# Patient Record
Sex: FEMALE | Race: WHITE | NOT HISPANIC OR LATINO | URBAN - METROPOLITAN AREA
[De-identification: names, ages, dates, MRNs, and addresses within clinical notes are randomized per-mention and may not be internally consistent; named-entity substitution may affect disease eponyms.]

---

## 2017-11-17 ENCOUNTER — INPATIENT (INPATIENT)
Facility: HOSPITAL | Age: 72
LOS: 2 days | Discharge: HOME | DRG: 312 | End: 2017-11-20
Attending: INTERNAL MEDICINE | Admitting: INTERNAL MEDICINE
Payer: MEDICARE

## 2017-11-17 VITALS
SYSTOLIC BLOOD PRESSURE: 148 MMHG | DIASTOLIC BLOOD PRESSURE: 77 MMHG | TEMPERATURE: 99 F | RESPIRATION RATE: 19 BRPM | OXYGEN SATURATION: 100 % | HEART RATE: 72 BPM

## 2017-11-17 DIAGNOSIS — J18.1 LOBAR PNEUMONIA, UNSPECIFIED ORGANISM: ICD-10-CM

## 2017-11-17 DIAGNOSIS — R55 SYNCOPE AND COLLAPSE: ICD-10-CM

## 2017-11-17 DIAGNOSIS — Z29.9 ENCOUNTER FOR PROPHYLACTIC MEASURES, UNSPECIFIED: ICD-10-CM

## 2017-11-17 DIAGNOSIS — I10 ESSENTIAL (PRIMARY) HYPERTENSION: ICD-10-CM

## 2017-11-17 LAB
ALBUMIN SERPL ELPH-MCNC: 3.7 G/DL — SIGNIFICANT CHANGE UP (ref 3.5–5)
ALP SERPL-CCNC: 62 U/L — SIGNIFICANT CHANGE UP (ref 40–120)
ALT FLD-CCNC: 23 U/L DA — SIGNIFICANT CHANGE UP (ref 10–60)
ANION GAP SERPL CALC-SCNC: 9 MMOL/L — SIGNIFICANT CHANGE UP (ref 5–17)
APTT BLD: 26.4 SEC — LOW (ref 27.5–37.4)
AST SERPL-CCNC: 18 U/L — SIGNIFICANT CHANGE UP (ref 10–40)
BASOPHILS # BLD AUTO: 0.1 K/UL — SIGNIFICANT CHANGE UP (ref 0–0.2)
BASOPHILS NFR BLD AUTO: 1.2 % — SIGNIFICANT CHANGE UP (ref 0–2)
BILIRUB SERPL-MCNC: 0.3 MG/DL — SIGNIFICANT CHANGE UP (ref 0.2–1.2)
BUN SERPL-MCNC: 14 MG/DL — SIGNIFICANT CHANGE UP (ref 7–18)
CALCIUM SERPL-MCNC: 8.2 MG/DL — LOW (ref 8.4–10.5)
CHLORIDE SERPL-SCNC: 106 MMOL/L — SIGNIFICANT CHANGE UP (ref 96–108)
CK MB BLD-MCNC: 1.4 % — SIGNIFICANT CHANGE UP (ref 0–3.5)
CK MB CFR SERPL CALC: 1.1 NG/ML — SIGNIFICANT CHANGE UP (ref 0–3.6)
CK SERPL-CCNC: 78 U/L — SIGNIFICANT CHANGE UP (ref 21–215)
CO2 SERPL-SCNC: 26 MMOL/L — SIGNIFICANT CHANGE UP (ref 22–31)
CREAT SERPL-MCNC: 0.75 MG/DL — SIGNIFICANT CHANGE UP (ref 0.5–1.3)
EOSINOPHIL # BLD AUTO: 0.1 K/UL — SIGNIFICANT CHANGE UP (ref 0–0.5)
EOSINOPHIL NFR BLD AUTO: 1.2 % — SIGNIFICANT CHANGE UP (ref 0–6)
GLUCOSE SERPL-MCNC: 103 MG/DL — HIGH (ref 70–99)
HCT VFR BLD CALC: 44.7 % — SIGNIFICANT CHANGE UP (ref 34.5–45)
HGB BLD-MCNC: 14.4 G/DL — SIGNIFICANT CHANGE UP (ref 11.5–15.5)
INR BLD: 1.04 RATIO — SIGNIFICANT CHANGE UP (ref 0.88–1.16)
LYMPHOCYTES # BLD AUTO: 3.1 K/UL — SIGNIFICANT CHANGE UP (ref 1–3.3)
LYMPHOCYTES # BLD AUTO: 48.3 % — HIGH (ref 13–44)
MCHC RBC-ENTMCNC: 30.7 PG — SIGNIFICANT CHANGE UP (ref 27–34)
MCHC RBC-ENTMCNC: 32.1 GM/DL — SIGNIFICANT CHANGE UP (ref 32–36)
MCV RBC AUTO: 95.4 FL — SIGNIFICANT CHANGE UP (ref 80–100)
MONOCYTES # BLD AUTO: 0.4 K/UL — SIGNIFICANT CHANGE UP (ref 0–0.9)
MONOCYTES NFR BLD AUTO: 6.9 % — SIGNIFICANT CHANGE UP (ref 2–14)
NEUTROPHILS # BLD AUTO: 2.7 K/UL — SIGNIFICANT CHANGE UP (ref 1.8–7.4)
NEUTROPHILS NFR BLD AUTO: 42.4 % — LOW (ref 43–77)
PLATELET # BLD AUTO: 104 K/UL — LOW (ref 150–400)
POTASSIUM SERPL-MCNC: 3.5 MMOL/L — SIGNIFICANT CHANGE UP (ref 3.5–5.3)
POTASSIUM SERPL-SCNC: 3.5 MMOL/L — SIGNIFICANT CHANGE UP (ref 3.5–5.3)
PROT SERPL-MCNC: 7.3 G/DL — SIGNIFICANT CHANGE UP (ref 6–8.3)
PROTHROM AB SERPL-ACNC: 11.3 SEC — SIGNIFICANT CHANGE UP (ref 9.8–12.7)
RBC # BLD: 4.69 M/UL — SIGNIFICANT CHANGE UP (ref 3.8–5.2)
RBC # FLD: 12.6 % — SIGNIFICANT CHANGE UP (ref 10.3–14.5)
SODIUM SERPL-SCNC: 141 MMOL/L — SIGNIFICANT CHANGE UP (ref 135–145)
TROPONIN I SERPL-MCNC: <0.015 NG/ML — SIGNIFICANT CHANGE UP (ref 0–0.04)
WBC # BLD: 6.3 K/UL — SIGNIFICANT CHANGE UP (ref 3.8–10.5)
WBC # FLD AUTO: 6.3 K/UL — SIGNIFICANT CHANGE UP (ref 3.8–10.5)

## 2017-11-17 PROCEDURE — 70548 MR ANGIOGRAPHY NECK W/DYE: CPT | Mod: 26

## 2017-11-17 PROCEDURE — 70544 MR ANGIOGRAPHY HEAD W/O DYE: CPT | Mod: 26,59

## 2017-11-17 PROCEDURE — 71010: CPT | Mod: 26

## 2017-11-17 PROCEDURE — 70450 CT HEAD/BRAIN W/O DYE: CPT | Mod: 26

## 2017-11-17 PROCEDURE — 70551 MRI BRAIN STEM W/O DYE: CPT | Mod: 26

## 2017-11-17 PROCEDURE — 99285 EMERGENCY DEPT VISIT HI MDM: CPT

## 2017-11-17 PROCEDURE — 93010 ELECTROCARDIOGRAM REPORT: CPT

## 2017-11-17 RX ORDER — AZITHROMYCIN 500 MG/1
TABLET, FILM COATED ORAL
Qty: 0 | Refills: 0 | Status: DISCONTINUED | OUTPATIENT
Start: 2017-11-17 | End: 2017-11-20

## 2017-11-17 RX ORDER — AZITHROMYCIN 500 MG/1
500 TABLET, FILM COATED ORAL EVERY 24 HOURS
Qty: 0 | Refills: 0 | Status: DISCONTINUED | OUTPATIENT
Start: 2017-11-18 | End: 2017-11-20

## 2017-11-17 RX ORDER — CEFTRIAXONE 500 MG/1
1 INJECTION, POWDER, FOR SOLUTION INTRAMUSCULAR; INTRAVENOUS EVERY 24 HOURS
Qty: 0 | Refills: 0 | Status: DISCONTINUED | OUTPATIENT
Start: 2017-11-18 | End: 2017-11-20

## 2017-11-17 RX ORDER — CEFTRIAXONE 500 MG/1
1 INJECTION, POWDER, FOR SOLUTION INTRAMUSCULAR; INTRAVENOUS ONCE
Qty: 0 | Refills: 0 | Status: COMPLETED | OUTPATIENT
Start: 2017-11-17 | End: 2017-11-17

## 2017-11-17 RX ORDER — AZITHROMYCIN 500 MG/1
500 TABLET, FILM COATED ORAL ONCE
Qty: 0 | Refills: 0 | Status: COMPLETED | OUTPATIENT
Start: 2017-11-17 | End: 2017-11-17

## 2017-11-17 RX ORDER — AMLODIPINE BESYLATE 2.5 MG/1
2.5 TABLET ORAL DAILY
Qty: 0 | Refills: 0 | Status: DISCONTINUED | OUTPATIENT
Start: 2017-11-17 | End: 2017-11-19

## 2017-11-17 RX ORDER — INFLUENZA VIRUS VACCINE 15; 15; 15; 15 UG/.5ML; UG/.5ML; UG/.5ML; UG/.5ML
0.5 SUSPENSION INTRAMUSCULAR ONCE
Qty: 0 | Refills: 0 | Status: DISCONTINUED | OUTPATIENT
Start: 2017-11-17 | End: 2017-11-20

## 2017-11-17 RX ORDER — CEFTRIAXONE 500 MG/1
INJECTION, POWDER, FOR SOLUTION INTRAMUSCULAR; INTRAVENOUS
Qty: 0 | Refills: 0 | Status: DISCONTINUED | OUTPATIENT
Start: 2017-11-17 | End: 2017-11-20

## 2017-11-17 RX ORDER — SODIUM CHLORIDE 9 MG/ML
3 INJECTION INTRAMUSCULAR; INTRAVENOUS; SUBCUTANEOUS ONCE
Qty: 0 | Refills: 0 | Status: COMPLETED | OUTPATIENT
Start: 2017-11-17 | End: 2017-11-17

## 2017-11-17 RX ADMIN — CEFTRIAXONE 100 GRAM(S): 500 INJECTION, POWDER, FOR SOLUTION INTRAMUSCULAR; INTRAVENOUS at 21:52

## 2017-11-17 RX ADMIN — AZITHROMYCIN 250 MILLIGRAM(S): 500 TABLET, FILM COATED ORAL at 18:46

## 2017-11-17 RX ADMIN — AZITHROMYCIN 250 MILLIGRAM(S): 500 TABLET, FILM COATED ORAL at 16:56

## 2017-11-17 RX ADMIN — CEFTRIAXONE 100 GRAM(S): 500 INJECTION, POWDER, FOR SOLUTION INTRAMUSCULAR; INTRAVENOUS at 16:56

## 2017-11-17 RX ADMIN — SODIUM CHLORIDE 3 MILLILITER(S): 9 INJECTION INTRAMUSCULAR; INTRAVENOUS; SUBCUTANEOUS at 12:28

## 2017-11-17 NOTE — H&P ADULT - PROBLEM SELECTOR PLAN 2
likely walking PNA  - Will get CT chest to evaluate further  - start ceftriaxone and azithromycin  - f/u blood cx and urine cx

## 2017-11-17 NOTE — H&P ADULT - ASSESSMENT
73yo F with no PMHx came in with c/o nausea, vomiting, palpitations and multiple syncopal episodes since 3 days. Patient states that 3 days ago she had about 3 episodes of vomiting, and after that she had generalized weakness followed by LOC for a few seconds. Patient also states having syncopal episode lasting couple of seconds yesterday at 4pm and today at 10 am. She lives with her  who is old. Son is unsure if the  knows about the syncopal episodes or not. Patient also states having palpitations, overall sweating, and  generalized weakness prior to the syncopal episodes. She denies any association with change in position. Per patient she has been having syncopal episodes lasting couple of seconds for the past 6 months, and would have 1 episode every month. But she never saw a physician for it. She denies any chest pain or SOB. Denies any falls or trauma. Denies tinnitus. Denies seizures. Denies cough, flu like symptoms, or fevers. Denies sick contacts or travel history.

## 2017-11-17 NOTE — H&P ADULT - HISTORY OF PRESENT ILLNESS
71yo F with no PMHx came in with c/o nausea, vomiting, palpitations and multiple syncopal episodes since 3 days. Patient states that 3 days ago she had about 3 episodes of vomiting, and after that she had generalized weakness followed by LOC for a few seconds. Patient also states having syncopal episode lasting couple of seconds yesterday at 4pm and today at 10 am. She lives with her  who is old. Son is unsure if the  knows about the syncopal episodes or not. Patient also states having palpitations, overall sweating, and  generalized weakness prior to the syncopal episodes. She denies any association with change in position. Per patient she has been having syncopal episodes lasting couple of seconds for the past 6 months, and would have 1 episode every month. But she never saw a physician for it. She denies any chest pain or SOB. Denies any falls or trauma. Denies tinnitus. Denies seizures. Denies cough, flu like symptoms, or fevers. Denies sick contacts or travel history.     PSHx: not significant  Social Hx: Denies smoking, alcohol or illicit drug use. Walks independently. Lives at home with her   Family Hx: Brother: prostate cancer  Allergies: NKDA  Code status: Full

## 2017-11-17 NOTE — H&P ADULT - NSHPREVIEWOFSYSTEMS_GEN_ALL_CORE
REVIEW OF SYSTEMS:  CONSTITUTIONAL: No fever, weight loss, or fatigue  EYES: No eye pain, visual disturbances, or discharge  ENMT:  No difficulty hearing, tinnitus, vertigo; No sinus or throat pain  RESPIRATORY: No cough, wheezing, chills or hemoptysis; No shortness of breath  CARDIOVASCULAR: Palpitations, dizziness.  No chest pain, dizziness, or leg swelling  GASTROINTESTINAL: No abdominal or epigastric pain. No nausea, vomiting, or hematemesis; No diarrhea or constipation. No melena or hematochezia.  GENITOURINARY: No dysuria, frequency, hematuria, or incontinence  NEUROLOGICAL: No headaches, memory loss, loss of strength, numbness, or tremors  SKIN: No itching, burning, rashes, or lesions   LYMPH NODES: No enlarged glands  MUSCULOSKELETAL: No joint pain or swelling; No muscle, back, or extremity pain

## 2017-11-17 NOTE — H&P ADULT - PROBLEM SELECTOR PLAN 1
Autonomic dysfunction vs arrythmias (e.g SVT) vs carotid stenosis vs TIA/Stroke  EKG- NSR with TWI in aVR and V1 (No baseline EKG)  T1- negative  Orthostatics- positive  CT head- neg  - Admit to tele  - Trend CE  - MRI head  - MRA neck  - ECHO  - PT eval

## 2017-11-17 NOTE — ED PROVIDER NOTE - OBJECTIVE STATEMENT
73 y/o F pt with no significant PMHx sent to ED for admission by PMD for multiple syncopal episodes x 4 days; pt reports chest pain, HA, and diaphoresis shortly before she synopsizes. Last syncope episode occurred at 10:00 AM today while at PMD. Pt reports that she has been experiencing these symptoms for the past 6 months, but has not followed up with a doctor until today. Pt currently denies shortness of breath, abd pain, nausea, vomiting, diarrhea, focal weakness, or any other complaints. NKDA. 73 y/o F pt with no significant PMHx sent to ED for admission by PMD Thmo Samuels for multiple syncopal episodes x 4 days; pt reports chest pain, HA, and diaphoresis shortly before she synopsizes. Last syncope episode occurred at 10:00 AM today while at PMD. Pt reports that she has been experiencing these symptoms for the past 6 months, but has seen a doctor about it until today. Pt currently denies shortness of breath, abd pain, nausea, vomiting, diarrhea, focal weakness, or any other complaints. NKDA.

## 2017-11-17 NOTE — ED PROVIDER NOTE - CARDIAC, MLM
Normal rate, regular rhythm.  Heart sounds S1, S2.  No murmurs, rubs or gallops. No pedal edema, nml pedal pulses.

## 2017-11-17 NOTE — H&P ADULT - ATTENDING COMMENTS
Above  noted  patient  is  73 yo lady brought to ER with c/o Elevated  blood pressure  associate with cold sweats extreme fatigue and weakness  and  possible syncope episodes  several time    Patient is without significant PMH does not take  medications  Patient  recently had  mammogram with some abnormalities  and became very emotional  Repeated  Mammogram was negative  as  per  patient  Radiology evaluation blood tests and  EKG reports reviewed    Impression    Hypertensive Urgency  Syncope    Right lower lobe  infiltrate Possible  Pneumonia walking Pneumonia agree with antibiotics  at present time  suggest CT chest   Admit to telemetry as per protocol follow  MRA and  ECHO report    Adjust BP meds Cardiology evaluation GI DVT prophylaxis

## 2017-11-17 NOTE — ED PROVIDER NOTE - CARE PLAN
Principal Discharge DX:	Syncope, unspecified syncope type  Secondary Diagnosis:	Pneumonia of right lower lobe due to infectious organism

## 2017-11-17 NOTE — ED ADULT NURSE NOTE - OBJECTIVE STATEMENT
pt stated chest pain started 2 days ago, pt stated that shes unable tolerate any food or liquid due to numerous episode of vomiting and nausea. pt fainted once

## 2017-11-17 NOTE — H&P ADULT - NSHPLABSRESULTS_GEN_ALL_CORE
14.4   6.3   )-----------( 104      ( 17 Nov 2017 11:47 )             44.7   11-17    141  |  106  |  14  ----------------------------<  103<H>  3.5   |  26  |  0.75    Ca    8.2<L>      17 Nov 2017 11:47    TPro  7.3  /  Alb  3.7  /  TBili  0.3  /  DBili  x   /  AST  18  /  ALT  23  /  AlkPhos  62  11-17    < from: CT Head No Cont (11.17.17 @ 13:21) >      EXAM:  CT BRAIN                            PROCEDURE DATE:  11/17/2017          INTERPRETATION:  CT HEAD WITHOUT CONTRAST    HISTORY: syncope, headache.    COMPARISON: None available.    TECHNIQUE: Noncontrast axial CT head was obtained from the skull base to   vertex.    FINDINGS:  There is no evidence of acute intracranial hemorrhage, mass effect or   midline shift. No CT evidence of acute large territory vascular infarct.   The ventricles and cortical sulci are within normal limits for age.   Scattered hypodensities in the periventricular white matter are   nonspecific, but likely sequela of small vessel ischemic disease.   Intracranial atherosclerotic calcifications are present.    There is opacification of a left posterior ethmoid air cell. The mastoid   air cells are well aerated    IMPRESSION:   No acute intracranial hemorrhage, mass effect or midline shift.    < end of copied text >      < from: Xray Chest 1 View AP- PORTABLE-Urgent (11.17.17 @ 11:38) >      EXAM:  CHEST-PORTABLE URGENT                            PROCEDURE DATE:  11/17/2017          INTERPRETATION:  cough    A frontal chest film demonstrates a right lower lobe infiltrate.    The heart size and vascular markings are within normal limitsfor   technique.      No mediastinal or hilar adenopathy is suggested. .     The osseous structures appear intact intact.     IMPRESSION:  Right lower lobe infiltrate.    < end of copied text >

## 2017-11-17 NOTE — H&P ADULT - NSHPPHYSICALEXAM_GEN_ALL_CORE
Vital Signs Last 24 Hrs  T(C): 37 (17 Nov 2017 12:34), Max: 37 (17 Nov 2017 12:34)  T(F): 98.6 (17 Nov 2017 12:34), Max: 98.6 (17 Nov 2017 12:34)  HR: 72 (17 Nov 2017 12:34) (72 - 72)  BP: 148/77 (17 Nov 2017 12:34) (148/77 - 148/77)  BP(mean): --  RR: 16 (17 Nov 2017 15:45) (16 - 19)  SpO2: 100% (17 Nov 2017 15:45) (100% - 100%)    GENERAL: NAD  HEAD:  Atraumatic, Normocephalic  EYES: EOMI, PERRLA, conjunctiva and sclera clear  ENMT: Moist mucous membranes  NECK: Supple, No JVD, Normal thyroid  NERVOUS SYSTEM:  Alert & Oriented X3, strength 5/5 on all extremities, no nystagmus  CHEST/LUNG: Clear to auscultation bilaterally; No rales, rhonchi, wheezing, or rubs  HEART: Regular rate and rhythm; No murmurs, rubs, or gallops  ABDOMEN: Soft, Nontender, Nondistended; Bowel sounds present  EXTREMITIES:  2+ Peripheral Pulses, No  edema  LYMPH: No lymphadenopathy noted  SKIN: No rashes or lesions

## 2017-11-17 NOTE — ED PROVIDER NOTE - MEDICAL DECISION MAKING DETAILS
Pt presents with multiple syncopal episodes; pt will require admission for cardiac evaluation. Pt also has PNA, will treat with IV ABx. PNA is likely not the cause of presenting symptoms due to symptoms ongoing for the last 6 months.

## 2017-11-18 LAB
ANION GAP SERPL CALC-SCNC: 7 MMOL/L — SIGNIFICANT CHANGE UP (ref 5–17)
BUN SERPL-MCNC: 13 MG/DL — SIGNIFICANT CHANGE UP (ref 7–18)
CALCIUM SERPL-MCNC: 8.7 MG/DL — SIGNIFICANT CHANGE UP (ref 8.4–10.5)
CHLORIDE SERPL-SCNC: 106 MMOL/L — SIGNIFICANT CHANGE UP (ref 96–108)
CO2 SERPL-SCNC: 29 MMOL/L — SIGNIFICANT CHANGE UP (ref 22–31)
CREAT SERPL-MCNC: 0.66 MG/DL — SIGNIFICANT CHANGE UP (ref 0.5–1.3)
GLUCOSE BLDC GLUCOMTR-MCNC: 100 MG/DL — HIGH (ref 70–99)
GLUCOSE SERPL-MCNC: 91 MG/DL — SIGNIFICANT CHANGE UP (ref 70–99)
POTASSIUM SERPL-MCNC: 3.8 MMOL/L — SIGNIFICANT CHANGE UP (ref 3.5–5.3)
POTASSIUM SERPL-SCNC: 3.8 MMOL/L — SIGNIFICANT CHANGE UP (ref 3.5–5.3)
SODIUM SERPL-SCNC: 142 MMOL/L — SIGNIFICANT CHANGE UP (ref 135–145)
TROPONIN I SERPL-MCNC: <0.015 NG/ML — SIGNIFICANT CHANGE UP (ref 0–0.04)

## 2017-11-18 PROCEDURE — 71250 CT THORAX DX C-: CPT | Mod: 26

## 2017-11-18 PROCEDURE — 93306 TTE W/DOPPLER COMPLETE: CPT | Mod: 26

## 2017-11-18 RX ORDER — PANTOPRAZOLE SODIUM 20 MG/1
40 TABLET, DELAYED RELEASE ORAL
Qty: 0 | Refills: 0 | Status: DISCONTINUED | OUTPATIENT
Start: 2017-11-18 | End: 2017-11-20

## 2017-11-18 RX ORDER — LORATADINE 10 MG/1
10 TABLET ORAL ONCE
Qty: 0 | Refills: 0 | Status: COMPLETED | OUTPATIENT
Start: 2017-11-18 | End: 2017-11-18

## 2017-11-18 RX ORDER — HEPARIN SODIUM 5000 [USP'U]/ML
5000 INJECTION INTRAVENOUS; SUBCUTANEOUS EVERY 12 HOURS
Qty: 0 | Refills: 0 | Status: DISCONTINUED | OUTPATIENT
Start: 2017-11-18 | End: 2017-11-20

## 2017-11-18 RX ORDER — ACETAMINOPHEN 500 MG
650 TABLET ORAL EVERY 6 HOURS
Qty: 0 | Refills: 0 | Status: DISCONTINUED | OUTPATIENT
Start: 2017-11-18 | End: 2017-11-20

## 2017-11-18 RX ADMIN — HEPARIN SODIUM 5000 UNIT(S): 5000 INJECTION INTRAVENOUS; SUBCUTANEOUS at 17:49

## 2017-11-18 RX ADMIN — CEFTRIAXONE 100 GRAM(S): 500 INJECTION, POWDER, FOR SOLUTION INTRAMUSCULAR; INTRAVENOUS at 18:42

## 2017-11-18 RX ADMIN — AZITHROMYCIN 250 MILLIGRAM(S): 500 TABLET, FILM COATED ORAL at 18:42

## 2017-11-18 RX ADMIN — AMLODIPINE BESYLATE 2.5 MILLIGRAM(S): 2.5 TABLET ORAL at 06:14

## 2017-11-18 RX ADMIN — LORATADINE 10 MILLIGRAM(S): 10 TABLET ORAL at 17:50

## 2017-11-18 NOTE — PROGRESS NOTE ADULT - SUBJECTIVE AND OBJECTIVE BOX
Patient was seen and examined  Patient is a 72y old  Female who presents with a chief complaint of Syncope (17 Nov 2017 15:40)      INTERVAL HPI/OVERNIGHT EVENTS:  T(C): 36.9 (11-18-17 @ 08:29), Max: 37 (11-17-17 @ 12:34)  HR: 73 (11-18-17 @ 08:29) (70 - 82)  BP: 155/71 (11-18-17 @ 08:29) (131/58 - 155/71)  RR: 16 (11-18-17 @ 08:29) (16 - 19)  SpO2: 100% (11-18-17 @ 08:29) (96% - 100%)  Wt(kg): --  I&O's Summary    18 Nov 2017 07:01  -  18 Nov 2017 09:11  --------------------------------------------------------  IN: 200 mL / OUT: 0 mL / NET: 200 mL        LABS:                        14.4   6.3   )-----------( 104      ( 17 Nov 2017 11:47 )             44.7     11-18    142  |  106  |  13  ----------------------------<  91  3.8   |  29  |  0.66    Ca    8.7      18 Nov 2017 06:02    TPro  7.3  /  Alb  3.7  /  TBili  0.3  /  DBili  x   /  AST  18  /  ALT  23  /  AlkPhos  62  11-17    PT/INR - ( 17 Nov 2017 11:47 )   PT: 11.3 sec;   INR: 1.04 ratio         PTT - ( 17 Nov 2017 11:47 )  PTT:26.4 sec    CAPILLARY BLOOD GLUCOSE      POCT Blood Glucose.: 100 mg/dL (18 Nov 2017 07:48)              MEDICATIONS  (STANDING):  amLODIPine   Tablet 2.5 milliGRAM(s) Oral daily  azithromycin  IVPB      azithromycin  IVPB 500 milliGRAM(s) IV Intermittent every 24 hours  cefTRIAXone   IVPB 1 Gram(s) IV Intermittent every 24 hours  cefTRIAXone   IVPB      influenza   Vaccine 0.5 milliLiter(s) IntraMuscular once    MEDICATIONS  (PRN):      RADIOLOGY & ADDITIONAL TESTS:    Imaging Personally Reviewed:  [ ] YES  [ ] NO    REVIEW OF SYSTEMS:  CONSTITUTIONAL: No fever, weight loss, or fatigue  EYES: No eye pain, visual disturbances, or discharge  ENMT:  No difficulty hearing, tinnitus, vertigo; No sinus or throat pain  NECK: No pain or stiffness  BREASTS: No pain, masses, or nipple discharge  RESPIRATORY: No cough, wheezing, chills or hemoptysis; No shortness of breath  CARDIOVASCULAR: No chest pain, palpitations, dizziness, or leg swelling  GASTROINTESTINAL: No abdominal or epigastric pain. No nausea, vomiting, or hematemesis; No diarrhea or constipation. No melena or hematochezia.  GENITOURINARY: No dysuria, frequency, hematuria, or incontinence  NEUROLOGICAL: No headaches, memory loss, loss of strength, numbness, or tremors  SKIN: No itching, burning, rashes, or lesions   LYMPH NODES: No enlarged glands  ENDOCRINE: No heat or cold intolerance; No hair loss  MUSCULOSKELETAL: No joint pain or swelling; No muscle, back, or extremity pain  PSYCHIATRIC: No depression, anxiety, mood swings, or difficulty sleeping  HEME/LYMPH: No easy bruising, or bleeding gums  ALLERY AND IMMUNOLOGIC: No hives or eczema      Consultant(s) Notes Reviewed:  [ x ] YES  [ ] NO    PHYSICAL EXAM:  GENERAL: NAD, well-groomed, well-developed  HEAD:  Atraumatic, Normocephalic  EYES: EOMI, PERRLA, conjunctiva and sclera clear  ENMT: No tonsillar erythema, exudates, or enlargement; Moist mucous membranes, Good dentition, No lesions  NECK: Supple, No JVD, Normal thyroid  NERVOUS SYSTEM:  Alert & Oriented X3, Good concentration; Motor Strength 5/5 B/L upper and lower extremities; DTRs 2+ intact and symmetric  CHEST/LUNG: Clear to percussion bilaterally; No rales, rhonchi, wheezing, or rubs  HEART: Regular rate and rhythm; No murmurs, rubs, or gallops  ABDOMEN: Soft, Nontender, Nondistended; Bowel sounds present  EXTREMITIES:  2+ Peripheral Pulses, No clubbing, cyanosis, or edema  LYMPH: No lymphadenopathy noted  SKIN: No rashes or lesions    Care Discussed with Consultants/Other Providers [ x] YES  [ ] NO

## 2017-11-18 NOTE — CONSULT NOTE ADULT - SUBJECTIVE AND OBJECTIVE BOX
CHIEF COMPLAINT:Patient is a 72y old  Female who presents with a chief complaint of Syncope.      HPI:  73yo F with no PMHx came in with c/o nausea, vomiting, palpitations and multiple syncopal episodes since 3 days. Patient states that 3 days ago she had about 3 episodes of vomiting, and after that she had generalized weakness followed by LOC for a few seconds. Patient also states having syncopal episode lasting couple of seconds yesterday at 4pm and today at 10 am. She lives with her  who is old. Son is unsure if the  knows about the syncopal episodes or not. Patient also states having palpitations, overall sweating, and  generalized weakness prior to the syncopal episodes. She denies any association with change in position. Per patient she has been having syncopal episodes lasting couple of seconds for the past 6 months, and would have 1 episode every month. But she never saw a physician for it. She denies any chest pain or SOB. Denies any falls or trauma. Denies tinnitus. Denies seizures. Denies cough, flu like symptoms, or fevers. Denies sick contacts or travel history.         PAST MEDICAL & SURGICAL HISTORY:  No pertinent past medical history  No significant past surgical history      MEDICATIONS  (STANDING):  amLODIPine   Tablet 2.5 milliGRAM(s) Oral daily  azithromycin  IVPB      azithromycin  IVPB 500 milliGRAM(s) IV Intermittent every 24 hours  cefTRIAXone   IVPB 1 Gram(s) IV Intermittent every 24 hours  cefTRIAXone   IVPB      influenza   Vaccine 0.5 milliLiter(s) IntraMuscular once    MEDICATIONS  (PRN):      FAMILY HISTORY:Brother: prostate cancer      SOCIAL HISTORY:    [X ] Non-smoker    [X ] Alcohol-social    Allergies    No Known Allergies    Intolerances    	    REVIEW OF SYSTEMS:  CONSTITUTIONAL: No fever, weight loss, or fatigue  EYES: No eye pain, visual disturbances, or discharge  ENT:  No difficulty hearing, tinnitus, vertigo; No sinus or throat pain  NECK: No pain or stiffness  RESPIRATORY: No cough, wheezing, chills or hemoptysis; No Shortness of Breath  CARDIOVASCULAR: No chest pain, palpitations, + passing out  GASTROINTESTINAL: No abdominal or epigastric pain. N+nausea, vomiting; No diarrhea or constipation. No melena or hematochezia.  GENITOURINARY: No dysuria, frequency, hematuria, or incontinence  NEUROLOGICAL: No headaches, memory loss, loss of strength, numbness, or tremors  SKIN: No itching, burning, rashes, or lesions   LYMPH Nodes: No enlarged glands  ENDOCRINE: No heat or cold intolerance; No hair loss  MUSCULOSKELETAL: No joint pain or swelling; No muscle, back, or extremity pain  PSYCHIATRIC: No depression, anxiety, mood swings, or difficulty sleeping  HEME/LYMPH: No easy bruising, or bleeding gums  ALLERGY AND IMMUNOLOGIC: No hives or eczema	      PHYSICAL EXAM:  T(C): 36.9 (11-18-17 @ 08:29), Max: 37 (11-17-17 @ 12:34)  HR: 73 (11-18-17 @ 08:29) (70 - 82)  BP: 155/71 (11-18-17 @ 08:29) (131/58 - 155/71)  RR: 16 (11-18-17 @ 08:29) (16 - 19)  SpO2: 100% (11-18-17 @ 08:29) (96% - 100%)  Wt(kg): --  I&O's Summary    18 Nov 2017 07:01  -  18 Nov 2017 09:28  --------------------------------------------------------  IN: 200 mL / OUT: 0 mL / NET: 200 mL        Appearance: Normal	  HEENT:   Normal oral mucosa, PERRL, EOMI	  Lymphatic: No lymphadenopathy  Cardiovascular: Normal S1 S2, No JVD, No murmurs, No edema  Respiratory: Lungs clear to auscultation	  Psychiatry: A & O x 3, Mood & affect appropriate  Gastrointestinal:  Soft, Non-tender, + BS	  Skin: No rashes, No ecchymoses, No cyanosis	  Neurologic: Non-focal  Extremities: Normal range of motion, No clubbing, cyanosis or edema  Vascular: Peripheral pulses palpable 2+ bilaterally        ECG:  	nsr    	  LABS:	 	    CARDIAC MARKERS:  CARDIAC MARKERS ( 18 Nov 2017 06:02 )  <0.015 ng/mL / x     / x     / x     / x      CARDIAC MARKERS ( 17 Nov 2017 17:10 )  <0.015 ng/mL / x     / 78 U/L / x     / 1.1 ng/mL  CARDIAC MARKERS ( 17 Nov 2017 11:47 )  <0.015 ng/mL / x     / x     / x     / x                             14.4   6.3   )-----------( 104      ( 17 Nov 2017 11:47 )             44.7     11-18    142  |  106  |  13  ----------------------------<  91  3.8   |  29  |  0.66    Ca    8.7      18 Nov 2017 06:02    TPro  7.3  /  Alb  3.7  /  TBili  0.3  /  DBili  x   /  AST  18  /  ALT  23  /  AlkPhos  62  11-17    MRI and MRA-.    EXAM:  CHEST-PORTABLE URGENT                            PROCEDURE DATE:  11/17/2017          INTERPRETATION:  cough    A frontal chest film demonstrates a right lower lobe infiltrate.    The heart size and vascular markings are within normal limitsfor   technique.      No mediastinal or hilar adenopathy is suggested. .     The osseous structures appear intact intact.     IMPRESSION:  Right lower lobe infiltrate.

## 2017-11-18 NOTE — CONSULT NOTE ADULT - SUBJECTIVE AND OBJECTIVE BOX
Neurology consult    ALEX [id number]    HPI:  71yo F with no PMHx came in with c/o nausea, vomiting, palpitations and multiple syncopal episodes since 3 days. Patient states that 3 days ago she had about 3 episodes of vomiting, and after that she had generalized weakness followed by LOC for a few seconds. Patient also states having syncopal episode lasting couple of seconds yesterday at 4pm and today at 10 am. She lives with her  who is old. Son is unsure if the  knows about the syncopal episodes or not. Patient also states having palpitations, overall sweating, and  generalized weakness prior to the syncopal episodes. She denies any association with change in position. Per patient she has been having syncopal episodes lasting couple of seconds for the past 6 months, and would have 1 episode every month. But she never saw a physician for it. She denies any chest pain or SOB. Denies any falls or trauma. Denies tinnitus. Denies seizures. Denies cough, flu like symptoms, or fevers. Denies sick contacts or travel history.     PSHx: not significant  Social Hx: Denies smoking, alcohol or illicit drug use. Walks independently. Lives at home with her   Family Hx: Brother: prostate cancer  Allergies: NKDA  Code status: Full (17 Nov 2017 15:40)          MEDICATIONS    acetaminophen   Tablet. 650 milliGRAM(s) Oral every 6 hours PRN  amLODIPine   Tablet 2.5 milliGRAM(s) Oral daily  azithromycin  IVPB      azithromycin  IVPB 500 milliGRAM(s) IV Intermittent every 24 hours  cefTRIAXone   IVPB 1 Gram(s) IV Intermittent every 24 hours  cefTRIAXone   IVPB      heparin  Injectable 5000 Unit(s) SubCutaneous every 12 hours  influenza   Vaccine 0.5 milliLiter(s) IntraMuscular once  loratadine 10 milliGRAM(s) Oral once  pantoprazole    Tablet 40 milliGRAM(s) Oral before breakfast         Family history: No history of dementia, strokes, or seizures   FAMILY HISTORY:    SOCIAL HISTORY -- No history of tobacco or alcohol use     Allergies    No Known Allergies    Intolerances            Vital Signs Last 24 Hrs  T(C): 36.4 (18 Nov 2017 15:56), Max: 37 (18 Nov 2017 12:51)  T(F): 97.6 (18 Nov 2017 15:56), Max: 98.6 (18 Nov 2017 12:51)  HR: 73 (18 Nov 2017 15:56) (68 - 82)  BP: 159/79 (18 Nov 2017 15:56) (131/58 - 159/79)  BP(mean): --  RR: 18 (18 Nov 2017 15:56) (16 - 18)  SpO2: 97% (18 Nov 2017 15:56) (96% - 100%)      REVIEW OF SYSTEMS:    Constitutional: No fever, chills, fatigue, weakness  Eyes: no eye pain, visual disturbances, or discharge  ENT:  No difficulty hearing, tinnitus, vertigo; No sinus or throat pain  Neck: No pain or stiffness  Respiratory: No cough, dyspnea, wheezing ,   Gastrointestinal: No abdominal or epigastric pain. No nausea, vomiting  No diarrhea or constipation.   Genitourinary: No dysuria, frequency, hematuria or incontinence  Neurological: No headaches, lightheadedness, vertigo, numbness or tremors  Psychiatric: No depression, anxiety, mood swings or difficulty sleeping  Musculoskeletal: No joint pain or swelling; No muscle, back or extremity pain  Skin: No itching, burning, rashes or lesions   Lymph Nodes: No enlarged glands  Endocrine: No heat or cold intolerance; No hair loss, No h/o diabetes or thyroid dysfunction      On Neurological Examination:    Mental Status - Patient is alert, awake, oriented X3.    Follows commands well and able to answer questions appropriately. Mood and affect  normal  Follow simple commands  Follow complex commands      Speech -   Fluent                         Cranial Nerves - Pupils 3 mm equal and reactive to light,   extraocular eye movements intact.     Motor Exam -   Right upper5/5  Left upper5/5  Right lower5/5  Left lower 5/5    With stimuli positive movement of all 4 extremities    Muscle tone - is normal all over. No asymmetry is seen.      Sensory    Bilateral intact to light touch    Gait -  normal  a    GENERAL Exam:     Nontoxic , No Acute Distress   	  HEENT:  normocephalic, atraumatic  		  LUNGS:	Clear bilaterally  No Wheeze  Decreased bilaterally  	  HEART:	 Normal S1S2   No murmur RRR        	  GI/ ABDOMEN:  Soft  Non tender    EXTREMITIES:   No Edema  No Clubbing  No Cyanosis No Edema    MUSCULOSKELETAL: Normal Range of Motion  	   SKIN:      Normal   No Ecchymosis               LABS:  CBC Full  -  ( 17 Nov 2017 11:47 )  WBC Count : 6.3 K/uL  Hemoglobin : 14.4 g/dL  Hematocrit : 44.7 %  Platelet Count - Automated : 104 K/uL  Mean Cell Volume : 95.4 fl  Mean Cell Hemoglobin : 30.7 pg  Mean Cell Hemoglobin Concentration : 32.1 gm/dL  Auto Neutrophil # : 2.7 K/uL  Auto Lymphocyte # : 3.1 K/uL  Auto Monocyte # : 0.4 K/uL  Auto Eosinophil # : 0.1 K/uL  Auto Basophil # : 0.1 K/uL  Auto Neutrophil % : 42.4 %  Auto Lymphocyte % : 48.3 %  Auto Monocyte % : 6.9 %  Auto Eosinophil % : 1.2 %  Auto Basophil % : 1.2 %      11-18    142  |  106  |  13  ----------------------------<  91  3.8   |  29  |  0.66    Ca    8.7      18 Nov 2017 06:02    TPro  7.3  /  Alb  3.7  /  TBili  0.3  /  DBili  x   /  AST  18  /  ALT  23  /  AlkPhos  62  11-17    Hemoglobin A1C:     LIVER FUNCTIONS - ( 17 Nov 2017 11:47 )  Alb: 3.7 g/dL / Pro: 7.3 g/dL / ALK PHOS: 62 U/L / ALT: 23 U/L DA / AST: 18 U/L / GGT: x           Vitamin B12   PT/INR - ( 17 Nov 2017 11:47 )   PT: 11.3 sec;   INR: 1.04 ratio         PTT - ( 17 Nov 2017 11:47 )  PTT:26.4 sec      RADIOLOGY< from: MR Head No Cont (11.17.17 @ 18:33) >  EXAM:  MR BRAIN                            PROCEDURE DATE:  11/17/2017          INTERPRETATION:  MRI brain without contrast    History syncope    Comparison CT performed earlier the same day    There is mild scattered chronic microvascular ischemicchange in the   periventricular white matter and stefanie without mass effect, cortical edema   or hydrocephalus. There is mild parietal cortical volume loss. There is   no evidence of acute infarct or previous parenchymal hemorrhage. The   orbital and sellar contents and cerebellar tonsils are within normal   limits.    IMPRESSION:    No acute findings      LIBORIO COON M.D., ATTENDING RADIOLOGIST  This document has been electronically signed. Nov 18 2017  8:19AM    EXAM:  MR ANGIO NECK IC                          EXAM:  MR ANGIO BRAIN                            PROCEDURE DATE:  11/17/2017          INTERPRETATION:  MRA head    History: Syncope    Technique 3-D TOF with 3D MIPs    There is no flow disturbance that would imply stenosis or aneurysm on   either side. The anterior, posterior and middle cerebral circulation   appears roughly symmetric.    Impression:  Normal study.       EXAM:  MR ANGIO NECK IC                          EXAM:  MR ANGIO BRAIN                            PROCEDURE DATE:  11/17/2017          INTERPRETATION:  MRA head    History: Syncope    Technique 3-D TOF with 3D MIPs    There is no flow disturbance that would imply stenosis or aneurysm on   either side. The anterior, posterior and middle cerebral circulation   appears roughly symmetric.    Impression:  Normal study.     EKG      < from: 12 Lead ECG (11.17.17 @ 11:14) >  Ventricular Rate 82 BPM    Atrial Rate 82 BPM    P-R Interval 164 ms    QRS Duration 82 ms     ms    QTc 413 ms    P Axis 51 degrees    R Axis 1 degrees    T Axis 41 degrees

## 2017-11-18 NOTE — CONSULT NOTE ADULT - ASSESSMENT
RECURRENT SYNCOPE RULE OUT CARDIAC WITH HISTORY OF PALPITATION     RULE OUT HYPOGLYCEMIC EPISODES AS PATIENT IS SKIPPING SOME MEALS     SEIZURE LESS LIKELY     PLAN CARDIAC WORK UP IN PROGRESS    NO MASS LESION OR CAROTID STENOSIS     EEG    DISCUSSED WITH HER SON IN THE EXAM ROOM.DISCUSSED WITH THE RESIDENT.

## 2017-11-18 NOTE — CONSULT NOTE ADULT - ASSESSMENT
Pt with no sig PMHX here with recurrent syncope,nausea,vomiting and RLL pneumonia.  1.Tele monitoring.  2.ABX.  3.Check Orthostatic BP q shift.  4.Echocardiogram.  5.Stress test-R/O Ischemia.  6.Check AM cortisol level.  7.GI and DVT prophylaxis.

## 2017-11-19 LAB
ANION GAP SERPL CALC-SCNC: 10 MMOL/L — SIGNIFICANT CHANGE UP (ref 5–17)
BASOPHILS # BLD AUTO: 0.1 K/UL — SIGNIFICANT CHANGE UP (ref 0–0.2)
BASOPHILS NFR BLD AUTO: 0.9 % — SIGNIFICANT CHANGE UP (ref 0–2)
BUN SERPL-MCNC: 9 MG/DL — SIGNIFICANT CHANGE UP (ref 7–18)
CALCIUM SERPL-MCNC: 8.5 MG/DL — SIGNIFICANT CHANGE UP (ref 8.4–10.5)
CHLORIDE SERPL-SCNC: 103 MMOL/L — SIGNIFICANT CHANGE UP (ref 96–108)
CO2 SERPL-SCNC: 26 MMOL/L — SIGNIFICANT CHANGE UP (ref 22–31)
CORTIS AM PEAK SERPL-MCNC: 9.9 UG/DL — SIGNIFICANT CHANGE UP (ref 6–18.4)
CREAT SERPL-MCNC: 0.73 MG/DL — SIGNIFICANT CHANGE UP (ref 0.5–1.3)
EOSINOPHIL # BLD AUTO: 0.1 K/UL — SIGNIFICANT CHANGE UP (ref 0–0.5)
EOSINOPHIL NFR BLD AUTO: 1.1 % — SIGNIFICANT CHANGE UP (ref 0–6)
GLUCOSE SERPL-MCNC: 100 MG/DL — HIGH (ref 70–99)
HCT VFR BLD CALC: 45.6 % — HIGH (ref 34.5–45)
HGB BLD-MCNC: 15 G/DL — SIGNIFICANT CHANGE UP (ref 11.5–15.5)
LYMPHOCYTES # BLD AUTO: 3.2 K/UL — SIGNIFICANT CHANGE UP (ref 1–3.3)
LYMPHOCYTES # BLD AUTO: 42.9 % — SIGNIFICANT CHANGE UP (ref 13–44)
MCHC RBC-ENTMCNC: 31.1 PG — SIGNIFICANT CHANGE UP (ref 27–34)
MCHC RBC-ENTMCNC: 32.9 GM/DL — SIGNIFICANT CHANGE UP (ref 32–36)
MCV RBC AUTO: 94.4 FL — SIGNIFICANT CHANGE UP (ref 80–100)
MONOCYTES # BLD AUTO: 0.5 K/UL — SIGNIFICANT CHANGE UP (ref 0–0.9)
MONOCYTES NFR BLD AUTO: 6.6 % — SIGNIFICANT CHANGE UP (ref 2–14)
NEUTROPHILS # BLD AUTO: 3.7 K/UL — SIGNIFICANT CHANGE UP (ref 1.8–7.4)
NEUTROPHILS NFR BLD AUTO: 48.5 % — SIGNIFICANT CHANGE UP (ref 43–77)
PLATELET # BLD AUTO: 112 K/UL — LOW (ref 150–400)
POTASSIUM SERPL-MCNC: 4 MMOL/L — SIGNIFICANT CHANGE UP (ref 3.5–5.3)
POTASSIUM SERPL-SCNC: 4 MMOL/L — SIGNIFICANT CHANGE UP (ref 3.5–5.3)
RBC # BLD: 4.83 M/UL — SIGNIFICANT CHANGE UP (ref 3.8–5.2)
RBC # FLD: 12.3 % — SIGNIFICANT CHANGE UP (ref 10.3–14.5)
SODIUM SERPL-SCNC: 139 MMOL/L — SIGNIFICANT CHANGE UP (ref 135–145)
TSH SERPL-MCNC: 0.48 UU/ML — SIGNIFICANT CHANGE UP (ref 0.34–4.82)
VIT B12 SERPL-MCNC: 541 PG/ML — SIGNIFICANT CHANGE UP (ref 243–894)
WBC # BLD: 7.6 K/UL — SIGNIFICANT CHANGE UP (ref 3.8–10.5)
WBC # FLD AUTO: 7.6 K/UL — SIGNIFICANT CHANGE UP (ref 3.8–10.5)

## 2017-11-19 RX ORDER — METOPROLOL TARTRATE 50 MG
25 TABLET ORAL
Qty: 0 | Refills: 0 | Status: DISCONTINUED | OUTPATIENT
Start: 2017-11-19 | End: 2017-11-20

## 2017-11-19 RX ORDER — ASPIRIN/CALCIUM CARB/MAGNESIUM 324 MG
81 TABLET ORAL DAILY
Qty: 0 | Refills: 0 | Status: DISCONTINUED | OUTPATIENT
Start: 2017-11-19 | End: 2017-11-20

## 2017-11-19 RX ADMIN — HEPARIN SODIUM 5000 UNIT(S): 5000 INJECTION INTRAVENOUS; SUBCUTANEOUS at 17:13

## 2017-11-19 RX ADMIN — CEFTRIAXONE 100 GRAM(S): 500 INJECTION, POWDER, FOR SOLUTION INTRAMUSCULAR; INTRAVENOUS at 18:39

## 2017-11-19 RX ADMIN — AZITHROMYCIN 250 MILLIGRAM(S): 500 TABLET, FILM COATED ORAL at 17:15

## 2017-11-19 RX ADMIN — Medication 25 MILLIGRAM(S): at 11:44

## 2017-11-19 RX ADMIN — Medication 25 MILLIGRAM(S): at 17:13

## 2017-11-19 RX ADMIN — HEPARIN SODIUM 5000 UNIT(S): 5000 INJECTION INTRAVENOUS; SUBCUTANEOUS at 05:42

## 2017-11-19 RX ADMIN — Medication 81 MILLIGRAM(S): at 11:44

## 2017-11-19 RX ADMIN — PANTOPRAZOLE SODIUM 40 MILLIGRAM(S): 20 TABLET, DELAYED RELEASE ORAL at 06:59

## 2017-11-19 RX ADMIN — AMLODIPINE BESYLATE 2.5 MILLIGRAM(S): 2.5 TABLET ORAL at 05:42

## 2017-11-19 NOTE — PROGRESS NOTE ADULT - SUBJECTIVE AND OBJECTIVE BOX
CHIEF COMPLAINT: Patient is a 72y old  Female who presents with a chief complaint of Syncope. Pt appears comfortable.    	  REVIEW OF SYSTEMS:  CONSTITUTIONAL: No fever, weight loss, or fatigue  EYES: No eye pain, visual disturbances, or discharge  ENT:  No difficulty hearing, tinnitus, vertigo; No sinus or throat pain  NECK: No pain or stiffness  RESPIRATORY: No cough, wheezing, chills or hemoptysis; No Shortness of Breath  CARDIOVASCULAR: No chest pain, palpitations, passing out, dizziness, or leg swelling  GASTROINTESTINAL: No abdominal or epigastric pain. No nausea, vomiting, or hematemesis; No diarrhea or constipation. No melena or hematochezia.  GENITOURINARY: No dysuria, frequency, hematuria, or incontinence  NEUROLOGICAL: No headaches, memory loss, loss of strength, numbness, or tremors  SKIN: No itching, burning, rashes, or lesions   LYMPH Nodes: No enlarged glands  ENDOCRINE: No heat or cold intolerance; No hair loss  MUSCULOSKELETAL: No joint pain or swelling; No muscle, back, or extremity pain  PSYCHIATRIC: No depression, anxiety, mood swings, or difficulty sleeping  HEME/LYMPH: No easy bruising, or bleeding gums  ALLERGY AND IMMUNOLOGIC: No hives or eczema	      PHYSICAL EXAM:  T(C): 37 (11-19-17 @ 08:04), Max: 37.1 (11-19-17 @ 07:24)  HR: 86 (11-19-17 @ 07:24) (68 - 89)  BP: 135/71 (11-19-17 @ 07:24) (128/66 - 159/79)  RR: 18 (11-19-17 @ 08:04) (16 - 18)  SpO2: 100% (11-19-17 @ 08:04) (97% - 100%)    18 Nov 2017 07:01  -  19 Nov 2017 07:00  --------------------------------------------------------  IN: 430 mL / OUT: 0 mL / NET: 430 mL    19 Nov 2017 07:01  -  19 Nov 2017 10:25  --------------------------------------------------------  IN: 230 mL / OUT: 0 mL / NET: 230 mL        Appearance: Normal	  HEENT:   Normal oral mucosa, PERRL, EOMI	  Lymphatic: No lymphadenopathy  Cardiovascular: Normal S1 S2, No JVD, No murmurs, No edema  Respiratory: Lungs clear to auscultation	  Psychiatry: A & O x 3, Mood & affect appropriate  Gastrointestinal:  Soft, Non-tender, + BS	  Skin: No rashes, No ecchymoses, No cyanosis	  Neurologic: Non-focal  Extremities: Normal range of motion, No clubbing, cyanosis or edema  Vascular: Peripheral pulses palpable 2+ bilaterally    MEDICATIONS  (STANDING):  amLODIPine   Tablet 2.5 milliGRAM(s) Oral daily  azithromycin  IVPB      azithromycin  IVPB 500 milliGRAM(s) IV Intermittent every 24 hours  cefTRIAXone   IVPB 1 Gram(s) IV Intermittent every 24 hours  cefTRIAXone   IVPB      heparin  Injectable 5000 Unit(s) SubCutaneous every 12 hours  influenza   Vaccine 0.5 milliLiter(s) IntraMuscular once  pantoprazole    Tablet 40 milliGRAM(s) Oral before breakfast      TELEMETRY: 	 nsr, psvt 150-180's   	  LABS:	 	    CARDIAC MARKERS:  CARDIAC MARKERS ( 18 Nov 2017 06:02 )  <0.015 ng/mL / x     / x     / x     / x      CARDIAC MARKERS ( 17 Nov 2017 17:10 )  <0.015 ng/mL / x     / 78 U/L / x     / 1.1 ng/mL  CARDIAC MARKERS ( 17 Nov 2017 11:47 )  <0.015 ng/mL / x     / x     / x     / x                            15.0   7.6   )-----------( 112      ( 19 Nov 2017 07:51 )             45.6     11-19    139  |  103  |  9   ----------------------------<  100<H>  4.0   |  26  |  0.73    Ca    8.5      19 Nov 2017 07:51    TPro  7.3  /  Alb  3.7  /  TBili  0.3  /  DBili  x   /  AST  18  /  ALT  23  /  AlkPhos  62  11-17    Echocardiogram:  	  OBSERVATIONS:  Mitral Valve: Thickened mitral valve leaflets. Trace mitral  regurgitation. No significant mitral stenosis.  Aortic Root: Calcified aortic root.  Aortic Valve: Probably trileaflet aortic valve is not well  seen. No aortic stenosis. Trace aortic regurgitation.  Left Atrium: Normal left atrium.  LA volume index = 28  cc/m2.  Left Ventricle: Normal Left Ventricular Systolic Function,  (EF = 68% by biplane) Normal left ventricular internal  dimensions and wall thicknesses. Grade I diastolic  dysfunction (Impaired relaxation).  Right Heart: Normal right atrium. Normal right ventricular  size and function (TAPSE 2.2 cm). Normal tricuspid valve.  Trace tricuspid regurgitation. Pulmonic valve not well  seen. No pulmonic insufficiency isnoted.  Pericardium/PleuraNo pericardial effusion. A prominent  epicardial fat pad is noted.  Hemodynamic: RA Pressure is 8 mm Hg. RV systolic pressure  is normal at  27 mm Hg.      EXAM:  CT CHEST                            PROCEDURE DATE:  11/18/2017          INTERPRETATION:  CT of the Chest without contrast    HISTORY: Infiltrate    Technique: Multi-slice volumetric acquisition using 0.625 mm collimation.    Intravenous contrast was not administered.     INTERPRETATION: The lung windows demonstrate mild scarring or atelectasis   of the lung bases. Small calcified nodule is seen in the right lung apex.   There is a small focus of bronchiectasis in the right middle lobe.     The mediastinum shows normal heart size with no evidence of adenopathy.    A borderline lymph node is seen in the right axilla. The left thyroid   lobe is enlarged.    No pericardial nor pleural effusion is identified.      The trachea and proximal bronchi show no focal lesions .      The bony structures show no gross destructive changes.  Small breast   calcifications are present.    Below the hemidiaphragms, the visualized solid visceral organs are   unremarkable.    IMPRESSION: Lung markings as described. Prominent left thyroid lobe.   Clinical correlation is suggested.

## 2017-11-19 NOTE — PROGRESS NOTE ADULT - SUBJECTIVE AND OBJECTIVE BOX
Neurology Follow up note    Name  ALEX ESPINOZA    HPI:  73yo F with no PMHx came in with c/o nausea, vomiting, palpitations and multiple syncopal episodes since 3 days. Patient states that 3 days ago she had about 3 episodes of vomiting, and after that she had generalized weakness followed by LOC for a few seconds. Patient also states having syncopal episode lasting couple of seconds yesterday at 4pm and today at 10 am. She lives with her  who is old. Son is unsure if the  knows about the syncopal episodes or not. Patient also states having palpitations, overall sweating, and  generalized weakness prior to the syncopal episodes. She denies any association with change in position. Per patient she has been having syncopal episodes lasting couple of seconds for the past 6 months, and would have 1 episode every month. But she never saw a physician for it. She denies any chest pain or SOB. Denies any falls or trauma. Denies tinnitus. Denies seizures. Denies cough, flu like symptoms, or fevers. Denies sick contacts or travel history.     PSHx: not significant  Social Hx: Denies smoking, alcohol or illicit drug use. Walks independently. Lives at home with her   Family Hx: Brother: prostate cancer  Allergies: NKDA  Code status: Full (17 Nov 2017 15:40)      Interval History -she had multiple episodes of SVT         REVIEW OF SYSTEMS:    Constitutional: No fever, weight loss or fatigue  Eyes: No eye pain, visual disturbances, or discharge  ENMT:  No difficulty hearing, tinnitus, vertigo; No sinus or throat pain  Neck: No pain or stiffness      Vital Signs Last 24 Hrs  T(C): 37.1 (19 Nov 2017 11:07), Max: 37.1 (19 Nov 2017 07:24)  T(F): 98.7 (19 Nov 2017 11:07), Max: 98.7 (19 Nov 2017 07:24)  HR: 90 (19 Nov 2017 11:07) (73 - 90)  BP: 116/65 (19 Nov 2017 11:07) (116/65 - 159/79)  BP(mean): --  RR: 18 (19 Nov 2017 11:07) (16 - 18)  SpO2: 100% (19 Nov 2017 11:07) (97% - 100%)    PHYSICAL EXAM:    General: Well developed; well nourished; in no acute distress  Head: Normocephalic; atraumatic  ENMT: No nasal discharge; airway clear  Neck: Supple; non tender; no masses  Extremities: Normal range of motion, No clubbing, cyanosis or edema  Vascular: Peripheral pulses palpable 2+ bilaterally  Skin: Warm and dry. No acute rash  Lymph Nodes: No acute cervical adenopathy  Psychiatric: Cooperative and appropriate      Neurological Exam:   On Neurological Examination:    Mental Status - Patient is alert, awake, oriented X3.     Follows commands well and able to answer questions appropriately. Mood and affect  normal  Follow simple commands  Follow complex commands      Speech -   Fluent                                Cranial Nerves - Pupils 3 mm equal and reactive to light,   extraocular eye movements intact.     Motor Exam -   Right upper 5/5  Left upper 5/5  Right lower5/5  Left lower 5/5    With stimuli positive movement of all 4 extremities    Muscle tone - is normal all over. No asymmetry is seen.      Sensory    Bilateral intact to light touch    Gait -  normal      GENERAL Exam:     Nontoxic , No Acute Distress   	  HEENT:  normocephalic, atraumatic  		  EXTREMITIES:   No Edema  No Clubbing  No Cyanosis No Edema    MUSCULOSKELETAL: Normal Range of Motion  	   SKIN:      Normal   No Ecchymosis

## 2017-11-19 NOTE — PROGRESS NOTE ADULT - SUBJECTIVE AND OBJECTIVE BOX
Patient was seen and examined  Patient is a 72y old  Female who presents with a chief complaint of Syncope (17 Nov 2017 15:40)      INTERVAL HPI/OVERNIGHT EVENTS:  T(C): 36.6 (11-19-17 @ 16:23), Max: 37.1 (11-19-17 @ 07:24)  HR: 76 (11-19-17 @ 16:23) (76 - 90)  BP: 128/62 (11-19-17 @ 16:23) (116/65 - 136/66)  RR: 18 (11-19-17 @ 16:23) (16 - 18)  SpO2: 100% (11-19-17 @ 16:23) (99% - 100%)  Wt(kg): --  I&O's Summary    18 Nov 2017 07:01  -  19 Nov 2017 07:00  --------------------------------------------------------  IN: 430 mL / OUT: 0 mL / NET: 430 mL    19 Nov 2017 07:01  -  19 Nov 2017 16:57  --------------------------------------------------------  IN: 430 mL / OUT: 0 mL / NET: 430 mL        LABS:                        15.0   7.6   )-----------( 112      ( 19 Nov 2017 07:51 )             45.6     11-19    139  |  103  |  9   ----------------------------<  100<H>  4.0   |  26  |  0.73    Ca    8.5      19 Nov 2017 07:51          CAPILLARY BLOOD GLUCOSE                  MEDICATIONS  (STANDING):  aspirin enteric coated 81 milliGRAM(s) Oral daily  azithromycin  IVPB      azithromycin  IVPB 500 milliGRAM(s) IV Intermittent every 24 hours  cefTRIAXone   IVPB 1 Gram(s) IV Intermittent every 24 hours  cefTRIAXone   IVPB      heparin  Injectable 5000 Unit(s) SubCutaneous every 12 hours  influenza   Vaccine 0.5 milliLiter(s) IntraMuscular once  metoprolol     tartrate 25 milliGRAM(s) Oral two times a day  pantoprazole    Tablet 40 milliGRAM(s) Oral before breakfast    MEDICATIONS  (PRN):  acetaminophen   Tablet. 650 milliGRAM(s) Oral every 6 hours PRN Mild Pain (1 - 3)      RADIOLOGY & ADDITIONAL TESTS:    Imaging Personally Reviewed:  [ ] YES  [ ] NO    REVIEW OF SYSTEMS:  CONSTITUTIONAL: No fever, weight loss, or fatigue  EYES: No eye pain, visual disturbances, or discharge  ENMT:  No difficulty hearing, tinnitus, vertigo; No sinus or throat pain  NECK: No pain or stiffness  BREASTS: No pain, masses, or nipple discharge  RESPIRATORY: No cough, wheezing, chills or hemoptysis; No shortness of breath  CARDIOVASCULAR: No chest pain, palpitations, dizziness, or leg swelling  GASTROINTESTINAL: No abdominal or epigastric pain. No nausea, vomiting, or hematemesis; No diarrhea or constipation. No melena or hematochezia.  GENITOURINARY: No dysuria, frequency, hematuria, or incontinence  NEUROLOGICAL: No headaches, memory loss, loss of strength, numbness, or tremors  SKIN: No itching, burning, rashes, or lesions   LYMPH NODES: No enlarged glands  ENDOCRINE: No heat or cold intolerance; No hair loss  MUSCULOSKELETAL: No joint pain or swelling; No muscle, back, or extremity pain  PSYCHIATRIC: No depression, anxiety, mood swings, or difficulty sleeping  HEME/LYMPH: No easy bruising, or bleeding gums  ALLERY AND IMMUNOLOGIC: No hives or eczema      Consultant(s) Notes Reviewed:  [x] YES  [ ] NO    PHYSICAL EXAM:  GENERAL: NAD, well-groomed, well-developed  HEAD:  Atraumatic, Normocephalic  EYES: EOMI, PERRLA, conjunctiva and sclera clear  ENMT: No tonsillar erythema, exudates, or enlargement; Moist mucous membranes, Good dentition, No lesions  NECK: Supple, No JVD, Normal thyroid  NERVOUS SYSTEM:  Alert & Oriented X3, Good concentration; Motor Strength 5/5 B/L upper and lower extremities; DTRs 2+ intact and symmetric  CHEST/LUNG: Clear to percussion bilaterally; No rales, rhonchi, wheezing, or rubs  HEART: Regular rate and rhythm; No murmurs, rubs, or gallops  ABDOMEN: Soft, Nontender, Nondistended; Bowel sounds present  EXTREMITIES:  2+ Peripheral Pulses, No clubbing, cyanosis, or edema  LYMPH: No lymphadenopathy noted  SKIN: No rashes or lesions    Care Discussed with Consultants/Other Providers [ x] YES  [ ] NO

## 2017-11-19 NOTE — PROGRESS NOTE ADULT - PROBLEM SELECTOR PLAN 1
Autonomic dysfunction vs arrythmias (e.g SVT) vs carotid stenosis vs TIA/Stroke  EKG- NSR with TWI in aVR and V1 (No baseline EKG)  T1- negative  Orthostatics- positive  CT head- neg  - Admit to tele  - Trend CE  - MRI head  - MRA neck  - ECHO  - PT eval  awaiting stress test

## 2017-11-19 NOTE — PROGRESS NOTE ADULT - ASSESSMENT
RECCURENT SYNCOPE     SVT ,FOLLOWED BY CARDIOLOGY      PLAN SCHEDULED FOR STRESS TEST    DISCUSSED WITH THE PATIENT AND NURSE .

## 2017-11-19 NOTE — PROGRESS NOTE ADULT - ASSESSMENT
Pt with no sig PMHX here with recurrent syncope,nausea,vomiting and RLL pneumonia,PSVT.  1.Tele monitoring.  2.ABX.  3.D/C norvasc.  4.Add ASA 81mg qd, lopressor 25mg bid.  5.Stress test-R/O Ischemia.  6.Check AM cortisol level,tft's.  7.GI and DVT prophylaxis.

## 2017-11-20 ENCOUNTER — TRANSCRIPTION ENCOUNTER (OUTPATIENT)
Age: 72
End: 2017-11-20

## 2017-11-20 VITALS
SYSTOLIC BLOOD PRESSURE: 151 MMHG | OXYGEN SATURATION: 98 % | TEMPERATURE: 98 F | DIASTOLIC BLOOD PRESSURE: 60 MMHG | HEART RATE: 75 BPM | RESPIRATION RATE: 18 BRPM

## 2017-11-20 LAB
24R-OH-CALCIDIOL SERPL-MCNC: 23.7 NG/ML — LOW (ref 30–80)
T4 FREE SERPL-MCNC: 1.5 NG/DL — SIGNIFICANT CHANGE UP (ref 0.9–1.8)

## 2017-11-20 RX ORDER — ASPIRIN/CALCIUM CARB/MAGNESIUM 324 MG
1 TABLET ORAL
Qty: 30 | Refills: 0
Start: 2017-11-20 | End: 2017-12-20

## 2017-11-20 RX ORDER — METOPROLOL TARTRATE 50 MG
1 TABLET ORAL
Qty: 60 | Refills: 0
Start: 2017-11-20 | End: 2017-12-20

## 2017-11-20 RX ORDER — CHOLECALCIFEROL (VITAMIN D3) 125 MCG
1000 CAPSULE ORAL
Qty: 30000 | Refills: 0
Start: 2017-11-20 | End: 2017-12-20

## 2017-11-20 RX ORDER — CHOLECALCIFEROL (VITAMIN D3) 125 MCG
1000 CAPSULE ORAL DAILY
Qty: 0 | Refills: 0 | Status: DISCONTINUED | OUTPATIENT
Start: 2017-11-20 | End: 2017-11-20

## 2017-11-20 RX ADMIN — HEPARIN SODIUM 5000 UNIT(S): 5000 INJECTION INTRAVENOUS; SUBCUTANEOUS at 06:25

## 2017-11-20 RX ADMIN — Medication 1000 UNIT(S): at 12:04

## 2017-11-20 RX ADMIN — PANTOPRAZOLE SODIUM 40 MILLIGRAM(S): 20 TABLET, DELAYED RELEASE ORAL at 06:25

## 2017-11-20 RX ADMIN — Medication 81 MILLIGRAM(S): at 12:04

## 2017-11-20 NOTE — DISCHARGE NOTE ADULT - CARE PLAN
Principal Discharge DX:	Syncope, unspecified syncope type  Instructions for follow-up, activity and diet:	Patient came with episode   EKG- NSR with TWI in aVR and V1 (No baseline EKG)  T1- negative  Orthostatics- positive  CT head- neg  - Admit to tele  - Trend CE  - MRI head  - MRA neck  - ECHO  - PT eval  awaiting stress test.  Secondary Diagnosis:	Hypertension Principal Discharge DX:	Syncope, unspecified syncope type  Goal:	Prevention  Instructions for follow-up, activity and diet:	Patient came with episode of syncope  EKG was performed showing NSR with TWI in aVR and V1 (No baseline EKG)  CT head- neg and was monitored in telemetry. Negative cardiac enzymes. Nuclear stress test was performed showing   Normal stress ECG at this level of exercise  Given patient's improved clinical status and current hemodynamic stability, decision was made to discharge. Follow up with Primary Care Physician within one week after discharge to inform of your recent hospitalization. Patient/Family was oriented on instruction.  Secondary Diagnosis:	Hypertension  Goal:	Blood Pressure control  Instructions for follow-up, activity and diet:	Continue with blood pressure medication. Continue with Metoprolol as indicated by cardiologist. Maintain a healthy diet that consist of low sugar, low fat, low sodium diet. Exercise frequently if possible.  Follow up with primary care physician in one week after discharge.

## 2017-11-20 NOTE — PROGRESS NOTE ADULT - SUBJECTIVE AND OBJECTIVE BOX
CHIEF COMPLAINT:Patient is a 72y old  Female who presents with a chief complaint of Syncope. Pt appears comfortable.    	  REVIEW OF SYSTEMS:  CONSTITUTIONAL: No fever, weight loss, or fatigue  EYES: No eye pain, visual disturbances, or discharge  ENT:  No difficulty hearing, tinnitus, vertigo; No sinus or throat pain  NECK: No pain or stiffness  RESPIRATORY: No cough, wheezing, chills or hemoptysis; No Shortness of Breath  CARDIOVASCULAR: No chest pain, palpitations, passing out, dizziness, or leg swelling  GASTROINTESTINAL: No abdominal or epigastric pain. No nausea, vomiting, or hematemesis; No diarrhea or constipation. No melena or hematochezia.  GENITOURINARY: No dysuria, frequency, hematuria, or incontinence  NEUROLOGICAL: No headaches, memory loss, loss of strength, numbness, or tremors  SKIN: No itching, burning, rashes, or lesions   LYMPH Nodes: No enlarged glands  ENDOCRINE: No heat or cold intolerance; No hair loss  MUSCULOSKELETAL: No joint pain or swelling; No muscle, back, or extremity pain  PSYCHIATRIC: No depression, anxiety, mood swings, or difficulty sleeping  HEME/LYMPH: No easy bruising, or bleeding gums  ALLERGY AND IMMUNOLOGIC: No hives or eczema	      PHYSICAL EXAM:  T(C): 37.1 (11-20-17 @ 07:34), Max: 37.1 (11-19-17 @ 11:07)  HR: 73 (11-20-17 @ 07:34) (69 - 90)  BP: 131/66 (11-20-17 @ 07:34) (116/65 - 132/68)  RR: 18 (11-20-17 @ 07:34) (17 - 18)  SpO2: 97% (11-20-17 @ 07:34) (96% - 100%)    19 Nov 2017 07:01  -  20 Nov 2017 07:00  --------------------------------------------------------  IN: 430 mL / OUT: 0 mL / NET: 430 mL        Appearance: Normal	  HEENT:   Normal oral mucosa, PERRL, EOMI	  Lymphatic: No lymphadenopathy  Cardiovascular: Normal S1 S2, No JVD, No murmurs, No edema  Respiratory: Lungs clear to auscultation	  Psychiatry: A & O x 3, Mood & affect appropriate  Gastrointestinal:  Soft, Non-tender, + BS	  Skin: No rashes, No ecchymoses, No cyanosis	  Neurologic: Non-focal  Extremities: Normal range of motion, No clubbing, cyanosis or edema  Vascular: Peripheral pulses palpable 2+ bilaterally    MEDICATIONS  (STANDING):  aspirin enteric coated 81 milliGRAM(s) Oral daily  azithromycin  IVPB      azithromycin  IVPB 500 milliGRAM(s) IV Intermittent every 24 hours  cefTRIAXone   IVPB 1 Gram(s) IV Intermittent every 24 hours  cefTRIAXone   IVPB      heparin  Injectable 5000 Unit(s) SubCutaneous every 12 hours  influenza   Vaccine 0.5 milliLiter(s) IntraMuscular once  metoprolol     tartrate 25 milliGRAM(s) Oral two times a day  pantoprazole    Tablet 40 milliGRAM(s) Oral before breakfast      TELEMETRY: 	    	  LABS:	 	                         15.0   7.6   )-----------( 112      ( 19 Nov 2017 07:51 )             45.6     11-19    139  |  103  |  9   ----------------------------<  100<H>  4.0   |  26  |  0.73    Ca    8.5      19 Nov 2017 07:51      TSH: Thyroid Stimulating Hormone, Serum: 0.48 uU/mL (11-19 @ 10:35)      Vitamin D, 25-Hydroxy (11.19.17 @ 10:55)    Vitamin D, 25-Hydroxy: 23.7: 30 – 80 ng/mL                                        Optimum Levels  (Reference range)  > 80 ng/mL                                             Toxicity possible  20 – 29 ng/mL                                         Insufficiency  10 – 19 ng/mL                                 Mild to Moderate  Deficiency  < 10 ng/mL                                             Severe Deficiency  Optimum levels for 25-Hydroxy vitamin D are 30 ng/mL and above based on  the Endocrine Society guidelines 2011. However, there is a lack of  consensus on this and the [employer] recommends 20 ng/mL and  above as optimum levels. Vitamin D results may vary depending on the  method of analysis. The current DiaSorin XL chemiluminescent immunoassay  methodmeasures both D2 & D3 and was introduced in 2010.  Reference Ranges changed effective September 21 2017. ng/mL    Vitamin B12, Serum in AM (11.19.17 @ 10:55)    Vitamin B12, Serum: 541 pg/mL

## 2017-11-20 NOTE — DISCHARGE NOTE ADULT - PATIENT PORTAL LINK FT
“You can access the [facility] Patient Portal, offered by [facility], by registering with the following website: [email or web address]”

## 2017-11-20 NOTE — DISCHARGE NOTE ADULT - CARE PROVIDER_API CALL
Dawn Ramos), Cardiology; Internal Medicine  [address]  [address]  [place] [address]  Phone: [phone]  Fax: [phone]    Humberto Samuels), Medicine  [address] [place] [address]  Phone: [phone]  Fax: [phone]

## 2017-11-20 NOTE — DISCHARGE NOTE ADULT - PLAN OF CARE
Patient came with episode   EKG- NSR with TWI in aVR and V1 (No baseline EKG)  T1- negative  Orthostatics- positive  CT head- neg  - Admit to tele  - Trend CE  - MRI head  - MRA neck  - ECHO  - PT eval  awaiting stress test. Prevention Patient came with episode of syncope  EKG was performed showing NSR with TWI in aVR and V1 (No baseline EKG)  CT head- neg and was monitored in telemetry. Negative cardiac enzymes. Nuclear stress test was performed showing   Normal stress ECG at this level of exercise  Given patient's improved clinical status and current hemodynamic stability, decision was made to discharge. Follow up with Primary Care Physician within one week after discharge to inform of your recent hospitalization. Patient/Family was oriented on instruction. Blood Pressure control Continue with blood pressure medication. Continue with Metoprolol as indicated by cardiologist. Maintain a healthy diet that consist of low sugar, low fat, low sodium diet. Exercise frequently if possible.  Follow up with primary care physician in one week after discharge.

## 2017-11-20 NOTE — DISCHARGE NOTE ADULT - MEDICATION SUMMARY - MEDICATIONS TO TAKE
I will START or STAY ON the medications listed below when I get home from the hospital:    aspirin 81 mg oral delayed release tablet  -- 1 tab(s) by mouth once a day  -- Indication: For CAD    metoprolol tartrate 25 mg oral tablet  -- 1 tab(s) by mouth 2 times a day  -- Indication: For Htn    cholecalciferol oral tablet  -- 1000 unit(s) by mouth once a day  -- Indication: For VItamin D defficiency

## 2017-11-20 NOTE — DISCHARGE NOTE ADULT - HOSPITAL COURSE
71yo F with no PMHx came in with c/o nausea, vomiting, palpitations and multiple syncopal episodes since 3 days. Patient states that 3 days ago she had about 3 episodes of vomiting, and after that she had generalized weakness followed by LOC for a few seconds. Patient also states having syncopal episode lasting couple of seconds yesterday at 4pm and today at 10 am. She lives with her  who is old. Son is unsure if the  knows about the syncopal episodes or not. Patient also states having palpitations, overall sweating, and  generalized weakness prior to the syncopal episodes. She denies any association with change in position. Per patient she has been having syncopal episodes lasting couple of seconds for the past 6 months, and would have 1 episode every month. But she never saw a physician for it. She denies any chest pain or SOB. Admitted for syncopal episode. During admission EKG was performed showing NSR with TWI in aVR and V1 (No baseline EKG). CT scan of the head- neg and was monitored in telemetry. Negative cardiac enzymes. Nuclear stress test was performed showing normal stress ECG at this level of exercise. Given patient's improved clinical status and current hemodynamic stability, decision was made to discharge. Follow up with Primary Care Physician within one week after discharge to inform of your recent hospitalization. Patient/Family was oriented on instruction.

## 2017-11-20 NOTE — DISCHARGE NOTE ADULT - INSTRUCTIONS
Recommend the DASH Diet. This diet emphasizes vegetables, fruits, and fat-free or low-fat dairy products.  Includes whole grains, fish, poultry, beans, seeds, nuts, and vegetable oils. Please limit sodium, sweets, sugary beverages, and red meats.  Patient oriented on diet and refers to understand

## 2017-11-20 NOTE — PROGRESS NOTE ADULT - ASSESSMENT
Pt with no sig PMHX here with recurrent syncope,nausea,vomiting and RLL pneumonia,PSVT.  1.Tele monitoring.  2.ABX.  3.Continue ASA 81mg qd, lopressor 25mg bid.  4.Stress test-R/O Ischemia.  5.GI and DVT prophylaxis.

## 2017-11-23 LAB
CULTURE RESULTS: SIGNIFICANT CHANGE UP
CULTURE RESULTS: SIGNIFICANT CHANGE UP
SPECIMEN SOURCE: SIGNIFICANT CHANGE UP
SPECIMEN SOURCE: SIGNIFICANT CHANGE UP

## 2017-12-19 PROCEDURE — 70551 MRI BRAIN STEM W/O DYE: CPT

## 2017-12-19 PROCEDURE — 78452 HT MUSCLE IMAGE SPECT MULT: CPT

## 2017-12-19 PROCEDURE — 82306 VITAMIN D 25 HYDROXY: CPT

## 2017-12-19 PROCEDURE — 84484 ASSAY OF TROPONIN QUANT: CPT

## 2017-12-19 PROCEDURE — 93017 CV STRESS TEST TRACING ONLY: CPT

## 2017-12-19 PROCEDURE — 71045 X-RAY EXAM CHEST 1 VIEW: CPT

## 2017-12-19 PROCEDURE — 82550 ASSAY OF CK (CPK): CPT

## 2017-12-19 PROCEDURE — 82607 VITAMIN B-12: CPT

## 2017-12-19 PROCEDURE — 70544 MR ANGIOGRAPHY HEAD W/O DYE: CPT

## 2017-12-19 PROCEDURE — 80053 COMPREHEN METABOLIC PANEL: CPT

## 2017-12-19 PROCEDURE — 84439 ASSAY OF FREE THYROXINE: CPT

## 2017-12-19 PROCEDURE — 82533 TOTAL CORTISOL: CPT

## 2017-12-19 PROCEDURE — 82553 CREATINE MB FRACTION: CPT

## 2017-12-19 PROCEDURE — 85610 PROTHROMBIN TIME: CPT

## 2017-12-19 PROCEDURE — 82962 GLUCOSE BLOOD TEST: CPT

## 2017-12-19 PROCEDURE — 85730 THROMBOPLASTIN TIME PARTIAL: CPT

## 2017-12-19 PROCEDURE — 99285 EMERGENCY DEPT VISIT HI MDM: CPT | Mod: 25

## 2017-12-19 PROCEDURE — A9502: CPT

## 2017-12-19 PROCEDURE — 93005 ELECTROCARDIOGRAM TRACING: CPT

## 2017-12-19 PROCEDURE — 84443 ASSAY THYROID STIM HORMONE: CPT

## 2017-12-19 PROCEDURE — 71250 CT THORAX DX C-: CPT

## 2017-12-19 PROCEDURE — 70450 CT HEAD/BRAIN W/O DYE: CPT

## 2017-12-19 PROCEDURE — 70548 MR ANGIOGRAPHY NECK W/DYE: CPT

## 2017-12-19 PROCEDURE — 80048 BASIC METABOLIC PNL TOTAL CA: CPT

## 2017-12-19 PROCEDURE — 85027 COMPLETE CBC AUTOMATED: CPT

## 2017-12-19 PROCEDURE — 93306 TTE W/DOPPLER COMPLETE: CPT

## 2017-12-19 PROCEDURE — 87040 BLOOD CULTURE FOR BACTERIA: CPT

## 2018-02-25 NOTE — ED PROVIDER NOTE - TOBACCO USE
Airway patent, Nasal mucosa clear. Mouth with normal mucosa. Throat has no vesicles, no oropharyngeal exudates and uvula is midline.
Never smoker

## 2018-03-06 NOTE — ED PROVIDER NOTE - INPATIENT RESIDENT/ACP NOTIFIED DATE
----- Message from Armin Scanlon sent at 3/6/2018  9:09 AM CST -----  Contact: Pt mother Kelsi Mueller  Pt mother is concerned about pt being sick on & off for the last past 3 weeks. Pt mother states it comes and goes. Throwing up, diarrhea and dizziness. Pt mother is concerned and would like a call back at [phone] (home)      
Letter made  Fax number [phone]  Phone number [phone]  [place]     Informed mom to keep patient at home and make sure patient is getting correct amount of medication a day without missing, letter faxed     Mom Confirmed understanding   
17-Nov-2017 14:40

## 2019-09-09 ENCOUNTER — INPATIENT (INPATIENT)
Facility: HOSPITAL | Age: 74
LOS: 2 days | Discharge: HOME | DRG: 149 | End: 2019-09-12
Attending: INTERNAL MEDICINE | Admitting: INTERNAL MEDICINE
Payer: MEDICARE

## 2019-09-09 VITALS
RESPIRATION RATE: 18 BRPM | DIASTOLIC BLOOD PRESSURE: 97 MMHG | HEIGHT: 65 IN | WEIGHT: 160.06 LBS | TEMPERATURE: 97 F | OXYGEN SATURATION: 95 % | SYSTOLIC BLOOD PRESSURE: 192 MMHG | HEART RATE: 97 BPM

## 2019-09-09 DIAGNOSIS — I10 ESSENTIAL (PRIMARY) HYPERTENSION: ICD-10-CM

## 2019-09-09 DIAGNOSIS — Z29.9 ENCOUNTER FOR PROPHYLACTIC MEASURES, UNSPECIFIED: ICD-10-CM

## 2019-09-09 DIAGNOSIS — R53.1 WEAKNESS: ICD-10-CM

## 2019-09-09 DIAGNOSIS — E86.0 DEHYDRATION: ICD-10-CM

## 2019-09-09 DIAGNOSIS — R11.2 NAUSEA WITH VOMITING, UNSPECIFIED: ICD-10-CM

## 2019-09-09 LAB
ALBUMIN SERPL ELPH-MCNC: 3.8 G/DL — SIGNIFICANT CHANGE UP (ref 3.5–5)
ALP SERPL-CCNC: 78 U/L — SIGNIFICANT CHANGE UP (ref 40–120)
ALT FLD-CCNC: 31 U/L DA — SIGNIFICANT CHANGE UP (ref 10–60)
ANION GAP SERPL CALC-SCNC: 5 MMOL/L — SIGNIFICANT CHANGE UP (ref 5–17)
APPEARANCE UR: CLEAR — SIGNIFICANT CHANGE UP
AST SERPL-CCNC: 21 U/L — SIGNIFICANT CHANGE UP (ref 10–40)
BACTERIA # UR AUTO: ABNORMAL /HPF
BASOPHILS # BLD AUTO: 0.03 K/UL — SIGNIFICANT CHANGE UP (ref 0–0.2)
BASOPHILS NFR BLD AUTO: 0.3 % — SIGNIFICANT CHANGE UP (ref 0–2)
BILIRUB SERPL-MCNC: 0.5 MG/DL — SIGNIFICANT CHANGE UP (ref 0.2–1.2)
BILIRUB UR-MCNC: NEGATIVE — SIGNIFICANT CHANGE UP
BUN SERPL-MCNC: 15 MG/DL — SIGNIFICANT CHANGE UP (ref 7–18)
CALCIUM SERPL-MCNC: 9.1 MG/DL — SIGNIFICANT CHANGE UP (ref 8.4–10.5)
CHLORIDE SERPL-SCNC: 106 MMOL/L — SIGNIFICANT CHANGE UP (ref 96–108)
CO2 SERPL-SCNC: 28 MMOL/L — SIGNIFICANT CHANGE UP (ref 22–31)
COLOR SPEC: YELLOW — SIGNIFICANT CHANGE UP
CREAT SERPL-MCNC: 0.68 MG/DL — SIGNIFICANT CHANGE UP (ref 0.5–1.3)
DIFF PNL FLD: NEGATIVE — SIGNIFICANT CHANGE UP
EOSINOPHIL # BLD AUTO: 0 K/UL — SIGNIFICANT CHANGE UP (ref 0–0.5)
EOSINOPHIL NFR BLD AUTO: 0 % — SIGNIFICANT CHANGE UP (ref 0–6)
EPI CELLS # UR: ABNORMAL /HPF
GLUCOSE BLDC GLUCOMTR-MCNC: 126 MG/DL — HIGH (ref 70–99)
GLUCOSE SERPL-MCNC: 106 MG/DL — HIGH (ref 70–99)
GLUCOSE UR QL: NEGATIVE — SIGNIFICANT CHANGE UP
HCT VFR BLD CALC: 44.8 % — SIGNIFICANT CHANGE UP (ref 34.5–45)
HGB BLD-MCNC: 14.4 G/DL — SIGNIFICANT CHANGE UP (ref 11.5–15.5)
IMM GRANULOCYTES NFR BLD AUTO: 0.3 % — SIGNIFICANT CHANGE UP (ref 0–1.5)
KETONES UR-MCNC: ABNORMAL
LEUKOCYTE ESTERASE UR-ACNC: ABNORMAL
LIDOCAIN IGE QN: 73 U/L — SIGNIFICANT CHANGE UP (ref 73–393)
LYMPHOCYTES # BLD AUTO: 3.68 K/UL — HIGH (ref 1–3.3)
LYMPHOCYTES # BLD AUTO: 34.6 % — SIGNIFICANT CHANGE UP (ref 13–44)
MCHC RBC-ENTMCNC: 29.5 PG — SIGNIFICANT CHANGE UP (ref 27–34)
MCHC RBC-ENTMCNC: 32.1 GM/DL — SIGNIFICANT CHANGE UP (ref 32–36)
MCV RBC AUTO: 91.8 FL — SIGNIFICANT CHANGE UP (ref 80–100)
MONOCYTES # BLD AUTO: 0.37 K/UL — SIGNIFICANT CHANGE UP (ref 0–0.9)
MONOCYTES NFR BLD AUTO: 3.5 % — SIGNIFICANT CHANGE UP (ref 2–14)
NEUTROPHILS # BLD AUTO: 6.53 K/UL — SIGNIFICANT CHANGE UP (ref 1.8–7.4)
NEUTROPHILS NFR BLD AUTO: 61.3 % — SIGNIFICANT CHANGE UP (ref 43–77)
NITRITE UR-MCNC: NEGATIVE — SIGNIFICANT CHANGE UP
NRBC # BLD: 0 /100 WBCS — SIGNIFICANT CHANGE UP (ref 0–0)
PH UR: 7 — SIGNIFICANT CHANGE UP (ref 5–8)
PLATELET # BLD AUTO: 111 K/UL — LOW (ref 150–400)
POTASSIUM SERPL-MCNC: 4.3 MMOL/L — SIGNIFICANT CHANGE UP (ref 3.5–5.3)
POTASSIUM SERPL-SCNC: 4.3 MMOL/L — SIGNIFICANT CHANGE UP (ref 3.5–5.3)
PROT SERPL-MCNC: 7.4 G/DL — SIGNIFICANT CHANGE UP (ref 6–8.3)
PROT UR-MCNC: NEGATIVE — SIGNIFICANT CHANGE UP
RBC # BLD: 4.88 M/UL — SIGNIFICANT CHANGE UP (ref 3.8–5.2)
RBC # FLD: 13.3 % — SIGNIFICANT CHANGE UP (ref 10.3–14.5)
RBC CASTS # UR COMP ASSIST: ABNORMAL /HPF (ref 0–2)
SODIUM SERPL-SCNC: 139 MMOL/L — SIGNIFICANT CHANGE UP (ref 135–145)
SP GR SPEC: 1 — LOW (ref 1.01–1.02)
UROBILINOGEN FLD QL: NEGATIVE — SIGNIFICANT CHANGE UP
WBC # BLD: 10.64 K/UL — HIGH (ref 3.8–10.5)
WBC # FLD AUTO: 10.64 K/UL — HIGH (ref 3.8–10.5)
WBC UR QL: SIGNIFICANT CHANGE UP /HPF (ref 0–5)

## 2019-09-09 PROCEDURE — 99285 EMERGENCY DEPT VISIT HI MDM: CPT

## 2019-09-09 PROCEDURE — 74177 CT ABD & PELVIS W/CONTRAST: CPT | Mod: 26

## 2019-09-09 RX ORDER — IOHEXOL 300 MG/ML
30 INJECTION, SOLUTION INTRAVENOUS ONCE
Refills: 0 | Status: COMPLETED | OUTPATIENT
Start: 2019-09-09 | End: 2019-09-09

## 2019-09-09 RX ORDER — METOCLOPRAMIDE HCL 10 MG
10 TABLET ORAL EVERY 8 HOURS
Refills: 0 | Status: DISCONTINUED | OUTPATIENT
Start: 2019-09-09 | End: 2019-09-12

## 2019-09-09 RX ORDER — ACETAMINOPHEN 500 MG
1000 TABLET ORAL ONCE
Refills: 0 | Status: COMPLETED | OUTPATIENT
Start: 2019-09-09 | End: 2019-09-09

## 2019-09-09 RX ORDER — ACETAMINOPHEN 500 MG
650 TABLET ORAL EVERY 6 HOURS
Refills: 0 | Status: DISCONTINUED | OUTPATIENT
Start: 2019-09-09 | End: 2019-09-12

## 2019-09-09 RX ORDER — MECLIZINE HCL 12.5 MG
25 TABLET ORAL ONCE
Refills: 0 | Status: DISCONTINUED | OUTPATIENT
Start: 2019-09-09 | End: 2019-09-09

## 2019-09-09 RX ORDER — ONDANSETRON 8 MG/1
4 TABLET, FILM COATED ORAL EVERY 8 HOURS
Refills: 0 | Status: DISCONTINUED | OUTPATIENT
Start: 2019-09-09 | End: 2019-09-09

## 2019-09-09 RX ORDER — SODIUM CHLORIDE 9 MG/ML
1000 INJECTION, SOLUTION INTRAVENOUS
Refills: 0 | Status: DISCONTINUED | OUTPATIENT
Start: 2019-09-09 | End: 2019-09-12

## 2019-09-09 RX ORDER — ENOXAPARIN SODIUM 100 MG/ML
40 INJECTION SUBCUTANEOUS DAILY
Refills: 0 | Status: DISCONTINUED | OUTPATIENT
Start: 2019-09-09 | End: 2019-09-12

## 2019-09-09 RX ORDER — TRAMADOL HYDROCHLORIDE 50 MG/1
25 TABLET ORAL ONCE
Refills: 0 | Status: DISCONTINUED | OUTPATIENT
Start: 2019-09-09 | End: 2019-09-09

## 2019-09-09 RX ORDER — MECLIZINE HCL 12.5 MG
25 TABLET ORAL THREE TIMES A DAY
Refills: 0 | Status: DISCONTINUED | OUTPATIENT
Start: 2019-09-09 | End: 2019-09-11

## 2019-09-09 RX ORDER — ONDANSETRON 8 MG/1
4 TABLET, FILM COATED ORAL ONCE
Refills: 0 | Status: COMPLETED | OUTPATIENT
Start: 2019-09-09 | End: 2019-09-09

## 2019-09-09 RX ORDER — METOPROLOL TARTRATE 50 MG
25 TABLET ORAL
Refills: 0 | Status: DISCONTINUED | OUTPATIENT
Start: 2019-09-09 | End: 2019-09-12

## 2019-09-09 RX ORDER — METOCLOPRAMIDE HCL 10 MG
10 TABLET ORAL ONCE
Refills: 0 | Status: COMPLETED | OUTPATIENT
Start: 2019-09-09 | End: 2019-09-09

## 2019-09-09 RX ORDER — SODIUM CHLORIDE 9 MG/ML
1000 INJECTION INTRAMUSCULAR; INTRAVENOUS; SUBCUTANEOUS ONCE
Refills: 0 | Status: COMPLETED | OUTPATIENT
Start: 2019-09-09 | End: 2019-09-09

## 2019-09-09 RX ORDER — FAMOTIDINE 10 MG/ML
20 INJECTION INTRAVENOUS ONCE
Refills: 0 | Status: COMPLETED | OUTPATIENT
Start: 2019-09-09 | End: 2019-09-09

## 2019-09-09 RX ORDER — DIAZEPAM 5 MG
5 TABLET ORAL ONCE
Refills: 0 | Status: DISCONTINUED | OUTPATIENT
Start: 2019-09-09 | End: 2019-09-09

## 2019-09-09 RX ORDER — ASPIRIN/CALCIUM CARB/MAGNESIUM 324 MG
81 TABLET ORAL DAILY
Refills: 0 | Status: DISCONTINUED | OUTPATIENT
Start: 2019-09-09 | End: 2019-09-12

## 2019-09-09 RX ORDER — PANTOPRAZOLE SODIUM 20 MG/1
40 TABLET, DELAYED RELEASE ORAL
Refills: 0 | Status: DISCONTINUED | OUTPATIENT
Start: 2019-09-09 | End: 2019-09-12

## 2019-09-09 RX ADMIN — Medication 10 MILLIGRAM(S): at 22:07

## 2019-09-09 RX ADMIN — ONDANSETRON 4 MILLIGRAM(S): 8 TABLET, FILM COATED ORAL at 21:38

## 2019-09-09 RX ADMIN — SODIUM CHLORIDE 80 MILLILITER(S): 9 INJECTION, SOLUTION INTRAVENOUS at 21:07

## 2019-09-09 RX ADMIN — IOHEXOL 30 MILLILITER(S): 300 INJECTION, SOLUTION INTRAVENOUS at 14:57

## 2019-09-09 RX ADMIN — Medication 400 MILLIGRAM(S): at 23:34

## 2019-09-09 RX ADMIN — SODIUM CHLORIDE 1000 MILLILITER(S): 9 INJECTION INTRAMUSCULAR; INTRAVENOUS; SUBCUTANEOUS at 13:55

## 2019-09-09 RX ADMIN — FAMOTIDINE 20 MILLIGRAM(S): 10 INJECTION INTRAVENOUS at 14:53

## 2019-09-09 RX ADMIN — ONDANSETRON 4 MILLIGRAM(S): 8 TABLET, FILM COATED ORAL at 13:55

## 2019-09-09 NOTE — ED ADULT NURSE REASSESSMENT NOTE - NS ED NURSE REASSESS COMMENT FT1
Pt. is in stable condition, no complaints voiced at this time. family at bedside. pt. is stable to go to [address] [place] [address] for continued care.

## 2019-09-09 NOTE — ED PROVIDER NOTE - CARE PLAN
Principal Discharge DX:	Weakness  Secondary Diagnosis:	Abdominal pain  Secondary Diagnosis:	Nausea and vomiting

## 2019-09-09 NOTE — ED ADULT NURSE NOTE - NSIMPLEMENTINTERV_GEN_ALL_ED
Implemented All Fall Risk Interventions:  [place] to call system. Call bell, personal items and telephone within reach. Instruct patient to call for assistance. Room bathroom lighting operational. Non-slip footwear when patient is off stretcher. Physically safe environment: no spills, clutter or unnecessary equipment. Stretcher in lowest position, wheels locked, appropriate side rails in place. Provide visual cue, wrist band, yellow gown, etc. Monitor gait and stability. Monitor for mental status changes and reorient to person, place, and time. Review medications for side effects contributing to fall risk. Reinforce activity limits and safety measures with patient and family.

## 2019-09-09 NOTE — H&P ADULT - PROBLEM SELECTOR PLAN 4
IMPROVE VTE Individual Risk Assessment  RISK                                                          Points  [] Previous VTE                                           3  [] Thrombophilia                                        2  [] Lower limb paralysis                              2   [] Current Cancer                                       2   [] Immobilization > 24 hrs                        1  [] ICU/CCU stay > 24 hours                       1  [] Age > 60                                                   1  IMPROVE VTE Score = 2  lovenox 40 mg for DVT chemoprophylaxis

## 2019-09-09 NOTE — H&P ADULT - NSHPPHYSICALEXAM_GEN_ALL_CORE
Vital Signs Last 24 Hrs  T(C): 36.4 (09 Sep 2019 16:10), Max: 36.4 (09 Sep 2019 16:10)  T(F): 97.5 (09 Sep 2019 16:10), Max: 97.5 (09 Sep 2019 16:10)  HR: 74 (09 Sep 2019 16:10) (74 - 97)  BP: 165/69 (09 Sep 2019 16:10) (165/69 - 192/97)  BP(mean): --  RR: 18 (09 Sep 2019 16:10) (18 - 18)  SpO2: 100% (09 Sep 2019 16:10) (95% - 100%)

## 2019-09-09 NOTE — H&P ADULT - PROBLEM SELECTOR PLAN 1
pt presented with nausea and vomiting for last 2 days.  denies any diarrhea, fever, eating from outside, sick contacts.  patient looked dehydrated on clinical exam.  S/p 1 L bolus in ED.  started on IV hydration.  Zofran for symptomatic nausea and vomting  started on protonix.  will monitor fluid status pt presented with nausea and vomiting for last 2 days.  denies any diarrhea, fever, eating from outside, sick contacts.  patient looked dehydrated on clinical exam.  S/p 1 L bolus in ED.  started on IV hydration.  Zofran for symptomatic nausea and vomting  started on protonix.  CT abdomen and pelvis is negative for any acute pathology  will monitor fluid status

## 2019-09-09 NOTE — H&P ADULT - HISTORY OF PRESENT ILLNESS
74 years old polish speaking female from home who lives with her  with no significant PMHx/PSHx presented with nausea and vomiting for l and diffuse abdominal pain since last night. Pt relates experiencing multiple episodes of NBNB emesis and generalized abdominal pain which is diffuse and 5/10 in intensity. Patient denies any diarrhea or fever. Patient frequently eats from outside. No one else is sick at home Pt reports her last bowel movement was 2 days ago, which is of normal frequency for her. patient does report chills and feeling weak because of nausea and vomiting.  Pt denies fever, CP, SOB, diarrhea, dysuria or any other acute complaints. NKDA.

## 2019-09-09 NOTE — ED PROVIDER NOTE - OBJECTIVE STATEMENT
75 y/o with no significant PMHx/PSHx c/o n/v and diffuse abdominal pain since last night. Pt relates experiencing multiple episodes of NBNB emesis and generalized abdominal pain. Pt reports her last bowel movement was 2 days ago, which is of normal frequency for her. Pt denies fever, CP, SOB, diarrhea, dysuria or any other acute complaints. NKDA. 73 y/o Swedish speaking F presents with diffuse abdominal pain since last night. Pt relates experiencing multiple episodes of NBNB emesis and generalized abdominal pain. Pt reports her last bowel movement was 2 days ago, which is of normal frequency for her. Denies fever, CP, SOB, diarrhea, dysuria, or any other acute complaints. NKDA.

## 2019-09-09 NOTE — H&P ADULT - PROBLEM SELECTOR PLAN 3
Patient has history of HTN, on metoprolol 25 mg twice daily at home.  Will start on metoprolol 25 mg with parameter  Monitor blood pressure

## 2019-09-09 NOTE — ED PROVIDER NOTE - CLINICAL SUMMARY MEDICAL DECISION MAKING FREE TEXT BOX
75 yo F with generalized abdominal pain and weakness. Labs and CT abd/pelvis grossly unremarkable. Patient deconditioned and unable to ambulate while in the ED. Will admit for weakness and PT eval. Patient agreeable for admission.

## 2019-09-09 NOTE — H&P ADULT - ASSESSMENT
74 years old female from home who lives with her  with no significant PMHx/PSHx presented with nausea and vomiting for l and diffuse abdominal pain since last night. Pt relates experiencing multiple episodes of NBNB emesis and generalized abdominal pain which is diffuse and 5/10 in intensity. Patient denies any diarrhea or fever. Patient frequently eats from outside. No one else is sick at home Pt reports her last bowel movement was 2 days ago, which is of normal frequency for her. patient does report chills and feeling weak because of nausea and vomiting.  Pt denies fever, CP, SOB, diarrhea, dysuria or any other acute complaints. NKDA.

## 2019-09-09 NOTE — H&P ADULT - PROBLEM SELECTOR PLAN 2
pt presented with nausea and vomiting for last 2 days.  denies any diarrhea, fever, eating from outside, sick contacts.  patient looked dehydrated on clinical exam.  S/p 1 L bolus in ED.  started on IV hydration.  Zofran for symptomatic nausea and vomting  started on protonix.  will monitor fluid status.  Improved after zofran in ED pt presented with nausea and vomiting for last 2 days.  denies any diarrhea, fever, eating from outside, sick contacts.  patient looked dehydrated on clinical exam.  S/p 1 L bolus in ED.  started on IV hydration.  Zofran for symptomatic nausea and vomting  started on protonix.  will monitor fluid status.  Improved after zofran in ED  CT abdomen and pelvis is negative for any acute pathology

## 2019-09-10 DIAGNOSIS — R42 DIZZINESS AND GIDDINESS: ICD-10-CM

## 2019-09-10 DIAGNOSIS — R10.9 UNSPECIFIED ABDOMINAL PAIN: ICD-10-CM

## 2019-09-10 DIAGNOSIS — Z02.9 ENCOUNTER FOR ADMINISTRATIVE EXAMINATIONS, UNSPECIFIED: ICD-10-CM

## 2019-09-10 LAB
24R-OH-CALCIDIOL SERPL-MCNC: 27.5 NG/ML — LOW (ref 30–80)
ALBUMIN SERPL ELPH-MCNC: 3.2 G/DL — LOW (ref 3.5–5)
ALP SERPL-CCNC: 64 U/L — SIGNIFICANT CHANGE UP (ref 40–120)
ALT FLD-CCNC: 22 U/L DA — SIGNIFICANT CHANGE UP (ref 10–60)
ANION GAP SERPL CALC-SCNC: 6 MMOL/L — SIGNIFICANT CHANGE UP (ref 5–17)
AST SERPL-CCNC: 16 U/L — SIGNIFICANT CHANGE UP (ref 10–40)
BASOPHILS # BLD AUTO: 0.04 K/UL — SIGNIFICANT CHANGE UP (ref 0–0.2)
BASOPHILS NFR BLD AUTO: 0.5 % — SIGNIFICANT CHANGE UP (ref 0–2)
BILIRUB DIRECT SERPL-MCNC: 0.1 MG/DL — SIGNIFICANT CHANGE UP (ref 0–0.2)
BILIRUB INDIRECT FLD-MCNC: 0.7 MG/DL — SIGNIFICANT CHANGE UP (ref 0.2–1)
BILIRUB SERPL-MCNC: 0.8 MG/DL — SIGNIFICANT CHANGE UP (ref 0.2–1.2)
BUN SERPL-MCNC: 12 MG/DL — SIGNIFICANT CHANGE UP (ref 7–18)
CALCIUM SERPL-MCNC: 8.4 MG/DL — SIGNIFICANT CHANGE UP (ref 8.4–10.5)
CHLORIDE SERPL-SCNC: 107 MMOL/L — SIGNIFICANT CHANGE UP (ref 96–108)
CHOLEST SERPL-MCNC: 236 MG/DL — HIGH (ref 10–199)
CO2 SERPL-SCNC: 28 MMOL/L — SIGNIFICANT CHANGE UP (ref 22–31)
CREAT SERPL-MCNC: 0.73 MG/DL — SIGNIFICANT CHANGE UP (ref 0.5–1.3)
EOSINOPHIL # BLD AUTO: 0.02 K/UL — SIGNIFICANT CHANGE UP (ref 0–0.5)
EOSINOPHIL NFR BLD AUTO: 0.2 % — SIGNIFICANT CHANGE UP (ref 0–6)
GLUCOSE SERPL-MCNC: 100 MG/DL — HIGH (ref 70–99)
HBA1C BLD-MCNC: 5.6 % — SIGNIFICANT CHANGE UP (ref 4–5.6)
HCT VFR BLD CALC: 39 % — SIGNIFICANT CHANGE UP (ref 34.5–45)
HCV AB S/CO SERPL IA: 0.12 S/CO — SIGNIFICANT CHANGE UP (ref 0–0.99)
HCV AB SERPL-IMP: SIGNIFICANT CHANGE UP
HDLC SERPL-MCNC: 55 MG/DL — SIGNIFICANT CHANGE UP
HGB BLD-MCNC: 12.6 G/DL — SIGNIFICANT CHANGE UP (ref 11.5–15.5)
IMM GRANULOCYTES NFR BLD AUTO: 0.3 % — SIGNIFICANT CHANGE UP (ref 0–1.5)
LIPID PNL WITH DIRECT LDL SERPL: 153 MG/DL — SIGNIFICANT CHANGE UP
LYMPHOCYTES # BLD AUTO: 3.89 K/UL — HIGH (ref 1–3.3)
LYMPHOCYTES # BLD AUTO: 43.9 % — SIGNIFICANT CHANGE UP (ref 13–44)
MAGNESIUM SERPL-MCNC: 2.1 MG/DL — SIGNIFICANT CHANGE UP (ref 1.6–2.6)
MCHC RBC-ENTMCNC: 29.9 PG — SIGNIFICANT CHANGE UP (ref 27–34)
MCHC RBC-ENTMCNC: 32.3 GM/DL — SIGNIFICANT CHANGE UP (ref 32–36)
MCV RBC AUTO: 92.6 FL — SIGNIFICANT CHANGE UP (ref 80–100)
MONOCYTES # BLD AUTO: 0.58 K/UL — SIGNIFICANT CHANGE UP (ref 0–0.9)
MONOCYTES NFR BLD AUTO: 6.5 % — SIGNIFICANT CHANGE UP (ref 2–14)
NEUTROPHILS # BLD AUTO: 4.31 K/UL — SIGNIFICANT CHANGE UP (ref 1.8–7.4)
NEUTROPHILS NFR BLD AUTO: 48.6 % — SIGNIFICANT CHANGE UP (ref 43–77)
NRBC # BLD: 0 /100 WBCS — SIGNIFICANT CHANGE UP (ref 0–0)
PHOSPHATE SERPL-MCNC: 3.1 MG/DL — SIGNIFICANT CHANGE UP (ref 2.5–4.5)
PLATELET # BLD AUTO: 122 K/UL — LOW (ref 150–400)
POTASSIUM SERPL-MCNC: 4.2 MMOL/L — SIGNIFICANT CHANGE UP (ref 3.5–5.3)
POTASSIUM SERPL-SCNC: 4.2 MMOL/L — SIGNIFICANT CHANGE UP (ref 3.5–5.3)
PROT SERPL-MCNC: 6.3 G/DL — SIGNIFICANT CHANGE UP (ref 6–8.3)
RBC # BLD: 4.21 M/UL — SIGNIFICANT CHANGE UP (ref 3.8–5.2)
RBC # FLD: 13.5 % — SIGNIFICANT CHANGE UP (ref 10.3–14.5)
SODIUM SERPL-SCNC: 141 MMOL/L — SIGNIFICANT CHANGE UP (ref 135–145)
TOTAL CHOLESTEROL/HDL RATIO MEASUREMENT: 4.3 RATIO — SIGNIFICANT CHANGE UP (ref 3.3–7.1)
TRIGL SERPL-MCNC: 140 MG/DL — SIGNIFICANT CHANGE UP (ref 10–149)
TSH SERPL-MCNC: 0.79 UU/ML — SIGNIFICANT CHANGE UP (ref 0.34–4.82)
VIT B12 SERPL-MCNC: 421 PG/ML — SIGNIFICANT CHANGE UP (ref 232–1245)
WBC # BLD: 8.87 K/UL — SIGNIFICANT CHANGE UP (ref 3.8–10.5)
WBC # FLD AUTO: 8.87 K/UL — SIGNIFICANT CHANGE UP (ref 3.8–10.5)

## 2019-09-10 PROCEDURE — 70450 CT HEAD/BRAIN W/O DYE: CPT | Mod: 26

## 2019-09-10 RX ORDER — MECLIZINE HCL 12.5 MG
25 TABLET ORAL ONCE
Refills: 0 | Status: COMPLETED | OUTPATIENT
Start: 2019-09-10 | End: 2019-09-10

## 2019-09-10 RX ORDER — ONDANSETRON 8 MG/1
4 TABLET, FILM COATED ORAL ONCE
Refills: 0 | Status: COMPLETED | OUTPATIENT
Start: 2019-09-10 | End: 2019-09-10

## 2019-09-10 RX ORDER — ACETAMINOPHEN 500 MG
1000 TABLET ORAL ONCE
Refills: 0 | Status: COMPLETED | OUTPATIENT
Start: 2019-09-10 | End: 2019-09-10

## 2019-09-10 RX ORDER — MECLIZINE HCL 12.5 MG
25 TABLET ORAL EVERY 8 HOURS
Refills: 0 | Status: DISCONTINUED | OUTPATIENT
Start: 2019-09-10 | End: 2019-09-11

## 2019-09-10 RX ADMIN — SODIUM CHLORIDE 80 MILLILITER(S): 9 INJECTION, SOLUTION INTRAVENOUS at 12:13

## 2019-09-10 RX ADMIN — Medication 10 MILLIGRAM(S): at 09:13

## 2019-09-10 RX ADMIN — Medication 25 MILLIGRAM(S): at 06:48

## 2019-09-10 RX ADMIN — Medication 25 MILLIGRAM(S): at 17:48

## 2019-09-10 RX ADMIN — ONDANSETRON 4 MILLIGRAM(S): 8 TABLET, FILM COATED ORAL at 11:27

## 2019-09-10 RX ADMIN — Medication 25 MILLIGRAM(S): at 16:17

## 2019-09-10 RX ADMIN — Medication 25 MILLIGRAM(S): at 21:25

## 2019-09-10 RX ADMIN — Medication 1000 MILLIGRAM(S): at 00:31

## 2019-09-10 RX ADMIN — ENOXAPARIN SODIUM 40 MILLIGRAM(S): 100 INJECTION SUBCUTANEOUS at 12:12

## 2019-09-10 RX ADMIN — Medication 650 MILLIGRAM(S): at 06:47

## 2019-09-10 RX ADMIN — Medication 1000 MILLIGRAM(S): at 09:26

## 2019-09-10 RX ADMIN — PANTOPRAZOLE SODIUM 40 MILLIGRAM(S): 20 TABLET, DELAYED RELEASE ORAL at 06:45

## 2019-09-10 RX ADMIN — Medication 650 MILLIGRAM(S): at 07:16

## 2019-09-10 RX ADMIN — Medication 25 MILLIGRAM(S): at 06:45

## 2019-09-10 RX ADMIN — Medication 25 MILLIGRAM(S): at 11:27

## 2019-09-10 RX ADMIN — Medication 400 MILLIGRAM(S): at 09:13

## 2019-09-10 NOTE — PROGRESS NOTE ADULT - PROBLEM SELECTOR PLAN 1
Pt rec'd 1 liter bolus in ED, now on 1/2 ns at 80cc/hr. Cont IVF until tolerating PO   Dehydration resolved well.  Labs not c/w dehydration  F/U BNP

## 2019-09-10 NOTE — PROGRESS NOTE ADULT - PROBLEM SELECTOR PLAN 4
Abdominal pain has resolved.  Tolerating regular diet Urine culture sent due to mildly positive UA and Abd pain. In progress  Abdominal pain has resolved.  Tolerating regular diet

## 2019-09-10 NOTE — PROGRESS NOTE ADULT - SUBJECTIVE AND OBJECTIVE BOX
Patient is a 74y old  Female who presents with a chief complaint of nausea .intractable vomiting (09 Sep 2019 19:47), F/U CT head, antivert 25 mg po tid/zofran prn, IV hydration      INTERVAL HPI/OVERNIGHT EVENTS:  T(C): 37.3 (09-10-19 @ 05:05), Max: 37.3 (09-10-19 @ 05:05)  HR: 76 (09-10-19 @ 05:05) (74 - 91)  BP: 134/55 (09-10-19 @ 05:05) (134/55 - 182/84)  RR: 17 (09-10-19 @ 05:05) (17 - 18)  SpO2: 98% (09-10-19 @ 05:05) (95% - 100%)  Wt(kg): --    LABS:                        12.6   8.87  )-----------( 122      ( 10 Sep 2019 06:10 )             39.0     09-10    141  |  107  |  12  ----------------------------<  100<H>  4.2   |  28  |  0.73    Ca    8.4      10 Sep 2019 06:10  Phos  3.1     09-10  Mg     2.1     09-10    TPro  6.3  /  Alb  3.2<L>  /  TBili  0.8  /  DBili  0.1  /  AST  16  /  ALT  22  /  AlkPhos  64  09-10      Urinalysis Basic - ( 09 Sep 2019 16:42 )    Color: Yellow / Appearance: Clear / S.005 / pH: x  Gluc: x / Ketone: Small  / Bili: Negative / Urobili: Negative   Blood: x / Protein: Negative / Nitrite: Negative   Leuk Esterase: Trace / RBC: 2-5 /HPF / WBC 0-2 /HPF   Sq Epi: x / Non Sq Epi: Occasional /HPF / Bacteria: Trace /HPF      CAPILLARY BLOOD GLUCOSE      POCT Blood Glucose.: 126 mg/dL (09 Sep 2019 23:04)        RADIOLOGY & ADDITIONAL TESTS:  < from: CT Abdomen and Pelvis w/ Oral Cont and w/ IV Cont (19 @ 16:55) >  LOWER CHEST: There are patchy bibasilar atelectatic changes.There is   probable focal scarring/fibrosis medial right middle lobe.    LIVER: Within normal limits.  BILE DUCTS: Normal caliber.  GALLBLADDER: Within normal limits.  SPLEEN: Mild splenomegaly.  PANCREAS: Coarse calcifications body pancreas may be associated with   chronic pancreatitis.  No peripancreatic fluid collection.  ADRENALS: Within normal limits.  KIDNEYS/URETERS:   Prominent right extrarenal pelvis. No hydronephrosis.  Tiny subcentimeter indeterminate hypodense lesion lower pole right kidney.    BLADDER: Within normal limits.  REPRODUCTIVE ORGANS: Small, 1.5 cm left adnexal cyst.  Small subcentimeter anterior external iliac pelvic lymph nodes.  No significant pelvic free fluid.    BOWEL: Left-sided diverticulosis, without CT evidence of diverticulitis.  No bowel obstruction noted.     Appendix normal.  PERITONEUM: No ascites.  VESSELS: Within normal limits.  RETROPERITONEUM/LYMPH NODES: No lymphadenopathy.    ABDOMINAL WALL: Tiny fat-containing periumbilical hernia.  BONES: Multilevel degenerative changes of the lumbar spine.    IMPRESSION:     Mild splenomegaly.    No acute intra-abdominal pathology demonstrated.    Other findings as discussed above.    < end of copied text >    Consultant(s) Notes Reviewed:  [x ] YES  [ ] NO    PHYSICAL EXAM:  GENERAL: well built, well nourished  HEAD:  Atraumatic, Normocephalic  EYES: EOMI, PERRLA, conjunctiva and sclera clear  ENT: No tonsillar erythema, exudates, or enlargement; Moist mucous membranes, Good dentition, No lesions  NECK: Supple, No JVD, Normal thyroid, no enlarged nodes  NERVOUS SYSTEM:  Alert & Oriented X3, Good concentration; Motor Strength 5/5 B/L upper and lower extremities; DTRs 2+ intact and symmetric, sensory intact  CHEST/LUNG: B/L good air entry; No rales, rhonchi, or wheezing  HEART: S1S2 normal, no S3, Regular rate and rhythm; No murmurs, rubs, or gallops  ABDOMEN: Soft, Nontender, Nondistended; Bowel sounds present  EXTREMITIES:  2+ Peripheral Pulses, No clubbing, cyanosis, or edema, mild varicous vein  LYMPH: No lymphadenopathy noted  SKIN: No rashes or lesions    Care Discussed with Consultants/Other Providers [ x] YES  [ ] NO

## 2019-09-10 NOTE — PROGRESS NOTE ADULT - SUBJECTIVE AND OBJECTIVE BOX
74 years old polish speaking female from home who lives with her  with no significant PMHx/PSHx presented with nausea and vomiting for l and diffuse abdominal pain since last night. Pt relates experiencing multiple episodes of NBNB emesis and generalized abdominal pain which is diffuse and 5/10 in intensity.  Now pt c/o inability to ambulate due to new weakness and inability to swallow. No deficits noted.  CT of head was done today with no acute infarct  S and S eval is pending.        INTERVAL HPI/OVERNIGHT EVENTS: no new complaints    MEDICATIONS  (STANDING):  aspirin enteric coated 81 milliGRAM(s) Oral daily  enoxaparin Injectable 40 milliGRAM(s) SubCutaneous daily  meclizine 25 milliGRAM(s) Oral every 8 hours  metoprolol tartrate 25 milliGRAM(s) Oral two times a day  pantoprazole    Tablet 40 milliGRAM(s) Oral before breakfast  sodium chloride 0.45%. 1000 milliLiter(s) (80 mL/Hr) IV Continuous <Continuous>    MEDICATIONS  (PRN):  acetaminophen   Tablet .. 650 milliGRAM(s) Oral every 6 hours PRN Temp greater or equal to 38C (100.4F), Mild Pain (1 - 3), Moderate Pain (4 - 6)  meclizine 25 milliGRAM(s) Oral three times a day PRN Dizziness  metoclopramide Injectable 10 milliGRAM(s) IV Push every 8 hours PRN nausea, vomiting      __________________________________________________  REVIEW OF SYSTEMS:    CONSTITUTIONAL: No fever,   EYES: no acute visual disturbances  NECK: No pain or stiffness  RESPIRATORY: No cough; No shortness of breath  CARDIOVASCULAR: No chest pain, no palpitations  GASTROINTESTINAL: No pain. No nausea or vomiting; No diarrhea   NEUROLOGICAL: No headache or numbness, no tremors  MUSCULOSKELETAL: No joint pain, no muscle pain  GENITOURINARY: no dysuria, no frequency, no hesitancy  PSYCHIATRY: no depression , no anxiety  ALL OTHER  ROS negative        Vital Signs Last 24 Hrs  T(C): 36.6 (10 Sep 2019 14:00), Max: 37.3 (10 Sep 2019 05:05)  T(F): 97.9 (10 Sep 2019 14:00), Max: 99.2 (10 Sep 2019 05:05)  HR: 70 (10 Sep 2019 17:49) (67 - 91)  BP: 165/71 (10 Sep 2019 17:49) (134/55 - 182/84)  BP(mean): --  RR: 14 (10 Sep 2019 17:49) (14 - 17)  SpO2: 100% (10 Sep 2019 17:49) (95% - 100%)    ________________________________________________  PHYSICAL EXAM:  GENERAL: NAD  HEENT: Normocephalic;  conjunctivae and sclerae clear; moist mucous membranes;   NECK : supple  CHEST/LUNG: Clear to auscultation bilaterally   HEART: S1 S2  regular;   ABDOMEN: Soft, Nontender, Nondistended; Bowel sounds present  EXTREMITIES: equal strength Upper and Lower extremities  SKIN: warm and dry; no rash  NERVOUS SYSTEM:  Awake and alert; Oriented  to place, person and time    _________________________________________________  LABS:                        12.6   8.87  )-----------( 122      ( 10 Sep 2019 06:10 )             39.0     09-10    141  |  107  |  12  ----------------------------<  100<H>  4.2   |  28  |  0.73    Ca    8.4      10 Sep 2019 06:10  Phos  3.1     09-10  Mg     2.1     09-10    TPro  6.3  /  Alb  3.2<L>  /  TBili  0.8  /  DBili  0.1  /  AST  16  /  ALT  22  /  AlkPhos  64  09-10      Urinalysis Basic - ( 09 Sep 2019 16:42 )    Color: Yellow / Appearance: Clear / S.005 / pH: x  Gluc: x / Ketone: Small  / Bili: Negative / Urobili: Negative   Blood: x / Protein: Negative / Nitrite: Negative   Leuk Esterase: Trace / RBC: 2-5 /HPF / WBC 0-2 /HPF   Sq Epi: x / Non Sq Epi: Occasional /HPF / Bacteria: Trace /HPF      CAPILLARY BLOOD GLUCOSE      POCT Blood Glucose.: 126 mg/dL (09 Sep 2019 23:04)        RADIOLOGY & ADDITIONAL TESTS:  < from: CT Head No Cont (09.10.19 @ 17:07) >    INTERPRETATION:  CT scan of the brain without contrast    CLINICAL HISTORY: Dizziness and headache    Comparison: 2017    TECHNIQUE:  Multi  detector spiral CT Scan of the Brain was performed   from the skull base to vertex without IV contrast administration in brain   and bone windows.    FINDINGS: The ventricles present a similar appearance.  There is no   midline shift or mass effect.  There is no acute intracranial hemorrhage   or extra-axial fluid collection.  The cortical sulci and gyri are normal.    The medulla, stefanie and cerebellum are unremarkable. The bony calvaria are   intact.  The visualized paranasal sinuses are clear.    There is no evidenceof acute infarction.    IMPRESSION:  No acute intracranial pathology.          Imaging Personally Reviewed:  YES  Consultant(s) Notes Reviewed:   YES  Care Discussed with Consultants :     Plan of care was discussed with patient and son          OVERNIGHT EVENTS: severe dizziness and weakness    MEDICATIONS  (STANDING):  aspirin enteric coated 81 milliGRAM(s) Oral daily  enoxaparin Injectable 40 milliGRAM(s) SubCutaneous daily  meclizine 25 milliGRAM(s) Oral every 8 hours  metoprolol tartrate 25 milliGRAM(s) Oral two times a day  pantoprazole    Tablet 40 milliGRAM(s) Oral before breakfast  sodium chloride 0.45%. 1000 milliLiter(s) (80 mL/Hr) IV Continuous <Continuous>    MEDICATIONS  (PRN):  acetaminophen   Tablet .. 650 milliGRAM(s) Oral every 6 hours PRN Temp greater or equal to 38C (100.4F), Mild Pain (1 - 3), Moderate Pain (4 - 6)  meclizine 25 milliGRAM(s) Oral three times a day PRN Dizziness  metoclopramide Injectable 10 milliGRAM(s) IV Push every 8 hours PRN nausea, vomiting      __________________________________________________  REVIEW OF SYSTEMS:    CONSTITUTIONAL: No fever,   EYES: no acute visual disturbances  NECK: No pain or stiffness  RESPIRATORY: No cough; No shortness of breath  CARDIOVASCULAR: No chest pain, no palpitations  GASTROINTESTINAL: positive  pain mid epigastric. positive nausea and wretching; No diarrhea   NEUROLOGICAL: No headache or numbness, no tremors  MUSCULOSKELETAL: No joint pain, no muscle pain  GENITOURINARY: no dysuria, no frequency, no hesitancy  PSYCHIATRY: no depression , no anxiety  ALL OTHER  ROS negative        Vital Signs Last 24 Hrs  T(C): 36.6 (10 Sep 2019 14:00), Max: 37.3 (10 Sep 2019 05:05)  T(F): 97.9 (10 Sep 2019 14:00), Max: 99.2 (10 Sep 2019 05:05)  HR: 70 (10 Sep 2019 17:49) (67 - 91)  BP: 165/71 (10 Sep 2019 17:49) (134/55 - 182/84)  BP(mean): --  RR: 14 (10 Sep 2019 17:49) (14 - 17)  SpO2: 100% (10 Sep 2019 17:49) (95% - 100%)    ________________________________________________  PHYSICAL EXAM:  GENERAL: NAD  HEENT: Normocephalic;  conjunctivae and sclerae clear; moist mucous membranes;   NECK : supple  CHEST/LUNG: Clear to auscultation bilaterally with good air entry   HEART: S1 S2  regular; no murmurs, gallops or rubs  ABDOMEN: Soft, Nontender, Nondistended; Bowel sounds present  EXTREMITIES: no cyanosis; no edema; no calf tenderness  SKIN: warm and dry; no rash  NERVOUS SYSTEM:  Awake and alert; Oriented  to place, person and time  _________________________________________________  LABS:                        12.6   8.87  )-----------( 122      ( 10 Sep 2019 06:10 )             39.0     09-10    141  |  107  |  12  ----------------------------<  100<H>  4.2   |  28  |  0.73    Ca    8.4      10 Sep 2019 06:10  Phos  3.1     -10  Mg     2.1     -10    TPro  6.3  /  Alb  3.2<L>  /  TBili  0.8  /  DBili  0.1  /  AST  16  /  ALT  22  /  AlkPhos  64  09-10      Urinalysis Basic - ( 09 Sep 2019 16:42 )    Color: Yellow / Appearance: Clear / S.005 / pH: x  Gluc: x / Ketone: Small  / Bili: Negative / Urobili: Negative   Blood: x / Protein: Negative / Nitrite: Negative   Leuk Esterase: Trace / RBC: 2-5 /HPF / WBC 0-2 /HPF   Sq Epi: x / Non Sq Epi: Occasional /HPF / Bacteria: Trace /HPF      CAPILLARY BLOOD GLUCOSE      POCT Blood Glucose.: 126 mg/dL (09 Sep 2019 23:04)        RADIOLOGY & ADDITIONAL TESTS:    Imaging Personally Reviewed:  YES    Consultant(s) Notes Reviewed:   YES  Care Discussed with Consultants :     Plan of care was discussed with patient son all questions and concerns were addressed and care was aligned with patient's wishes.

## 2019-09-11 ENCOUNTER — TRANSCRIPTION ENCOUNTER (OUTPATIENT)
Age: 74
End: 2019-09-11

## 2019-09-11 LAB
ANION GAP SERPL CALC-SCNC: 5 MMOL/L — SIGNIFICANT CHANGE UP (ref 5–17)
BUN SERPL-MCNC: 11 MG/DL — SIGNIFICANT CHANGE UP (ref 7–18)
CALCIUM SERPL-MCNC: 8.5 MG/DL — SIGNIFICANT CHANGE UP (ref 8.4–10.5)
CHLORIDE SERPL-SCNC: 107 MMOL/L — SIGNIFICANT CHANGE UP (ref 96–108)
CO2 SERPL-SCNC: 31 MMOL/L — SIGNIFICANT CHANGE UP (ref 22–31)
CREAT SERPL-MCNC: 0.78 MG/DL — SIGNIFICANT CHANGE UP (ref 0.5–1.3)
CULTURE RESULTS: SIGNIFICANT CHANGE UP
GLUCOSE SERPL-MCNC: 90 MG/DL — SIGNIFICANT CHANGE UP (ref 70–99)
HCT VFR BLD CALC: 40.1 % — SIGNIFICANT CHANGE UP (ref 34.5–45)
HGB BLD-MCNC: 12.8 G/DL — SIGNIFICANT CHANGE UP (ref 11.5–15.5)
MCHC RBC-ENTMCNC: 29.4 PG — SIGNIFICANT CHANGE UP (ref 27–34)
MCHC RBC-ENTMCNC: 31.9 GM/DL — LOW (ref 32–36)
MCV RBC AUTO: 92 FL — SIGNIFICANT CHANGE UP (ref 80–100)
NRBC # BLD: 0 /100 WBCS — SIGNIFICANT CHANGE UP (ref 0–0)
PLATELET # BLD AUTO: 108 K/UL — LOW (ref 150–400)
POTASSIUM SERPL-MCNC: 3.6 MMOL/L — SIGNIFICANT CHANGE UP (ref 3.5–5.3)
POTASSIUM SERPL-SCNC: 3.6 MMOL/L — SIGNIFICANT CHANGE UP (ref 3.5–5.3)
RBC # BLD: 4.36 M/UL — SIGNIFICANT CHANGE UP (ref 3.8–5.2)
RBC # FLD: 13.3 % — SIGNIFICANT CHANGE UP (ref 10.3–14.5)
SODIUM SERPL-SCNC: 143 MMOL/L — SIGNIFICANT CHANGE UP (ref 135–145)
SPECIMEN SOURCE: SIGNIFICANT CHANGE UP
WBC # BLD: 7.47 K/UL — SIGNIFICANT CHANGE UP (ref 3.8–10.5)
WBC # FLD AUTO: 7.47 K/UL — SIGNIFICANT CHANGE UP (ref 3.8–10.5)

## 2019-09-11 PROCEDURE — 70551 MRI BRAIN STEM W/O DYE: CPT | Mod: 26

## 2019-09-11 PROCEDURE — 99223 1ST HOSP IP/OBS HIGH 75: CPT

## 2019-09-11 RX ORDER — MECLIZINE HCL 12.5 MG
50 TABLET ORAL EVERY 12 HOURS
Refills: 0 | Status: DISCONTINUED | OUTPATIENT
Start: 2019-09-11 | End: 2019-09-12

## 2019-09-11 RX ORDER — MECLIZINE HCL 12.5 MG
25 TABLET ORAL EVERY 8 HOURS
Refills: 0 | Status: DISCONTINUED | OUTPATIENT
Start: 2019-09-11 | End: 2019-09-11

## 2019-09-11 RX ADMIN — Medication 40 MILLIGRAM(S): at 12:23

## 2019-09-11 RX ADMIN — Medication 25 MILLIGRAM(S): at 12:23

## 2019-09-11 RX ADMIN — PANTOPRAZOLE SODIUM 40 MILLIGRAM(S): 20 TABLET, DELAYED RELEASE ORAL at 06:03

## 2019-09-11 RX ADMIN — Medication 25 MILLIGRAM(S): at 17:13

## 2019-09-11 RX ADMIN — Medication 25 MILLIGRAM(S): at 05:25

## 2019-09-11 RX ADMIN — Medication 650 MILLIGRAM(S): at 06:50

## 2019-09-11 RX ADMIN — Medication 81 MILLIGRAM(S): at 12:23

## 2019-09-11 RX ADMIN — Medication 650 MILLIGRAM(S): at 06:17

## 2019-09-11 RX ADMIN — Medication 50 MILLIGRAM(S): at 17:13

## 2019-09-11 RX ADMIN — ENOXAPARIN SODIUM 40 MILLIGRAM(S): 100 INJECTION SUBCUTANEOUS at 12:23

## 2019-09-11 NOTE — PROGRESS NOTE ADULT - PROBLEM SELECTOR PLAN 2
CT scan no acute findings   Abdominal pain has resolved.  Tolerating regular diet
Pt has some nausea and retching 2/2 dizziness / vertigo this AM  Has mostly resolved. Pt tolerated po today    Continue Reglan prn

## 2019-09-11 NOTE — PROGRESS NOTE ADULT - PROBLEM SELECTOR PLAN 3
continue  Metoprolol   monitor BP
Started  Meclizine with good therapeutic effect. Was able to stand to get into wheelchair for CT scan   Head CT was negative for any acute processes. CT results. See above.  F/U PT eval

## 2019-09-11 NOTE — PROGRESS NOTE ADULT - SUBJECTIVE AND OBJECTIVE BOX
NP Note discussed with  primary attending      74 years old female from home no significant PMHx/PSHx presented with nausea, vomiting and dizziness.  CT of head no acute infarct, CT abd and pelvis negative. Pt seen at bedside this morning, co persistent dizziness, worse with moving her head and changing position. States still feels slightly nauseous.  No vomiting or abd pain. No fever, vision changes, focal weakness, fever.       MEDICATIONS  (STANDING):  aspirin enteric coated 81 milliGRAM(s) Oral daily  enoxaparin Injectable 40 milliGRAM(s) SubCutaneous daily  meclizine 25 milliGRAM(s) Oral every 8 hours  metoprolol tartrate 25 milliGRAM(s) Oral two times a day  pantoprazole    Tablet 40 milliGRAM(s) Oral before breakfast  sodium chloride 0.45%. 1000 milliLiter(s) (80 mL/Hr) IV Continuous <Continuous>    MEDICATIONS  (PRN):  acetaminophen   Tablet .. 650 milliGRAM(s) Oral every 6 hours PRN Temp greater or equal to 38C (100.4F), Mild Pain (1 - 3), Moderate Pain (4 - 6)  meclizine 25 milliGRAM(s) Oral three times a day PRN Dizziness  metoclopramide Injectable 10 milliGRAM(s) IV Push every 8 hours PRN nausea, vomiting      __________________________________________________  REVIEW OF SYSTEMS:    CONSTITUTIONAL: No fever,   EYES: no acute visual disturbances  NECK: No pain or stiffness  RESPIRATORY: No cough; No shortness of breath  CARDIOVASCULAR: No chest pain, no palpitations  GASTROINTESTINAL: No pain. No nausea or vomiting; No diarrhea   NEUROLOGICAL: + dizziness, mild headache   MUSCULOSKELETAL: No joint pain, no muscle pain  GENITOURINARY: no dysuria, no frequency, no hesitancy  PSYCHIATRY: no depression , no anxiety  ALL OTHER  ROS negative        Vital Signs Last 24 Hrs  T(C): 36.7 (11 Sep 2019 05:03), Max: 37.1 (10 Sep 2019 20:51)  T(F): 98 (11 Sep 2019 05:03), Max: 98.7 (10 Sep 2019 20:51)  HR: 64 (11 Sep 2019 05:03) (64 - 70)  BP: 155/69 (11 Sep 2019 05:03) (150/62 - 175/83)  BP(mean): --  RR: 17 (11 Sep 2019 05:03) (14 - 17)  SpO2: 95% (11 Sep 2019 05:03) (95% - 100%)    ________________________________________________  PHYSICAL EXAM:  GENERAL: NAD  HEENT: Normocephalic;  conjunctivae and sclerae clear; moist mucous membranes;   NECK : supple  CHEST/LUNG: Clear to ausculitation bilaterally with good air entry   HEART: S1 S2  regular; no murmurs, gallops or rubs  ABDOMEN: Soft, Nontender, Nondistended; Bowel sounds present  EXTREMITIES: no cyanosis; no edema; no calf tenderness  SKIN: warm and dry; no rash  NERVOUS SYSTEM:  Awake and alert; Oriented  to place, person and time ; no new deficits    _________________________________________________  LABS:                        12.8   7.47  )-----------( 108      ( 11 Sep 2019 06:50 )             40.1     -    143  |  107  |  11  ----------------------------<  90  3.6   |  31  |  0.78    Ca    8.5      11 Sep 2019 06:50  Phos  3.1     -10  Mg     2.1     09-10    TPro  6.3  /  Alb  3.2<L>  /  TBili  0.8  /  DBili  0.1  /  AST  16  /  ALT  22  /  AlkPhos  64  09-10      Urinalysis Basic - ( 09 Sep 2019 16:42 )    Color: Yellow / Appearance: Clear / S.005 / pH: x  Gluc: x / Ketone: Small  / Bili: Negative / Urobili: Negative   Blood: x / Protein: Negative / Nitrite: Negative   Leuk Esterase: Trace / RBC: 2-5 /HPF / WBC 0-2 /HPF   Sq Epi: x / Non Sq Epi: Occasional /HPF / Bacteria: Trace /HPF      CAPILLARY BLOOD GLUCOSE            RADIOLOGY & ADDITIONAL TESTS:    Imaging Personally Reviewed:  YES/NO    Consultant(s) Notes Reviewed:   YES/ No    Care Discussed with Consultants :     Plan of care was discussed with patient and /or primary care giver; all questions and concerns were addressed and care was aligned with patient's wishes.

## 2019-09-11 NOTE — CONSULT NOTE ADULT - SUBJECTIVE AND OBJECTIVE BOX
Patient is a 74y old  Female who presents with a chief complaint of nausea vomiting (11 Sep 2019 10:27)      HPI:  74 years old polish speaking female from home who lives with her  with no significant PMHx/PSHx presented with nausea and vomiting, neurology called for vertigo.  The patient has been having spinning sensation with moving her head for the past few days.  No new hearing loss or recent infections.  Has tinnitus in the left ear.  No new medications.  No prior similar symptoms.  Received Meclazine 25mg with some improvement but without resolution of symptoms.    Neurological Review of Systems:  No difficulty with language.  No vision loss or double vision.  No dizziness or new hearing loss.  No difficulty with speech or swallowing.  No focal weakness.  No focal sensory changes.  +difficulty with balance and difficulty with ambulation, not to any specific side.        MEDICATIONS  (STANDING):  aspirin enteric coated 81 milliGRAM(s) Oral daily  enoxaparin Injectable 40 milliGRAM(s) SubCutaneous daily  meclizine 50 milliGRAM(s) Oral every 12 hours  metoprolol tartrate 25 milliGRAM(s) Oral two times a day  pantoprazole    Tablet 40 milliGRAM(s) Oral before breakfast  predniSONE   Tablet 40 milliGRAM(s) Oral daily  sodium chloride 0.45%. 1000 milliLiter(s) (80 mL/Hr) IV Continuous <Continuous>    MEDICATIONS  (PRN):  acetaminophen   Tablet .. 650 milliGRAM(s) Oral every 6 hours PRN Temp greater or equal to 38C (100.4F), Mild Pain (1 - 3), Moderate Pain (4 - 6)  metoclopramide Injectable 10 milliGRAM(s) IV Push every 8 hours PRN nausea, vomiting    Allergies    No Known Allergies    Intolerances      PAST MEDICAL & SURGICAL HISTORY:  No pertinent past medical history  No significant past surgical history    FAMILY HISTORY: reviwed NC son    SOCIAL HISTORY: non smoker    Review of Systems:  Constitutional: No fevers.                    Eyes, Ears, Mouth, Throat: No vision loss   Respiratory: No cough.                                Cardiovascular: No chest pain   Gastrointestinal: + nausea or vomiting.                                         Genitourinary: No urinary incontinence  Musculoskeletal: No joint pain.                                                           Dermatologic: No rash.  Neurological: as per HPI                                                                      Psychiatric: No behavioral problems.  Endocrine: No known hypoglycemia.               Hematologic/Lymphatic: No easy bleeding.    O:  Vital Signs Last 24 Hrs  T(C): 36.9 (11 Sep 2019 13:38), Max: 37.1 (10 Sep 2019 20:51)  T(F): 98.5 (11 Sep 2019 13:38), Max: 98.7 (10 Sep 2019 20:51)  HR: 72 (11 Sep 2019 13:38) (64 - 87)  BP: 140/69 (11 Sep 2019 13:38) (140/69 - 175/83)  BP(mean): --  RR: 16 (11 Sep 2019 13:38) (14 - 17)  SpO2: 95% (11 Sep 2019 13:38) (95% - 100%)    General Exam:   General appearance: No acute distress                 Cardiovascular: Pedal dorsalis pulses intact bilaterally    Mental Status: Orientated to self, date and place.  Attention intact.  No dysarthria, aphasia or neglect.  Knowledge intact.  Registration intact.  Short and long term memory grossly intact.      Cranial Nerves: CN I - not tested.  PERRL, EOMI, VFF, no nystagmus or diplopia.  No APD.  Fundi not visualized.  CN V1-3 intact to light touch.  No facial asymmetry.  Hearing intact to finger rub bilaterally.  Tongue, uvula and palate midline.  Sternocleidomastoid and Trapezius intact bilaterally.    Motor:   Tone: normal.                  Strength intact throughout  No pronator drift bilaterally                      No dysmetria on finger-nose-finger or heel-shin-heel  No truncal ataxia.  No resting, postural or action tremor.  No myoclonus.    Sensation: intact to light touch    Deep Tendon Reflexes: 1+ bilateral biceps, triceps, brachioradialis, knee and ankle  Toes flexor bilaterally    Gait: patient declines due to feeling unwell    Other: patient unable to tolerate HITS    LABS:                        12.8   7.47  )-----------( 108      ( 11 Sep 2019 06:50 )             40.1     -    143  |  107  |  11  ----------------------------<  90  3.6   |  31  |  0.78    Ca    8.5      11 Sep 2019 06:50  Phos  3.1     09-10  Mg     2.1     -10    TPro  6.3  /  Alb  3.2<L>  /  TBili  0.8  /  DBili  0.1  /  AST  16  /  ALT  22  /  AlkPhos  64  09-10      Urinalysis Basic - ( 09 Sep 2019 16:42 )    Color: Yellow / Appearance: Clear / S.005 / pH: x  Gluc: x / Ketone: Small  / Bili: Negative / Urobili: Negative   Blood: x / Protein: Negative / Nitrite: Negative   Leuk Esterase: Trace / RBC: 2-5 /HPF / WBC 0-2 /HPF   Sq Epi: x / Non Sq Epi: Occasional /HPF / Bacteria: Trace /HPF          RADIOLOGY & ADDITIONAL STUDIES:    < from: 12 Lead ECG (19 @ 20:29) >  Diagnosis Line *** Poor data quality, interpretation may be adversely affected  Normal sinus rhythm  Normal ECG    < end of copied text >    < from: CT Head No Cont (09.10.19 @ 17:07) > (images reviwed)  EXAM:  CT BRAIN                            PROCEDURE DATE:  09/10/2019          INTERPRETATION:  CT scan of the brain without contrast    CLINICAL HISTORY: Dizziness and headache    Comparison: 2017    TECHNIQUE:  Multi  detector spiral CT Scan of the Brain was performed   from the skull base to vertex without IV contrast administration in brain   and bone windows.    FINDINGS: The ventricles present a similar appearance.  There is no   midline shift or mass effect.  There is no acute intracranial hemorrhage   or extra-axial fluid collection.  The cortical sulci and gyri are normal.    The medulla, stefanie and cerebellum are unremarkable. The bony calvaria are   intact.  The visualized paranasal sinuses are clear.    There is no evidenceof acute infarction.    IMPRESSION:  No acute intracranial pathology.    Thank you for this referral.                AURA DARDEN M.D., ATTENDING RADIOLOGIST  This document has been electronically signed. Sep 10 2019  5:22PM        < end of copied text >

## 2019-09-11 NOTE — PROGRESS NOTE ADULT - PROBLEM SELECTOR PLAN 5
PT eval to see pt.   Most likely disposition is back home as weakness has improved
Pt mildly hypertensive on Metoprolol 150s-160s. Will consider titration if BP remains elevated after resolution of Vertigo.

## 2019-09-11 NOTE — PROGRESS NOTE ADULT - PROBLEM SELECTOR PLAN 1
likely BPV   reports persistent dizziness with head movement, unable to get up today to walk to bathroom   meclizine 25 mg q 8 hrs   continue reglan and tylenol prn   F/U PT eval  may need neuro eval if dizziness persists  assistant with ambulation likely BPV   reports persistent dizziness with head movement, unable to get up today to walk to bathroom   meclizine 25 mg q 8 hrs   continue reglan and tylenol prn   start Prednisone taper dose   F/U PT eval  may need neuro eval if dizziness persists  assistant with ambulation likely BPV   reports persistent dizziness with head movement, unable to get up today to walk to bathroom   meclizine 25 mg q 8 hrs   continue reglan and tylenol prn   start Prednisone taper dose   F/U PT eval   neuro eval   MRI head as per Dr Moore recommendation   assistant with ambulation

## 2019-09-11 NOTE — CONSULT NOTE ADULT - ASSESSMENT
Vertigo and tinnitus, unclear if peripheral or central; possible labyrintitis given Hx of tinnitus    Recommend:  MRI brain r/o cva  increase Meclazine to 50mg Q8 hours, switch to [id number] prn when symptoms resolve    britta THOMPSON and Dr. Cain

## 2019-09-11 NOTE — PHYSICAL THERAPY INITIAL EVALUATION ADULT - GENERAL OBSERVATIONS, REHAB EVAL
pt aox4 pleasant, refer to dizziness limiting functional mobility, social hx obtain from grand daughter

## 2019-09-12 ENCOUNTER — TRANSCRIPTION ENCOUNTER (OUTPATIENT)
Age: 74
End: 2019-09-12

## 2019-09-12 VITALS
DIASTOLIC BLOOD PRESSURE: 54 MMHG | SYSTOLIC BLOOD PRESSURE: 147 MMHG | RESPIRATION RATE: 18 BRPM | TEMPERATURE: 98 F | HEART RATE: 74 BPM | OXYGEN SATURATION: 96 %

## 2019-09-12 LAB
ANION GAP SERPL CALC-SCNC: 1 MMOL/L — LOW (ref 5–17)
BUN SERPL-MCNC: 17 MG/DL — SIGNIFICANT CHANGE UP (ref 7–18)
CALCIUM SERPL-MCNC: 8.8 MG/DL — SIGNIFICANT CHANGE UP (ref 8.4–10.5)
CHLORIDE SERPL-SCNC: 108 MMOL/L — SIGNIFICANT CHANGE UP (ref 96–108)
CO2 SERPL-SCNC: 33 MMOL/L — HIGH (ref 22–31)
CREAT SERPL-MCNC: 0.76 MG/DL — SIGNIFICANT CHANGE UP (ref 0.5–1.3)
GLUCOSE SERPL-MCNC: 119 MG/DL — HIGH (ref 70–99)
HCT VFR BLD CALC: 39.8 % — SIGNIFICANT CHANGE UP (ref 34.5–45)
HGB BLD-MCNC: 12.6 G/DL — SIGNIFICANT CHANGE UP (ref 11.5–15.5)
MCHC RBC-ENTMCNC: 29.4 PG — SIGNIFICANT CHANGE UP (ref 27–34)
MCHC RBC-ENTMCNC: 31.7 GM/DL — LOW (ref 32–36)
MCV RBC AUTO: 93 FL — SIGNIFICANT CHANGE UP (ref 80–100)
NRBC # BLD: 0 /100 WBCS — SIGNIFICANT CHANGE UP (ref 0–0)
PLATELET # BLD AUTO: 115 K/UL — LOW (ref 150–400)
POTASSIUM SERPL-MCNC: 3.6 MMOL/L — SIGNIFICANT CHANGE UP (ref 3.5–5.3)
POTASSIUM SERPL-SCNC: 3.6 MMOL/L — SIGNIFICANT CHANGE UP (ref 3.5–5.3)
RBC # BLD: 4.28 M/UL — SIGNIFICANT CHANGE UP (ref 3.8–5.2)
RBC # FLD: 13.3 % — SIGNIFICANT CHANGE UP (ref 10.3–14.5)
SODIUM SERPL-SCNC: 142 MMOL/L — SIGNIFICANT CHANGE UP (ref 135–145)
WBC # BLD: 9.32 K/UL — SIGNIFICANT CHANGE UP (ref 3.8–10.5)
WBC # FLD AUTO: 9.32 K/UL — SIGNIFICANT CHANGE UP (ref 3.8–10.5)

## 2019-09-12 RX ORDER — MECLIZINE HCL 12.5 MG
1 TABLET ORAL
Qty: 15 | Refills: 0
Start: 2019-09-12 | End: 2019-09-16

## 2019-09-12 RX ORDER — ACETAMINOPHEN 500 MG
2 TABLET ORAL
Qty: 0 | Refills: 0 | DISCHARGE
Start: 2019-09-12

## 2019-09-12 RX ORDER — MECLIZINE HCL 12.5 MG
25 TABLET ORAL EVERY 8 HOURS
Refills: 0 | Status: DISCONTINUED | OUTPATIENT
Start: 2019-09-12 | End: 2019-09-12

## 2019-09-12 RX ORDER — IBUPROFEN 200 MG
600 TABLET ORAL ONCE
Refills: 0 | Status: COMPLETED | OUTPATIENT
Start: 2019-09-12 | End: 2019-09-12

## 2019-09-12 RX ADMIN — Medication 25 MILLIGRAM(S): at 05:39

## 2019-09-12 RX ADMIN — ENOXAPARIN SODIUM 40 MILLIGRAM(S): 100 INJECTION SUBCUTANEOUS at 11:15

## 2019-09-12 RX ADMIN — Medication 81 MILLIGRAM(S): at 11:15

## 2019-09-12 RX ADMIN — Medication 40 MILLIGRAM(S): at 05:39

## 2019-09-12 RX ADMIN — Medication 600 MILLIGRAM(S): at 11:15

## 2019-09-12 RX ADMIN — Medication 600 MILLIGRAM(S): at 12:42

## 2019-09-12 RX ADMIN — PANTOPRAZOLE SODIUM 40 MILLIGRAM(S): 20 TABLET, DELAYED RELEASE ORAL at 06:50

## 2019-09-12 RX ADMIN — Medication 50 MILLIGRAM(S): at 05:39

## 2019-09-12 NOTE — DISCHARGE NOTE PROVIDER - HOSPITAL COURSE
74 years old polish speaking female from home who lives with her  with no significant PMHx/PSHx presented with nausea and vomiting for l and diffuse abdominal pain since last night. Pt relates experiencing multiple episodes of NBNB emesis and generalized abdominal pain which is diffuse and 5/10 in intensity. Patient denies any diarrhea or fever. Patient frequently eats from outside. No one else is sick at home Pt reports her last bowel movement was 2 days ago, which is of normal frequency for her. patient does report chills and feeling weak because of nausea and vomiting. Patient looked dehydrated on clinical exam, S/p 1 L bolus in ED. started on IV hydration. Zofran for symptomatic nausea and vomiting, Started on Protonix. CT abdomen and pelvis is negative for any acute pathology. Vertigo and tinnitus, unclear if peripheral or central; possible labyrintitis given Hx of tinnitus. Neurology recommend: MRI brain r/o cva, increase Meclazine to 50mg Q8 hours, switch to [id number] prn when symptoms resolve        (Not completed, Awaiting MRI) 74 years old polish speaking female from home who lives with her  with no significant PMHx/PSHx presented with nausea and vomiting for l and diffuse abdominal pain since last night. Pt relates experiencing multiple episodes of NBNB emesis and generalized abdominal pain which is diffuse and 5/10 in intensity. Patient denies any diarrhea or fever. Patient frequently eats from outside. No one else is sick at home Pt reports her last bowel movement was 2 days ago, which is of normal frequency for her. patient does report chills and feeling weak because of nausea and vomiting. Patient looked dehydrated on clinical exam, S/p 1 L bolus in ED. started on IV hydration. Zofran for symptomatic nausea and vomiting, Started on Protonix. CT abdomen and pelvis is negative for any acute pathology. CT  head done and is negative for any acute findings. Vertigo and tinnitus, unclear if peripheral or central; possible labyrintitis given Hx of tinnitus. Neurology recommend: MRI brain r/o cva, increase Meclizine to 50mg Q8 hours, switch to 25 mg [id number] prn when symptoms resolve.     MRI negative for acute infarct. Only mild to moderate chronic microvascular changes.    Pt is safe for discharge to home.

## 2019-09-12 NOTE — DISCHARGE NOTE PROVIDER - CARE PROVIDER_API CALL
Aakash Loyd)  Otolaryngology  [address], [address]  [place] [address]  Phone: [phone]  Fax: [phone]  Follow Up Time:     Shyann Cain)  Internal Medicine  [address] [place] [address]  Phone: [phone]  Fax: [phone]  Follow Up Time:

## 2019-09-12 NOTE — PROGRESS NOTE ADULT - ASSESSMENT
74 yr old female, from home, denies past medical history, admit hospital fro ABD pain and nausea and intractable vomiting, NPO, IV hydration zofran/antivert, F/U CT head, CT ABD, RML chronic fibrosis(?), no bowel obstruction, CT head negative, S/P neuro consult, MRI of brain no acute finding, chronic mild to moderate ischemic changes, continue on antivert/steroid, patient significant improved, D/C home today, F/U neuro out patient
74 yr old female, from home, denies past medical history, admit hospital fro ABD pain and nausea and intractable vomiting, NPO, IV hydration zofran/antivert, F/U CT head, CT ABD, RML chronic fibrosis(?), no bowel obstruction, DVT prophylaxis, fall precaution.
Pt admitted for Dehydration 2/2 Nausea , Vomiting  most likely 2/2 Vertigo
patient was seen and evaluated, clinically improved, CT head negative, PT, continue antivert/course of steroid, if continue improving, D/C home AM

## 2019-09-12 NOTE — DISCHARGE NOTE NURSING/CASE MANAGEMENT/SOCIAL WORK - PATIENT PORTAL LINK FT
You can access the FollowMyHealth Patient Portal offered by [facility] by registering at the following website: [email or web address]. By joining [employer]’s FollowMyHealth portal, you will also be able to view your health information using other applications (apps) compatible with our system.

## 2019-09-12 NOTE — DISCHARGE NOTE PROVIDER - NSDCCPCAREPLAN_GEN_ALL_CORE_FT
PRINCIPAL DISCHARGE DIAGNOSIS  Diagnosis: Vertigo  Assessment and Plan of Treatment: There are many causes of Vertigo such as stroke, infection , head trauma. Your CT scan and MRI were negative for any acute process. You should follow up with and Ear Nose and Throat (ENT)  specialist.  You will be sent home with Antivert to use as needed at home      SECONDARY DISCHARGE DIAGNOSES  Diagnosis: Nausea and vomiting  Assessment and Plan of Treatment: Nausea and Vomiting  subsided once Vertigo subsided. Vertigo can cause Nausea and vomiting    Diagnosis: Abdominal pain  Assessment and Plan of Treatment: CT abdomen was negative for any acute findings to cause vomiting

## 2019-09-12 NOTE — PROGRESS NOTE ADULT - SUBJECTIVE AND OBJECTIVE BOX
Patient is a 74y old  Female who presents with a chief complaint of nausea vomiting (12 Sep 2019 00:18), MRI of brain negative for acute CVA      INTERVAL HPI/OVERNIGHT EVENTS:  T(C): 36.7 (09-12-19 @ 04:42), Max: 36.9 (09-11-19 @ 13:38)  HR: 89 (09-12-19 @ 09:44) (67 - 89)  BP: 151/68 (09-12-19 @ 09:44) (134/70 - 151/68)  RR: 16 (09-12-19 @ 04:42) (16 - 16)  SpO2: 98% (09-12-19 @ 04:42) (95% - 98%)  Wt(kg): --    LABS:                        12.6   9.32  )-----------( 115      ( 12 Sep 2019 06:55 )             39.8     09-12    142  |  108  |  17  ----------------------------<  119<H>  3.6   |  33<H>  |  0.76    Ca    8.8      12 Sep 2019 06:55          CAPILLARY BLOOD GLUCOSE            RADIOLOGY & ADDITIONAL TESTS:  < from: MR Head No Cont (09.11.19 @ 18:21) >  IMPRESSION:     No acute intracranial findings.    Mild-to-moderate chronic microvascular ischemic disease.    < end of copied text >    Consultant(s) Notes Reviewed:  [x ] YES  [ ] NO    PHYSICAL EXAM:  GENERAL: well built, well nourished  HEAD:  Atraumatic, Normocephalic  EYES: EOMI, PERRLA, conjunctiva and sclera clear  ENT: No tonsillar erythema, exudates, or enlargement; Moist mucous membranes, Good dentition, No lesions  NECK: Supple, No JVD, Normal thyroid, no enlarged nodes  NERVOUS SYSTEM:  Alert & Oriented X3, Good concentration; Motor Strength 5/5 B/L upper and lower extremities; DTRs 2+ intact and symmetric, sensory intact  CHEST/LUNG: B/L good air entry; No rales, rhonchi, or wheezing  HEART: S1S2 normal, no S3, Regular rate and rhythm; No murmurs, rubs, or gallops  ABDOMEN: Soft, Nontender, Nondistended; Bowel sounds present  EXTREMITIES:  2+ Peripheral Pulses, No clubbing, cyanosis, or edema  LYMPH: No lymphadenopathy noted  SKIN: No rashes or lesions    Care Discussed with Consultants/Other Providers [ x] YES  [ ] NO

## 2019-10-01 PROCEDURE — 97116 GAIT TRAINING THERAPY: CPT

## 2019-10-01 PROCEDURE — 93005 ELECTROCARDIOGRAM TRACING: CPT

## 2019-10-01 PROCEDURE — 84100 ASSAY OF PHOSPHORUS: CPT

## 2019-10-01 PROCEDURE — 80076 HEPATIC FUNCTION PANEL: CPT

## 2019-10-01 PROCEDURE — 84484 ASSAY OF TROPONIN QUANT: CPT

## 2019-10-01 PROCEDURE — 80061 LIPID PANEL: CPT

## 2019-10-01 PROCEDURE — 36415 COLL VENOUS BLD VENIPUNCTURE: CPT

## 2019-10-01 PROCEDURE — 82306 VITAMIN D 25 HYDROXY: CPT

## 2019-10-01 PROCEDURE — 84443 ASSAY THYROID STIM HORMONE: CPT

## 2019-10-01 PROCEDURE — 97161 PT EVAL LOW COMPLEX 20 MIN: CPT

## 2019-10-01 PROCEDURE — 81001 URINALYSIS AUTO W/SCOPE: CPT

## 2019-10-01 PROCEDURE — 83690 ASSAY OF LIPASE: CPT

## 2019-10-01 PROCEDURE — 82962 GLUCOSE BLOOD TEST: CPT

## 2019-10-01 PROCEDURE — 83735 ASSAY OF MAGNESIUM: CPT

## 2019-10-01 PROCEDURE — 80053 COMPREHEN METABOLIC PANEL: CPT

## 2019-10-01 PROCEDURE — 83036 HEMOGLOBIN GLYCOSYLATED A1C: CPT

## 2019-10-01 PROCEDURE — 80048 BASIC METABOLIC PNL TOTAL CA: CPT

## 2019-10-01 PROCEDURE — 70450 CT HEAD/BRAIN W/O DYE: CPT

## 2019-10-01 PROCEDURE — 82607 VITAMIN B-12: CPT

## 2019-10-01 PROCEDURE — 74177 CT ABD & PELVIS W/CONTRAST: CPT

## 2019-10-01 PROCEDURE — 85027 COMPLETE CBC AUTOMATED: CPT

## 2019-10-01 PROCEDURE — 86803 HEPATITIS C AB TEST: CPT

## 2019-10-01 PROCEDURE — 99285 EMERGENCY DEPT VISIT HI MDM: CPT | Mod: 25

## 2019-10-01 PROCEDURE — 70551 MRI BRAIN STEM W/O DYE: CPT

## 2019-10-01 PROCEDURE — 87086 URINE CULTURE/COLONY COUNT: CPT

## 2020-07-08 NOTE — ED ADULT NURSE NOTE - NS ED PATIENT SAFETY CONCERN
No Suturegard Intro: Intraoperative tissue expansion was performed, utilizing the SUTUREGARD device, in order to reduce wound tension.

## 2021-04-21 NOTE — DISCHARGE NOTE ADULT - PROVIDER TOKENS
Pt recently in hosp and discharged yesterday. She thought she was told she may return to work in 1 week but not sure if that was correct. Please advise when and she states her employer will want it in writing. WILLIAM:'[id number]',WILLIAM:'[id number]'

## 2021-11-01 ENCOUNTER — INPATIENT (INPATIENT)
Facility: HOSPITAL | Age: 76
LOS: 2 days | Discharge: ANOTHER HOSPITAL | DRG: 305 | End: 2021-11-04
Attending: INTERNAL MEDICINE | Admitting: INTERNAL MEDICINE
Payer: MEDICARE

## 2021-11-01 VITALS
HEART RATE: 117 BPM | WEIGHT: 180.78 LBS | OXYGEN SATURATION: 95 % | RESPIRATION RATE: 18 BRPM | HEIGHT: 65 IN | SYSTOLIC BLOOD PRESSURE: 155 MMHG | DIASTOLIC BLOOD PRESSURE: 89 MMHG | TEMPERATURE: 98 F

## 2021-11-01 DIAGNOSIS — G62.9 POLYNEUROPATHY, UNSPECIFIED: ICD-10-CM

## 2021-11-01 DIAGNOSIS — Z29.9 ENCOUNTER FOR PROPHYLACTIC MEASURES, UNSPECIFIED: ICD-10-CM

## 2021-11-01 DIAGNOSIS — I48.91 UNSPECIFIED ATRIAL FIBRILLATION: ICD-10-CM

## 2021-11-01 DIAGNOSIS — R42 DIZZINESS AND GIDDINESS: ICD-10-CM

## 2021-11-01 DIAGNOSIS — I50.9 HEART FAILURE, UNSPECIFIED: ICD-10-CM

## 2021-11-01 LAB
ALBUMIN SERPL ELPH-MCNC: 3.6 G/DL — SIGNIFICANT CHANGE UP (ref 3.5–5)
ALP SERPL-CCNC: 85 U/L — SIGNIFICANT CHANGE UP (ref 40–120)
ALT FLD-CCNC: 31 U/L DA — SIGNIFICANT CHANGE UP (ref 10–60)
ANION GAP SERPL CALC-SCNC: 9 MMOL/L — SIGNIFICANT CHANGE UP (ref 5–17)
APPEARANCE UR: CLEAR — SIGNIFICANT CHANGE UP
AST SERPL-CCNC: 19 U/L — SIGNIFICANT CHANGE UP (ref 10–40)
BASOPHILS # BLD AUTO: 0.07 K/UL — SIGNIFICANT CHANGE UP (ref 0–0.2)
BASOPHILS NFR BLD AUTO: 0.8 % — SIGNIFICANT CHANGE UP (ref 0–2)
BILIRUB SERPL-MCNC: 1.1 MG/DL — SIGNIFICANT CHANGE UP (ref 0.2–1.2)
BILIRUB UR-MCNC: NEGATIVE — SIGNIFICANT CHANGE UP
BUN SERPL-MCNC: 19 MG/DL — HIGH (ref 7–18)
CALCIUM SERPL-MCNC: 9 MG/DL — SIGNIFICANT CHANGE UP (ref 8.4–10.5)
CHLORIDE SERPL-SCNC: 111 MMOL/L — HIGH (ref 96–108)
CO2 SERPL-SCNC: 22 MMOL/L — SIGNIFICANT CHANGE UP (ref 22–31)
COLOR SPEC: YELLOW — SIGNIFICANT CHANGE UP
CREAT SERPL-MCNC: 1.09 MG/DL — SIGNIFICANT CHANGE UP (ref 0.5–1.3)
D DIMER BLD IA.RAPID-MCNC: 327 NG/ML DDU — HIGH
DIFF PNL FLD: ABNORMAL
EOSINOPHIL # BLD AUTO: 0.06 K/UL — SIGNIFICANT CHANGE UP (ref 0–0.5)
EOSINOPHIL NFR BLD AUTO: 0.6 % — SIGNIFICANT CHANGE UP (ref 0–6)
GLUCOSE SERPL-MCNC: 122 MG/DL — HIGH (ref 70–99)
GLUCOSE UR QL: NEGATIVE — SIGNIFICANT CHANGE UP
HCT VFR BLD CALC: 43.8 % — SIGNIFICANT CHANGE UP (ref 34.5–45)
HGB BLD-MCNC: 14.4 G/DL — SIGNIFICANT CHANGE UP (ref 11.5–15.5)
IMM GRANULOCYTES NFR BLD AUTO: 0.4 % — SIGNIFICANT CHANGE UP (ref 0–1.5)
KETONES UR-MCNC: ABNORMAL
LEUKOCYTE ESTERASE UR-ACNC: ABNORMAL
LYMPHOCYTES # BLD AUTO: 3.39 K/UL — HIGH (ref 1–3.3)
LYMPHOCYTES # BLD AUTO: 36.5 % — SIGNIFICANT CHANGE UP (ref 13–44)
MCHC RBC-ENTMCNC: 29.6 PG — SIGNIFICANT CHANGE UP (ref 27–34)
MCHC RBC-ENTMCNC: 32.9 GM/DL — SIGNIFICANT CHANGE UP (ref 32–36)
MCV RBC AUTO: 90.1 FL — SIGNIFICANT CHANGE UP (ref 80–100)
MONOCYTES # BLD AUTO: 0.41 K/UL — SIGNIFICANT CHANGE UP (ref 0–0.9)
MONOCYTES NFR BLD AUTO: 4.4 % — SIGNIFICANT CHANGE UP (ref 2–14)
NEUTROPHILS # BLD AUTO: 5.33 K/UL — SIGNIFICANT CHANGE UP (ref 1.8–7.4)
NEUTROPHILS NFR BLD AUTO: 57.3 % — SIGNIFICANT CHANGE UP (ref 43–77)
NITRITE UR-MCNC: NEGATIVE — SIGNIFICANT CHANGE UP
NRBC # BLD: 0 /100 WBCS — SIGNIFICANT CHANGE UP (ref 0–0)
NT-PROBNP SERPL-SCNC: 3434 PG/ML — HIGH (ref 0–450)
PH UR: 5 — SIGNIFICANT CHANGE UP (ref 5–8)
PLATELET # BLD AUTO: 141 K/UL — LOW (ref 150–400)
POTASSIUM SERPL-MCNC: 3.7 MMOL/L — SIGNIFICANT CHANGE UP (ref 3.5–5.3)
POTASSIUM SERPL-SCNC: 3.7 MMOL/L — SIGNIFICANT CHANGE UP (ref 3.5–5.3)
PROT SERPL-MCNC: 7.2 G/DL — SIGNIFICANT CHANGE UP (ref 6–8.3)
PROT UR-MCNC: NEGATIVE — SIGNIFICANT CHANGE UP
RAPID RVP RESULT: SIGNIFICANT CHANGE UP
RBC # BLD: 4.86 M/UL — SIGNIFICANT CHANGE UP (ref 3.8–5.2)
RBC # FLD: 14.2 % — SIGNIFICANT CHANGE UP (ref 10.3–14.5)
SARS-COV-2 RNA SPEC QL NAA+PROBE: SIGNIFICANT CHANGE UP
SODIUM SERPL-SCNC: 142 MMOL/L — SIGNIFICANT CHANGE UP (ref 135–145)
SP GR SPEC: 1.01 — SIGNIFICANT CHANGE UP (ref 1.01–1.02)
TROPONIN I, HIGH SENSITIVITY RESULT: 15.2 NG/L — SIGNIFICANT CHANGE UP
TROPONIN I, HIGH SENSITIVITY RESULT: 17.2 NG/L — SIGNIFICANT CHANGE UP
UROBILINOGEN FLD QL: NEGATIVE — SIGNIFICANT CHANGE UP
WBC # BLD: 9.3 K/UL — SIGNIFICANT CHANGE UP (ref 3.8–10.5)
WBC # FLD AUTO: 9.3 K/UL — SIGNIFICANT CHANGE UP (ref 3.8–10.5)

## 2021-11-01 PROCEDURE — 93010 ELECTROCARDIOGRAM REPORT: CPT

## 2021-11-01 PROCEDURE — 71046 X-RAY EXAM CHEST 2 VIEWS: CPT | Mod: 26

## 2021-11-01 PROCEDURE — 71275 CT ANGIOGRAPHY CHEST: CPT | Mod: 26,MA

## 2021-11-01 PROCEDURE — 99291 CRITICAL CARE FIRST HOUR: CPT

## 2021-11-01 RX ORDER — GABAPENTIN 400 MG/1
300 CAPSULE ORAL
Refills: 0 | Status: DISCONTINUED | OUTPATIENT
Start: 2021-11-01 | End: 2021-11-04

## 2021-11-01 RX ORDER — FOLIC ACID 0.8 MG
1 TABLET ORAL DAILY
Refills: 0 | Status: DISCONTINUED | OUTPATIENT
Start: 2021-11-01 | End: 2021-11-04

## 2021-11-01 RX ORDER — METOPROLOL TARTRATE 50 MG
25 TABLET ORAL
Refills: 0 | Status: DISCONTINUED | OUTPATIENT
Start: 2021-11-01 | End: 2021-11-02

## 2021-11-01 RX ORDER — METOPROLOL TARTRATE 50 MG
25 TABLET ORAL ONCE
Refills: 0 | Status: COMPLETED | OUTPATIENT
Start: 2021-11-01 | End: 2021-11-01

## 2021-11-01 RX ORDER — FUROSEMIDE 40 MG
40 TABLET ORAL DAILY
Refills: 0 | Status: DISCONTINUED | OUTPATIENT
Start: 2021-11-02 | End: 2021-11-03

## 2021-11-01 RX ORDER — MIRTAZAPINE 45 MG/1
45 TABLET, ORALLY DISINTEGRATING ORAL DAILY
Refills: 0 | Status: DISCONTINUED | OUTPATIENT
Start: 2021-11-01 | End: 2021-11-04

## 2021-11-01 RX ORDER — METOPROLOL TARTRATE 50 MG
5 TABLET ORAL ONCE
Refills: 0 | Status: COMPLETED | OUTPATIENT
Start: 2021-11-01 | End: 2021-11-01

## 2021-11-01 RX ORDER — FUROSEMIDE 40 MG
40 TABLET ORAL ONCE
Refills: 0 | Status: COMPLETED | OUTPATIENT
Start: 2021-11-01 | End: 2021-11-01

## 2021-11-01 RX ORDER — LISINOPRIL 2.5 MG/1
5 TABLET ORAL DAILY
Refills: 0 | Status: DISCONTINUED | OUTPATIENT
Start: 2021-11-01 | End: 2021-11-04

## 2021-11-01 RX ORDER — ENOXAPARIN SODIUM 100 MG/ML
80 INJECTION SUBCUTANEOUS
Refills: 0 | Status: DISCONTINUED | OUTPATIENT
Start: 2021-11-01 | End: 2021-11-03

## 2021-11-01 RX ADMIN — Medication 40 MILLIGRAM(S): at 17:35

## 2021-11-01 RX ADMIN — Medication 25 MILLIGRAM(S): at 17:35

## 2021-11-01 RX ADMIN — Medication 5 MILLIGRAM(S): at 13:37

## 2021-11-01 RX ADMIN — ENOXAPARIN SODIUM 80 MILLIGRAM(S): 100 INJECTION SUBCUTANEOUS at 17:36

## 2021-11-01 RX ADMIN — GABAPENTIN 300 MILLIGRAM(S): 400 CAPSULE ORAL at 17:35

## 2021-11-01 RX ADMIN — Medication 25 MILLIGRAM(S): at 13:37

## 2021-11-01 NOTE — H&P ADULT - HISTORY OF PRESENT ILLNESS
75 y/o F with pmh of HTN, HLD, varicose veins, vertigo, who presented to the ED with complains of SOB. She states her SOB has been getting worse for past 3 weeks. It is most pronounced when she walks and when she lays down. Pt also endorsed some chest pain at times, 3/10 under left breast, and palpitations, but denies any currently. Pt says that she recently saw a cardiologist and had work up done including echo but doesn't have the results (Dr. Mcdonald [phone]). She also endores some pain in her legs, which she attributes to her varicose veins. She denies any other complains including headache, dizziness, abd pain, nausea, vomiting, diarrhea, weakness, numbness, tingling or fatigue.     In ED CTA of chest negative for PE but with pleural effusions. Pro bnp elevated. PT also noted to have afib.

## 2021-11-01 NOTE — PATIENT PROFILE ADULT. - NS PRO PT RIGHT SUPPORT PERSON
Add 84558 Cpt? (Important Note: In 2017 The Use Of 67112 Is Being Tracked By Cms To Determine Future Global Period Reimbursement For Global Periods): no Detail Level: Detailed Yes

## 2021-11-01 NOTE — H&P ADULT - PROBLEM SELECTOR PLAN 1
pt c/o SOB on excertion and orthopnea  probnp 3.4K, D-dimmer 327 (not significant for age)  no history of CFH, also noted to be on new onset afib  Trop negative x1  CTA Chest: negative for PE, BL pulmonary edema  Will give Lasix 40  metoprolol, aspirin  f/u echo  f/u lipid profile, f/u a1c  Cardio Dr. Mullins

## 2021-11-01 NOTE — H&P ADULT - NSHPREVIEWOFSYSTEMS_GEN_ALL_CORE
CONSTITUTIONAL: No fever, weight loss, or fatigue  EYES: No eye pain, visual disturbances, or discharge  ENT:  No difficulty hearing, tinnitus, vertigo; No sinus or throat pain  NECK: No pain or stiffness  RESPIRATORY: + SOB, No cough, wheezing, chills or hemoptysis;  CARDIOVASCULAR: + chest pain, + palpitations, no passing out, dizziness, or leg swelling  GASTROINTESTINAL: No abdominal or epigastric pain. No nausea, vomiting, or hematemesis; No diarrhea or constipation. No melena or hematochezia.  GENITOURINARY: No dysuria, frequency, hematuria, or incontinence  NEUROLOGICAL: No headaches, memory loss, loss of strength, numbness, or tremors  SKIN: No itching, burning, rashes, or lesions   LYMPH Nodes: No enlarged glands  ENDOCRINE: No heat or cold intolerance; No hair loss  MUSCULOSKELETAL: No joint pain or swelling; No muscle, back, No extremity pain  PSYCHIATRIC: No depression, anxiety, mood swings, or difficulty sleeping  HEME/LYMPH: No easy bruising, or bleeding gums  ALLERGY AND IMMUNOLOGIC: No hives or eczema

## 2021-11-01 NOTE — ED PROVIDER NOTE - CLINICAL SUMMARY MEDICAL DECISION MAKING FREE TEXT BOX
pt with new onset of A-fib having sob and chest pain.  will get labs and ekg, cxr and cta given new onset of a-fib and sudden shortness of breath to rule out PE  will admit

## 2021-11-01 NOTE — ED PROVIDER NOTE - OBJECTIVE STATEMENT
pt is [place] speaking and declined , son at bedside  pt with sob worsening over the last few days was being worked up as out pt but not sure what has been done.  sob worse at night   also has a dry cough  fully vaccinated with Eamon and Eamon covid two months ago  states now can only walk about 20 steps without getting sob  a few months ago could walk without any issues

## 2021-11-01 NOTE — H&P ADULT - NSICDXFAMILYHX_GEN_ALL_CORE_FT
FAMILY HISTORY:  Father  Still living? No  FH: heart attack, Age at diagnosis: Age Unknown    Mother  Still living? Unknown  FH: heart attack, Age at diagnosis: Age Unknown

## 2021-11-01 NOTE — H&P ADULT - PROBLEM SELECTOR PLAN 2
pt with new onset afib and chf  s/p Metoprolol in ED (po and IV)  will continue to monitor  tele monitoring  f/u echo  Chadvasc score 4: will start full dose lovenox, consider NOAc based on insurance  Cardio consult: Dr. Mullins

## 2021-11-01 NOTE — H&P ADULT - NSHPPHYSICALEXAM_GEN_ALL_CORE
GENERAL: NAD, well-groomed, well-developed, pleasant   HEAD:  Atraumatic, Normocephalic  EYES: EOMI, PERRLA, conjunctiva and sclera clear  ENMT: No tonsillar erythema, exudates, or enlargement; Moist mucous membranes, poor dentition, No lesions  NECK: Supple, normal appearance, No JVD; Normal thyroid; Trachea midline  NERVOUS SYSTEM:  Alert & Oriented X3,  Motor Strength 5/5 B/L upper and lower extremities, sensation intact  CHEST/LUNG: fine crackles bl   HEART: irregular rate and rhythm; No murmurs, rubs, or gallops appreciated  ABDOMEN: Soft, Nontender, Nondistended; Bowel sounds present  EXTREMITIES:  2+ Peripheral Pulses, No clubbing, cyanosis, or edema  LYMPH: No lymphadenopathy noted  SKIN: No rashes or lesions;  Good capillary refill

## 2021-11-01 NOTE — H&P ADULT - ASSESSMENT
75 y/o F with pmh of HTN, HLD, varicose veins, vertigo, who presented to the ED with complains of SOB. Admitted for new onset chf and afib

## 2021-11-02 DIAGNOSIS — Z02.9 ENCOUNTER FOR ADMINISTRATIVE EXAMINATIONS, UNSPECIFIED: ICD-10-CM

## 2021-11-02 DIAGNOSIS — R82.71 BACTERIURIA: ICD-10-CM

## 2021-11-02 DIAGNOSIS — E78.5 HYPERLIPIDEMIA, UNSPECIFIED: ICD-10-CM

## 2021-11-02 LAB
A1C WITH ESTIMATED AVERAGE GLUCOSE RESULT: 5.6 % — SIGNIFICANT CHANGE UP (ref 4–5.6)
ALBUMIN SERPL ELPH-MCNC: 4 G/DL — SIGNIFICANT CHANGE UP (ref 3.5–5)
ALP SERPL-CCNC: 88 U/L — SIGNIFICANT CHANGE UP (ref 40–120)
ALT FLD-CCNC: 29 U/L DA — SIGNIFICANT CHANGE UP (ref 10–60)
ANION GAP SERPL CALC-SCNC: 11 MMOL/L — SIGNIFICANT CHANGE UP (ref 5–17)
ANISOCYTOSIS BLD QL: SLIGHT — SIGNIFICANT CHANGE UP
AST SERPL-CCNC: 21 U/L — SIGNIFICANT CHANGE UP (ref 10–40)
BASOPHILS # BLD AUTO: 0 K/UL — SIGNIFICANT CHANGE UP (ref 0–0.2)
BASOPHILS NFR BLD AUTO: 0 % — SIGNIFICANT CHANGE UP (ref 0–2)
BILIRUB SERPL-MCNC: 1.3 MG/DL — HIGH (ref 0.2–1.2)
BUN SERPL-MCNC: 16 MG/DL — SIGNIFICANT CHANGE UP (ref 7–18)
CALCIUM SERPL-MCNC: 9.1 MG/DL — SIGNIFICANT CHANGE UP (ref 8.4–10.5)
CHLORIDE SERPL-SCNC: 107 MMOL/L — SIGNIFICANT CHANGE UP (ref 96–108)
CHOLEST SERPL-MCNC: 270 MG/DL — HIGH
CO2 SERPL-SCNC: 23 MMOL/L — SIGNIFICANT CHANGE UP (ref 22–31)
COVID-19 SPIKE DOMAIN AB INTERP: POSITIVE
COVID-19 SPIKE DOMAIN ANTIBODY RESULT: >250 U/ML — HIGH
CREAT SERPL-MCNC: 0.99 MG/DL — SIGNIFICANT CHANGE UP (ref 0.5–1.3)
EOSINOPHIL # BLD AUTO: 0 K/UL — SIGNIFICANT CHANGE UP (ref 0–0.5)
EOSINOPHIL NFR BLD AUTO: 0 % — SIGNIFICANT CHANGE UP (ref 0–6)
ESTIMATED AVERAGE GLUCOSE: 114 MG/DL — SIGNIFICANT CHANGE UP (ref 68–114)
GLUCOSE BLDC GLUCOMTR-MCNC: 111 MG/DL — HIGH (ref 70–99)
GLUCOSE SERPL-MCNC: 94 MG/DL — SIGNIFICANT CHANGE UP (ref 70–99)
HCT VFR BLD CALC: 46.7 % — HIGH (ref 34.5–45)
HDLC SERPL-MCNC: 53 MG/DL — SIGNIFICANT CHANGE UP
HGB BLD-MCNC: 15.5 G/DL — SIGNIFICANT CHANGE UP (ref 11.5–15.5)
HYPOCHROMIA BLD QL: SLIGHT — SIGNIFICANT CHANGE UP
LIPID PNL WITH DIRECT LDL SERPL: 185 MG/DL — HIGH
LYMPHOCYTES # BLD AUTO: 58 % — HIGH (ref 13–44)
LYMPHOCYTES # BLD AUTO: 6.24 K/UL — HIGH (ref 1–3.3)
MAGNESIUM SERPL-MCNC: 2.2 MG/DL — SIGNIFICANT CHANGE UP (ref 1.6–2.6)
MANUAL SMEAR VERIFICATION: SIGNIFICANT CHANGE UP
MCHC RBC-ENTMCNC: 29.6 PG — SIGNIFICANT CHANGE UP (ref 27–34)
MCHC RBC-ENTMCNC: 33.2 GM/DL — SIGNIFICANT CHANGE UP (ref 32–36)
MCV RBC AUTO: 89.1 FL — SIGNIFICANT CHANGE UP (ref 80–100)
MONOCYTES # BLD AUTO: 0.75 K/UL — SIGNIFICANT CHANGE UP (ref 0–0.9)
MONOCYTES NFR BLD AUTO: 7 % — SIGNIFICANT CHANGE UP (ref 2–14)
NEUTROPHILS # BLD AUTO: 3.77 K/UL — SIGNIFICANT CHANGE UP (ref 1.8–7.4)
NEUTROPHILS NFR BLD AUTO: 35 % — LOW (ref 43–77)
NON HDL CHOLESTEROL: 217 MG/DL — HIGH
NRBC # BLD: 0 /100 — SIGNIFICANT CHANGE UP (ref 0–0)
PHOSPHATE SERPL-MCNC: 3.4 MG/DL — SIGNIFICANT CHANGE UP (ref 2.5–4.5)
PLAT MORPH BLD: NORMAL — SIGNIFICANT CHANGE UP
PLATELET # BLD AUTO: 169 K/UL — SIGNIFICANT CHANGE UP (ref 150–400)
PLATELET COUNT - ESTIMATE: NORMAL — SIGNIFICANT CHANGE UP
POIKILOCYTOSIS BLD QL AUTO: SLIGHT — SIGNIFICANT CHANGE UP
POLYCHROMASIA BLD QL SMEAR: SLIGHT — SIGNIFICANT CHANGE UP
POTASSIUM SERPL-MCNC: 3.5 MMOL/L — SIGNIFICANT CHANGE UP (ref 3.5–5.3)
POTASSIUM SERPL-SCNC: 3.5 MMOL/L — SIGNIFICANT CHANGE UP (ref 3.5–5.3)
PROT SERPL-MCNC: 7.5 G/DL — SIGNIFICANT CHANGE UP (ref 6–8.3)
RBC # BLD: 5.24 M/UL — HIGH (ref 3.8–5.2)
RBC # FLD: 14.3 % — SIGNIFICANT CHANGE UP (ref 10.3–14.5)
RBC BLD AUTO: ABNORMAL
SARS-COV-2 IGG+IGM SERPL QL IA: >250 U/ML — HIGH
SARS-COV-2 IGG+IGM SERPL QL IA: POSITIVE
SODIUM SERPL-SCNC: 141 MMOL/L — SIGNIFICANT CHANGE UP (ref 135–145)
TRIGL SERPL-MCNC: 161 MG/DL — HIGH
TSH SERPL-MCNC: 1.16 UU/ML — SIGNIFICANT CHANGE UP (ref 0.34–4.82)
WBC # BLD: 10.76 K/UL — HIGH (ref 3.8–10.5)
WBC # FLD AUTO: 10.76 K/UL — HIGH (ref 3.8–10.5)

## 2021-11-02 RX ORDER — POTASSIUM CHLORIDE 20 MEQ
40 PACKET (EA) ORAL ONCE
Refills: 0 | Status: COMPLETED | OUTPATIENT
Start: 2021-11-02 | End: 2021-11-02

## 2021-11-02 RX ORDER — DILTIAZEM HCL 120 MG
5 CAPSULE, EXT RELEASE 24 HR ORAL
Qty: 125 | Refills: 0 | Status: DISCONTINUED | OUTPATIENT
Start: 2021-11-02 | End: 2021-11-04

## 2021-11-02 RX ORDER — SODIUM CHLORIDE 9 MG/ML
500 INJECTION INTRAMUSCULAR; INTRAVENOUS; SUBCUTANEOUS ONCE
Refills: 0 | Status: COMPLETED | OUTPATIENT
Start: 2021-11-02 | End: 2021-11-02

## 2021-11-02 RX ORDER — ONDANSETRON 8 MG/1
4 TABLET, FILM COATED ORAL ONCE
Refills: 0 | Status: COMPLETED | OUTPATIENT
Start: 2021-11-02 | End: 2021-11-02

## 2021-11-02 RX ORDER — METOPROLOL TARTRATE 50 MG
50 TABLET ORAL
Refills: 0 | Status: DISCONTINUED | OUTPATIENT
Start: 2021-11-02 | End: 2021-11-04

## 2021-11-02 RX ORDER — ATORVASTATIN CALCIUM 80 MG/1
10 TABLET, FILM COATED ORAL AT BEDTIME
Refills: 0 | Status: DISCONTINUED | OUTPATIENT
Start: 2021-11-02 | End: 2021-11-04

## 2021-11-02 RX ADMIN — GABAPENTIN 300 MILLIGRAM(S): 400 CAPSULE ORAL at 05:47

## 2021-11-02 RX ADMIN — Medication 1 MILLIGRAM(S): at 13:15

## 2021-11-02 RX ADMIN — ATORVASTATIN CALCIUM 10 MILLIGRAM(S): 80 TABLET, FILM COATED ORAL at 21:04

## 2021-11-02 RX ADMIN — ENOXAPARIN SODIUM 80 MILLIGRAM(S): 100 INJECTION SUBCUTANEOUS at 05:47

## 2021-11-02 RX ADMIN — MIRTAZAPINE 45 MILLIGRAM(S): 45 TABLET, ORALLY DISINTEGRATING ORAL at 13:15

## 2021-11-02 RX ADMIN — Medication 5 MG/HR: at 21:04

## 2021-11-02 RX ADMIN — Medication 40 MILLIEQUIVALENT(S): at 17:15

## 2021-11-02 RX ADMIN — Medication 40 MILLIGRAM(S): at 05:47

## 2021-11-02 RX ADMIN — LISINOPRIL 5 MILLIGRAM(S): 2.5 TABLET ORAL at 05:46

## 2021-11-02 RX ADMIN — ENOXAPARIN SODIUM 80 MILLIGRAM(S): 100 INJECTION SUBCUTANEOUS at 17:14

## 2021-11-02 RX ADMIN — SODIUM CHLORIDE 500 MILLILITER(S): 9 INJECTION INTRAMUSCULAR; INTRAVENOUS; SUBCUTANEOUS at 07:45

## 2021-11-02 RX ADMIN — GABAPENTIN 300 MILLIGRAM(S): 400 CAPSULE ORAL at 17:15

## 2021-11-02 RX ADMIN — Medication 25 MILLIGRAM(S): at 17:15

## 2021-11-02 RX ADMIN — Medication 25 MILLIGRAM(S): at 05:47

## 2021-11-02 NOTE — PROGRESS NOTE ADULT - CONVERSATION DETAILS
Discussed with both patient and son at bedside, regarding test results, dx with treatment plan, including life sustaining treatment option. understands verbally. wants Full code for now.

## 2021-11-02 NOTE — PROGRESS NOTE ADULT - PROBLEM SELECTOR PLAN 4
UA positive for wbc, ketone, leuk Est  Asymptomatic of dysuria   Afebrile, no leukocytosis  monitor urinary symptoms  will not treat this time unless pt is symptomatic or having fever.

## 2021-11-02 NOTE — CONSULT NOTE ADULT - SUBJECTIVE AND OBJECTIVE BOX
C A R D I O L O G Y  *********************    DATE OF SERVICE: 11-02-21    HISTORY OF PRESENT ILLNESS: HPI:  77 y/o F with pmh of HTN, HLD, varicose veins, vertigo, who presented to the ED with complains of SOB. She states her SOB has been getting worse for past 3 weeks. It is most pronounced when she walks and when she lays down. Pt also endorsed some chest pain at times, 3/10 under left breast, and palpitations, but denies any currently. Pt says that she recently saw a cardiologist and had work up done including echo but doesn't have the results (Dr. Mcdonald [phone]). She also endores some pain in her legs, which she attributes to her varicose veins. She denies any other complains including headache, dizziness, abd pain, nausea, vomiting, diarrhea, weakness, numbness, tingling or fatigue.     In ED CTA of chest negative for PE but with pleural effusions. Pro bnp elevated. PT also noted to have afib.  (01 Nov 2021 16:01)      PAST MEDICAL & SURGICAL HISTORY:  Hypertension    Vertigo    H/O varicose veins    No significant past surgical history            MEDICATIONS:  MEDICATIONS  (STANDING):  enoxaparin Injectable 80 milliGRAM(s) SubCutaneous two times a day  folic acid 1 milliGRAM(s) Oral daily  furosemide   Injectable 40 milliGRAM(s) IV Push daily  gabapentin 300 milliGRAM(s) Oral two times a day  lisinopril 5 milliGRAM(s) Oral daily  metoprolol tartrate 25 milliGRAM(s) Oral two times a day  mirtazapine 45 milliGRAM(s) Oral daily  sodium chloride 0.9% Bolus 500 milliLiter(s) IV Bolus once      Allergies    No Known Allergies    Intolerances        FAMILY HISTORY:  FH: heart attack (Father, Mother)      Non-contributary for premature coronary disease or sudden cardiac death    SOCIAL HISTORY:    [ X] Non-smoker  [ ] Smoker  [ ] Alcohol        REVIEW OF SYSTEMS:  [ ]chest pain  [  X]shortness of breath  [  ]palpitations  [  ]syncope  [ ]near syncope [ ]upper extremity weakness   [ ] lower extremity weakness  [  ]diplopia  [  ]altered mental status   [  ]fevers  [ ]chills [ ]nausea  [ ]vomitting  [  ]dysphagia    [ ]abdominal pain  [ ]melena  [ ]BRBPR    [  ]epistaxis  [  ]rash    [ ]lower extremity edema        [X] All others negative	  [ ] Unable to obtain      LABS:	 	    CARDIAC MARKERS:                              15.5   10.76 )-----------( 169      ( 02 Nov 2021 07:11 )             46.7     Hb Trend: 15.5<--    11-02    141  |  107  |  16  ----------------------------<  94  3.5   |  23  |  0.99    Ca    9.1      02 Nov 2021 07:11  Phos  3.4     11-02  Mg     2.2     11-02    TPro  7.5  /  Alb  4.0  /  TBili  1.3<H>  /  DBili  x   /  AST  21  /  ALT  29  /  AlkPhos  88  11-02    Creatinine Trend: 0.99<--, 1.09<--    TSH: Thyroid Stimulating Hormone, Serum: 1.16 uU/mL (11-02 @ 07:11)          PHYSICAL EXAM:  T(C): 36.6 (11-01-21 @ 22:39), Max: 36.6 (11-01-21 @ 11:11)  HR: 102 (11-02-21 @ 08:03) (66 - 131)  BP: 116/76 (11-02-21 @ 08:03) (116/76 - 160/100)  RR: 19 (11-02-21 @ 08:03) (18 - 22)  SpO2: 96% (11-02-21 @ 08:03) (89% - 96%)  Wt(kg): --   BMI (kg/m2): 30.1 (11-01-21 @ 09:44)  I&O's Summary    HEENT:  (-)icterus (-)pallor  CV: N S1 S2 1/6 ZITA (+)2 Pulses B/l  Resp: Decreased BS B/L Bases, normal effort  GI: (+) BS Soft, NT, ND  Lymph:  (-)Edema, (-)obvious lymphadenopathy  Skin: Warm to touch, Normal turgor  Psych: Appropriate mood and affect        TELEMETRY: 	afib      ECG:  	Afib 132 BPM, nonspecific T wave abnormality    RADIOLOGY:         CXR:   < from: Xray Chest 2 Views PA/Lat (11.01.21 @ 10:35) >  Bibasilar pleuropulmonary processes.    < end of copied text >      ASSESSMENT/PLAN: 	76y Female  pmh of HTN, HLD, varicose veins, vertigo, who presented to the ED with complains of SOB found with pulmonary edema and new leonel    - Echo  - EP eval Dr. Bolton called  - cont therautic lovenox  - Strict I+O's  - keep net neagative  - will follow with you    I once again thank you for allowing me to participate in the care of your patient.  If you have any questions or concerns please do not hesitate to contact me.    Jair Mullins MD, [facility]  BEEPER [phone]      
EP Attending    HISTORY OF PRESENT ILLNESS: HPI:  77 y/o F with pmh of HTN, HLD, varicose veins, vertigo, who presented to the ED with complains of SOB. She states her SOB has been getting worse for past 3 weeks. It is most pronounced when she walks and when she lays down. Pt also endorsed some chest pain at times, 3/10 under left breast, and palpitations, but denies any currently. Pt says that she recently saw a cardiologist and had work up done including echo but doesn't have the results (Dr. Mcdonald [phone]). She also endores some pain in her legs, which she attributes to her varicose veins. She denies any other complains including headache, dizziness, abd pain, nausea, vomiting, diarrhea, weakness, numbness, tingling or fatigue.     In ED CTA of chest negative for PE but with pleural effusions. Pro bnp elevated. PT also noted to have afib.  (01 Nov 2021 16:01)    Date of Service 11/2- History confirmed with son Wai at bedside.  Patient has history of HTN, HLD, no diabetes.  Had classical CHF symptoms starting a few weeks ago.  Had palpitations and s hortness of breath. Came to  hospital, found to be in acute CHF and with rapid AFib.  She is doing incrementally better on oral metoprolol, and is no longer requiring supplemental O2.  Diuresing subjectively well on IV Lasix daily.  I discussed, at length, the epidemiology and cause(s) of AFib, symptoms attributable to AF including heart failure, and we discussed management strategy.  The patient is willing to take anticoagulation and either antiarrhythmic drugs or undergo ablation, depending on how her hospital course goes and ultimately what her physician team deems appropriate.  No history of prior palpitations or fainting.  A 10 pt ROS is otherwise negative.    PAST MEDICAL & SURGICAL HISTORY:  Hypertension    Vertigo    H/O varicose veins    No significant past surgical history    MEDICATIONS  (STANDING):  atorvastatin 10 milliGRAM(s) Oral at bedtime  enoxaparin Injectable 80 milliGRAM(s) SubCutaneous two times a day  folic acid 1 milliGRAM(s) Oral daily  furosemide   Injectable 40 milliGRAM(s) IV Push daily  gabapentin 300 milliGRAM(s) Oral two times a day  lisinopril 5 milliGRAM(s) Oral daily  metoprolol tartrate 25 milliGRAM(s) Oral two times a day  mirtazapine 45 milliGRAM(s) Oral daily  potassium chloride    Tablet ER 40 milliEquivalent(s) Oral once    Allergies    No Known Allergies    Intolerances    FAMILY HISTORY:  FH: heart attack (Father, Mother)    Non-contributary for premature coronary disease or sudden cardiac death    SOCIAL HISTORY:    [ x] Non-smoker  [ ] Smoker  [ ] Alcohol    PHYSICAL EXAM:  T(C): 36.6 (11-02-21 @ 10:47), Max: 36.6 (11-01-21 @ 22:39)  HR: 118 (11-02-21 @ 10:47) (66 - 118)  BP: 109/67 (11-02-21 @ 10:47) (109/67 - 151/92)  RR: 18 (11-02-21 @ 10:47) (18 - 22)  SpO2: 98% (11-02-21 @ 10:47) (89% - 98%)  Wt(kg): --    Appearance: Normal appearing adult woman in no acute distress.	  HEENT:   Normal oral mucosa, PERRL, EOMI	  Lymphatic: No lymphadenopathy , +edema  Cardiovascular: Rapid irregular S1 S2, +JVD, No murmurs , Peripheral pulses palpable 2+ bilaterally  Respiratory: Poor air entry at bases.  Rales.	  Gastrointestinal:  Soft, Non-tender, + BS	  Skin: No rashes, No ecchymoses, No cyanosis, warm to touch  Musculoskeletal: Normal range of motion, normal strength  Psychiatry:  Mood & affect appropriate    TELEMETRY:AF -120bpm 	    ECG: AF 150bpm 	  Echo: pending  CTA: No PE.  Severely enlarged LA (6+cm in multiple planes)  LABS:	 	                          15.5   10.76 )-----------( 169      ( 02 Nov 2021 07:11 )             46.7     11-02    141  |  107  |  16  ----------------------------<  94  3.5   |  23  |  0.99    Ca    9.1      02 Nov 2021 07:11  Phos  3.4     11-02  Mg     2.2     11-02    TPro  7.5  /  Alb  4.0  /  TBili  1.3<H>  /  DBili  x   /  AST  21  /  ALT  29  /  AlkPhos  88  11-02    TSH: Thyroid Stimulating Hormone, Serum: 1.16 uU/mL (11-02 @ 07:11)      ASSESSMENT/PLAN: 	76y Female with new onset AF/RVR.    Her LA is severely enlarged, and with a history of longstanding hypertension it is conceivable that she's had many shorter runs of AFib before presenting with new persistent AF.  She is naaive to anticoagulation.  Lovenox SQ 1mg/kg BID or Heparin infusion for now.  Will prescribe a DOAC prior to discharge.  An echocardiogram is pending.  She has pleural effusions on CT, consider tapping the right side to expedite recovery.  Vigorously replace K to 4-4.5 (i.e. 40mEQ BID), and consider increasing diuretic frequency to twice daily as well.  Based on echo results, may need ischemic workup.  Family and patient wish to proceed towards AFib ablation, for greater efficacy in maintaining sinus rhythm in a severely enlarged LA.  I have tentatively booked this for next Tuesday, 11/9 at [facility], which should give time for workup.  Will follow.      Perez Bolton M.D.  Cardiac Electrophysiology    office [phone]  pager [phone]

## 2021-11-02 NOTE — RAPID RESPONSE TEAM SUMMARY - NSADDTLFINDINGSRRT_GEN_ALL_CORE
/73  Cardiac monitor showing rapid afib with fluctuating HR  500ml bolus given (BP unreadable at first)  EKG and zofran ordered    Cardiac monitor showing rapid afib with fluctuating HR  500ml bolus given (BP unreadable at first suspected to be low, repeat 123/65)  EKG and zofran ordered

## 2021-11-02 NOTE — RAPID RESPONSE TEAM SUMMARY - NSSITUATIONBACKGROUNDRRT_GEN_ALL_CORE
Rapid response was called because patient went to restroom and on walking back, felt dizzy and extremely nauseous and like she would pass out. She did not fall but lay down. Patient admitted for new onset afib and CHF. Patient examined and spoken to using [place]  [id number] Caridad.  Rapid response was called because patient went to restroom and on walking back, felt dizzy and extremely nauseous and like she would pass out. She did not fall but lay down. Patient admitted for new onset afib and CHF. Patient examined and spoken to using Nepalese  [id number] Caridad. On exam, has no crackles or LE edema. Echo is pending. She denies having similar episode before. She denies chest pain. She has not eaten anything since yesterday.

## 2021-11-02 NOTE — PROGRESS NOTE ADULT - SUBJECTIVE AND OBJECTIVE BOX
NP Note discussed with  Primary Attending    INTERVAL HPI/OVERNIGHT EVENTS: pt states feeling much better for now. no c/o nausea/vomiting or dizziness at present time.     MEDICATIONS  (STANDING):  enoxaparin Injectable 80 milliGRAM(s) SubCutaneous two times a day  folic acid 1 milliGRAM(s) Oral daily  furosemide   Injectable 40 milliGRAM(s) IV Push daily  gabapentin 300 milliGRAM(s) Oral two times a day  lisinopril 5 milliGRAM(s) Oral daily  metoprolol tartrate 25 milliGRAM(s) Oral two times a day  mirtazapine 45 milliGRAM(s) Oral daily  sodium chloride 0.9% Bolus 500 milliLiter(s) IV Bolus once    MEDICATIONS  (PRN):      __________________________________________________  REVIEW OF SYSTEMS:    CONSTITUTIONAL: No fever,   EYES: no acute visual disturbances  NECK: No pain or stiffness  RESPIRATORY: dry cough occasionally ; No shortness of breath  CARDIOVASCULAR: No chest pain, no palpitations  GASTROINTESTINAL: No pain. No nausea or vomiting; No diarrhea   NEUROLOGICAL: No headache or numbness, no tremors  MUSCULOSKELETAL: No joint pain, no muscle pain  GENITOURINARY: no dysuria, no frequency, no hesitancy  PSYCHIATRY: no depression , no anxiety  ALL OTHER  ROS negative        Vital Signs Last 24 Hrs  T(C): 36.6 (2021 10:47), Max: 36.6 (2021 22:39)  T(F): 97.9 (2021 10:47), Max: 97.9 (2021 22:39)  HR: 118 (2021 10:47) (66 - 131)  BP: 109/67 (2021 10:47) (109/67 - 160/100)  BP(mean): --  RR: 18 (2021 10:47) (18 - 22)  SpO2: 98% (2021 10:47) (89% - 98%)    ________________________________________________  PHYSICAL EXAM:  GENERAL: NAD  HEENT: Normocephalic;  conjunctivae and sclerae clear; moist mucous membranes;   NECK : supple  CHEST/LUNG: fair air entry, no wheezing or rhonchi bilaterally.   HEART: S1 S2  irregularly irregular; soft murmur   ABDOMEN: Soft, Nontender, Nondistended; Bowel sounds present  EXTREMITIES: no cyanosis; no edema; no calf tenderness  SKIN: warm and dry; no rash  NERVOUS SYSTEM:  Awake and alert; Oriented  to place, person and time ; no new deficits    _________________________________________________  LABS:                        15.5   10.76 )-----------( 169      ( 2021 07:11 )             46.7     11-    141  |  107  |  16  ----------------------------<  94  3.5   |  23  |  0.99    Ca    9.1      2021 07:11  Phos  3.4     11-  Mg     2.2         TPro  7.5  /  Alb  4.0  /  TBili  1.3<H>  /  DBili  x   /  AST  21  /  ALT  29  /  AlkPhos  88  11-      Urinalysis Basic - ( 2021 18:13 )    Color: Yellow / Appearance: Clear / S.010 / pH: x  Gluc: x / Ketone: Moderate  / Bili: Negative / Urobili: Negative   Blood: x / Protein: Negative / Nitrite: Negative   Leuk Esterase: Small / RBC: 5-10 /HPF / WBC 11-25 /HPF   Sq Epi: x / Non Sq Epi: Few /HPF / Bacteria: Few /HPF      CAPILLARY BLOOD GLUCOSE  POCT Blood Glucose.: 111 mg/dL (2021 07:28)        RADIOLOGY & ADDITIONAL TESTS:    Imaging  Reviewed:  YES    < from: CT Angio Chest PE Protocol w/ IV Cont (21 @ 12:51) >    EXAM:  CT ANGIO CHEST PULM [facility]                            PROCEDURE DATE:  2021          INTERPRETATION:  CLINICAL INFORMATION: New atrial fibrillation. Pulmonary vascular congestion and bilateral pleural effusions. Shortness of breath.    COMPARISON: PA and lateral radiographs of the chest performed the same day, noncontrast CT of the thorax dated 2017.    CONTRAST/COMPLICATIONS:  IV Contrast: Omnipaque 350  50 cc administered   50 cc discarded  Oral Contrast:  Complications: None reported at time of study completion    PROCEDURE:  CT Angiography of the Chest.  Sagittal and coronal reformats were performed as well as 3D (MIP) reconstructions.    FINDINGS:    LUNGS AND AIRWAYS: Patent central airways.  There is milddiffuse airway thickening. There is mild smooth thickening of the interlobular septa throughout the lungs, compatible with interstitial edema. There are patchy perihilar groundglass opacities throughout the lungs, compatible with alveolar edema. No discrete suspicious pulmonary nodule is identified.  PLEURA: There are moderate bilateral layering pleural effusions, right greater than left, with moderate right middle lobe and bilateral lower lobe compressive atelectasis. No pneumothorax or pneumomediastinum.  MEDIASTINUM AND RUPALI: Shotty mediastinal and bilateral hilar lymph nodes.  VESSELS: There is no evidence of filling defect in the first, second, or third order branches of the pulmonary arteries. The pulmonary trunk and main pulmonary arteries are unremarkable. The thoracic aorta and great vessels are unremarkable.  HEART: Heart size is normal. No pericardial effusion.  CHEST WALL AND LOWER NECK: Again is noted enlargement of the left lobe of the thyroid.  VISUALIZED UPPER ABDOMEN: Within normal limits.  BONES: Within normal limits.    IMPRESSION:  1. Moderate bilateral layering pleural effusions with interstitial and alveolar edema, compatible with pulmonary vascular congestion.  2. No evidence of pulmonary embolism.    --- End ofReport ---            GINA TRINIDAD MD; Attending Radiologist  This document has been electronically signed. 2021  1:18PM    < end of copied text >  < from: Xray Chest 2 Views PA/Lat (21 @ 10:35) >    EXAM:  XR CHEST PA LAT 2V                            PROCEDURE DATE:  2021          INTERPRETATION:  PA and lateral chest on 2021 at 10:23 AM. Patient has chest pain.    Heart suggest normal size.    There are basilar pleural pulmonary atelectatic and associated effusion findings which are new since CAT scan of 2017.    IMPRESSION: Bibasilar pleuropulmonary processes.    --- End of Report ---            LILLIANA WAGNER MD; Attending Radiologist  This document has been electronically signed. 2021  2:19PM    < end of copied text >      Consultant(s) Notes Reviewed:   YES      Plan of care was discussed with patient and /or primary care giver; all questions and concerns were addressed

## 2021-11-02 NOTE — RAPID RESPONSE TEAM SUMMARY - NSCIRCULATIONRRT_GEN_ALL_CORE
EKG/No intervention
A/P: 70 y/o with laceration s/p repair.  Patient with vascular exam intact, possible digital nerve laceration.  - LUE elevation  - Pain control  - Tetanus  - Maintain splint  - Abx  - F/U 5 days  - Patient educated on warning signs to prompt ER return and need for possible digital nerve repair    Thank You  Jair Santiago MD  Plastic Surgery  [phone]

## 2021-11-02 NOTE — RAPID RESPONSE TEAM SUMMARY - NSOTHERINTERVENTIONSRRT_GEN_ALL_CORE
will f/u with cardiology  will f/u with cardiology. Patient is already on FDL lovenox and she also received oral metoprolol and lasix this am.   Patient states she is feeling better after interventions.

## 2021-11-02 NOTE — CHART NOTE - NSCHARTNOTEFT_GEN_A_CORE
Pt is on tele monitoring, HR bet. 140s and 150s most of times during the day. Spoke with cardiology dr. Mullins. started Cardizem drip 5mg/hr for now and increased Metoprolol to 50mg BID. Pt is asymptomatic of palpitation, sob or chest pain.

## 2021-11-02 NOTE — PROGRESS NOTE ADULT - PROBLEM SELECTOR PLAN 1
pt c/o SOB on excertion and orthopnea  probnp 3.4K, D-dimmer 327 (not significant for age)  no history of CFH, also noted to be on new onset afib  Trop negative x1  CTA Chest: negative for PE, + BL pulmonary edema, pleural effusion  started IV lasix 40mg.   c/w metoprolol, aspirin, statin  f/u echo  Cardio Dr. Mullins  EP dr. Bolton called  continue telemetry pt c/o SOB on excertion and orthopnea  probnp 3.4K, D-dimmer 327 (not significant for age)  no history of CFH, also noted to be on new onset afib  Trop negative x1  CTA Chest: negative for PE, + BL pulmonary edema, pleural effusion  started IV lasix 40mg.   I/O monitoring  monitor electrolytes, give one dose potassium, K 3.5 today.   c/w metoprolol, aspirin, statin  f/u echo  Cardio Dr. Mullins  EP dr. Bolton called  continue telemetry

## 2021-11-02 NOTE — PROGRESS NOTE ADULT - SUBJECTIVE AND OBJECTIVE BOX
INTERVAL HPI/OVERNIGHT EVENTS:  Patient seen,events noticed for overnight,had rapid responce,currently stable  VITAL SIGNS:  T(F): 97.9 (21 @ 22:39)  HR: 102 (21 @ 08:03)  BP: 116/76 (21 @ 08:03)  RR: 19 (21 @ 08:03)  SpO2: 96% (21 @ 08:03)  Wt(kg): --    PHYSICAL EXAM:  awake  Constitutional:  Eyes:  ENMT:perrla  Neck:  Respiratory:clear  Cardiovascular:s1s2,m-none  Gastrointestinal:soft,bs pos  Extremities:  Vascular:  Neurological:no focal deficit  Musculoskeletal:    MEDICATIONS  (STANDING):  enoxaparin Injectable 80 milliGRAM(s) SubCutaneous two times a day  folic acid 1 milliGRAM(s) Oral daily  furosemide   Injectable 40 milliGRAM(s) IV Push daily  gabapentin 300 milliGRAM(s) Oral two times a day  lisinopril 5 milliGRAM(s) Oral daily  metoprolol tartrate 25 milliGRAM(s) Oral two times a day  mirtazapine 45 milliGRAM(s) Oral daily  ondansetron Injectable 4 milliGRAM(s) IV Push once  sodium chloride 0.9% Bolus 500 milliLiter(s) IV Bolus once    MEDICATIONS  (PRN):      Allergies    No Known Allergies    Intolerances        LABS:                        15.5   10.76 )-----------( 169      ( 2021 07:11 )             46.7         141  |  107  |  16  ----------------------------<  94  3.5   |  23  |  0.99    Ca    9.1      2021 07:11  Phos  3.4       Mg     2.2         TPro  7.5  /  Alb  4.0  /  TBili  1.3<H>  /  DBili  x   /  AST  21  /  ALT  29  /  AlkPhos  88        Urinalysis Basic - ( 2021 18:13 )    Color: Yellow / Appearance: Clear / S.010 / pH: x  Gluc: x / Ketone: Moderate  / Bili: Negative / Urobili: Negative   Blood: x / Protein: Negative / Nitrite: Negative   Leuk Esterase: Small / RBC: 5-10 /HPF / WBC 11-25 /HPF   Sq Epi: x / Non Sq Epi: Few /HPF / Bacteria: Few /HPF        RADIOLOGY & ADDITIONAL TESTS:      Assessment and Plan:    Assessment:  · Assessment	  75 y/o F with pmh of HTN, HLD, varicose veins, vertigo, who presented to the ED with complains of SOB. Admitted for new onset chf and afib     Problem/Plan - 1:  ·  Problem: Congestive heart failure (CHF).   no history of CFH, also noted to be on new onset afib  CTA Chest: negative for PE, BL pulmonary edema  Will give Lasix 40  metoprolol, aspirin  f/u echo  f/u lipid profile, f/u a1c  Cardio Dr. Mullins.     Problem/Plan - 2:  ·  Problem: Afib.   ·  Plan: pt with new onset afib and chf  s/p Metoprolol in ED (po and IV)  will continue to monitor  tele monitoring  f/u echo  Cardio consult: Dr. Mullins.     Problem/Plan - 3:  ·  Problem: Vertigo.   ·  Plan: continue home meds prn.     Problem/Plan - 4:  ·  Problem: Peripheral neuropathy.   ·  Plan: continue gabapentin  monitor.     Problem/Plan - 5:  ·  Problem: Prophylactic measure.   ·  Plan: full dose lovenox for afib and dvt ppx  protonix for gi ppx.

## 2021-11-03 ENCOUNTER — TRANSCRIPTION ENCOUNTER (OUTPATIENT)
Age: 76
End: 2021-11-03

## 2021-11-03 DIAGNOSIS — R82.71 BACTERIURIA: ICD-10-CM

## 2021-11-03 LAB
ALBUMIN SERPL ELPH-MCNC: 3.5 G/DL — SIGNIFICANT CHANGE UP (ref 3.5–5)
ALP SERPL-CCNC: 95 U/L — SIGNIFICANT CHANGE UP (ref 40–120)
ALT FLD-CCNC: 104 U/L DA — HIGH (ref 10–60)
ANION GAP SERPL CALC-SCNC: 10 MMOL/L — SIGNIFICANT CHANGE UP (ref 5–17)
APTT BLD: 150.7 SEC — CRITICAL HIGH (ref 27.5–35.5)
APTT BLD: 46.4 SEC — HIGH (ref 27.5–35.5)
AST SERPL-CCNC: 97 U/L — HIGH (ref 10–40)
BILIRUB SERPL-MCNC: 0.4 MG/DL — SIGNIFICANT CHANGE UP (ref 0.2–1.2)
BLD GP AB SCN SERPL QL: SIGNIFICANT CHANGE UP
BUN SERPL-MCNC: 24 MG/DL — HIGH (ref 7–18)
CALCIUM SERPL-MCNC: 9 MG/DL — SIGNIFICANT CHANGE UP (ref 8.4–10.5)
CHLORIDE SERPL-SCNC: 108 MMOL/L — SIGNIFICANT CHANGE UP (ref 96–108)
CO2 SERPL-SCNC: 26 MMOL/L — SIGNIFICANT CHANGE UP (ref 22–31)
CREAT SERPL-MCNC: 1.29 MG/DL — SIGNIFICANT CHANGE UP (ref 0.5–1.3)
GLUCOSE SERPL-MCNC: 108 MG/DL — HIGH (ref 70–99)
HCT VFR BLD CALC: 45.4 % — HIGH (ref 34.5–45)
HCT VFR BLD CALC: 45.7 % — HIGH (ref 34.5–45)
HGB BLD-MCNC: 14.9 G/DL — SIGNIFICANT CHANGE UP (ref 11.5–15.5)
HGB BLD-MCNC: 15.2 G/DL — SIGNIFICANT CHANGE UP (ref 11.5–15.5)
MAGNESIUM SERPL-MCNC: 2.2 MG/DL — SIGNIFICANT CHANGE UP (ref 1.6–2.6)
MCHC RBC-ENTMCNC: 29.9 PG — SIGNIFICANT CHANGE UP (ref 27–34)
MCHC RBC-ENTMCNC: 30.2 PG — SIGNIFICANT CHANGE UP (ref 27–34)
MCHC RBC-ENTMCNC: 32.8 GM/DL — SIGNIFICANT CHANGE UP (ref 32–36)
MCHC RBC-ENTMCNC: 33.3 GM/DL — SIGNIFICANT CHANGE UP (ref 32–36)
MCV RBC AUTO: 90.7 FL — SIGNIFICANT CHANGE UP (ref 80–100)
MCV RBC AUTO: 91 FL — SIGNIFICANT CHANGE UP (ref 80–100)
NRBC # BLD: 0 /100 WBCS — SIGNIFICANT CHANGE UP (ref 0–0)
NRBC # BLD: 0 /100 WBCS — SIGNIFICANT CHANGE UP (ref 0–0)
PHOSPHATE SERPL-MCNC: 4 MG/DL — SIGNIFICANT CHANGE UP (ref 2.5–4.5)
PLATELET # BLD AUTO: 149 K/UL — LOW (ref 150–400)
PLATELET # BLD AUTO: 152 K/UL — SIGNIFICANT CHANGE UP (ref 150–400)
POTASSIUM SERPL-MCNC: 3.6 MMOL/L — SIGNIFICANT CHANGE UP (ref 3.5–5.3)
POTASSIUM SERPL-SCNC: 3.6 MMOL/L — SIGNIFICANT CHANGE UP (ref 3.5–5.3)
PROT SERPL-MCNC: 7.3 G/DL — SIGNIFICANT CHANGE UP (ref 6–8.3)
RBC # BLD: 4.99 M/UL — SIGNIFICANT CHANGE UP (ref 3.8–5.2)
RBC # BLD: 5.04 M/UL — SIGNIFICANT CHANGE UP (ref 3.8–5.2)
RBC # FLD: 14.5 % — SIGNIFICANT CHANGE UP (ref 10.3–14.5)
RBC # FLD: 14.9 % — HIGH (ref 10.3–14.5)
SARS-COV-2 RNA SPEC QL NAA+PROBE: SIGNIFICANT CHANGE UP
SODIUM SERPL-SCNC: 144 MMOL/L — SIGNIFICANT CHANGE UP (ref 135–145)
WBC # BLD: 11.98 K/UL — HIGH (ref 3.8–10.5)
WBC # BLD: 8.14 K/UL — SIGNIFICANT CHANGE UP (ref 3.8–10.5)
WBC # FLD AUTO: 11.98 K/UL — HIGH (ref 3.8–10.5)
WBC # FLD AUTO: 8.14 K/UL — SIGNIFICANT CHANGE UP (ref 3.8–10.5)

## 2021-11-03 RX ORDER — HEPARIN SODIUM 5000 [USP'U]/ML
INJECTION INTRAVENOUS; SUBCUTANEOUS
Qty: 25000 | Refills: 0 | Status: DISCONTINUED | OUTPATIENT
Start: 2021-11-03 | End: 2021-11-04

## 2021-11-03 RX ORDER — FUROSEMIDE 40 MG
40 TABLET ORAL
Refills: 0 | Status: DISCONTINUED | OUTPATIENT
Start: 2021-11-03 | End: 2021-11-04

## 2021-11-03 RX ADMIN — Medication 40 MILLIGRAM(S): at 05:06

## 2021-11-03 RX ADMIN — Medication 50 MILLIGRAM(S): at 17:44

## 2021-11-03 RX ADMIN — HEPARIN SODIUM 0 UNIT(S)/HR: 5000 INJECTION INTRAVENOUS; SUBCUTANEOUS at 23:50

## 2021-11-03 RX ADMIN — Medication 1 MILLIGRAM(S): at 12:37

## 2021-11-03 RX ADMIN — Medication 50 MILLIGRAM(S): at 05:06

## 2021-11-03 RX ADMIN — ATORVASTATIN CALCIUM 10 MILLIGRAM(S): 80 TABLET, FILM COATED ORAL at 22:40

## 2021-11-03 RX ADMIN — ENOXAPARIN SODIUM 80 MILLIGRAM(S): 100 INJECTION SUBCUTANEOUS at 05:05

## 2021-11-03 RX ADMIN — GABAPENTIN 300 MILLIGRAM(S): 400 CAPSULE ORAL at 05:06

## 2021-11-03 RX ADMIN — Medication 40 MILLIGRAM(S): at 17:44

## 2021-11-03 RX ADMIN — LISINOPRIL 5 MILLIGRAM(S): 2.5 TABLET ORAL at 05:06

## 2021-11-03 RX ADMIN — HEPARIN SODIUM 1500 UNIT(S)/HR: 5000 INJECTION INTRAVENOUS; SUBCUTANEOUS at 17:01

## 2021-11-03 RX ADMIN — GABAPENTIN 300 MILLIGRAM(S): 400 CAPSULE ORAL at 17:44

## 2021-11-03 RX ADMIN — MIRTAZAPINE 45 MILLIGRAM(S): 45 TABLET, ORALLY DISINTEGRATING ORAL at 12:37

## 2021-11-03 NOTE — PROGRESS NOTE ADULT - PROBLEM SELECTOR PLAN 1
pt c/o SOB on excertion and orthopnea  probnp 3.4K, D-dimmer 327 (not significant for age)  no history of CFH, also noted to be on new onset afib  Trop negative x1  CTA Chest: negative for PE, BL pulmonary edema  Will give Lasix 40  metoprolol, aspirin  f/u echo  f/u lipid profile, f/u a1c  Cardio Dr. Mullins pt c/o SOB on excertion and orthopnea  probnp 3.4K, D-dimmer 327 (not significant for age)  no history of CFH, also noted to be on new onset afib  Trop negative x1  CTA Chest: negative for PE, BL pulmonary edema  Will give Lasix 40  metoprolol, aspirin  f/u echo  f/u lipid profile, f/u a1c  Cardio Dr. Mullins: suspected severe MR is for ablation vs valve repair [facility] pt c/o SOB on excertion and orthopnea  probnp 3.4K, D-dimmer 327 (not significant for age)  no history of CFH, also noted to be on new onset afib  Trop negative x1  CTA Chest: negative for PE, BL pulmonary edema  Will give Lasix 40 will increased it to BID  metoprolol, aspirin  Lipids are elevated, A1C normal  statin prescribed  Cardio Dr. Mullins: suspected severe MR is for ablation vs valve repair J

## 2021-11-03 NOTE — DISCHARGE NOTE PROVIDER - PROVIDER TOKENS
PROVIDER:[TOKEN:[[id number]],ESTABLISHEDPATIENT:[T]],PROVIDER:[TOKEN:[[id number]],ESTABLISHEDPATIENT:[T]]

## 2021-11-03 NOTE — PROGRESS NOTE ADULT - PROBLEM SELECTOR PLAN 5
full dose lovenox for afib and dvt ppx  protonix for gi ppx continue gabapentin  monitor  A1 c is normal  Monitor kidney function, in the setting of CHF patient may not clear gabapentin Cr. 0.99-1.29  monitor

## 2021-11-03 NOTE — DISCHARGE NOTE PROVIDER - CARE PROVIDER_API CALL
Jair Mullins)  Cardiovascular Disease  [address]  [place] [address]  Phone: [phone]  Fax: [phone]  Established Patient  Follow Up Time:     STEPHANIE ORELLANA  Internal Medicine  [address] [address]  Phone: [phone]  Fax: [phone]  Established Patient  Follow Up Time:

## 2021-11-03 NOTE — DISCHARGE NOTE PROVIDER - HOSPITAL COURSE
This is a 75 y/o F with pmhx of HTN, HLD, varicose veins, and vertigo who presented to the ED with complains of worsening SOB for the past 3 weeks, worse with exertion and laying flat and associated with intermittent 3/10 left sided chest pain and palpitations. In the ED she was found to have tachycardia, decreased breath sounds and pitting edema bilaterally. Labs showed elevated pro-BNP and UA was positive for a possible infection. CTA of chest was negative for PE but showed pleural effusions. Patient was admitted for CHF exacerbation and new onset A-fib. Patient was put on metoprolol for her Afib and Lasix for CHF and was also put on telemetry. Because of patient's Roe-Vasc score, patient was also put on anticoagulation therapy with FD lovenox. An echocardiogram was performed on her which showed severe mitral regurgitation. Dr. Mullins, the cardiologist suggests that she needs possible mitral valve repair as well as cardiac ablation for his Afib. Patient will need to be transferred to [facility] 11/3 for procedures on 11/4. She will be NPO after midnight and her lovenox was DC'd and will be on heparin drip.       Critical Events:     Rapid response was called for dizziness and pre-syncope while in the bathroom, She did not fall but lay down. Patient was examined [place]  [id number] [place]- by night team  On exam, has no crackles or LE edema. Echo is pending. She denies having similar episode before. She denies chest pain. She has not eaten anything since yesterday.      Cardiac monitor showing rapid afib with fluctuating HR  500ml bolus given (BP unreadable at first suspected to be low, repeat 123/65),   EKG and zofran given  will f/u with cardiology.   Patient states she is feeling better after interventions.  Remained on unit    She has pleural effusions on CT, consider tapping the right side to expedite recovery.  Vigorously replace K to 4-4.5 (i.e. 40mEQ BID), and consider increasing diuretic frequency to twice daily as well.  Based on echo results, may need ischemic workup.  Family and patient wish to proceed towards AFib ablation, for greater efficacy in maintaining sinus rhythm in a severely enlarged LA.  I have tentatively booked this for next Tuesday, 11/9 at [facility], which should give time for workup.   This is a 77 y/o F with pmhx of HTN, HLD, varicose veins, and vertigo who presented to the ED with complains of worsening SOB for the past 3 weeks, worse with exertion and laying flat and associated with intermittent 3/10 left sided chest pain and palpitations. Labs showed elevated pro-BNP and UA was positive for a possible infection. CTA of chest was negative for PE but showed pleural effusions. Patient was admitted for CHF exacerbation and new onset A-fib. Patient was put on metoprolol for her Afib and Lasix for CHF and was also put on telemetry and FD lovenox. An echocardiogram was performed on her which showed moderate M.    Rapid response was called for dizziness and pre-syncope  and nausea while in the bathroom, Cardiac monitor showing rapid afib with fluctuating HR, 500ml bolus given for low BP, repeat measurement was normal, EKG was obtained, showed A -fib. Her morning medications were given including cardizem drip, lasix and metoprolol  Patient states she is feeling better after interventions.    Cardiology  reviewed her Echo cardiogram which showed:  Moderate to severe MR, Moderately increased RV pressure, diastolic function assessment was inconclusive, EF 55%  She is being transferred to [facility] for ablation and MR assessment for possible repair

## 2021-11-03 NOTE — PROGRESS NOTE ADULT - PROBLEM SELECTOR PLAN 4
continue gabapentin  monitor continue gabapentin  monitor  A1 c is normal  Monitor kidney function, in the setting of CHF patient may not clear gabapentin Cr. 0.99-1.29  monitor Patient has a partially positive UA  fainted, felt dizzy in the bathroom later that day, likely cardiogenic presyncope  no symptoms of dysuria, frequecy urge, has some blood in the urine as well as pyuria  will give PO ceftin for 3 days

## 2021-11-03 NOTE — PROGRESS NOTE ADULT - SUBJECTIVE AND OBJECTIVE BOX
C A R D I O L O G Y  **********************************     : [id number]    DATE OF SERVICE: 11-03-21    Patient denies chest pain  breathing better    Review of systems otherwise (-)  	  MEDICATIONS:  MEDICATIONS  (STANDING):  atorvastatin 10 milliGRAM(s) Oral at bedtime  diltiazem Infusion 5 mG/Hr (5 mL/Hr) IV Continuous <Continuous>  folic acid 1 milliGRAM(s) Oral daily  furosemide   Injectable 40 milliGRAM(s) IV Push daily  gabapentin 300 milliGRAM(s) Oral two times a day  heparin  Infusion.  Unit(s)/Hr (15 mL/Hr) IV Continuous <Continuous>  lisinopril 5 milliGRAM(s) Oral daily  metoprolol tartrate 50 milliGRAM(s) Oral two times a day  mirtazapine 45 milliGRAM(s) Oral daily      LABS:	 	    CARDIAC MARKERS:                                15.2   8.14  )-----------( 149      ( 03 Nov 2021 07:17 )             45.7     Hemoglobin: 15.2 g/dL (11-03 @ 07:17)  Hemoglobin: 15.5 g/dL (11-02 @ 07:11)  Hemoglobin: 14.4 g/dL (11-01 @ 10:20)      11-03    144  |  108  |  24<H>  ----------------------------<  108<H>  3.6   |  26  |  1.29    Ca    9.0      03 Nov 2021 07:17  Phos  4.0     11-03  Mg     2.2     11-03    TPro  7.3  /  Alb  3.5  /  TBili  0.4  /  DBili  x   /  AST  97<H>  /  ALT  104<H>  /  AlkPhos  95  11-03    Creatinine Trend: 1.29<--, 0.99<--, 1.09<--      PHYSICAL EXAM:  T(C): 36.3 (11-03-21 @ 11:08), Max: 36.8 (11-02-21 @ 21:12)  HR: 93 (11-03-21 @ 11:08) (78 - 135)  BP: 108/64 (11-03-21 @ 11:08) (100/56 - 122/68)  RR: 18 (11-03-21 @ 11:08) (16 - 18)  SpO2: 95% (11-03-21 @ 11:08) (93% - 99%)  Wt(kg): --  I&O's Summary      HEENT:  (-)icterus (-)pallor  CV: N S1 S2 1/6 ZITA (+)2 Pulses B/l  Resp:  Clear to ausculatation B/L, normal effort  GI: (+) BS Soft, NT, ND  Lymph:  (-)Edema, (-)obvious lymphadenopathy  Skin: Warm to touch, Normal turgor  Psych: Appropriate mood and affect      TELEMETRY: 	  Afib 80's        ASSESSMENT/PLAN: 	76y  Female pmh of HTN, HLD, varicose veins, vertigo, who presented to the ED with complains of SOB found with pulmonary edema and new afib    - Echo noted, images reviewed.  Her MR jet is highly eccentric and I am concerned it is being underestimated.  Would plan for DOUG and R+L cath at [facility]  - D/W patient and son at bedside. valve may need to be repaired instead of undergoing an ablation as it is more likely to maintain sinus rhythym if the MR is addressed  - EP eval appreciated  - D/C lovenox.  Start heparin gtt instead of the next dose  - Strict I+O's  - keep net negative  - NPO after midnight   - Plan for transfer to [facility] in AM  - D/W floor team and Dr. Chinedu Mullins MD, [facility]  BEEPER [phone]

## 2021-11-03 NOTE — PROGRESS NOTE ADULT - PROBLEM SELECTOR PLAN 2
pt with new onset afib and chf  s/p Metoprolol in ED (po and IV)  will continue to monitor  tele monitoring  f/u echo  Chadvasc score 4: will start full dose lovenox, consider NOAc based on insurance  Cardio consult: Dr. Mullins pt with new onset afib and chf  s/p Metoprolol in ED (po and IV) now on lasix also   will continue to monitor  tele monitoring  f/u echo  Chadvasc score 4: will start full dose lovenox, consider NOAc based on insurance  Cardio consult: Dr. Mullins  EP says to consider thora on right for the pleural effusion to quicken recovery, patient is satting 97 on RA now  Per EP Based on echo results, may need ischemic workup.  Family and patient wish to proceed towards AFib ablation, for greater efficacy in maintaining sinus rhythm in a severely enlarged LA.  EP  tentatively booked this for next Tuesday, 11/9 at [facility], which should give time for workup. pt with new onset afib and chf  s/p Metoprolol in ED (po and IV) now on lasix also   will continue to monitor  tele monitoring  f/u echo  Chadvasc score 4: will start full dose lovenox, consider NOAc based on insurance  Cardio consult: Dr. Mullins  For right for the pleural effusion, patient is satting 97 on RA now  Per EP Based on echo results, may need ischemic workup.  AFib ablation, for greater efficacy in maintaining sinus rhythm in a severely enlarged LA. vs Mitral valve repair  EP  tentatively booked this for next Tuesday, 11/9 at [facility], which should give time for workup.  But patient is going to [facility] pt with new onset afib and chf  s/p Metoprolol in ED (po and IV) now on lasix also   will continue to monitor  tele monitoring  f/u echo  Chadvasc score 4: will start full dose lovenox, consider NOAc based on insurance  Cardio consult: Dr. Mullins  For right for the pleural effusion, patient is satting 97 on RA now  Per EP Based on echo results, may need ischemic workup.  AFib ablation, for greater efficacy in maintaining sinus rhythm in a severely enlarged LA. vs Mitral valve repair  EP  tentatively booked this for next Tuesday, 11/9 at [facility], which should give time for workup.  But patient is going to [facility] tomorrow  NPO after midnight, COVID, coags, heparin infusion, stop FD lovenox

## 2021-11-03 NOTE — PROGRESS NOTE ADULT - PROBLEM SELECTOR PLAN 3
continue home meds prn Patient had a dizzy spell in the bathroom  RRT called  Cardiogenic work up is underway as patient has A-fib and CHF  No further episodes past those few minutes,  no n/v- this is likely cardiogenic  continue home meds prn  PRN meclizine if needed

## 2021-11-03 NOTE — PROGRESS NOTE ADULT - SUBJECTIVE AND OBJECTIVE BOX
PGY-1 Progress Note discussed with attending    PAGER #: [--------] TILL 5:00 PM  PLEASE CONTACT ON CALL TEAM:  - On Call Team (Please refer to Neri) FROM 5:00 PM - 8:30PM  - Nightfloat Team FROM 8:30 -7:30 AM    CHIEF COMPLAINT & BRIEF HOSPITAL COURSE:  75 y/o F with pmh of HTN, HLD, varicose veins, vertigo, who presented to the ED with complains of SOB. She states her SOB has been getting worse for past 3 weeks. It is most pronounced when she walks and when she lays down. Pt also endorsed some chest pain at times, 3/10 under left breast, and palpitations, but denies any currently. Pt says that she recently saw a cardiologist and had work up done including echo but doesn't have the results (Dr. Mcdonald [phone]). She also endores some pain in her legs, which she attributes to her varicose veins. She denies any other complains including headache, dizziness, abd pain, nausea, vomiting, diarrhea, weakness, numbness, tingling or fatigue.     In ED AF, /89 HR 1177, Sats 95 on RA, EKG showed A fib RVR  Exam: decreased breath sounds b/l pitting edema  Labs: D dimer 320s, BNP 3400s, UA positive   CTA of chest negative for PE but with pleural effusions. Pro bnp elevated. PT also noted to have afib.      patient was admitted for CHF exacerbation  and new onset A-fib    INTERVAL HPI/OVERNIGHT EVENTS:   Rapid response was called because patient went to restroom and on walking back, felt dizzy and extremely nauseous and like she would pass out. She did not fall but lay down. Patient admitted for new onset afib and CHF. Patient examined and spoken to using Croatian  [id number] Caridad. On exam, has no crackles or LE edema. Echo is pending. She denies having similar episode before. She denies chest pain. She has not eaten anything since yesterday.      Cardiac monitor showing rapid afib with fluctuating HR  500ml bolus given (BP unreadable at first suspected to be low, repeat 123/65)  EKG and zofran ordered    EKG, No intervention  zofran  will f/u with cardiology. Patient is already on FDL lovenox and she also received oral metoprolol and lasix this am.   Patient states she is feeling better after interventions.    Remained on unit    EPshe's had many shorter runs of AFib before presenting with new persistent AF.  She is naaive to anticoagulation.  Lovenox SQ 1mg/kg BID or Heparin infusion for now.  Will prescribe a DOAC prior to discharge.  An echocardiogram is pending.  She has pleural effusions on CT, consider tapping the right side to expedite recovery.  Vigorously replace K to 4-4.5 (i.e. 40mEQ BID), and consider increasing diuretic frequency to twice daily as well.  Based on echo results, may need ischemic workup.  Family and patient wish to proceed towards AFib ablation, for greater efficacy in maintaining sinus rhythm in a severely enlarged LA.  I have tentatively booked this for next Tuesday, 11/9 at [facility], which should give time for workup.    MEDICATIONS  (STANDING):  atorvastatin 10 milliGRAM(s) Oral at bedtime  diltiazem Infusion 5 mG/Hr (5 mL/Hr) IV Continuous <Continuous>  enoxaparin Injectable 80 milliGRAM(s) SubCutaneous two times a day  folic acid 1 milliGRAM(s) Oral daily  furosemide   Injectable 40 milliGRAM(s) IV Push daily  gabapentin 300 milliGRAM(s) Oral two times a day  lisinopril 5 milliGRAM(s) Oral daily  metoprolol tartrate 50 milliGRAM(s) Oral two times a day  mirtazapine 45 milliGRAM(s) Oral daily    MEDICATIONS  (PRN):      REVIEW OF SYSTEMS:  CONSTITUTIONAL: No fever, weight loss, or fatigue  RESPIRATORY: No cough, wheezing, chills or hemoptysis; No shortness of breath  CARDIOVASCULAR: No chest pain, palpitations, dizziness, or leg swelling  GASTROINTESTINAL: No abdominal pain. No nausea, vomiting, or hematemesis; No diarrhea or constipation. No melena or hematochezia.  GENITOURINARY: No dysuria or hematuria, urinary frequency  NEUROLOGICAL: No headaches, memory loss, loss of strength, numbness, or tremors  SKIN: No itching, burning, rashes, or lesions     Vital Signs Last 24 Hrs  T(C): 36.6 (03 Nov 2021 04:46), Max: 36.8 (02 Nov 2021 21:12)  T(F): 97.9 (03 Nov 2021 04:46), Max: 98.3 (02 Nov 2021 21:12)  HR: 93 (03 Nov 2021 04:46) (93 - 135)  BP: 119/74 (03 Nov 2021 04:46) (100/56 - 122/68)  BP(mean): --  RR: 17 (03 Nov 2021 04:46) (16 - 19)  SpO2: 93% (03 Nov 2021 04:46) (93% - 99%)    PHYSICAL EXAMINATION:  GENERAL: NAD, well built  HEAD:  Atraumatic, Normocephalic  EYES:  conjunctiva and sclera clear  NECK: Supple, No JVD, Normal thyroid  CHEST/LUNG: Clear to auscultation. Clear to percussion bilaterally; No rales, rhonchi, wheezing, or rubs  HEART: Regular rate and rhythm; No murmurs, rubs, or gallops  ABDOMEN: Soft, Nontender, Nondistended; Bowel sounds present  NERVOUS SYSTEM:  Alert & Oriented X3,    EXTREMITIES:  2+ Peripheral Pulses, No clubbing, cyanosis, or edema  SKIN: warm dry                          15.5   10.76 )-----------( 169      ( 02 Nov 2021 07:11 )             46.7     11-02    141  |  107  |  16  ----------------------------<  94  3.5   |  23  |  0.99    Ca    9.1      02 Nov 2021 07:11  Phos  3.4     11-02  Mg     2.2     11-02    TPro  7.5  /  Alb  4.0  /  TBili  1.3<H>  /  DBili  x   /  AST  21  /  ALT  29  /  AlkPhos  88  11-02    LIVER FUNCTIONS - ( 02 Nov 2021 07:11 )  Alb: 4.0 g/dL / Pro: 7.5 g/dL / ALK PHOS: 88 U/L / ALT: 29 U/L DA / AST: 21 U/L / GGT: x                   CAPILLARY BLOOD GLUCOSE      RADIOLOGY & ADDITIONAL TESTS:                   PGY-1 Progress Note discussed with attending    PAGER #: [--------] TILL 5:00 PM  PLEASE CONTACT ON CALL TEAM:  - On Call Team (Please refer to Neri) FROM 5:00 PM - 8:30PM  - Nightfloat Team FROM 8:30 -7:30 AM    CHIEF COMPLAINT & BRIEF HOSPITAL COURSE:  77 y/o F with pmh of HTN, HLD, varicose veins, vertigo, who presented to the ED with complains of worsening SOB for the past 3 weeks, worse with exertion and laying flat associated with intermittent 3/10 left  Pt also endorsed some chest pain at times, 3/10 under left breast, and palpitations, but denies any currently. Pt says that she recently saw a cardiologist and had work up done including echo but doesn't have the results (Dr. Mcdonald [phone]). She also endores some pain in her legs, which she attributes to her varicose veins. She denies any other complains including headache, dizziness, abd pain, nausea, vomiting, diarrhea, weakness, numbness, tingling or fatigue.     In ED AF, /89 HR 1177, Sats 95 on RA, EKG showed A fib RVR  Exam: decreased breath sounds b/l pitting edema  Labs: D dimer 320s, BNP 3400s, UA positive   CTA of chest negative for PE but with pleural effusions. Pro bnp elevated. PT also noted to have afib.      patient was admitted for CHF exacerbation  and new onset A-fib    INTERVAL HPI/OVERNIGHT EVENTS:   Rapid response was called because patient went to restroom and on walking back, felt dizzy and extremely nauseous and like she would pass out. She did not fall but lay down. Patient admitted for new onset afib and CHF. Patient examined and spoken to using Prydein[place]  [id number] Caridad. On exam, has no crackles or LE edema. Echo is pending. She denies having similar episode before. She denies chest pain. She has not eaten anything since yesterday.      Cardiac monitor showing rapid afib with fluctuating HR  500ml bolus given (BP unreadable at first suspected to be low, repeat 123/65)  EKG and zofran ordered    EKG, No intervention  zofran  will f/u with cardiology. Patient is already on FDL lovenox and she also received oral metoprolol and lasix this am.   Patient states she is feeling better after interventions.    Remained on unit    EPshe's had many shorter runs of AFib before presenting with new persistent AF.  She is naaive to anticoagulation.  Lovenox SQ 1mg/kg BID or Heparin infusion for now.  Will prescribe a DOAC prior to discharge.  An echocardiogram is pending.  She has pleural effusions on CT, consider tapping the right side to expedite recovery.  Vigorously replace K to 4-4.5 (i.e. 40mEQ BID), and consider increasing diuretic frequency to twice daily as well.  Based on echo results, may need ischemic workup.  Family and patient wish to proceed towards AFib ablation, for greater efficacy in maintaining sinus rhythm in a severely enlarged LA.  I have tentatively booked this for next Tuesday, 11/9 at [facility], which should give time for workup.    MEDICATIONS  (STANDING):  atorvastatin 10 milliGRAM(s) Oral at bedtime  diltiazem Infusion 5 mG/Hr (5 mL/Hr) IV Continuous <Continuous>  enoxaparin Injectable 80 milliGRAM(s) SubCutaneous two times a day  folic acid 1 milliGRAM(s) Oral daily  furosemide   Injectable 40 milliGRAM(s) IV Push daily  gabapentin 300 milliGRAM(s) Oral two times a day  lisinopril 5 milliGRAM(s) Oral daily  metoprolol tartrate 50 milliGRAM(s) Oral two times a day  mirtazapine 45 milliGRAM(s) Oral daily    MEDICATIONS  (PRN):      REVIEW OF SYSTEMS:  CONSTITUTIONAL: No fever, weight loss, or fatigue  RESPIRATORY: No cough, wheezing, chills or hemoptysis; No shortness of breath  CARDIOVASCULAR: No chest pain, palpitations, dizziness, or leg swelling  GASTROINTESTINAL: No abdominal pain. No nausea, vomiting, or hematemesis; No diarrhea or constipation. No melena or hematochezia.  GENITOURINARY: No dysuria or hematuria, urinary frequency  NEUROLOGICAL: No headaches, memory loss, loss of strength, numbness, or tremors  SKIN: No itching, burning, rashes, or lesions     Vital Signs Last 24 Hrs  T(C): 36.6 (03 Nov 2021 04:46), Max: 36.8 (02 Nov 2021 21:12)  T(F): 97.9 (03 Nov 2021 04:46), Max: 98.3 (02 Nov 2021 21:12)  HR: 93 (03 Nov 2021 04:46) (93 - 135)  BP: 119/74 (03 Nov 2021 04:46) (100/56 - 122/68)  BP(mean): --  RR: 17 (03 Nov 2021 04:46) (16 - 19)  SpO2: 93% (03 Nov 2021 04:46) (93% - 99%)    PHYSICAL EXAMINATION:  GENERAL: NAD, well built  HEAD:  Atraumatic, Normocephalic  EYES:  conjunctiva and sclera clear  NECK: Supple, No JVD, Normal thyroid  CHEST/LUNG: Clear to auscultation. Clear to percussion bilaterally; No rales, rhonchi, wheezing, or rubs  HEART: Regular rate and rhythm; No murmurs, rubs, or gallops  ABDOMEN: Soft, Nontender, Nondistended; Bowel sounds present  NERVOUS SYSTEM:  Alert & Oriented X3,    EXTREMITIES:  2+ Peripheral Pulses, No clubbing, cyanosis, or edema  SKIN: warm dry                          15.5   10.76 )-----------( 169      ( 02 Nov 2021 07:11 )             46.7     11-02    141  |  107  |  16  ----------------------------<  94  3.5   |  23  |  0.99    Ca    9.1      02 Nov 2021 07:11  Phos  3.4     11-02  Mg     2.2     11-02    TPro  7.5  /  Alb  4.0  /  TBili  1.3<H>  /  DBili  x   /  AST  21  /  ALT  29  /  AlkPhos  88  11-02    LIVER FUNCTIONS - ( 02 Nov 2021 07:11 )  Alb: 4.0 g/dL / Pro: 7.5 g/dL / ALK PHOS: 88 U/L / ALT: 29 U/L DA / AST: 21 U/L / GGT: x                   CAPILLARY BLOOD GLUCOSE      RADIOLOGY & ADDITIONAL TESTS:                   PGY-1 Progress Note discussed with attending    PAGER #: [--------] TILL 5:00 PM  PLEASE CONTACT ON CALL TEAM:  - On Call Team (Please refer to Neri) FROM 5:00 PM - 8:30PM  - Nightfloat Team FROM 8:30 -7:30 AM    CHIEF COMPLAINT & BRIEF HOSPITAL COURSE:  77 y/o F with pmh of HTN, HLD, varicose veins, vertigo, who presented to the ED with complains of worsening SOB for the past 3 weeks, worse with exertion and laying flat associated with intermittent 3/10 left sided chest pain and palpitations.  Underwent cardiac work up (Dr. Mcdonald [phone]) including Echo, no results yet. ROS reveals some leg pains which patient attributes to her varicose veins. She denies any other complains including headache, dizziness, abd pain, nausea, vomiting, diarrhea, weakness, numbness, tingling or fatigue.     In ED AF, /89 HR 1177, Sats 95 on RA, EKG showed A fib RVR echo pending  Exam: decreased breath sounds b/l pitting edema  Labs: D dimer 320s, BNP 3400s, UA positive  CTA of chest negative for PE but with pleural effusions. Pro bnp elevated. PT also noted to have afib.    Patient was admitted for CHF exacerbation  and new onset A-fib  Patient is on FD lovenox, she is AC naaive,  and she also received oral metoprolol for A-fib and lasix for CHF     INTERVAL HPI/OVERNIGHT EVENTS:     Rapid response was called for dizziness and pre-syncope while in the bathroom, She did not fall but lay down. Patient was examined Slovenian  [id number] Caridad- by night team  On exam, has no crackles or LE edema. Echo is pending. She denies having similar episode before. She denies chest pain. She has not eaten anything since yesterday.      Cardiac monitor showing rapid afib with fluctuating HR  500ml bolus given (BP unreadable at first suspected to be low, repeat 123/65),   EKG and zofran given  will f/u with cardiology.   Patient states she is feeling better after interventions.  Remained on unit      An echocardiogram is pending.  She has pleural effusions on CT, consider tapping the right side to expedite recovery.  Vigorously replace K to 4-4.5 (i.e. 40mEQ BID), and consider increasing diuretic frequency to twice daily as well.  Based on echo results, may need ischemic workup.  Family and patient wish to proceed towards AFib ablation, for greater efficacy in maintaining sinus rhythm in a severely enlarged LA.  I have tentatively booked this for next Tuesday, 11/9 at [facility], which should give time for workup.    MEDICATIONS  (STANDING):  atorvastatin 10 milliGRAM(s) Oral at bedtime  diltiazem Infusion 5 mG/Hr (5 mL/Hr) IV Continuous <Continuous>  enoxaparin Injectable 80 milliGRAM(s) SubCutaneous two times a day  folic acid 1 milliGRAM(s) Oral daily  furosemide   Injectable 40 milliGRAM(s) IV Push daily  gabapentin 300 milliGRAM(s) Oral two times a day  lisinopril 5 milliGRAM(s) Oral daily  metoprolol tartrate 50 milliGRAM(s) Oral two times a day  mirtazapine 45 milliGRAM(s) Oral daily    MEDICATIONS  (PRN):      REVIEW OF SYSTEMS:  CONSTITUTIONAL: No fever, weight loss, or fatigue  RESPIRATORY: No cough, wheezing, chills or hemoptysis; No shortness of breath  CARDIOVASCULAR: No chest pain, palpitations, dizziness, or leg swelling  GASTROINTESTINAL: No abdominal pain. No nausea, vomiting, or hematemesis; No diarrhea or constipation. No melena or hematochezia.  GENITOURINARY: No dysuria or hematuria, urinary frequency  NEUROLOGICAL: No headaches, memory loss, loss of strength, numbness, or tremors  SKIN: No itching, burning, rashes, or lesions     Vital Signs Last 24 Hrs  T(C): 36.6 (03 Nov 2021 04:46), Max: 36.8 (02 Nov 2021 21:12)  T(F): 97.9 (03 Nov 2021 04:46), Max: 98.3 (02 Nov 2021 21:12)  HR: 93 (03 Nov 2021 04:46) (93 - 135)  BP: 119/74 (03 Nov 2021 04:46) (100/56 - 122/68)  BP(mean): --  RR: 17 (03 Nov 2021 04:46) (16 - 19)  SpO2: 93% (03 Nov 2021 04:46) (93% - 99%)    PHYSICAL EXAMINATION:  GENERAL: NAD, well built  HEAD:  Atraumatic, Normocephalic  EYES:  conjunctiva and sclera clear  NECK: Supple, No JVD, Normal thyroid  CHEST/LUNG: Clear to auscultation. Clear to percussion bilaterally; No rales, rhonchi, wheezing, or rubs  HEART: Regular rate and rhythm; No murmurs, rubs, or gallops  ABDOMEN: Soft, Nontender, Nondistended; Bowel sounds present  NERVOUS SYSTEM:  Alert & Oriented X3,    EXTREMITIES:  2+ Peripheral Pulses, No clubbing, cyanosis, or edema  SKIN: warm dry                          15.5   10.76 )-----------( 169      ( 02 Nov 2021 07:11 )             46.7     11-02    141  |  107  |  16  ----------------------------<  94  3.5   |  23  |  0.99    Ca    9.1      02 Nov 2021 07:11  Phos  3.4     11-02  Mg     2.2     11-02    TPro  7.5  /  Alb  4.0  /  TBili  1.3<H>  /  DBili  x   /  AST  21  /  ALT  29  /  AlkPhos  88  11-02    LIVER FUNCTIONS - ( 02 Nov 2021 07:11 )  Alb: 4.0 g/dL / Pro: 7.5 g/dL / ALK PHOS: 88 U/L / ALT: 29 U/L DA / AST: 21 U/L / GGT: x                   CAPILLARY BLOOD GLUCOSE      RADIOLOGY & ADDITIONAL TESTS:                   PGY-1 Progress Note discussed with attending    PAGER #: [--------] TILL 5:00 PM  PLEASE CONTACT ON CALL TEAM:  - On Call Team (Please refer to Neri) FROM 5:00 PM - 8:30PM  - Nightfloat Team FROM 8:30 -7:30 AM    CHIEF COMPLAINT & BRIEF HOSPITAL COURSE:  75 y/o F with pmh of HTN, HLD, varicose veins, vertigo, who presented to the ED with complains of worsening SOB for the past 3 weeks, worse with exertion and laying flat associated with intermittent 3/10 left sided chest pain and palpitations.  Underwent cardiac work up (Dr. Mcdonald [phone]) including Echo, no results yet. ROS reveals some leg pains which patient attributes to her varicose veins. She denies any other complains including headache, dizziness, abd pain, nausea, vomiting, diarrhea, weakness, numbness, tingling or fatigue.     In ED AF, /89 HR 1177, Sats 95 on RA, EKG showed A fib RVR echo pending  Exam: decreased breath sounds b/l pitting edema  Labs: D dimer 320s, BNP 3400s, UA positive  CTA of chest negative for PE but with pleural effusions. Pro bnp elevated. PT also noted to have afib.    Patient was admitted for CHF exacerbation  and new onset A-fib   Patient is on FD lovenox, she is AC naaive,  and she also received oral metoprolol for A-fib and lasix for CHF     INTERVAL HPI/OVERNIGHT EVENTS:     Rapid response was called for dizziness and pre-syncope while in the bathroom, She did not fall but lay down. Patient was examined Serbian  [id number] Caridad- by night team  On exam, has no crackles or LE edema. Echo is pending. She denies having similar episode before. She denies chest pain. She has not eaten anything since yesterday.      Cardiac monitor showing rapid afib with fluctuating HR  500ml bolus given (BP unreadable at first suspected to be low, repeat 123/65),   EKG and zofran given  will f/u with cardiology.   Patient states she is feeling better after interventions.  Remained on unit    Echo shows MR moderate to severe MR    She has pleural effusions on CT, consider tapping the right side to expedite recovery.  Vigorously replace K to 4-4.5 (i.e. 40mEQ BID), and consider increasing diuretic frequency to twice daily as well.  Based on echo results, may need ischemic workup.  Family and patient wish to proceed towards AFib ablation, for greater efficacy in maintaining sinus rhythm in a severely enlarged LA.  I have tentatively booked this for next Tuesday, 11/9 at [facility], which should give time for workup.    MEDICATIONS  (STANDING):  atorvastatin 10 milliGRAM(s) Oral at bedtime  diltiazem Infusion 5 mG/Hr (5 mL/Hr) IV Continuous <Continuous>  enoxaparin Injectable 80 milliGRAM(s) SubCutaneous two times a day  folic acid 1 milliGRAM(s) Oral daily  furosemide   Injectable 40 milliGRAM(s) IV Push daily  gabapentin 300 milliGRAM(s) Oral two times a day  lisinopril 5 milliGRAM(s) Oral daily  metoprolol tartrate 50 milliGRAM(s) Oral two times a day  mirtazapine 45 milliGRAM(s) Oral daily    MEDICATIONS  (PRN):      REVIEW OF SYSTEMS:  CONSTITUTIONAL: No fever, weight loss, or fatigue  RESPIRATORY: No cough, wheezing, chills or hemoptysis; No shortness of breath  CARDIOVASCULAR: No chest pain, palpitations, dizziness, or leg swelling  GASTROINTESTINAL: No abdominal pain. No nausea, vomiting, or hematemesis; No diarrhea or constipation. No melena or hematochezia.  GENITOURINARY: No dysuria or hematuria, urinary frequency  NEUROLOGICAL: No headaches, memory loss, loss of strength, numbness, or tremors  SKIN: No itching, burning, rashes, or lesions     Vital Signs Last 24 Hrs  T(C): 36.6 (03 Nov 2021 04:46), Max: 36.8 (02 Nov 2021 21:12)  T(F): 97.9 (03 Nov 2021 04:46), Max: 98.3 (02 Nov 2021 21:12)  HR: 93 (03 Nov 2021 04:46) (93 - 135)  BP: 119/74 (03 Nov 2021 04:46) (100/56 - 122/68)  BP(mean): --  RR: 17 (03 Nov 2021 04:46) (16 - 19)  SpO2: 93% (03 Nov 2021 04:46) (93% - 99%)    PHYSICAL EXAMINATION:  GENERAL: NAD, well built  HEAD:  Atraumatic, Normocephalic  EYES:  conjunctiva and sclera clear  NECK: Supple, No JVD, Normal thyroid  CHEST/LUNG: Clear to auscultation. Clear to percussion bilaterally; No rales, rhonchi, wheezing, or rubs  HEART: Regular rate and rhythm; No murmurs, rubs, or gallops  ABDOMEN: Soft, Nontender, Nondistended; Bowel sounds present  NERVOUS SYSTEM:  Alert & Oriented X3,    EXTREMITIES:  2+ Peripheral Pulses, No clubbing, cyanosis, or edema  SKIN: warm dry                          15.5   10.76 )-----------( 169      ( 02 Nov 2021 07:11 )             46.7     11-02    141  |  107  |  16  ----------------------------<  94  3.5   |  23  |  0.99    Ca    9.1      02 Nov 2021 07:11  Phos  3.4     11-02  Mg     2.2     11-02    TPro  7.5  /  Alb  4.0  /  TBili  1.3<H>  /  DBili  x   /  AST  21  /  ALT  29  /  AlkPhos  88  11-02    LIVER FUNCTIONS - ( 02 Nov 2021 07:11 )  Alb: 4.0 g/dL / Pro: 7.5 g/dL / ALK PHOS: 88 U/L / ALT: 29 U/L DA / AST: 21 U/L / GGT: x                   CAPILLARY BLOOD GLUCOSE      RADIOLOGY & ADDITIONAL TESTS:

## 2021-11-03 NOTE — DISCHARGE NOTE PROVIDER - NSDCCPCAREPLAN_GEN_ALL_CORE_FT
PRINCIPAL DISCHARGE DIAGNOSIS  Diagnosis: Congestive heart failure (CHF)  Assessment and Plan of Treatment:   ·  Plan: pt c/o SOB on excertion and orthopnea  probnp 3.4K, D-dimmer 327 (not significant for age)  no history of CFH, also noted to be on new onset afib  Trop negative x1  CTA Chest: negative for PE, BL pulmonary edema  Will give Lasix 40 will increased it to BID  metoprolol, aspirin  Lipids are elevated, A1C normal  statin prescribed  Cardio Dr. Mullins: suspected severe MR is for ablation vs valve repair [facility].        SECONDARY DISCHARGE DIAGNOSES  Diagnosis: Vertigo  Assessment and Plan of Treatment:   Plan: Patient had a dizzy spell in the bathroom  RRT called  Cardiogenic work up is underway as patient has A-fib and CHF  No further episodes past those few minutes,  no n/v- this is likely cardiogenic  continue home meds prn  PRN meclizine if needed.      Diagnosis: Atrial fibrillation with RVR  Assessment and Plan of Treatment:   Plan: pt with new onset afib and chf  s/p Metoprolol in ED (po and IV) now on lasix also   will continue to monitor  tele monitoring  f/u echo  Chadvasc score 4: will start full dose lovenox, consider NOAc based on insurance  Cardio consult: Dr. Mullins  For right for the pleural effusion, patient is satting 97 on RA now  Per EP Based on echo results, may need ischemic workup.  AFib ablation, for greater efficacy in maintaining sinus rhythm in a severely enlarged LA. vs Mitral valve repair  EP  tentatively booked this for next Tuesday, 11/9 at [facility], which should give time for workup.  But patient is going to [facility] tomorrow  NPO after midnight, COVID, coags, heparin infusion, stop FD lovenox.      Diagnosis: Asymptomatic bacteriuria  Assessment and Plan of Treatment: Patient has a partially positive UA  fainted, felt dizzy in the bathroom later that day, likely cardiogenic presyncope  no symptoms of dysuria, frequecy urge, has some blood in the urine as well as pyuria  will give PO ceftin for 3 days.      Diagnosis: Congestive heart failure (CHF)  Assessment and Plan of Treatment:     Diagnosis: Peripheral neuropathy  Assessment and Plan of Treatment: Problem/Plan - 5:  ·  Problem: Peripheral neuropathy.   ·  Plan: continue gabapentin  monitor  A1 c is normal  Monitor kidney function, in the setting of CHF patient may not clear gabapentin Cr. 0.99-1.29  monitor.       PRINCIPAL DISCHARGE DIAGNOSIS  Diagnosis: Congestive heart failure (CHF)  Assessment and Plan of Treatment: You presented to the ED with shortness of breath for the past 3 weeks that gets worse on exertion and when laying flat. In the ED, you were found to have Afib and possible heart failure, both are new findings for you. You were admitted to our service for congestive heart failure exacerbation. During your hospitalization, we have given you diuretics to decrease your volume load which will help you with your breathing. We also gave you metoprolol, aspirin, and blood thinners because of your newly diagnosed atrial fibrillation. Cardiologist Dr. Mullins was consulted and he performed an echocardiogram on your heart. This study showed that you might have severe mitral regurgitation. This could be the underlying cause for your heart failure and fluid overload. Dr. Mullins recommended that you go to [facility] for an ablation or valve repair. He has booked you an appointment on 11/4 and you will be transferred 11/3. Please follow-up with your cardiologist and PCP as an outpatient to best manage your care.  Please go to the Emergency Room if you are ever having trouble breathing.      SECONDARY DISCHARGE DIAGNOSES  Diagnosis: Atrial fibrillation with RVR  Assessment and Plan of Treatment: On admission, EKG showed that you have atrial fibrillation, a common disorder that causes an irregular heartbeat. Patients with atrial fibrillation have an increased chance of stroke and pulmonary embolism. Because of this, we put you on anticoagulation therapy with Lovenox and then a heparin drip. You were also put on metoprolol to control your heart rate. We have been monitoring you on telemetry during your hospitalization. Dr. Bolton, the electrophysiologist recommends that a cardiac ablation would be the best option for treating atrial fibrillation especially given the severe mitral regurgitation. This procedure will take place in [facility] on 11/4 and you will be transferred 11/3. Please follow-up with your cardiologist and PCP as an outpatient to best manage your health.  Please go to the Emergency room if you ever have sudden shortness of breath, loss of consciousness, or sudden neurological deficits.      Diagnosis: Asymptomatic bacteriuria  Assessment and Plan of Treatment: On admission, you had a urinalysis that was suspicious for an infection. you have no symptoms of trouble using the bathroom or increased frequency but you have some blood and pus in your urine. We will give you 3 days of ceftin which is an antibiotic. Please take this medication as prescribed. Please follow-up with your PCP as an outpatient to best manage your health.       Diagnosis: Peripheral neuropathy  Assessment and Plan of Treatment: You have a history of peripheral neuropathy which you are taking gabapentin for. We continued this medication during your hospitalization. Please continue taking your medication as prescribed as an outpatient. Please follow-up with your PCP as an outpatient to manage your health.    Diagnosis: Dizziness  Assessment and Plan of Treatment: During your hospitalization, you had an episode of dizziness when using the bathroom. A rapid response was called and you were given one bolus of fluids which helped improve your dizziness. We believe that this dizziness is due to your atrial fibrillation. We have meclizine on hold just in case you have more dizziness. Please follow-up with your cardiologist as an outpatient to best manage your health.  Please go to the emergency department if you ever have sudden dizziness again.     PRINCIPAL DISCHARGE DIAGNOSIS  Diagnosis: Congestive heart failure (CHF)  Assessment and Plan of Treatment: You presented to the ED with shortness of breath for the past 3 weeks that gets worse on exertion and when laying flat. In the ED, you were found to have Afib and possible heart failure, both are new findings for you. You were admitted to our service for congestive heart failure exacerbation. During your hospitalization, we have given you diuretics to decrease your volume load which will help you with your breathing. We also gave you metoprolol, aspirin, and blood thinners because of your newly diagnosed atrial fibrillation. Cardiologist Dr. Mullins was consulted and he performed an echocardiogram on your heart. This study showed that you might have severe mitral regurgitation. This could be the underlying cause for your heart failure and fluid overload. Dr. Mullins recommended that you go to [facility] for an ablation or valve repair. He has booked you an appointment on 11/4 and you will be transferred 11/4. Please follow-up with your cardiologist or Dr. Mullins  as well as PCP as an outpatient to best manage your care.  Please go to the Emergency Room if you are ever having trouble breathing.      SECONDARY DISCHARGE DIAGNOSES  Diagnosis: Atrial fibrillation with RVR  Assessment and Plan of Treatment: On admission, EKG showed that you have atrial fibrillation, a common disorder that causes an irregular heartbeat. Patients with atrial fibrillation have an increased chance of stroke and pulmonary embolism. Because of this, we put you on anticoagulation therapy with Lovenox and then a heparin drip. You were also put on metoprolol to control your heart rate. We have been monitoring you on telemetry during your hospitalization. Dr. Bolton, the electrophysiologist recommends that a cardiac ablation would be the best option for treating atrial fibrillation especially given the severe mitral regurgitation. This procedure will take place in [facility] on 11/4 and you will be transferred 11/4. Please follow-up with your cardiologist or Dr. Mullins, and PCP as an outpatient to best manage your health.  Please go to the Emergency room if you ever have sudden shortness of breath, loss of consciousness, or sudden neurological deficits.      Diagnosis: Asymptomatic bacteriuria  Assessment and Plan of Treatment: On admission, you had a urinalysis that was suspicious for an infection. you have no symptoms of trouble using the bathroom or increased frequency but you have some blood and bacteria and white blood cells  in your urine. We will give you 3 days of ceftriaxone which is an antibiotic. Please take this medication as prescribed. Please follow-up with your PCP as an outpatient to best manage your health.       Diagnosis: Dizziness  Assessment and Plan of Treatment: During your hospitalization, you had an episode of dizziness when using the bathroom. A rapid response was called and you were given one bolus of fluids which helped improve your dizziness. We believe that this dizziness is due to your atrial fibrillation. We have meclizine on hold just in case you have more dizziness. Please follow-up with your cardiologist as an outpatient to best manage your health.  Please go to the emergency department if you ever have sudden dizziness again.

## 2021-11-03 NOTE — DISCHARGE NOTE PROVIDER - NSDCMRMEDTOKEN_GEN_ALL_CORE_FT
bisoprolol 5 mg oral tablet: 1 tab(s) orally once a day  folic acid 1 mg oral tablet: 1 tab(s) orally once a day  gabapentin 300 mg oral capsule: 1 cap(s) orally 2 times a day  mirtazapine 45 mg oral tablet: 1 tab(s) orally once a day  ramelteon 8 mg oral tablet: 1 tab(s) orally once a day (at bedtime)   bisoprolol 5 mg oral tablet: 1 tab(s) orally once a day  folic acid 1 mg oral tablet: 1 tab(s) orally once a day  gabapentin 300 mg oral capsule: 1 cap(s) orally 2 times a day  lisinopril 5 mg oral tablet: 1 tab(s) orally once a day  mirtazapine 45 mg oral tablet: 1 tab(s) orally once a day  ramelteon 8 mg oral tablet: 1 tab(s) orally once a day (at bedtime)

## 2021-11-04 ENCOUNTER — INPATIENT (INPATIENT)
Facility: HOSPITAL | Age: 76
LOS: 0 days | Discharge: ANOTHER HOSPITAL | End: 2021-11-05
Attending: INTERNAL MEDICINE | Admitting: INTERNAL MEDICINE
Payer: MEDICARE

## 2021-11-04 VITALS
OXYGEN SATURATION: 96 % | SYSTOLIC BLOOD PRESSURE: 104 MMHG | TEMPERATURE: 98 F | RESPIRATION RATE: 18 BRPM | DIASTOLIC BLOOD PRESSURE: 53 MMHG

## 2021-11-04 VITALS
OXYGEN SATURATION: 97 % | SYSTOLIC BLOOD PRESSURE: 137 MMHG | DIASTOLIC BLOOD PRESSURE: 69 MMHG | WEIGHT: 174.39 LBS | RESPIRATION RATE: 18 BRPM | HEART RATE: 104 BPM | HEIGHT: 67 IN | TEMPERATURE: 99 F

## 2021-11-04 DIAGNOSIS — I50.33 ACUTE ON CHRONIC DIASTOLIC (CONGESTIVE) HEART FAILURE: ICD-10-CM

## 2021-11-04 DIAGNOSIS — I48.91 UNSPECIFIED ATRIAL FIBRILLATION: ICD-10-CM

## 2021-11-04 DIAGNOSIS — I51.9 HEART DISEASE, UNSPECIFIED: ICD-10-CM

## 2021-11-04 PROBLEM — Z86.79 PERSONAL HISTORY OF OTHER DISEASES OF THE CIRCULATORY SYSTEM: Chronic | Status: ACTIVE | Noted: 2021-11-01

## 2021-11-04 PROBLEM — I10 ESSENTIAL (PRIMARY) HYPERTENSION: Chronic | Status: ACTIVE | Noted: 2021-11-01

## 2021-11-04 PROBLEM — R42 DIZZINESS AND GIDDINESS: Chronic | Status: ACTIVE | Noted: 2021-11-01

## 2021-11-04 LAB
ALBUMIN SERPL ELPH-MCNC: 4 G/DL — SIGNIFICANT CHANGE UP (ref 3.5–5)
ALP SERPL-CCNC: 96 U/L — SIGNIFICANT CHANGE UP (ref 40–120)
ALT FLD-CCNC: 111 U/L DA — HIGH (ref 10–60)
ANION GAP SERPL CALC-SCNC: 8 MMOL/L — SIGNIFICANT CHANGE UP (ref 5–17)
APTT BLD: 168.7 SEC — CRITICAL HIGH (ref 27.5–35.5)
AST SERPL-CCNC: 59 U/L — HIGH (ref 10–40)
BASOPHILS # BLD AUTO: 0.09 K/UL — SIGNIFICANT CHANGE UP (ref 0–0.2)
BASOPHILS NFR BLD AUTO: 0.9 % — SIGNIFICANT CHANGE UP (ref 0–2)
BILIRUB SERPL-MCNC: 0.6 MG/DL — SIGNIFICANT CHANGE UP (ref 0.2–1.2)
BUN SERPL-MCNC: 27 MG/DL — HIGH (ref 7–18)
CALCIUM SERPL-MCNC: 9.1 MG/DL — SIGNIFICANT CHANGE UP (ref 8.4–10.5)
CHLORIDE SERPL-SCNC: 106 MMOL/L — SIGNIFICANT CHANGE UP (ref 96–108)
CO2 SERPL-SCNC: 27 MMOL/L — SIGNIFICANT CHANGE UP (ref 22–31)
CREAT SERPL-MCNC: 1.09 MG/DL — SIGNIFICANT CHANGE UP (ref 0.5–1.3)
EOSINOPHIL # BLD AUTO: 0.35 K/UL — SIGNIFICANT CHANGE UP (ref 0–0.5)
EOSINOPHIL NFR BLD AUTO: 3.4 % — SIGNIFICANT CHANGE UP (ref 0–6)
GLUCOSE SERPL-MCNC: 110 MG/DL — HIGH (ref 70–99)
HCT VFR BLD CALC: 47.1 % — HIGH (ref 34.5–45)
HGB BLD-MCNC: 15.5 G/DL — SIGNIFICANT CHANGE UP (ref 11.5–15.5)
IMM GRANULOCYTES NFR BLD AUTO: 0.5 % — SIGNIFICANT CHANGE UP (ref 0–1.5)
INR BLD: 1.12 RATIO — SIGNIFICANT CHANGE UP (ref 0.88–1.16)
LYMPHOCYTES # BLD AUTO: 4.99 K/UL — HIGH (ref 1–3.3)
LYMPHOCYTES # BLD AUTO: 49.1 % — HIGH (ref 13–44)
MAGNESIUM SERPL-MCNC: 2.1 MG/DL — SIGNIFICANT CHANGE UP (ref 1.6–2.6)
MCHC RBC-ENTMCNC: 30 PG — SIGNIFICANT CHANGE UP (ref 27–34)
MCHC RBC-ENTMCNC: 32.9 GM/DL — SIGNIFICANT CHANGE UP (ref 32–36)
MCV RBC AUTO: 91.3 FL — SIGNIFICANT CHANGE UP (ref 80–100)
MONOCYTES # BLD AUTO: 0.74 K/UL — SIGNIFICANT CHANGE UP (ref 0–0.9)
MONOCYTES NFR BLD AUTO: 7.3 % — SIGNIFICANT CHANGE UP (ref 2–14)
NEUTROPHILS # BLD AUTO: 3.95 K/UL — SIGNIFICANT CHANGE UP (ref 1.8–7.4)
NEUTROPHILS NFR BLD AUTO: 38.8 % — LOW (ref 43–77)
NRBC # BLD: 0 /100 WBCS — SIGNIFICANT CHANGE UP (ref 0–0)
PHOSPHATE SERPL-MCNC: 3.8 MG/DL — SIGNIFICANT CHANGE UP (ref 2.5–4.5)
PLATELET # BLD AUTO: 152 K/UL — SIGNIFICANT CHANGE UP (ref 150–400)
POTASSIUM SERPL-MCNC: 3.7 MMOL/L — SIGNIFICANT CHANGE UP (ref 3.5–5.3)
POTASSIUM SERPL-SCNC: 3.7 MMOL/L — SIGNIFICANT CHANGE UP (ref 3.5–5.3)
PROT SERPL-MCNC: 7.4 G/DL — SIGNIFICANT CHANGE UP (ref 6–8.3)
PROTHROM AB SERPL-ACNC: 13.3 SEC — SIGNIFICANT CHANGE UP (ref 10.6–13.6)
RBC # BLD: 5.16 M/UL — SIGNIFICANT CHANGE UP (ref 3.8–5.2)
RBC # FLD: 14.7 % — HIGH (ref 10.3–14.5)
SARS-COV-2 RNA SPEC QL NAA+PROBE: SIGNIFICANT CHANGE UP
SODIUM SERPL-SCNC: 141 MMOL/L — SIGNIFICANT CHANGE UP (ref 135–145)
WBC # BLD: 10.17 K/UL — SIGNIFICANT CHANGE UP (ref 3.8–10.5)
WBC # FLD AUTO: 10.17 K/UL — SIGNIFICANT CHANGE UP (ref 3.8–10.5)

## 2021-11-04 PROCEDURE — 80061 LIPID PANEL: CPT

## 2021-11-04 PROCEDURE — 81001 URINALYSIS AUTO W/SCOPE: CPT

## 2021-11-04 PROCEDURE — 85379 FIBRIN DEGRADATION QUANT: CPT

## 2021-11-04 PROCEDURE — 36415 COLL VENOUS BLD VENIPUNCTURE: CPT

## 2021-11-04 PROCEDURE — 85025 COMPLETE CBC W/AUTO DIFF WBC: CPT

## 2021-11-04 PROCEDURE — 83036 HEMOGLOBIN GLYCOSYLATED A1C: CPT

## 2021-11-04 PROCEDURE — 84100 ASSAY OF PHOSPHORUS: CPT

## 2021-11-04 PROCEDURE — 86850 RBC ANTIBODY SCREEN: CPT

## 2021-11-04 PROCEDURE — 99285 EMERGENCY DEPT VISIT HI MDM: CPT | Mod: 25

## 2021-11-04 PROCEDURE — 96374 THER/PROPH/DIAG INJ IV PUSH: CPT

## 2021-11-04 PROCEDURE — 71275 CT ANGIOGRAPHY CHEST: CPT | Mod: MA

## 2021-11-04 PROCEDURE — 86901 BLOOD TYPING SEROLOGIC RH(D): CPT

## 2021-11-04 PROCEDURE — 83880 ASSAY OF NATRIURETIC PEPTIDE: CPT

## 2021-11-04 PROCEDURE — 0225U NFCT DS DNA&RNA 21 SARSCOV2: CPT

## 2021-11-04 PROCEDURE — 93010 ELECTROCARDIOGRAM REPORT: CPT

## 2021-11-04 PROCEDURE — 85610 PROTHROMBIN TIME: CPT

## 2021-11-04 PROCEDURE — 93456 R HRT CORONARY ARTERY ANGIO: CPT | Mod: 26

## 2021-11-04 PROCEDURE — 93005 ELECTROCARDIOGRAM TRACING: CPT

## 2021-11-04 PROCEDURE — 71046 X-RAY EXAM CHEST 2 VIEWS: CPT

## 2021-11-04 PROCEDURE — 80053 COMPREHEN METABOLIC PANEL: CPT

## 2021-11-04 PROCEDURE — 86900 BLOOD TYPING SEROLOGIC ABO: CPT

## 2021-11-04 PROCEDURE — 85730 THROMBOPLASTIN TIME PARTIAL: CPT

## 2021-11-04 PROCEDURE — 83735 ASSAY OF MAGNESIUM: CPT

## 2021-11-04 PROCEDURE — 86769 SARS-COV-2 COVID-19 ANTIBODY: CPT

## 2021-11-04 PROCEDURE — 85027 COMPLETE CBC AUTOMATED: CPT

## 2021-11-04 PROCEDURE — 82962 GLUCOSE BLOOD TEST: CPT

## 2021-11-04 PROCEDURE — 87635 SARS-COV-2 COVID-19 AMP PRB: CPT

## 2021-11-04 PROCEDURE — 84443 ASSAY THYROID STIM HORMONE: CPT

## 2021-11-04 PROCEDURE — 84484 ASSAY OF TROPONIN QUANT: CPT

## 2021-11-04 PROCEDURE — 93306 TTE W/DOPPLER COMPLETE: CPT

## 2021-11-04 RX ORDER — LISINOPRIL 2.5 MG/1
1 TABLET ORAL
Qty: 0 | Refills: 0 | DISCHARGE
Start: 2021-11-04

## 2021-11-04 RX ORDER — INFLUENZA VIRUS VACCINE 15; 15; 15; 15 UG/.5ML; UG/.5ML; UG/.5ML; UG/.5ML
0.7 SUSPENSION INTRAMUSCULAR ONCE
Refills: 0 | Status: DISCONTINUED | OUTPATIENT
Start: 2021-11-04 | End: 2021-11-05

## 2021-11-04 RX ORDER — FUROSEMIDE 40 MG
20 TABLET ORAL DAILY
Refills: 0 | Status: DISCONTINUED | OUTPATIENT
Start: 2021-11-04 | End: 2021-11-05

## 2021-11-04 RX ORDER — MIRTAZAPINE 45 MG/1
45 TABLET, ORALLY DISINTEGRATING ORAL DAILY
Refills: 0 | Status: DISCONTINUED | OUTPATIENT
Start: 2021-11-04 | End: 2021-11-05

## 2021-11-04 RX ORDER — DILTIAZEM HCL 120 MG
30 CAPSULE, EXT RELEASE 24 HR ORAL EVERY 6 HOURS
Refills: 0 | Status: DISCONTINUED | OUTPATIENT
Start: 2021-11-04 | End: 2021-11-05

## 2021-11-04 RX ORDER — GABAPENTIN 400 MG/1
300 CAPSULE ORAL
Refills: 0 | Status: DISCONTINUED | OUTPATIENT
Start: 2021-11-04 | End: 2021-11-05

## 2021-11-04 RX ORDER — METOPROLOL TARTRATE 50 MG
5 TABLET ORAL ONCE
Refills: 0 | Status: COMPLETED | OUTPATIENT
Start: 2021-11-04 | End: 2021-11-04

## 2021-11-04 RX ORDER — FOLIC ACID 0.8 MG
1 TABLET ORAL DAILY
Refills: 0 | Status: DISCONTINUED | OUTPATIENT
Start: 2021-11-04 | End: 2021-11-05

## 2021-11-04 RX ORDER — CEFTRIAXONE 500 MG/1
1000 INJECTION, POWDER, FOR SOLUTION INTRAMUSCULAR; INTRAVENOUS EVERY 24 HOURS
Refills: 0 | Status: DISCONTINUED | OUTPATIENT
Start: 2021-11-04 | End: 2021-11-04

## 2021-11-04 RX ORDER — LISINOPRIL 2.5 MG/1
5 TABLET ORAL DAILY
Refills: 0 | Status: DISCONTINUED | OUTPATIENT
Start: 2021-11-04 | End: 2021-11-05

## 2021-11-04 RX ORDER — DILTIAZEM HCL 120 MG
10 CAPSULE, EXT RELEASE 24 HR ORAL
Qty: 125 | Refills: 0 | Status: DISCONTINUED | OUTPATIENT
Start: 2021-11-04 | End: 2021-11-05

## 2021-11-04 RX ADMIN — Medication 5 MILLIGRAM(S): at 19:37

## 2021-11-04 RX ADMIN — GABAPENTIN 300 MILLIGRAM(S): 400 CAPSULE ORAL at 06:00

## 2021-11-04 RX ADMIN — Medication 30 MILLIGRAM(S): at 23:40

## 2021-11-04 RX ADMIN — HEPARIN SODIUM 900 UNIT(S)/HR: 5000 INJECTION INTRAVENOUS; SUBCUTANEOUS at 09:19

## 2021-11-04 RX ADMIN — Medication 50 MILLIGRAM(S): at 06:00

## 2021-11-04 RX ADMIN — HEPARIN SODIUM 1200 UNIT(S)/HR: 5000 INJECTION INTRAVENOUS; SUBCUTANEOUS at 01:49

## 2021-11-04 RX ADMIN — LISINOPRIL 5 MILLIGRAM(S): 2.5 TABLET ORAL at 06:00

## 2021-11-04 RX ADMIN — Medication 40 MILLIGRAM(S): at 06:00

## 2021-11-04 RX ADMIN — HEPARIN SODIUM 0 UNIT(S)/HR: 5000 INJECTION INTRAVENOUS; SUBCUTANEOUS at 08:16

## 2021-11-04 NOTE — TRANSFER ACCEPTANCE NOTE - RS GEN PE MLT RESP DETAILS PC
normal/airway patent/good air movement/clear to auscultation bilaterally/no chest wall tenderness/no intercostal retractions/no rhonchi/no wheezes/rales

## 2021-11-04 NOTE — ACUTE INTERFACILITY TRANSFER NOTE - PLAN OF CARE
Congestive heart failure (CHF).   pt c/o SOB on exertion and orthopnea  pro bnp 3.4K, D-dimmer 327 (not significant for age)  no history of CHF pior, also noted to be on new onset afib  Trop negative x1  CTA Chest: negative for PE, BL pulmonary edema  Will give Lasix 40 will increased it to BID  metoprolol, aspirin  Lipids are elevated, A1C normal  statin prescribed  Cardio Dr. Mullins: suspected severe MR is for ablation vs valve repair [facility]. Afib.   ·  Plan: pt with new onset afib and chf  s/p Metoprolol in ED (po and IV) now on lasix also   will continue to monitor  tele monitoring  f/u echo  Chadvasc score 4: will start full dose lovenox, consider NOAc based on insurance  Cardio consult: Dr. Mullins  For right for the pleural effusion, patient is satting 97 on RA now  Per EP Based on echo results, may need ischemic workup.  AFib ablation, for greater efficacy in maintaining sinus rhythm in a severely enlarged LA. vs Mitral valve repair  But patient is going to [facility]  NPO after midnight, COVID, coags, heparin infusion, Problem: UTI  Plan: Patient has a partially positive UA  fainted, felt dizzy in the bathroom later that day, likely cardiogenic presyncope  no symptoms of dysuria, frequecy urge, has some blood in the urine as well as pyuria  will give ceftriaxone for 3 days

## 2021-11-04 NOTE — PROGRESS NOTE ADULT - PROBLEM SELECTOR PLAN 1
pt c/o SOB on excertion and orthopnea  probnp 3.4K, D-dimmer 327 (not significant for age)  no history of CFH, also noted to be on new onset afib  Trop negative x1  CTA Chest: negative for PE, BL pulmonary edema  Will give Lasix 40 will increased it to BID  metoprolol, aspirin  Lipids are elevated, A1C normal  statin prescribed  Cardio Dr. Mullins: suspected severe MR is for ablation vs valve repair J

## 2021-11-04 NOTE — TRANSFER ACCEPTANCE NOTE - PROBLEM SELECTOR PLAN 2
[id number] Score of 5 and patient started on heparin drip for anticoagulation   Started on Cardizem gtt at 5cc/hr from [facility]  Will started on Cardizem 30mg q6hrs for rate control  Will start a/c post cath   EP consult with Dr. Bolton

## 2021-11-04 NOTE — PROGRESS NOTE ADULT - PROBLEM SELECTOR PLAN 2
pt with new onset afib and chf  s/p Metoprolol in ED (po and IV) now on lasix also   will continue to monitor  tele monitoring  f/u echo  Chadvasc score 4: will start full dose lovenox, consider NOAc based on insurance  Cardio consult: Dr. Mullins  For right for the pleural effusion, patient is satting 97 on RA now  Per EP Based on echo results, may need ischemic workup.  AFib ablation, for greater efficacy in maintaining sinus rhythm in a severely enlarged LA. vs Mitral valve repair  EP  tentatively booked this for next Tuesday, 11/9 at [facility], which should give time for workup.  But patient is going to [facility] tomorrow  NPO after midnight, COVID, coags, heparin infusion, stop FD lovenox

## 2021-11-04 NOTE — PROGRESS NOTE ADULT - SUBJECTIVE AND OBJECTIVE BOX
C A R D I O L O G Y  **********************************     DATE OF SERVICE: 11-04-21    Patient denies chest pain or shortness of breath.   Review of symptoms otherwise negative.    atorvastatin 10 milliGRAM(s) Oral at bedtime  cefTRIAXone   IVPB 1000 milliGRAM(s) IV Intermittent every 24 hours  diltiazem Infusion 5 mG/Hr IV Continuous <Continuous>  folic acid 1 milliGRAM(s) Oral daily  furosemide   Injectable 40 milliGRAM(s) IV Push two times a day  gabapentin 300 milliGRAM(s) Oral two times a day  heparin  Infusion.  Unit(s)/Hr IV Continuous <Continuous>  lisinopril 5 milliGRAM(s) Oral daily  metoprolol tartrate 50 milliGRAM(s) Oral two times a day  mirtazapine 45 milliGRAM(s) Oral daily                            15.5   10.17 )-----------( 152      ( 04 Nov 2021 07:28 )             47.1       Hemoglobin: 15.5 g/dL (11-04 @ 07:28)  Hemoglobin: 14.9 g/dL (11-03 @ 22:48)  Hemoglobin: 15.2 g/dL (11-03 @ 07:17)  Hemoglobin: 15.5 g/dL (11-02 @ 07:11)  Hemoglobin: 14.4 g/dL (11-01 @ 10:20)      11-04    141  |  106  |  27<H>  ----------------------------<  110<H>  3.7   |  27  |  1.09    Ca    9.1      04 Nov 2021 07:28  Phos  3.8     11-04  Mg     2.1     11-04    TPro  7.4  /  Alb  4.0  /  TBili  0.6  /  DBili  x   /  AST  59<H>  /  ALT  111<H>  /  AlkPhos  96  11-04    Creatinine Trend: 1.09<--, 1.29<--, 0.99<--, 1.09<--    COAGS: PT/INR - ( 04 Nov 2021 07:28 )   PT: 13.3 sec;   INR: 1.12 ratio         PTT - ( 04 Nov 2021 07:28 )  PTT:168.7 sec          T(C): 36.8 (11-04-21 @ 11:10), Max: 37.1 (11-03-21 @ 15:18)  HR: 78 (11-04-21 @ 08:11) (71 - 103)  BP: 104/53 (11-04-21 @ 11:10) (92/77 - 147/73)  RR: 18 (11-04-21 @ 11:10) (18 - 18)  SpO2: 96% (11-04-21 @ 11:10) (92% - 96%)  Wt(kg): --    I&O's Summary    03 Nov 2021 07:01  -  04 Nov 2021 07:00  --------------------------------------------------------  IN: 105 mL / OUT: 0 mL / NET: 105 mL        HEENT:  (-)icterus (-)pallor  CV: N S1 S2 1/6 ZITA (+)2 Pulses B/l  Resp:  Clear to ausculatation B/L, normal effort  GI: (+) BS Soft, NT, ND  Lymph:  (-)Edema, (-)obvious lymphadenopathy  Skin: Warm to touch, Normal turgor  Psych: Appropriate mood and affect      TELEMETRY: 	  Afib 80's        ASSESSMENT/PLAN: 	76y  Female pmh of HTN, HLD, varicose veins, vertigo, who presented to the ED with complains of SOB found with pulmonary edema and new afib    - Echo noted, images reviewed.  Her MR jet is highly eccentric and I am concerned it is being underestimated.  Would plan for DOUG and R+L cath at [facility]  - D/W patient and son at bedside. valve may need to be repaired instead of undergoing an ablation as it is more likely to maintain sinus rhythym if the MR is addressed  - EP eval appreciated  -  cont heparin gtt  - Strict I+O's  - keep net negative  - Keep NPO for potential DOUG today vs R+L cath  - awaiting transfer to [facility] today    Jair Mullins MD, FACC  BEEPER [phone]

## 2021-11-04 NOTE — PROGRESS NOTE ADULT - SUBJECTIVE AND OBJECTIVE BOX
PGY-1 Progress Note discussed with attending    PAGER #: [[phone]] TILL 5:00 PM  PLEASE CONTACT ON CALL TEAM:  - On Call Team (Please refer to Neri) FROM 5:00 PM - 8:30PM  - Nightfloat Team FROM 8:30 -7:30 AM    CHIEF COMPLAINT & BRIEF HOSPITAL COURSE:  75 y/o F with pmh of HTN, HLD, varicose veins, vertigo, who presented to the ED with complains of worsening SOB for the past 3 weeks, worse with exertion and laying flat associated with intermittent 3/10 left sided chest pain and palpitations.  Underwent cardiac work up (Dr. Mcdonald [phone]) including Echo, no results yet. ROS reveals some leg pains which patient attributes to her varicose veins. She denies any other complains including headache, dizziness, abd pain, nausea, vomiting, diarrhea, weakness, numbness, tingling or fatigue.     In ED AF, /89 HR 1177, Sats 95 on RA, EKG showed A fib RVR echo pending  Exam: decreased breath sounds b/l pitting edema  Labs: D dimer 320s, BNP 3400s, UA positive  CTA of chest negative for PE but with pleural effusions. Pro bnp elevated. PT also noted to have afib.    Patient was admitted for CHF exacerbation  and new onset A-fib   Patient is on FD lovenox, she is AC naaive,  and she also received oral metoprolol for A-fib and lasix for CHF     INTERVAL HPI/OVERNIGHT EVENTS:     Rapid response was called for dizziness and pre-syncope while in the bathroom, She did not fall but lay down. Patient was examined Citizen of [place]  [id number] [place]- by night team  On exam, has no crackles or LE edema. Echo is pending. She denies having similar episode before. She denies chest pain. She has not eaten anything since yesterday.      Cardiac monitor showing rapid afib with fluctuating HR  500ml bolus given (BP unreadable at first suspected to be low, repeat 123/65),   EKG and zofran given  will f/u with cardiology.   Patient states she is feeling better after interventions.  Remained on unit    Echo shows MR moderate to severe MR  patient is for transfer to [facility] for cath ablation vs MR repair    INTERVAL HPI/OVERNIGHT EVENTS:   no events    MEDICATIONS  (STANDING):  atorvastatin 10 milliGRAM(s) Oral at bedtime  diltiazem Infusion 5 mG/Hr (5 mL/Hr) IV Continuous <Continuous>  folic acid 1 milliGRAM(s) Oral daily  furosemide   Injectable 40 milliGRAM(s) IV Push two times a day  gabapentin 300 milliGRAM(s) Oral two times a day  heparin  Infusion.  Unit(s)/Hr (15 mL/Hr) IV Continuous <Continuous>  lisinopril 5 milliGRAM(s) Oral daily  metoprolol tartrate 50 milliGRAM(s) Oral two times a day  mirtazapine 45 milliGRAM(s) Oral daily    MEDICATIONS  (PRN):      REVIEW OF SYSTEMS:  CONSTITUTIONAL: No fever, weight loss, or fatigue  RESPIRATORY: No cough, wheezing, chills or hemoptysis; No shortness of breath  CARDIOVASCULAR: No chest pain, palpitations, dizziness, or leg swelling  GASTROINTESTINAL: No abdominal pain. No nausea, vomiting, or hematemesis; No diarrhea or constipation. No melena or hematochezia.  GENITOURINARY: No dysuria or hematuria, urinary frequency  NEUROLOGICAL: No headaches, memory loss, loss of strength, numbness, or tremors  SKIN: No itching, burning, rashes, or lesions     Vital Signs Last 24 Hrs  T(C): 36.7 (04 Nov 2021 04:41), Max: 37.1 (03 Nov 2021 15:18)  T(F): 98 (04 Nov 2021 04:41), Max: 98.8 (03 Nov 2021 15:18)  HR: 71 (04 Nov 2021 04:41) (71 - 103)  BP: 117/79 (04 Nov 2021 04:41) (92/77 - 147/73)  BP(mean): --  RR: 18 (04 Nov 2021 04:41) (18 - 18)  SpO2: 95% (04 Nov 2021 04:41) (93% - 97%)    PHYSICAL EXAMINATION:  GENERAL: NAD, well built  HEAD:  Atraumatic, Normocephalic  EYES:  conjunctiva and sclera clear  NECK: Supple, No JVD, Normal thyroid  CHEST/LUNG: Clear to auscultation. Clear to percussion bilaterally; No rales, rhonchi, wheezing, or rubs  HEART: Regular rate and rhythm; No murmurs, rubs, or gallops  ABDOMEN: Soft, Nontender, Nondistended; Bowel sounds present  NERVOUS SYSTEM:  Alert & Oriented X3,    EXTREMITIES:  2+ Peripheral Pulses, No clubbing, cyanosis, or edema  SKIN: warm dry                          14.9   11.98 )-----------( 152      ( 03 Nov 2021 22:48 )             45.4     11-03    144  |  108  |  24<H>  ----------------------------<  108<H>  3.6   |  26  |  1.29    Ca    9.0      03 Nov 2021 07:17  Phos  4.0     11-03  Mg     2.2     11-03    TPro  7.3  /  Alb  3.5  /  TBili  0.4  /  DBili  x   /  AST  97<H>  /  ALT  104<H>  /  AlkPhos  95  11-03    LIVER FUNCTIONS - ( 03 Nov 2021 07:17 )  Alb: 3.5 g/dL / Pro: 7.3 g/dL / ALK PHOS: 95 U/L / ALT: 104 U/L DA / AST: 97 U/L / GGT: x               PTT - ( 03 Nov 2021 22:48 )  PTT:150.7 sec    CAPILLARY BLOOD GLUCOSE      RADIOLOGY & ADDITIONAL TESTS:

## 2021-11-04 NOTE — PROGRESS NOTE ADULT - PROBLEM SELECTOR PLAN 3
Patient had a dizzy spell in the bathroom  RRT called  Cardiogenic work up is underway as patient has A-fib and CHF  No further episodes past those few minutes,  no n/v- this is likely cardiogenic  continue home meds prn  PRN meclizine if needed

## 2021-11-04 NOTE — TRANSFER ACCEPTANCE NOTE - PROBLEM SELECTOR PLAN 1
Monitor on telemetry for now. Elevated pro-BNP from [facility] and was started on IV Lasix 40mg QD   HgbA1C, TSH, lipid profile, CBC, CMP in am   Low sodium diet, daily weights, monitor I's and O's, fluid restrictions 1000cc/day   Will await RHC results and check LVEDP  Will continue with IV Lasix 40mg QD for now

## 2021-11-04 NOTE — PROGRESS NOTE ADULT - ASSESSMENT
77 y/o F with pmh of HTN, HLD, varicose veins, vertigo, who presented to the ED with complains of SOB. Admitted for new onset chf and afib
77 y/o F with pmh of HTN, HLD, varicose veins, vertigo, who presented to the ED with complains of SOB. Admitted for new onset chf and afib
75 y/o F with pmh of HTN, HLD, varicose veins, vertigo, who presented to the ED with complains of SOB. Admitted for new onset chf and afib. tele monitoring. Cardiology on board. CTA Chest shows negative for PE, + b/l pleural effusion with alveolar/interstitial edema. pulmonary vascular congestion.  11/2 - Pt is called for RRT this morning (7:45am) for dizziness with nausea, no vomiting. , SBP > 120s. ECG shows Afib.  s/p  IV blous and pt states feeling much better, tolerates well with food.    EP dr. Bolton was called by cardiology. Echo pending. on Full dose lovenox. IV lasix, BB, ACEI.   UA positive, but afebrile, no leukocytosis. no urinary symptoms. will not treat for asymptomatic bacteriurea  for now.

## 2021-11-04 NOTE — PROGRESS NOTE ADULT - PROBLEM SELECTOR PLAN 4
Patient has a partially positive UA  fainted, felt dizzy in the bathroom later that day, likely cardiogenic presyncope  no symptoms of dysuria, frequecy urge, has some blood in the urine as well as pyuria  will give PO ceftin for 3 days

## 2021-11-04 NOTE — TRANSFER ACCEPTANCE NOTE - NEGATIVE MUSCULOSKELETAL SYMPTOMS
no arthralgia/no joint swelling/no myalgia/no muscle cramps/no muscle weakness/no stiffness/no neck pain

## 2021-11-04 NOTE — PROGRESS NOTE ADULT - PROBLEM SELECTOR PLAN 6
heparin infusion  protonix for gi ppx
continue gabapentin  monitor symptoms
full dose lovenox for afib and dvt ppx  protonix for gi ppx

## 2021-11-04 NOTE — PROGRESS NOTE ADULT - PROBLEM SELECTOR PLAN 5
continue gabapentin  monitor  A1 c is normal  Monitor kidney function, in the setting of CHF patient may not clear gabapentin Cr. 0.99-1.29  monitor

## 2021-11-04 NOTE — TRANSFER ACCEPTANCE NOTE - HISTORY OF PRESENT ILLNESS
77 y/o F with PMH of HTN, HLD, varicose veins, Vertigo who presented to [place] ED on Nov 1st with the complains of SOB. She states her SOB has been getting worse for past 3 weeks. It is most pronounced when she walks and when she lays down. Pt also endorsed some chest pain at times, 3/10 under left breast, and palpitations, but denies any currently. Pt says that she recently saw a cardiologist and had work up done including echo but doesn't have the results (Dr. Mcdonald [phone]). She also endores some pain in her legs, which she attributes to her varicose veins. She denies any other complains including headache, dizziness, abd pain, nausea, vomiting, diarrhea, weakness, numbness, tingling or fatigue.     In ED CTA of chest negative for PE but with pleural effusions. Pro bnp elevated. PT also noted to have afib.  75 y/o F with PMH of HTN, HLD, Valve murmur, Varicose veins, Vertigo who presented to [place] ED on Nov 1st with the complains of SOB. She states her SOB has been getting worse for past 2-3 weeks. It is most pronounced when she walks and when she lays down. Patient also endorsed some chest pain at times after having a dry cough. Patient described the chest pain as a sharp pain, aggravated after coughing, without any radiation, with a severity of 3/10. Patient says that she recently sw a cardiologist and had work up done including echo but doesn't have the results. Patient also endorses some pain in her legs, which she attributes to her varicose veins. She denies any other complaints such as fevers, chills, N/V/D/C, abdominal pain, dysuria, melena, hematochezia, recent travel, sick contact, cough, body aches, pleuritic or positional chest pain.      In [facility] ED patient had a CT PA which revealed no PE but with pleural effusions. Pro BNP was elevated. Patient was started on IV Lasix and noted to be in Afib with RVR and given Lovenox 1mg/kg.    ID: [id number] and name: Kasandra    75 y/o F with PMH of HTN, HLD, Valve murmur, Varicose veins, Vertigo who presented to [place] ED on Nov 1st with the complains of SOB. She states her SOB has been getting worse for past 2-3 weeks. It is most pronounced when she walks and when she lays down. Patient also endorsed some chest pain at times after having a dry cough. Patient described the chest pain as a sharp pain, aggravated after coughing, without any radiation, with a severity of 3/10. Patient says that she recently sw a cardiologist and had work up done including echo but doesn't have the results. Patient also endorses some pain in her legs, which she attributes to her varicose veins. She denies any other complaints such as fevers, chills, N/V/D/C, abdominal pain, dysuria, melena, hematochezia, recent travel, sick contact, cough, body aches, pleuritic or positional chest pain.      In [facility] ED patient had a CT PA which revealed no PE but with pleural effusions. Pro BNP was elevated. Patient was started on IV Lasix and noted to be in Afib with RVR and given Lovenox 1mg/kg.

## 2021-11-04 NOTE — TRANSFER ACCEPTANCE NOTE - ASSESSMENT
77 y/o F with PMH of HTN, HLD, Valve murmur, Varicose veins, Vertigo who presented to [place] ED on Nov 1st with the complains of SOB. She states her SOB has been getting worse for past 2-3 weeks. Patient now transferred to [facility] for LHC with Dr. Hansel Quintanilla.  77 y/o F with PMH of HTN, HLD, Valve murmur, Varicose veins, Vertigo who presented to [place] ED on Nov 1st with the complains of SOB. She states her SOB has been getting worse for past 2-3 weeks. Patient now transferred to [facility] for LHC/RHC with Dr. Hansel Quintanilla.

## 2021-11-04 NOTE — ACUTE INTERFACILITY TRANSFER NOTE - HOSPITAL COURSE
75 y/o F with pmh of HTN, HLD, varicose veins, vertigo, who presented to the ED with complains of worsening SOB for the past 3 weeks, worse with exertion and laying flat associated with intermittent 3/10 left sided chest pain and palpitations.  Underwent cardiac work up (Dr. Mcdonald [phone]) including Echo, no results yet. ROS reveals some leg pains which patient attributes to her varicose veins. She denies any other complains including headache, dizziness, abd pain, nausea, vomiting, diarrhea, weakness, numbness, tingling or fatigue.     In ED AF, /89 , Sats 95 on RA, EKG showed A fib RVR echo pending  Exam: decreased breath sounds b/l pitting edema  Labs: D dimer 320s, BNP 3400s, UA positive  CTA of chest negative for PE but with pleural effusions. Pro bnp elevated. PT also noted to have afib.    Patient was admitted for CHF exacerbation  and new onset A-fib   Patient is on FD lovenox, she is AC naaive,  and she also received oral metoprolol for A-fib and lasix for CHF     Rapid response was called for dizziness and pre-syncope while in the bathroom, She did not fall but lay down. Patient was examined [place]  [id number] [place]- by night team  On exam, has no crackles or LE edema. Echo is pending. She denies having similar episode before. She denies chest pain. She has not eaten anything since yesterday.      Cardiac monitor showing rapid afib with fluctuating HR  500ml bolus given (BP unreadable at first suspected to be low, repeat 123/65),   EKG and zofran given  will f/u with cardiology.   Patient states she is feeling better after interventions.  Remained on unit    Echo shows MR moderate to severe MR  patient is for transfer to [facility] for cath ablation vs MR repair

## 2021-11-05 ENCOUNTER — TRANSCRIPTION ENCOUNTER (OUTPATIENT)
Age: 76
End: 2021-11-05

## 2021-11-05 ENCOUNTER — INPATIENT (INPATIENT)
Facility: HOSPITAL | Age: 76
LOS: 14 days | Discharge: HOME HEALTH | DRG: 219 | End: 2021-11-20
Attending: THORACIC SURGERY (CARDIOTHORACIC VASCULAR SURGERY) | Admitting: THORACIC SURGERY (CARDIOTHORACIC VASCULAR SURGERY)
Payer: MEDICARE

## 2021-11-05 VITALS
TEMPERATURE: 99 F | OXYGEN SATURATION: 94 % | DIASTOLIC BLOOD PRESSURE: 74 MMHG | HEART RATE: 99 BPM | SYSTOLIC BLOOD PRESSURE: 128 MMHG | HEIGHT: 68 IN | WEIGHT: 174.17 LBS | RESPIRATION RATE: 16 BRPM

## 2021-11-05 VITALS
RESPIRATION RATE: 18 BRPM | TEMPERATURE: 97 F | SYSTOLIC BLOOD PRESSURE: 122 MMHG | OXYGEN SATURATION: 96 % | DIASTOLIC BLOOD PRESSURE: 69 MMHG | HEART RATE: 87 BPM

## 2021-11-05 DIAGNOSIS — I34.0 NONRHEUMATIC MITRAL (VALVE) INSUFFICIENCY: ICD-10-CM

## 2021-11-05 DIAGNOSIS — I48.91 UNSPECIFIED ATRIAL FIBRILLATION: ICD-10-CM

## 2021-11-05 DIAGNOSIS — I25.10 ATHEROSCLEROTIC HEART DISEASE OF NATIVE CORONARY ARTERY WITHOUT ANGINA PECTORIS: ICD-10-CM

## 2021-11-05 LAB
A1C WITH ESTIMATED AVERAGE GLUCOSE RESULT: 5.4 % — SIGNIFICANT CHANGE UP (ref 4–5.6)
ALBUMIN SERPL ELPH-MCNC: 4.3 G/DL — SIGNIFICANT CHANGE UP (ref 3.3–5)
ALP SERPL-CCNC: 100 U/L — SIGNIFICANT CHANGE UP (ref 40–120)
ALT FLD-CCNC: 77 U/L — HIGH (ref 4–33)
ANION GAP SERPL CALC-SCNC: 14 MMOL/L — SIGNIFICANT CHANGE UP (ref 7–14)
APTT BLD: 29.8 SEC — SIGNIFICANT CHANGE UP (ref 27–36.3)
APTT BLD: 52.6 SEC — HIGH (ref 27.5–35.5)
APTT BLD: 58.5 SEC — HIGH (ref 27–36.3)
AST SERPL-CCNC: 53 U/L — HIGH (ref 4–32)
BILIRUB SERPL-MCNC: 0.8 MG/DL — SIGNIFICANT CHANGE UP (ref 0.2–1.2)
BUN SERPL-MCNC: 24 MG/DL — HIGH (ref 7–23)
CALCIUM SERPL-MCNC: 9.6 MG/DL — SIGNIFICANT CHANGE UP (ref 8.4–10.5)
CHLORIDE SERPL-SCNC: 101 MMOL/L — SIGNIFICANT CHANGE UP (ref 98–107)
CHOLEST SERPL-MCNC: 213 MG/DL — HIGH
CO2 SERPL-SCNC: 25 MMOL/L — SIGNIFICANT CHANGE UP (ref 22–31)
COVID-19 NUCLEOCAPSID GAM AB INTERP: NEGATIVE — SIGNIFICANT CHANGE UP
COVID-19 NUCLEOCAPSID TOTAL GAM ANTIBODY RESULT: 0.08 INDEX — SIGNIFICANT CHANGE UP
COVID-19 SPIKE DOMAIN AB INTERP: POSITIVE
COVID-19 SPIKE DOMAIN ANTIBODY RESULT: >250 U/ML — HIGH
CREAT SERPL-MCNC: 0.92 MG/DL — SIGNIFICANT CHANGE UP (ref 0.5–1.3)
ESTIMATED AVERAGE GLUCOSE: 108 — SIGNIFICANT CHANGE UP
GLUCOSE SERPL-MCNC: 133 MG/DL — HIGH (ref 70–99)
HCT VFR BLD CALC: 44 % — SIGNIFICANT CHANGE UP (ref 34.5–45)
HCT VFR BLD CALC: 45.8 % — HIGH (ref 34.5–45)
HCT VFR BLD CALC: 47.5 % — HIGH (ref 34.5–45)
HDLC SERPL-MCNC: 43 MG/DL — LOW
HGB BLD-MCNC: 14.2 G/DL — SIGNIFICANT CHANGE UP (ref 11.5–15.5)
HGB BLD-MCNC: 15 G/DL — SIGNIFICANT CHANGE UP (ref 11.5–15.5)
HGB BLD-MCNC: 15.3 G/DL — SIGNIFICANT CHANGE UP (ref 11.5–15.5)
INR BLD: 1.16 RATIO — SIGNIFICANT CHANGE UP (ref 0.88–1.16)
LIPID PNL WITH DIRECT LDL SERPL: 119 MG/DL — HIGH
MCHC RBC-ENTMCNC: 30 PG — SIGNIFICANT CHANGE UP (ref 27–34)
MCHC RBC-ENTMCNC: 30 PG — SIGNIFICANT CHANGE UP (ref 27–34)
MCHC RBC-ENTMCNC: 30.2 PG — SIGNIFICANT CHANGE UP (ref 27–34)
MCHC RBC-ENTMCNC: 32.2 GM/DL — SIGNIFICANT CHANGE UP (ref 32–36)
MCHC RBC-ENTMCNC: 32.3 GM/DL — SIGNIFICANT CHANGE UP (ref 32–36)
MCHC RBC-ENTMCNC: 32.8 GM/DL — SIGNIFICANT CHANGE UP (ref 32–36)
MCV RBC AUTO: 92.3 FL — SIGNIFICANT CHANGE UP (ref 80–100)
MCV RBC AUTO: 92.8 FL — SIGNIFICANT CHANGE UP (ref 80–100)
MCV RBC AUTO: 93.1 FL — SIGNIFICANT CHANGE UP (ref 80–100)
NON HDL CHOLESTEROL: 170 MG/DL — HIGH
NRBC # BLD: 0 /100 WBCS — SIGNIFICANT CHANGE UP
NRBC # BLD: 0 /100 WBCS — SIGNIFICANT CHANGE UP
NRBC # BLD: 0 /100 WBCS — SIGNIFICANT CHANGE UP (ref 0–0)
NRBC # FLD: 0 K/UL — SIGNIFICANT CHANGE UP
NRBC # FLD: 0 K/UL — SIGNIFICANT CHANGE UP
PLATELET # BLD AUTO: 125 K/UL — LOW (ref 150–400)
PLATELET # BLD AUTO: 130 K/UL — LOW (ref 150–400)
PLATELET # BLD AUTO: 131 K/UL — LOW (ref 150–400)
POTASSIUM SERPL-MCNC: 3.9 MMOL/L — SIGNIFICANT CHANGE UP (ref 3.5–5.3)
POTASSIUM SERPL-SCNC: 3.9 MMOL/L — SIGNIFICANT CHANGE UP (ref 3.5–5.3)
PROT SERPL-MCNC: 7.1 G/DL — SIGNIFICANT CHANGE UP (ref 6–8.3)
PROTHROM AB SERPL-ACNC: 13.3 SEC — SIGNIFICANT CHANGE UP (ref 10.6–13.6)
RBC # BLD: 4.74 M/UL — SIGNIFICANT CHANGE UP (ref 3.8–5.2)
RBC # BLD: 4.96 M/UL — SIGNIFICANT CHANGE UP (ref 3.8–5.2)
RBC # BLD: 5.1 M/UL — SIGNIFICANT CHANGE UP (ref 3.8–5.2)
RBC # FLD: 14.6 % — HIGH (ref 10.3–14.5)
SARS-COV-2 IGG+IGM SERPL QL IA: 0.08 INDEX — SIGNIFICANT CHANGE UP
SARS-COV-2 IGG+IGM SERPL QL IA: >250 U/ML — HIGH
SARS-COV-2 IGG+IGM SERPL QL IA: NEGATIVE — SIGNIFICANT CHANGE UP
SARS-COV-2 IGG+IGM SERPL QL IA: POSITIVE
SARS-COV-2 RNA SPEC QL NAA+PROBE: SIGNIFICANT CHANGE UP
SODIUM SERPL-SCNC: 140 MMOL/L — SIGNIFICANT CHANGE UP (ref 135–145)
TRIGL SERPL-MCNC: 254 MG/DL — HIGH
WBC # BLD: 8.24 K/UL — SIGNIFICANT CHANGE UP (ref 3.8–10.5)
WBC # BLD: 8.32 K/UL — SIGNIFICANT CHANGE UP (ref 3.8–10.5)
WBC # BLD: 9.86 K/UL — SIGNIFICANT CHANGE UP (ref 3.8–10.5)
WBC # FLD AUTO: 8.24 K/UL — SIGNIFICANT CHANGE UP (ref 3.8–10.5)
WBC # FLD AUTO: 8.32 K/UL — SIGNIFICANT CHANGE UP (ref 3.8–10.5)
WBC # FLD AUTO: 9.86 K/UL — SIGNIFICANT CHANGE UP (ref 3.8–10.5)

## 2021-11-05 PROCEDURE — 93325 DOPPLER ECHO COLOR FLOW MAPG: CPT | Mod: 26,GC

## 2021-11-05 PROCEDURE — 71045 X-RAY EXAM CHEST 1 VIEW: CPT | Mod: 26

## 2021-11-05 PROCEDURE — 93312 ECHO TRANSESOPHAGEAL: CPT | Mod: 26

## 2021-11-05 PROCEDURE — 93320 DOPPLER ECHO COMPLETE: CPT | Mod: 26,GC

## 2021-11-05 RX ORDER — MIRTAZAPINE 45 MG/1
45 TABLET, ORALLY DISINTEGRATING ORAL DAILY
Refills: 0 | Status: DISCONTINUED | OUTPATIENT
Start: 2021-11-05 | End: 2021-11-08

## 2021-11-05 RX ORDER — SODIUM CHLORIDE 9 MG/ML
3 INJECTION INTRAMUSCULAR; INTRAVENOUS; SUBCUTANEOUS EVERY 8 HOURS
Refills: 0 | Status: DISCONTINUED | OUTPATIENT
Start: 2021-11-05 | End: 2021-11-08

## 2021-11-05 RX ORDER — HEPARIN SODIUM 5000 [USP'U]/ML
7 INJECTION INTRAVENOUS; SUBCUTANEOUS
Qty: 0 | Refills: 0 | DISCHARGE
Start: 2021-11-05

## 2021-11-05 RX ORDER — HEPARIN SODIUM 5000 [USP'U]/ML
700 INJECTION INTRAVENOUS; SUBCUTANEOUS
Qty: 25000 | Refills: 0 | Status: DISCONTINUED | OUTPATIENT
Start: 2021-11-05 | End: 2021-11-05

## 2021-11-05 RX ORDER — HEPARIN SODIUM 5000 [USP'U]/ML
INJECTION INTRAVENOUS; SUBCUTANEOUS
Qty: 25000 | Refills: 0 | Status: DISCONTINUED | OUTPATIENT
Start: 2021-11-05 | End: 2021-11-05

## 2021-11-05 RX ORDER — DILTIAZEM HCL 120 MG
30 CAPSULE, EXT RELEASE 24 HR ORAL EVERY 6 HOURS
Refills: 0 | Status: DISCONTINUED | OUTPATIENT
Start: 2021-11-05 | End: 2021-11-08

## 2021-11-05 RX ORDER — GABAPENTIN 400 MG/1
300 CAPSULE ORAL
Refills: 0 | Status: DISCONTINUED | OUTPATIENT
Start: 2021-11-05 | End: 2021-11-08

## 2021-11-05 RX ORDER — BISOPROLOL FUMARATE 10 MG/1
1 TABLET, FILM COATED ORAL
Qty: 0 | Refills: 0 | DISCHARGE

## 2021-11-05 RX ORDER — HEPARIN SODIUM 5000 [USP'U]/ML
3000 INJECTION INTRAVENOUS; SUBCUTANEOUS EVERY 6 HOURS
Refills: 0 | Status: DISCONTINUED | OUTPATIENT
Start: 2021-11-05 | End: 2021-11-05

## 2021-11-05 RX ORDER — FUROSEMIDE 40 MG
20 TABLET ORAL DAILY
Refills: 0 | Status: DISCONTINUED | OUTPATIENT
Start: 2021-11-05 | End: 2021-11-08

## 2021-11-05 RX ORDER — HEPARIN SODIUM 5000 [USP'U]/ML
700 INJECTION INTRAVENOUS; SUBCUTANEOUS
Qty: 25000 | Refills: 0 | Status: DISCONTINUED | OUTPATIENT
Start: 2021-11-05 | End: 2021-11-08

## 2021-11-05 RX ORDER — DILTIAZEM HCL 120 MG
10 CAPSULE, EXT RELEASE 24 HR ORAL
Qty: 0 | Refills: 0 | DISCHARGE
Start: 2021-11-05

## 2021-11-05 RX ORDER — DILTIAZEM HCL 120 MG
10 CAPSULE, EXT RELEASE 24 HR ORAL
Qty: 125 | Refills: 0 | Status: DISCONTINUED | OUTPATIENT
Start: 2021-11-05 | End: 2021-11-05

## 2021-11-05 RX ORDER — FUROSEMIDE 40 MG
20 TABLET ORAL
Qty: 0 | Refills: 0 | DISCHARGE
Start: 2021-11-05

## 2021-11-05 RX ORDER — FOLIC ACID 0.8 MG
1 TABLET ORAL DAILY
Refills: 0 | Status: DISCONTINUED | OUTPATIENT
Start: 2021-11-05 | End: 2021-11-08

## 2021-11-05 RX ORDER — HEPARIN SODIUM 5000 [USP'U]/ML
6500 INJECTION INTRAVENOUS; SUBCUTANEOUS EVERY 6 HOURS
Refills: 0 | Status: DISCONTINUED | OUTPATIENT
Start: 2021-11-05 | End: 2021-11-05

## 2021-11-05 RX ORDER — DILTIAZEM HCL 120 MG
1 CAPSULE, EXT RELEASE 24 HR ORAL
Qty: 0 | Refills: 0 | DISCHARGE
Start: 2021-11-05

## 2021-11-05 RX ORDER — DILTIAZEM HCL 120 MG
10 CAPSULE, EXT RELEASE 24 HR ORAL
Qty: 125 | Refills: 0 | Status: DISCONTINUED | OUTPATIENT
Start: 2021-11-05 | End: 2021-11-08

## 2021-11-05 RX ORDER — RAMELTEON 8 MG
1 TABLET ORAL
Qty: 0 | Refills: 0 | DISCHARGE

## 2021-11-05 RX ADMIN — SODIUM CHLORIDE 3 MILLILITER(S): 9 INJECTION INTRAMUSCULAR; INTRAVENOUS; SUBCUTANEOUS at 21:37

## 2021-11-05 RX ADMIN — Medication 10 MG/HR: at 15:04

## 2021-11-05 RX ADMIN — Medication 10 MG/HR: at 08:08

## 2021-11-05 RX ADMIN — HEPARIN SODIUM 700 UNIT(S)/HR: 5000 INJECTION INTRAVENOUS; SUBCUTANEOUS at 15:34

## 2021-11-05 RX ADMIN — HEPARIN SODIUM 700 UNIT(S)/HR: 5000 INJECTION INTRAVENOUS; SUBCUTANEOUS at 07:10

## 2021-11-05 RX ADMIN — Medication 30 MILLIGRAM(S): at 05:48

## 2021-11-05 RX ADMIN — Medication 20 MILLIGRAM(S): at 05:49

## 2021-11-05 RX ADMIN — Medication 30 MILLIGRAM(S): at 17:19

## 2021-11-05 RX ADMIN — GABAPENTIN 300 MILLIGRAM(S): 400 CAPSULE ORAL at 17:20

## 2021-11-05 RX ADMIN — LISINOPRIL 5 MILLIGRAM(S): 2.5 TABLET ORAL at 05:48

## 2021-11-05 RX ADMIN — Medication 10 MG/HR: at 03:00

## 2021-11-05 RX ADMIN — GABAPENTIN 300 MILLIGRAM(S): 400 CAPSULE ORAL at 05:48

## 2021-11-05 NOTE — H&P ADULT - PROBLEM SELECTOR PLAN 1
11/5 DOUG- Sev MR   Plan for MVR with Dr. Arango on Monday   Preop in progress  CXR in AM   Continue with Lasix 20IV daily  supplement electrolytes as needed  Strict i&O's

## 2021-11-05 NOTE — CHART NOTE - NSCHARTNOTEFT_GEN_A_CORE
Spoke with Dr. Cormier and recommended to restart heparin drip without bolus tomorrow 11/5 in the morning if right groin site is stable.
Patient s/p cardiac cath, Rt groin appears clean, dry, intact, no evidence of active bleeding, no hematoma, + pulses. Will restart Hep gtt without bolus as per Dr. Cormier. Continue to monitor.

## 2021-11-05 NOTE — PATIENT PROFILE ADULT - STATED REASON FOR ADMISSION
Telephone Encounter by Mallory Jenkins at 12/04/17 03:08 PM     Author:  Mallory Jenkins Service:  (none) Author Type:       Filed:  12/04/17 03:08 PM Encounter Date:  11/30/2017 Status:  Signed     :  Mallory Jenkins ()            Left message on answering machine to call back[HF1.1T]  For pt to call & schedule 2- 3 wk fol up with either Jenny or Jigna Lancaster[HF1.1M]      Revision History        User Key Date/Time User Provider Type Action    > HF1.1 12/04/17 03:08 PM Mallory Jenkins  Sign    M - Manual, T - Template             Pt states their heart hurt and she felt short of breathe.

## 2021-11-05 NOTE — DISCHARGE NOTE PROVIDER - NSDCCPCAREPLAN_GEN_ALL_CORE_FT
PRINCIPAL DISCHARGE DIAGNOSIS  Diagnosis: New onset atrial fibrillation  Assessment and Plan of Treatment: COntinue blood thinner to prevent stroke. Continue heparin gtt and cardizem gtt for rate control.      SECONDARY DISCHARGE DIAGNOSES  Diagnosis: Mitral regurgitation  Assessment and Plan of Treatment: Transfer to [facility] for CT surgey consult

## 2021-11-05 NOTE — H&P ADULT - HISTORY OF PRESENT ILLNESS
77 y/o F with pmhx of HTN, HLD, varicose veins, and vertigo who presented to the ED with complains of worsening SOB for the past 3 weeks, worse with exertion and laying flat and associated with intermittent 3/10 left sided chest pain and palpitations. Labs showed elevated pro-BNP and UA was positive for a possible infection. CTA of chest was negative for PE but showed pleural effusions. Patient was admitted for CHF exacerbation and new onset A-fib. Patient was put on metoprolol for her Afib and Lasix for CHF and was also put on telemetry and FD lovenox. An echocardiogram was performed on her which showed moderate M.    Rapid response was called for dizziness and pre-syncope  and nausea while in the bathroom, Cardiac monitor showing rapid afib with fluctuating HR, 500ml bolus given for low BP, repeat measurement was normal, EKG was obtained, showed A -fib. Her morning medications were given including cardizem drip, lasix and metoprolol  Patient states she is feeling better after interventions.    Cardiology  reviewed her Echo cardiogram which showed:  Moderate to severe MR, Moderately increased RV pressure, diastolic function assessment was inconclusive, EF 55%  She is being transferred to [facility] for ablation and MR assessment for possible repair

## 2021-11-05 NOTE — H&P ADULT - NSHPLABSRESULTS_GEN_ALL_CORE
< from: Transesophageal Echocardiogram w/o TTE (11.05.21 @ 18:09) >      Patient name: ALEX ESPINOZA  YOB: 1945   Age: 76 (F)   MR#: [id number]  Study Date: 11/5/2021  Location: [id number]: Wes Trinidad M.D.  Study quality: Technically good  Referring Physician: Danica Cormier MD  Blood Pressure: 115/70 mmHg  Height: 170 cm  Weight: 79 kg  BSA: 1.9 m2  ------------------------------------------------------------------------  PROCEDURE: Transesophageal (DOUG) was performed in the  echocardiography laboratory.  Informed consent wasfirst  obtained for DOUG.  The patient was not sedated.   The  procedure was monitored with automatic blood pressure  monitoring, ECG tracings and pulse oximetry.  Gag reflex  was abolished with topical Cetacaine and the  transesophageal probe was placed in the esophagus posterior  to the heart without complications.  The patient tolerated  the procedure welil.  INDICATION: Nonrheumatic mitral (valve) insufficiency  (I34.0)  ------------------------------------------------------------------------  OBSERVATIONS:  Mitral Valve: Mitral annular calcification and calcified,  tethered mitral leaflets with normal diastolic opening.  Severe  mitral regurgitation along the line of  malcoaptation. Regurgitant volume > 100cc. Systolic flow  reversal seenin the right upper pulmonary vein.  Aortic Root: Normal aortic root, aortic arch and descending  thoracic aorta.  Aortic Valve: Calcified trileaflet aortic valve with normal  opening. Mild aortic regurgitation.  Left Atrium: Left atrial enlargement. No left atrial or  left atrial appendage thrombus.  Left Ventricle: Normal left ventricular systolic function.  No segmental wall motion abnormalities.  Right Heart: Normal right atrium. Normal right ventricular  size and function. Normal tricuspid valve. Normal pulmonic  valve. Mild-moderate pulmonic regurgitation.  Pericardium/PleuraNormal pericardium with no pericardial  effusion.  Hemodynamic: Agitated saline injection demonstrates no  evidence of a patent foramen ovale.  ------------------------------------------------------------------------  CONCLUSIONS:  1. Mitral annular calcification and calcified, tethered  mitral leaflets with normal diastolic opening.  Severe  mitral regurgitation along the line of malcoaptation.  Regurgitant volume > 100cc. Systolic flow reversal seen in  the right upper pulmonary vein.  2. Calcified trileaflet aortic valve with normal opening.  Mild aortic regurgitation.  3. Left atrial enlargement. No left atrial or left atrial  appendage thrombus.  4. Normal left ventricular systolic function. No segmental  wall motion abnormalities.  5. Normal right ventricular size and function.  6. Agitated saline injection demonstrates no evidence of a  patent foramen ovale.    < end of copied text >

## 2021-11-05 NOTE — H&P ADULT - NSHPREVIEWOFSYSTEMS_GEN_ALL_CORE
REVIEW OF SYSTEMS:  CONSTITUTIONAL: Denies fever, weight loss or fatigue  EYES: Denies eye pain, visual disturbances, or discharge  ENMT:  Denies difficulty hearing, tinnitus, vertigo, sinus or throat pain  NECK: Denies pain or stiffness  RESPIRATORY: Denies cough, wheezing, chills, hemoptysis +Shortness of breath  CARDIOVASCULAR: Denies chest pain, palpitations, dizziness, +Leg swelling  GASTROINTESTINAL: Denies abdominal or epigastric pain, nausea, vomiting, hematemesis, diarrhea or melena  GENITOURINARY: Denies dysuria, frequency, hematuria, or incontinence  NEUROLOGICAL: Denies headaches, memory loss, loss of strength, numbness or tremors  SKIN: Denies itching, burning, rashes, or lesions   LYMPH NODES: Denies enlarged glands  ENDOCRINE: Denies heat or cold intolerance or hair loss  MUSCULOSKELETAL: Denies joint pain or swelling, muscle, back or extremity pain  PSYCHIATRIC: Denies depression, anxiety, mood swings or difficulty sleeping  HEME/LYMPH: Denies easy bruising or bleeding gums  ALLERGY: Denies hives or eczema

## 2021-11-05 NOTE — DISCHARGE NOTE PROVIDER - NSDCFUADDAPPT_GEN_ALL_CORE_FT
Follow up with your primary care physician for further monitoring in 1-2 weeks. Please call to arrange appointment.

## 2021-11-05 NOTE — DISCHARGE NOTE PROVIDER - HOSPITAL COURSE
76y  Female pmh of HTN, HLD, varicose veins, vertigo, who presented to the ED with complains of SOB found with pulmonary edema and new afib    -Pt. with mild CAD on cath - medical management recommended  -DOUG performed - discussed with echo attending - pt. with severe MR  -Given severe symptomatic MR, recommend CTS evaluation and transfer to [facility]  -CTS consultation with Dr. Arango called  -Continue with ac for AF

## 2021-11-05 NOTE — DISCHARGE NOTE NURSING/CASE MANAGEMENT/SOCIAL WORK - PATIENT PORTAL LINK FT
You can access the FollowMyHealth Patient Portal offered by [facility] by registering at the following website: [email or web address]. By joining [facility]’s FollowMyHealth portal, you will also be able to view your health information using other applications (apps) compatible with our system.

## 2021-11-05 NOTE — DISCHARGE NOTE PROVIDER - NSDCMRMEDTOKEN_GEN_ALL_CORE_FT
dilTIAZem: 10 mg/hr intravenous   dilTIAZem 30 mg oral tablet: 1 tab(s) orally every 6 hours  folic acid 1 mg oral tablet: 1 tab(s) orally once a day  furosemide 100 mg/100 mL-0.9% intravenous solution: 20 milliliter(s) intravenous once a day  gabapentin 300 mg oral capsule: 1 cap(s) orally 2 times a day  heparin 100 units/mL-D5% intravenous solution: 7 milliliter(s) intravenous   lisinopril 5 mg oral tablet: 1 tab(s) orally once a day  mirtazapine 45 mg oral tablet: 1 tab(s) orally once a day   acetaminophen: 2 tab(s) orally every 8 hours, As Needed  for mild pain/headache  amiodarone 200 mg oral tablet: 1 tab(s) orally once a day  apixaban 2.5 mg oral tablet: 1 tab(s) orally 2 times a day  aspirin 81 mg oral delayed release tablet: 1 tab(s) orally once a day  folic acid 1 mg oral tablet: 1 tab(s) orally once a day  furosemide 20 mg oral tablet: 1 tab(s) orally once a day  gabapentin 300 mg oral capsule: 1 cap(s) orally 2 times a day  metoprolol succinate 100 mg oral tablet, extended release: 1 tab(s) orally once a day  mirtazapine 45 mg oral tablet: 1 tab(s) orally once a day (at bedtime)  pantoprazole 40 mg oral delayed release tablet: 1 tab(s) orally once a day (before a meal)  spironolactone 25 mg oral tablet: 1 tab(s) orally once a day

## 2021-11-05 NOTE — H&P ADULT - NSHPPHYSICALEXAM_GEN_ALL_CORE
General: WN/WD NAD  Neurology: A&Ox3, nonfocal, RODRIGUEZ x 4  Head:  Normocephalic, atraumatic  ENT:  Mucosa moist, no ulcerations  Neck:  Supple, no sinuses or palpable masses  Lymphatic:  No palpable cervical, supraclavicular, axillary or inguinal adenopathy  Respiratory: CTA B/L  CV: IIR  Abdominal: Soft, NT, ND no palpable mass  MSK: TRACE LE edema, + peripheral pulses, FROM all 4 extremity

## 2021-11-05 NOTE — PROGRESS NOTE ADULT - SUBJECTIVE AND OBJECTIVE BOX
C A R D I O L O G Y  **********************************     DATE OF SERVICE: 11-05-21    Patient denies chest pain or shortness of breath.   Review of symptoms otherwise negative.        diltiazem    Tablet 30 milliGRAM(s) Oral every 6 hours  diltiazem Infusion 10 mG/Hr IV Continuous <Continuous>  folic acid 1 milliGRAM(s) Oral daily  furosemide   Injectable 20 milliGRAM(s) IV Push daily  gabapentin 300 milliGRAM(s) Oral two times a day  heparin   Injectable 6500 Unit(s) IV Push every 6 hours PRN  heparin   Injectable 3000 Unit(s) IV Push every 6 hours PRN  heparin  Infusion. 700 Unit(s)/Hr IV Continuous <Continuous>  influenza  Vaccine (HIGH DOSE) 0.7 milliLiter(s) IntraMuscular once  lisinopril 5 milliGRAM(s) Oral daily  mirtazapine 45 milliGRAM(s) Oral daily                            15.0   8.24  )-----------( 131      ( 05 Nov 2021 14:34 )             45.8       11-05    140  |  101  |  24<H>  ----------------------------<  133<H>  3.9   |  25  |  0.92    Ca    9.6      05 Nov 2021 06:29  Phos  3.8     11-04  Mg     2.1     11-04    TPro  7.1  /  Alb  4.3  /  TBili  0.8  /  DBili  x   /  AST  53<H>  /  ALT  77<H>  /  AlkPhos  100  11-05            T(C): 36.4 (11-05-21 @ 14:01), Max: 37 (11-04-21 @ 21:07)  HR: 85 (11-05-21 @ 14:01) (83 - 104)  BP: 147/83 (11-05-21 @ 14:01) (136/72 - 147/83)  RR: 18 (11-05-21 @ 14:01) (18 - 18)  SpO2: 96% (11-05-21 @ 14:01) (95% - 97%)  Wt(kg): --    I&O's Summary      HEENT:  (-)icterus (-)pallor  CV: N S1 S2 1/6 ZITA (+)2 Pulses B/l  Resp:  Clear to ausculatation B/L, normal effort  GI: (+) BS Soft, NT, ND  Lymph:  (-)Edema, (-)obvious lymphadenopathy  Skin: Warm to touch, Normal turgor  Psych: Appropriate mood and affect      TELEMETRY: 	  Afib 80's        ASSESSMENT/PLAN: 	76y  Female pmh of HTN, HLD, varicose veins, vertigo, who presented to the ED with complains of SOB found with pulmonary edema and new afib    -Pt. with mild CAD on cath - medical management recommended  -DOUG performed - discussed with echo attending - pt. with severe MR  -Given severe symptomatic MR, recommend CTS evaluation and transfer to [facility]  -CTS consultation with Dr. Arango called  -Continue with ac for AF    Danica Cormier MD

## 2021-11-05 NOTE — H&P ADULT - ASSESSMENT
77 y/o F with pmhx of HTN, HLD, varicose veins, and vertigo who presented to the ED with complains of worsening SOB for the past 3 weeks. Patient was admitted for CHF and [place] with RVR. Patient s/p LHC showing mild CAD, DOUG done showing severe MR. Patient transferred from [facility] to [facility] for MVR with Dr. Arango.       11/5 Afib 100, /74, 96% on RA. Patient is comfortable, no CP, no palpitations or SOB, states lower extremity edema resolving. R groin cath site c/d/i. Patient transferred over from [facility] on Hep gtt for Afib and Cardizem gtt for rate control.

## 2021-11-05 NOTE — DISCHARGE NOTE PROVIDER - CARE PROVIDER_API CALL
Danica Cormier (MD)  Cardiovascular Disease; Internal Medicine; Interventional Cardiology  [address] [place] [id number]  Phone: [phone]  Fax: [phone]  Follow Up Time:

## 2021-11-06 LAB
ALBUMIN SERPL ELPH-MCNC: 3.9 G/DL — SIGNIFICANT CHANGE UP (ref 3.3–5)
ALP SERPL-CCNC: 93 U/L — SIGNIFICANT CHANGE UP (ref 40–120)
ALT FLD-CCNC: 59 U/L — HIGH (ref 10–45)
ANION GAP SERPL CALC-SCNC: 16 MMOL/L — SIGNIFICANT CHANGE UP (ref 5–17)
APTT BLD: 77.2 SEC — HIGH (ref 27.5–35.5)
APTT BLD: 90.3 SEC — HIGH (ref 27.5–35.5)
AST SERPL-CCNC: 37 U/L — SIGNIFICANT CHANGE UP (ref 10–40)
BILIRUB SERPL-MCNC: 0.6 MG/DL — SIGNIFICANT CHANGE UP (ref 0.2–1.2)
BLD GP AB SCN SERPL QL: NEGATIVE — SIGNIFICANT CHANGE UP
BUN SERPL-MCNC: 32 MG/DL — HIGH (ref 7–23)
CALCIUM SERPL-MCNC: 9 MG/DL — SIGNIFICANT CHANGE UP (ref 8.4–10.5)
CHLORIDE SERPL-SCNC: 103 MMOL/L — SIGNIFICANT CHANGE UP (ref 96–108)
CO2 SERPL-SCNC: 22 MMOL/L — SIGNIFICANT CHANGE UP (ref 22–31)
CREAT SERPL-MCNC: 0.96 MG/DL — SIGNIFICANT CHANGE UP (ref 0.5–1.3)
GLUCOSE SERPL-MCNC: 111 MG/DL — HIGH (ref 70–99)
HCT VFR BLD CALC: 43.3 % — SIGNIFICANT CHANGE UP (ref 34.5–45)
HGB BLD-MCNC: 14.2 G/DL — SIGNIFICANT CHANGE UP (ref 11.5–15.5)
MCHC RBC-ENTMCNC: 30.3 PG — SIGNIFICANT CHANGE UP (ref 27–34)
MCHC RBC-ENTMCNC: 32.8 GM/DL — SIGNIFICANT CHANGE UP (ref 32–36)
MCV RBC AUTO: 92.5 FL — SIGNIFICANT CHANGE UP (ref 80–100)
MRSA PCR RESULT.: SIGNIFICANT CHANGE UP
NRBC # BLD: 0 /100 WBCS — SIGNIFICANT CHANGE UP (ref 0–0)
NT-PROBNP SERPL-SCNC: 262 PG/ML — SIGNIFICANT CHANGE UP (ref 0–300)
NT-PROBNP SERPL-SCNC: 289 PG/ML — SIGNIFICANT CHANGE UP (ref 0–300)
PA ADP PRP-ACNC: 177 PRU — LOW (ref 194–417)
PLATELET # BLD AUTO: 118 K/UL — LOW (ref 150–400)
POTASSIUM SERPL-MCNC: 3.9 MMOL/L — SIGNIFICANT CHANGE UP (ref 3.5–5.3)
POTASSIUM SERPL-SCNC: 3.9 MMOL/L — SIGNIFICANT CHANGE UP (ref 3.5–5.3)
PROT SERPL-MCNC: 6.4 G/DL — SIGNIFICANT CHANGE UP (ref 6–8.3)
RBC # BLD: 4.68 M/UL — SIGNIFICANT CHANGE UP (ref 3.8–5.2)
RBC # FLD: 14.5 % — SIGNIFICANT CHANGE UP (ref 10.3–14.5)
RH IG SCN BLD-IMP: POSITIVE — SIGNIFICANT CHANGE UP
S AUREUS DNA NOSE QL NAA+PROBE: DETECTED
SODIUM SERPL-SCNC: 141 MMOL/L — SIGNIFICANT CHANGE UP (ref 135–145)
WBC # BLD: 7.89 K/UL — SIGNIFICANT CHANGE UP (ref 3.8–10.5)
WBC # FLD AUTO: 7.89 K/UL — SIGNIFICANT CHANGE UP (ref 3.8–10.5)

## 2021-11-06 PROCEDURE — 93306 TTE W/DOPPLER COMPLETE: CPT | Mod: 26

## 2021-11-06 PROCEDURE — 99231 SBSQ HOSP IP/OBS SF/LOW 25: CPT

## 2021-11-06 PROCEDURE — 93010 ELECTROCARDIOGRAM REPORT: CPT

## 2021-11-06 PROCEDURE — 93880 EXTRACRANIAL BILAT STUDY: CPT | Mod: 26

## 2021-11-06 RX ORDER — MUPIROCIN 20 MG/G
1 OINTMENT TOPICAL EVERY 12 HOURS
Refills: 0 | Status: DISCONTINUED | OUTPATIENT
Start: 2021-11-06 | End: 2021-11-08

## 2021-11-06 RX ADMIN — GABAPENTIN 300 MILLIGRAM(S): 400 CAPSULE ORAL at 16:57

## 2021-11-06 RX ADMIN — SODIUM CHLORIDE 3 MILLILITER(S): 9 INJECTION INTRAMUSCULAR; INTRAVENOUS; SUBCUTANEOUS at 05:55

## 2021-11-06 RX ADMIN — MUPIROCIN 1 APPLICATION(S): 20 OINTMENT TOPICAL at 23:44

## 2021-11-06 RX ADMIN — MIRTAZAPINE 45 MILLIGRAM(S): 45 TABLET, ORALLY DISINTEGRATING ORAL at 11:57

## 2021-11-06 RX ADMIN — Medication 30 MILLIGRAM(S): at 00:41

## 2021-11-06 RX ADMIN — Medication 30 MILLIGRAM(S): at 16:56

## 2021-11-06 RX ADMIN — GABAPENTIN 300 MILLIGRAM(S): 400 CAPSULE ORAL at 06:41

## 2021-11-06 RX ADMIN — HEPARIN SODIUM 8 UNIT(S)/HR: 5000 INJECTION INTRAVENOUS; SUBCUTANEOUS at 08:42

## 2021-11-06 RX ADMIN — Medication 30 MILLIGRAM(S): at 11:56

## 2021-11-06 RX ADMIN — Medication 20 MILLIGRAM(S): at 06:41

## 2021-11-06 RX ADMIN — Medication 30 MILLIGRAM(S): at 23:47

## 2021-11-06 RX ADMIN — Medication 30 MILLIGRAM(S): at 06:41

## 2021-11-06 RX ADMIN — SODIUM CHLORIDE 3 MILLILITER(S): 9 INJECTION INTRAMUSCULAR; INTRAVENOUS; SUBCUTANEOUS at 13:11

## 2021-11-06 RX ADMIN — SODIUM CHLORIDE 3 MILLILITER(S): 9 INJECTION INTRAMUSCULAR; INTRAVENOUS; SUBCUTANEOUS at 22:47

## 2021-11-06 RX ADMIN — HEPARIN SODIUM 8 UNIT(S)/HR: 5000 INJECTION INTRAVENOUS; SUBCUTANEOUS at 00:03

## 2021-11-06 RX ADMIN — Medication 1 MILLIGRAM(S): at 11:56

## 2021-11-06 NOTE — PROGRESS NOTE ADULT - ASSESSMENT
77 y/o F with pmhx of HTN, HLD, varicose veins, and vertigo who presented to the ED with complains of worsening SOB for the past 3 weeks. Patient was admitted for CHF and [place] with RVR. Patient s/p LHC showing mild CAD, DOUG done showing severe MR. Patient transferred from [facility] to [facility] for MVR with Dr. Arango.       11/5 Afib 100, /74, 96% on RA. Patient is comfortable, no CP, no palpitations or SOB, states lower extremity edema resolving. R groin cath site c/d/i. Patient transferred over from [facility] on Hep gtt for Afib and Cardizem gtt for rate control.   11/6: VS stable Denies chest discomfort, Ongoing preop work up for MV with OR on Mon.

## 2021-11-06 NOTE — PROGRESS NOTE ADULT - SUBJECTIVE AND OBJECTIVE BOX
DATE OF SERVICE: 11-06-21         diltiazem    Tablet 30 milliGRAM(s) Oral every 6 hours  diltiazem Infusion 10 mG/Hr IV Continuous <Continuous>  folic acid 1 milliGRAM(s) Oral daily  furosemide   Injectable 20 milliGRAM(s) IV Push daily  gabapentin 300 milliGRAM(s) Oral two times a day  heparin  Infusion 700 Unit(s)/Hr IV Continuous <Continuous>  mirtazapine 45 milliGRAM(s) Oral daily  sodium chloride 0.9% lock flush 3 milliLiter(s) IV Push every 8 hours                            14.2   7.89  )-----------( 118      ( 06 Nov 2021 07:03 )             43.3       Hemoglobin: 14.2 g/dL (11-06 @ 07:03)  Hemoglobin: 14.2 g/dL (11-05 @ 22:35)  Hemoglobin: 15.0 g/dL (11-05 @ 14:34)  Hemoglobin: 15.3 g/dL (11-05 @ 06:29)  Hemoglobin: 15.5 g/dL (11-04 @ 07:28)      11-06    141  |  103  |  32<H>  ----------------------------<  111<H>  3.9   |  22  |  0.96    Ca    9.0      06 Nov 2021 07:01    TPro  6.4  /  Alb  3.9  /  TBili  0.6  /  DBili  x   /  AST  37  /  ALT  59<H>  /  AlkPhos  93  11-06    Creatinine Trend: 0.96<--, 0.92<--, 1.09<--, 1.29<--, 0.99<--, 1.09<--    COAGS: PTT - ( 06 Nov 2021 07:03 )  PTT:77.2 sec          T(C): 36.8 (11-06-21 @ 04:37), Max: 37 (11-05-21 @ 20:47)  HR: 100 (11-06-21 @ 04:37) (85 - 100)  BP: 105/65 (11-06-21 @ 04:37) (105/65 - 147/83)  RR: 18 (11-06-21 @ 04:37) (16 - 18)  SpO2: 91% (11-06-21 @ 04:37) (91% - 96%)  Wt(kg): --    I&O's Summary    05 Nov 2021 07:01  -  06 Nov 2021 07:00  --------------------------------------------------------  IN: 160 mL / OUT: 0 mL / NET: 160 mL    06 Nov 2021 08:01  -  06 Nov 2021 09:43  --------------------------------------------------------  IN: 24 mL / OUT: 0 mL / NET: 24 mL        HEENT:  (-)icterus (-)pallor  CV: N S1 S2 1/6 ZITA (+)2 Pulses B/l  Resp:  Clear to ausculatation B/L, normal effort  GI: (+) BS Soft, NT, ND  Lymph:  (-)Edema, (-)obvious lymphadenopathy  Skin: Warm to touch, Normal turgor  Psych: Appropriate mood and affect      TELEMETRY: 	  Afib 80's        ASSESSMENT/PLAN: 	76y  Female pmh of HTN, HLD, varicose veins, vertigo, who presented to the ED with complains of SOB found with pulmonary edema and new afib    -Pt. with mild CAD on cath - medical management recommended  -DOUG performed - discussed with echo attending - pt. with severe MR  - OR monday for MVR  - cont daily diuresis   -  GI / DVT prophylaxis.  - keep K>4, mag >2.0   -Continue with ac for AF

## 2021-11-06 NOTE — PROGRESS NOTE ADULT - SUBJECTIVE AND OBJECTIVE BOX
VITAL SIGNS    Telemetry:    Vital Signs Last 24 Hrs  T(C): 36.7 (21 @ 11:55), Max: 37 (21 @ 20:47)  T(F): 98 (21 @ 11:55), Max: 98.6 (21 @ 20:47)  HR: 89 (21 @ 11:55) (85 - 100)  BP: 108/72 (21 @ 11:55) (105/65 - 147/83)  RR: 18 (21 @ 11:55) (16 - 18)  SpO2: 94% (21 @ 11:55) (91% - 96%)             @ 07:01  -   @ 07:00  --------------------------------------------------------  IN: 160 mL / OUT: 0 mL / NET: 160 mL     @ 08:01  -   @ 13:22  --------------------------------------------------------  IN: 48 mL / OUT: 0 mL / NET: 48 mL       Daily Height in cm: 172.72 (2021 20:47)    Daily Weight in k.1 (2021 08:19)  Admit Wt: Drug Dosing Weight  Height (cm): 172.7 (2021 20:47)  Weight (kg): 79 (2021 20:47)  BMI (kg/m2): 26.5 (2021 20:47)  BSA (m2): 1.93 (2021 20:47)     Daily Weight in k.1 (21 @ 08:19)    LABS      141  |  103  |  32<H>  ----------------------------<  111<H>  3.9   |  22  |  0.96    Ca    9.0      2021 07:01    TPro  6.4  /  Alb  3.9  /  TBili  0.6  /  DBili  x   /  AST  37  /  ALT  59<H>  /  AlkPhos  93                                   14.2   7.89  )-----------( 118      ( 2021 07:03 )             43.3          PT/INR - ( 2021 06:43 )   PT: 13.3 sec;   INR: 1.16 ratio         PTT - ( 2021 07:03 )  PTT:77.2 sec        Bilirubin Total, Serum: 0.6 mg/dL ( @ 07:01)    CAPILLARY BLOOD GLUCOSE              Drains:     MS       [  ]   [  ]            L Pleural [  ]            R Pleural  [  ]            VIBHA  [  ]           Reid  [  ]    Pacing Wires      [  ]   Settings:                                  Isolated  [  ]                    CXR:      MEDICATIONS  diltiazem    Tablet 30 milliGRAM(s) Oral every 6 hours  diltiazem Infusion 10 mG/Hr IV Continuous <Continuous>  folic acid 1 milliGRAM(s) Oral daily  furosemide   Injectable 20 milliGRAM(s) IV Push daily  gabapentin 300 milliGRAM(s) Oral two times a day  heparin  Infusion 700 Unit(s)/Hr IV Continuous <Continuous>  mirtazapine 45 milliGRAM(s) Oral daily  sodium chloride 0.9% lock flush 3 milliLiter(s) IV Push every 8 hours      PHYSICAL EXAM      Neurology: alert and oriented x 3, nonfocal, no gross deficits  CV :S1S2  Sternal Wound :  CDI , Stable  Lungs: cta  Abdomen: soft, nontender, nondistended, positive bowel sounds, last bowel movement   :  voids     Extremities:   warm to touch               PAST MEDICAL & SURGICAL HISTORY:  Hypertension    Vertigo    H/O varicose veins    No significant past surgical history                   Discussed with Cardiothoracic Team at AM rounds.

## 2021-11-07 LAB
ALBUMIN SERPL ELPH-MCNC: 4.1 G/DL — SIGNIFICANT CHANGE UP (ref 3.3–5)
ALP SERPL-CCNC: 111 U/L — SIGNIFICANT CHANGE UP (ref 40–120)
ALT FLD-CCNC: 89 U/L — HIGH (ref 10–45)
ANION GAP SERPL CALC-SCNC: 14 MMOL/L — SIGNIFICANT CHANGE UP (ref 5–17)
APPEARANCE UR: CLEAR — SIGNIFICANT CHANGE UP
APTT BLD: 128.5 SEC — CRITICAL HIGH (ref 27.5–35.5)
APTT BLD: 74.2 SEC — HIGH (ref 27.5–35.5)
APTT BLD: 79 SEC — HIGH (ref 27.5–35.5)
AST SERPL-CCNC: 71 U/L — HIGH (ref 10–40)
BACTERIA # UR AUTO: NEGATIVE — SIGNIFICANT CHANGE UP
BASOPHILS # BLD AUTO: 0.08 K/UL — SIGNIFICANT CHANGE UP (ref 0–0.2)
BASOPHILS NFR BLD AUTO: 1.2 % — SIGNIFICANT CHANGE UP (ref 0–2)
BILIRUB SERPL-MCNC: 0.5 MG/DL — SIGNIFICANT CHANGE UP (ref 0.2–1.2)
BILIRUB UR-MCNC: NEGATIVE — SIGNIFICANT CHANGE UP
BLD GP AB SCN SERPL QL: NEGATIVE — SIGNIFICANT CHANGE UP
BUN SERPL-MCNC: 22 MG/DL — SIGNIFICANT CHANGE UP (ref 7–23)
CALCIUM SERPL-MCNC: 9.2 MG/DL — SIGNIFICANT CHANGE UP (ref 8.4–10.5)
CHLORIDE SERPL-SCNC: 103 MMOL/L — SIGNIFICANT CHANGE UP (ref 96–108)
CO2 SERPL-SCNC: 22 MMOL/L — SIGNIFICANT CHANGE UP (ref 22–31)
COLOR SPEC: COLORLESS — SIGNIFICANT CHANGE UP
CREAT SERPL-MCNC: 0.95 MG/DL — SIGNIFICANT CHANGE UP (ref 0.5–1.3)
DIFF PNL FLD: NEGATIVE — SIGNIFICANT CHANGE UP
EOSINOPHIL # BLD AUTO: 0.35 K/UL — SIGNIFICANT CHANGE UP (ref 0–0.5)
EOSINOPHIL NFR BLD AUTO: 5.1 % — SIGNIFICANT CHANGE UP (ref 0–6)
EPI CELLS # UR: 0 /HPF — SIGNIFICANT CHANGE UP
GLUCOSE SERPL-MCNC: 101 MG/DL — HIGH (ref 70–99)
GLUCOSE UR QL: NEGATIVE — SIGNIFICANT CHANGE UP
HCT VFR BLD CALC: 42.5 % — SIGNIFICANT CHANGE UP (ref 34.5–45)
HGB BLD-MCNC: 14.1 G/DL — SIGNIFICANT CHANGE UP (ref 11.5–15.5)
HYALINE CASTS # UR AUTO: 0 /LPF — SIGNIFICANT CHANGE UP (ref 0–2)
IMM GRANULOCYTES NFR BLD AUTO: 0.3 % — SIGNIFICANT CHANGE UP (ref 0–1.5)
KETONES UR-MCNC: NEGATIVE — SIGNIFICANT CHANGE UP
LEUKOCYTE ESTERASE UR-ACNC: ABNORMAL
LYMPHOCYTES # BLD AUTO: 3.5 K/UL — HIGH (ref 1–3.3)
LYMPHOCYTES # BLD AUTO: 50.7 % — HIGH (ref 13–44)
MCHC RBC-ENTMCNC: 30 PG — SIGNIFICANT CHANGE UP (ref 27–34)
MCHC RBC-ENTMCNC: 33.2 GM/DL — SIGNIFICANT CHANGE UP (ref 32–36)
MCV RBC AUTO: 90.4 FL — SIGNIFICANT CHANGE UP (ref 80–100)
MONOCYTES # BLD AUTO: 0.42 K/UL — SIGNIFICANT CHANGE UP (ref 0–0.9)
MONOCYTES NFR BLD AUTO: 6.1 % — SIGNIFICANT CHANGE UP (ref 2–14)
NEUTROPHILS # BLD AUTO: 2.53 K/UL — SIGNIFICANT CHANGE UP (ref 1.8–7.4)
NEUTROPHILS NFR BLD AUTO: 36.6 % — LOW (ref 43–77)
NITRITE UR-MCNC: NEGATIVE — SIGNIFICANT CHANGE UP
NRBC # BLD: 0 /100 WBCS — SIGNIFICANT CHANGE UP (ref 0–0)
PH UR: 7 — SIGNIFICANT CHANGE UP (ref 5–8)
PLATELET # BLD AUTO: 114 K/UL — LOW (ref 150–400)
POTASSIUM SERPL-MCNC: 4.2 MMOL/L — SIGNIFICANT CHANGE UP (ref 3.5–5.3)
POTASSIUM SERPL-SCNC: 4.2 MMOL/L — SIGNIFICANT CHANGE UP (ref 3.5–5.3)
PREALB SERPL-MCNC: 23 MG/DL — SIGNIFICANT CHANGE UP (ref 20–40)
PROT SERPL-MCNC: 6.4 G/DL — SIGNIFICANT CHANGE UP (ref 6–8.3)
PROT UR-MCNC: NEGATIVE — SIGNIFICANT CHANGE UP
RBC # BLD: 4.7 M/UL — SIGNIFICANT CHANGE UP (ref 3.8–5.2)
RBC # FLD: 14.1 % — SIGNIFICANT CHANGE UP (ref 10.3–14.5)
RBC CASTS # UR COMP ASSIST: 1 /HPF — SIGNIFICANT CHANGE UP (ref 0–4)
RH IG SCN BLD-IMP: POSITIVE — SIGNIFICANT CHANGE UP
SARS-COV-2 RNA SPEC QL NAA+PROBE: SIGNIFICANT CHANGE UP
SODIUM SERPL-SCNC: 139 MMOL/L — SIGNIFICANT CHANGE UP (ref 135–145)
SP GR SPEC: 1.01 — LOW (ref 1.01–1.02)
T3 SERPL-MCNC: 122 NG/DL — SIGNIFICANT CHANGE UP (ref 80–200)
T4 AB SER-ACNC: 10.6 UG/DL — SIGNIFICANT CHANGE UP (ref 4.6–12)
TSH SERPL-MCNC: 1.61 UIU/ML — SIGNIFICANT CHANGE UP (ref 0.27–4.2)
UROBILINOGEN FLD QL: NEGATIVE — SIGNIFICANT CHANGE UP
WBC # BLD: 6.9 K/UL — SIGNIFICANT CHANGE UP (ref 3.8–10.5)
WBC # FLD AUTO: 6.9 K/UL — SIGNIFICANT CHANGE UP (ref 3.8–10.5)
WBC UR QL: 3 /HPF — SIGNIFICANT CHANGE UP (ref 0–5)

## 2021-11-07 RX ORDER — CEFUROXIME AXETIL 250 MG
1500 TABLET ORAL ONCE
Refills: 0 | Status: COMPLETED | OUTPATIENT
Start: 2021-11-07 | End: 2021-11-07

## 2021-11-07 RX ORDER — CHLORHEXIDINE GLUCONATE 213 G/1000ML
30 SOLUTION TOPICAL ONCE
Refills: 0 | Status: COMPLETED | OUTPATIENT
Start: 2021-11-07 | End: 2021-11-08

## 2021-11-07 RX ORDER — CHLORHEXIDINE GLUCONATE 213 G/1000ML
1 SOLUTION TOPICAL ONCE
Refills: 0 | Status: COMPLETED | OUTPATIENT
Start: 2021-11-07 | End: 2021-11-07

## 2021-11-07 RX ADMIN — SODIUM CHLORIDE 3 MILLILITER(S): 9 INJECTION INTRAMUSCULAR; INTRAVENOUS; SUBCUTANEOUS at 21:55

## 2021-11-07 RX ADMIN — Medication 30 MILLIGRAM(S): at 17:04

## 2021-11-07 RX ADMIN — GABAPENTIN 300 MILLIGRAM(S): 400 CAPSULE ORAL at 06:00

## 2021-11-07 RX ADMIN — MUPIROCIN 1 APPLICATION(S): 20 OINTMENT TOPICAL at 06:00

## 2021-11-07 RX ADMIN — Medication 20 MILLIGRAM(S): at 06:01

## 2021-11-07 RX ADMIN — Medication 1 MILLIGRAM(S): at 11:02

## 2021-11-07 RX ADMIN — MUPIROCIN 1 APPLICATION(S): 20 OINTMENT TOPICAL at 17:04

## 2021-11-07 RX ADMIN — Medication 30 MILLIGRAM(S): at 11:03

## 2021-11-07 RX ADMIN — Medication 30 MILLIGRAM(S): at 23:31

## 2021-11-07 RX ADMIN — MIRTAZAPINE 45 MILLIGRAM(S): 45 TABLET, ORALLY DISINTEGRATING ORAL at 11:03

## 2021-11-07 RX ADMIN — SODIUM CHLORIDE 3 MILLILITER(S): 9 INJECTION INTRAMUSCULAR; INTRAVENOUS; SUBCUTANEOUS at 13:03

## 2021-11-07 RX ADMIN — GABAPENTIN 300 MILLIGRAM(S): 400 CAPSULE ORAL at 17:04

## 2021-11-07 RX ADMIN — Medication 30 MILLIGRAM(S): at 06:00

## 2021-11-07 RX ADMIN — SODIUM CHLORIDE 3 MILLILITER(S): 9 INJECTION INTRAMUSCULAR; INTRAVENOUS; SUBCUTANEOUS at 06:46

## 2021-11-07 RX ADMIN — HEPARIN SODIUM 7 UNIT(S)/HR: 5000 INJECTION INTRAVENOUS; SUBCUTANEOUS at 11:03

## 2021-11-07 RX ADMIN — HEPARIN SODIUM 7 UNIT(S)/HR: 5000 INJECTION INTRAVENOUS; SUBCUTANEOUS at 22:09

## 2021-11-07 RX ADMIN — CHLORHEXIDINE GLUCONATE 1 APPLICATION(S): 213 SOLUTION TOPICAL at 21:55

## 2021-11-07 NOTE — PROGRESS NOTE ADULT - ASSESSMENT
75 y/o F with pmhx of HTN, HLD, varicose veins, and vertigo who presented to the ED with complains of worsening SOB for the past 3 weeks. Patient was admitted for CHF and [place] with RVR. Patient s/p LHC showing mild CAD, DOUG done showing severe MR. Patient transferred from [facility] to [facility] for MVR with Dr. Arango.       11/5 Afib 100, /74, 96% on RA. Patient is comfortable, no CP, no palpitations or SOB, states lower extremity edema resolving. R groin cath site c/d/i. Patient transferred over from [facility] on Hep gtt for Afib and Cardizem gtt for rate control.   11/6: VS stable Denies chest discomfort, Ongoing preop work up for MV with OR on Mon.   11/7 Preop for MV /RF ablation

## 2021-11-07 NOTE — PROGRESS NOTE ADULT - SUBJECTIVE AND OBJECTIVE BOX
Cardiac Surgery Pre-op Note:  CC: Patient is a 76y old  Female who presents with a chief complaint of Sev MR (2021 13:21)      Referring Physician:                                                                                             Surgeon:  Procedure: (Date) (Procedure)    Allergies    No Known Allergies    Intolerances      HPI:  77 y/o F with pmhx of HTN, HLD, varicose veins, and vertigo who presented to the ED with complains of worsening SOB for the past 3 weeks, worse with exertion and laying flat and associated with intermittent 3/10 left sided chest pain and palpitations. Labs showed elevated pro-BNP and UA was positive for a possible infection. CTA of chest was negative for PE but showed pleural effusions. Patient was admitted for CHF exacerbation and new onset A-fib. Patient was put on metoprolol for her Afib and Lasix for CHF and was also put on telemetry and FD lovenox. An echocardiogram was performed on her which showed moderate M.    Rapid response was called for dizziness and pre-syncope  and nausea while in the bathroom, Cardiac monitor showing rapid afib with fluctuating HR, 500ml bolus given for low BP, repeat measurement was normal, EKG was obtained, showed A -fib. Her morning medications were given including cardizem drip, lasix and metoprolol  Patient states she is feeling better after interventions.    Cardiology  reviewed her Echo cardiogram which showed:  Moderate to severe MR, Moderately increased RV pressure, diastolic function assessment was inconclusive, EF 55%  She is being transferred to [facility] for ablation and MR assessment for possible repair (2021 21:25)      PAST MEDICAL & SURGICAL HISTORY:  Hypertension    Vertigo    H/O varicose veins    No significant past surgical history        MEDICATIONS  (STANDING):  cefuroxime  IVPB 1500 milliGRAM(s) IV Intermittent once  chlorhexidine 0.12% Liquid 30 milliLiter(s) Swish and Spit once  chlorhexidine 4% Liquid 1 Application(s) Topical once  diltiazem    Tablet 30 milliGRAM(s) Oral every 6 hours  diltiazem Infusion 10 mG/Hr (10 mL/Hr) IV Continuous <Continuous>  folic acid 1 milliGRAM(s) Oral daily  furosemide   Injectable 20 milliGRAM(s) IV Push daily  gabapentin 300 milliGRAM(s) Oral two times a day  heparin  Infusion 700 Unit(s)/Hr (7 mL/Hr) IV Continuous <Continuous>  mirtazapine 45 milliGRAM(s) Oral daily  mupirocin 2% Nasal 1 Application(s) Nasal every 12 hours  sodium chloride 0.9% lock flush 3 milliLiter(s) IV Push every 8 hours    MEDICATIONS  (PRN):      Labs:                        14.1   6.90  )-----------( 114      ( 2021 06:59 )             42.5         139  |  103  |  22  ----------------------------<  101<H>  4.2   |  22  |  0.95    Ca    9.2      2021 06:55    TPro  6.4  /  Alb  4.1  /  TBili  0.5  /  DBili  x   /  AST  71<H>  /  ALT  89<H>  /  AlkPhos  111      PTT - ( 2021 08:26 )  PTT:128.5 sec    Blood Type: ABO Interpretation: A ( @ 07:27)    HGB A1C:   Prealbumin: Prealbumin, Serum: 23 mg/dL ( @ 00:07)    Pro-BNP: Serum Pro-Brain Natriuretic Peptide: 289 pg/mL ( @ 15:41)    Thyroid Panel:  @ 00:08/1.61  --/10.6/122   @ 07:11/1.16  --/--/--    MRSA: MRSA PCR Result.: Pascual ( @ 02:41)   / MSSA:   Urinalysis Basic - ( 2021 07:50 )    Color: Colorless / Appearance: Clear / S.007 / pH: x  Gluc: x / Ketone: Negative  / Bili: Negative / Urobili: Negative   Blood: x / Protein: Negative / Nitrite: Negative   Leuk Esterase: Small / RBC: 1 /hpf / WBC 3 /HPF   Sq Epi: x / Non Sq Epi: 0 /hpf / Bacteria: Negative      CT chest  < from: CT Angio Chest PE Protocol w/ IV Cont (21 @ 12:51) >  ROCEDURE:  CT Angiography of the Chest.  Sagittal and coronal reformats were performed as well as 3D (MIP) reconstructions.    FINDINGS:    LUNGS AND AIRWAYS: Patent central airways.  There is milddiffuse airway thickening. There is mild smooth thickening of the interlobular septa throughout the lungs, compatible with interstitial edema. There are patchy perihilar groundglass opacities throughout the lungs, compatible with alveolar edema. No discrete suspicious pulmonary nodule is identified.  PLEURA: There are moderate bilateral layering pleural effusions, right greater than left, with moderate right middle lobe and bilateral lower lobe compressive atelectasis. No pneumothorax or pneumomediastinum.  MEDIASTINUM AND RUPALI: Shotty mediastinal and bilateral hilar lymph nodes.  VESSELS: There is no evidence of filling defect in the first, second, or third order branches of the pulmonary arteries. The pulmonary trunk and main pulmonary arteries are unremarkable. The thoracic aorta and great vessels are unremarkable.  HEART: Heart size is normal. No pericardial effusion.  CHEST WALL AND LOWER NECK: Again is noted enlargement of the left lobe of the thyroid.  VISUALIZED UPPER ABDOMEN: Within normal limits.  BONES: Within normal limits.    IMPRESSION:  1. Moderate bilateral layering pleural effusions with interstitial and alveolar edema, compatible with pulmonary vascular congestion.  2. No evidence of pulmonary embolism.      < end of copied text >        CXR:   < from: Xray Chest 2 Views PA/Lat (21 @ 10:35) >  EXAM:  XR CHEST PA LAT 2V                            PROCEDURE DATE:  2021          INTERPRETATION:  PA and lateral chest on 2021 at 10:23 AM. Patient has chest pain.    Heart suggest normal size.    There are basilar pleural pulmonary atelectatic and associated effusion findings which are new since CAT scan of 2017.    IMPRESSION: Bibasilar pleuropulmonary processes.      < end of copied text >      EKG:   < from: 12 Lead ECG (21 @ 09:16) >  entricular Rate 83 BPM    Atrial Rate 83 BPM    QRS Duration 88 ms    Q-T Interval 378 ms    QTC Calculation(Bazett) 444 ms    R Axis 3 degrees    T Axis -7 degrees    Diagnosis Line ATRIAL FIBRILLATION  MINIMAL VOLTAGE CRITERIA FOR LVH, MAY BE NORMAL VARIANT  SEPTAL INFARCT , AGE UNDETERMINED  ABNORMAL ECG  NO PREVIOUS ECGS AVAILABLE  Confirmed by MD Hue, [facility] ([id number]) on 2021 9:54:34 PM    < end of copied text >        Carotid Duplex:  done, reading pending    PFT's: pending    Echocardiogram: < from: Transthoracic Echocardiogram (21 @ 13:40) >  PROCEDURE: Transthoracic echocardiogram with 2-D, M-Mode  and complete spectral and color flow Doppler.  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)  ------------------------------------------------------------------------  Dimensions:    Normal Values:  LA:     4.6    2.0 - 4.0 cm  Ao:     3.3    2.0 - 3.8 cm  SEPTUM: 1.2    0.6 - 1.2 cm  PWT:    1.1    0.6 - 1.1 cm  LVIDd:  4.2    3.0 - 5.6 cm  LVIDs:  2.6    1.8 - 4.0 cm  Derived variables:  LVMI: 87 g/m2  RWT: 0.52  Fractional short: 38 %  EF (Visual Estimate): >75 %  Doppler Peak Velocity (m/sec): AoV=1.3  ------------------------------------------------------------------------  Observations:  Mitral Valve: Doming and thickening of the tips of the  mitral valve suggestive of rheumatic mitral valve disease.  Moderate-severe mitral regurgitation. Mean transmitral  valve gradient equals 4 mm Hg, consistent with mild mitral  stenosis. (HRabout 120 bpm)  Aortic Valve/Aorta: Calcified trileaflet aortic valve with  normal opening. Peak transaortic valve gradient equals 7 mm  Hg, mean transaortic valve gradient equals 3 mm Hg, aortic  valve velocity time integral equals 18 cm. Minimal aortic  regurgitation. Peak left ventricular outflow tract gradient  equals 3 mm Hg, LVOT velocity time integral equals 16 cm.  Aortic Root: 3.3 cm.  Left Atrium: Severely dilated left atrium.  LA volume index  = 61 cc/m2.  Left Ventricle: Hyperdynamic left ventricular systolic  function. Normal left ventricular internal dimensions and  wall thicknesses.  Right Heart: Mild right atrial enlargement. Normal right  ventricular size and function. Normal tricuspid valve.  Mild-moderate tricuspid regurgitation. Pulmonic valve not  well visualized, probably normal. Minimal pulmonic  regurgitation.  Pericardium/Pleura: Normal pericardium with no pericardial  effusion.  Bilateral pleural effusions.  Hemodynamic: Estimated right atrial pressure is 8 mm Hg.  Estimated right ventricular systolic pressure equals 43 mm  Hg, assuming right atrial pressure equals 8 mm Hg,  consistent with mild pulmonary hypertension.  ------------------------------------------------------------------------  Conclusions:  Patient in atrial fibrillation with heart ratesabout  110s-120s bpm during the study.  1. Doming and thickening of the tips of the mitral valve  suggestive of rheumatic mitral valve disease.  Moderate-severe mitral regurgitation. Mean transmitral  valve gradient equals 4 mm Hg, consistent with mild mitral  stenosis. (HRabout 120 bpm)  2. Calcified trileaflet aortic valve with normal opening.  Minimal aortic regurgitation.  3. Severely dilated left atrium.  LA volume index = 61  cc/m2.  4. Hyperdynamic left ventricular systolic function.  5. Normal right ventricular size and function.  6. Bilateral pleural effusions.  *** No previous Echo exam.    < end of copied text >      Cardiac catheterization:  < from: Cardiac Catheterization (21 @ 18:20) >  Danica Cormier M.D.  INDICATIONS: Mitral valve insufficiency.  HISTORY: Vertigo. There was no prior cardiac history. The patient has  hypertension.  PRIOR DIAGNOSTIC TEST RESULTS: Echocardiography (transthoracic) performed (  2021): 55 % estimated ejection fraction, mild aortic valvular  insufficiency, and moderate mitral valvular regurgitation.  PROCEDURE:  --  Sonosite - Diagnostic.  --  Right heart catheterization.  --  Left coronary angiography.  --  Right coronary angiography.  TECHNIQUE: The risks and alternatives of the procedures and conscious  sedation were explained to the patient and informedconsent was obtained.  Cardiac catheterization performed electively.  Sonosite - Diagnostic. Right common femoral vein and artery were identified  and access was obtained using ultrasound guidance. Right femoral vein  access. Local anesthetic given. The puncture site was infiltrated with 2 %  lidocaine. A 7fr Sheath Palermo was inserted in the vessel. Right femoral  artery access. Local anesthetic given. The puncture site was infiltrated  with 2 % lidocaine. A 4fr Sheath Palermo was inserted inthe vessel.  Right heart catheterization. The procedure was performed utilizing a 7fr  Ebro Alberto catheter. Left coronary artery angiography. The vessel was  injected utilizing a 4 Fr JL 4.0 catheter. Right coronary artery  angiography. The vessel wasinjected utilizing a 4 Fr JR 4.0 catheter.  RADIATION EXPOSURE: 5.92 min.  CONTRAST GIVEN: Omnipaque 35 ml.  MEDICATIONS GIVEN: 2% Lidocaine, 10 ml, subcutaneously. 0.9% Normal saline,  6 ml, IV.  VENTRICLES: No left ventriculogram was performed.  CORONARY VESSELS: The coronary circulation is right dominant.  LM:   --  LM: Normal.  LAD:   --  Mid LAD: There was a tubular 30 % stenosis in the proximal third  of the vessel segment.  --  D1: There was a discrete 20 % stenosis at the ostium of the vessel  segment.  --  D2: There was a discrete 20 % stenosis in the proximal third of the  vessel segment.  CX:   --  Mid circumflex: Angiography showed minor luminal irregularities  with no flow limiting lesions.  RCA:   --  RCA: Angiography showed minor luminal irregularities with no  flow limiting lesions.  COMPLICATIONS: There were no complications.  DIAGNOSTIC RECOMMENDATIONS: Medical management is recommended.    < end of copied text >      Gen: WN/WD NAD  Neuro: AAOx3, nonfocal  Pulm: CTA B/L  CV: RRR, S1S2 +systolic murmur  Abd: Soft, NT, ND +BS  Ext: No edema, + peripheral pulses      Pt has AICD/PPM [ ] Yes  [x ] No             Brand Name:  Pre-op Beta Blocker ordered within 24 hrs of surgery (CABG ONLY)?  [x ] Yes  [ ] No  If not, Why?  Type & Cross  [x ] Yes  [ ] No  NPO after Midnight [x ] Yes  [ ] No  Pre-op ABX ordered, to be taped on chart:  [ x] Yes  [ ] No     Hibiclens/Peridex ordered [x ] Yes  [ ] No  Intraop on Hold: PRBCs, CXR, DOUG [x ]   Consent obtained  [x ] Yes  [ ] No

## 2021-11-08 ENCOUNTER — APPOINTMENT (OUTPATIENT)
Dept: CARDIOTHORACIC SURGERY | Facility: HOSPITAL | Age: 76
End: 2021-11-08

## 2021-11-08 ENCOUNTER — RESULT REVIEW (OUTPATIENT)
Age: 76
End: 2021-11-08

## 2021-11-08 LAB
ALBUMIN SERPL ELPH-MCNC: 3.4 G/DL — SIGNIFICANT CHANGE UP (ref 3.3–5)
ALP SERPL-CCNC: 63 U/L — SIGNIFICANT CHANGE UP (ref 40–120)
ALT FLD-CCNC: 49 U/L — HIGH (ref 10–45)
ANION GAP SERPL CALC-SCNC: 19 MMOL/L — HIGH (ref 5–17)
APTT BLD: 35.2 SEC — SIGNIFICANT CHANGE UP (ref 27.5–35.5)
APTT BLD: 70.2 SEC — HIGH (ref 27.5–35.5)
AST SERPL-CCNC: 86 U/L — HIGH (ref 10–40)
BASE EXCESS BLDV CALC-SCNC: -1 MMOL/L — SIGNIFICANT CHANGE UP (ref -2–2)
BASE EXCESS BLDV CALC-SCNC: -1.1 MMOL/L — SIGNIFICANT CHANGE UP (ref -2–2)
BASE EXCESS BLDV CALC-SCNC: -1.1 MMOL/L — SIGNIFICANT CHANGE UP (ref -2–2)
BASE EXCESS BLDV CALC-SCNC: -2.6 MMOL/L — LOW (ref -2–2)
BASE EXCESS BLDV CALC-SCNC: -2.6 MMOL/L — LOW (ref -2–2)
BASE EXCESS BLDV CALC-SCNC: -2.7 MMOL/L — LOW (ref -2–2)
BASOPHILS # BLD AUTO: 0.04 K/UL — SIGNIFICANT CHANGE UP (ref 0–0.2)
BASOPHILS # BLD AUTO: 0.08 K/UL — SIGNIFICANT CHANGE UP (ref 0–0.2)
BASOPHILS NFR BLD AUTO: 0.4 % — SIGNIFICANT CHANGE UP (ref 0–2)
BASOPHILS NFR BLD AUTO: 0.9 % — SIGNIFICANT CHANGE UP (ref 0–2)
BILIRUB SERPL-MCNC: 1.2 MG/DL — SIGNIFICANT CHANGE UP (ref 0.2–1.2)
BLOOD GAS VENOUS - CREATININE: SIGNIFICANT CHANGE UP MG/DL (ref 0.5–1.3)
BUN SERPL-MCNC: 17 MG/DL — SIGNIFICANT CHANGE UP (ref 7–23)
CA-I SERPL-SCNC: 0.75 MMOL/L — LOW (ref 1.15–1.33)
CA-I SERPL-SCNC: 0.77 MMOL/L — LOW (ref 1.15–1.33)
CA-I SERPL-SCNC: 0.79 MMOL/L — LOW (ref 1.15–1.33)
CA-I SERPL-SCNC: 0.81 MMOL/L — LOW (ref 1.15–1.33)
CA-I SERPL-SCNC: 1.1 MMOL/L — LOW (ref 1.15–1.33)
CALCIUM SERPL-MCNC: 7.8 MG/DL — LOW (ref 8.4–10.5)
CHLORIDE BLDV-SCNC: 104 MMOL/L — SIGNIFICANT CHANGE UP (ref 96–108)
CHLORIDE BLDV-SCNC: 105 MMOL/L — SIGNIFICANT CHANGE UP (ref 96–108)
CHLORIDE BLDV-SCNC: 106 MMOL/L — SIGNIFICANT CHANGE UP (ref 96–108)
CHLORIDE BLDV-SCNC: 106 MMOL/L — SIGNIFICANT CHANGE UP (ref 96–108)
CHLORIDE BLDV-SCNC: 99 MMOL/L — SIGNIFICANT CHANGE UP (ref 96–108)
CHLORIDE SERPL-SCNC: 106 MMOL/L — SIGNIFICANT CHANGE UP (ref 96–108)
CK MB BLD-MCNC: 16.3 % — HIGH (ref 0–3.5)
CK MB CFR SERPL CALC: 63.8 NG/ML — HIGH (ref 0–3.8)
CK SERPL-CCNC: 392 U/L — HIGH (ref 25–170)
CO2 BLDV-SCNC: 22 MMOL/L — SIGNIFICANT CHANGE UP (ref 22–26)
CO2 BLDV-SCNC: 23 MMOL/L — SIGNIFICANT CHANGE UP (ref 22–26)
CO2 BLDV-SCNC: 24 MMOL/L — SIGNIFICANT CHANGE UP (ref 22–26)
CO2 BLDV-SCNC: 26 MMOL/L — SIGNIFICANT CHANGE UP (ref 22–26)
CO2 BLDV-SCNC: 27 MMOL/L — HIGH (ref 22–26)
CO2 BLDV-SCNC: 27 MMOL/L — HIGH (ref 22–26)
CO2 SERPL-SCNC: 19 MMOL/L — LOW (ref 22–31)
CREAT SERPL-MCNC: 0.79 MG/DL — SIGNIFICANT CHANGE UP (ref 0.5–1.3)
EOSINOPHIL # BLD AUTO: 0.06 K/UL — SIGNIFICANT CHANGE UP (ref 0–0.5)
EOSINOPHIL # BLD AUTO: 0.27 K/UL — SIGNIFICANT CHANGE UP (ref 0–0.5)
EOSINOPHIL NFR BLD AUTO: 0.6 % — SIGNIFICANT CHANGE UP (ref 0–6)
EOSINOPHIL NFR BLD AUTO: 3.1 % — SIGNIFICANT CHANGE UP (ref 0–6)
FIBRINOGEN PPP-MCNC: 282 MG/DL — LOW (ref 290–520)
GAS PNL BLDA: SIGNIFICANT CHANGE UP
GAS PNL BLDV: 134 MMOL/L — LOW (ref 136–145)
GAS PNL BLDV: 136 MMOL/L — SIGNIFICANT CHANGE UP (ref 136–145)
GAS PNL BLDV: 136 MMOL/L — SIGNIFICANT CHANGE UP (ref 136–145)
GAS PNL BLDV: 140 MMOL/L — SIGNIFICANT CHANGE UP (ref 136–145)
GAS PNL BLDV: 140 MMOL/L — SIGNIFICANT CHANGE UP (ref 136–145)
GAS PNL BLDV: SIGNIFICANT CHANGE UP
GLUCOSE BLDV-MCNC: 119 MG/DL — HIGH (ref 70–99)
GLUCOSE BLDV-MCNC: 129 MG/DL — HIGH (ref 70–99)
GLUCOSE BLDV-MCNC: 131 MG/DL — HIGH (ref 70–99)
GLUCOSE BLDV-MCNC: 96 MG/DL — SIGNIFICANT CHANGE UP (ref 70–99)
GLUCOSE BLDV-MCNC: 96 MG/DL — SIGNIFICANT CHANGE UP (ref 70–99)
GLUCOSE SERPL-MCNC: 139 MG/DL — HIGH (ref 70–99)
HCO3 BLDV-SCNC: 21 MMOL/L — LOW (ref 22–29)
HCO3 BLDV-SCNC: 22 MMOL/L — SIGNIFICANT CHANGE UP (ref 22–29)
HCO3 BLDV-SCNC: 23 MMOL/L — SIGNIFICANT CHANGE UP (ref 22–29)
HCO3 BLDV-SCNC: 24 MMOL/L — SIGNIFICANT CHANGE UP (ref 22–29)
HCO3 BLDV-SCNC: 25 MMOL/L — SIGNIFICANT CHANGE UP (ref 22–29)
HCO3 BLDV-SCNC: 26 MMOL/L — SIGNIFICANT CHANGE UP (ref 22–29)
HCT VFR BLD CALC: 32.2 % — LOW (ref 34.5–45)
HCT VFR BLD CALC: 33.8 % — LOW (ref 34.5–45)
HCT VFR BLD CALC: 45.4 % — HIGH (ref 34.5–45)
HCT VFR BLDA CALC: 25 % — LOW (ref 34.5–46.5)
HCT VFR BLDA CALC: 26 % — LOW (ref 34.5–46.5)
HCT VFR BLDA CALC: 28 % — LOW (ref 34.5–46.5)
HEPARIN-PF4 AB RESULT: SIGNIFICANT CHANGE UP (ref 0–0.9)
HEPARINASE TEG R TIME: 8.2 MIN — SIGNIFICANT CHANGE UP (ref 4.3–8.3)
HGB BLD CALC-MCNC: 8.2 G/DL — LOW (ref 11.7–16.1)
HGB BLD CALC-MCNC: 8.5 G/DL — LOW (ref 11.7–16.1)
HGB BLD CALC-MCNC: 8.6 G/DL — LOW (ref 11.7–16.1)
HGB BLD CALC-MCNC: 8.7 G/DL — LOW (ref 11.7–16.1)
HGB BLD CALC-MCNC: 9.4 G/DL — LOW (ref 11.7–16.1)
HGB BLD-MCNC: 10.8 G/DL — LOW (ref 11.5–15.5)
HGB BLD-MCNC: 11.4 G/DL — LOW (ref 11.5–15.5)
HGB BLD-MCNC: 14.8 G/DL — SIGNIFICANT CHANGE UP (ref 11.5–15.5)
HOROWITZ INDEX BLDV+IHG-RTO: 80 — SIGNIFICANT CHANGE UP
IMM GRANULOCYTES NFR BLD AUTO: 0.3 % — SIGNIFICANT CHANGE UP (ref 0–1.5)
IMM GRANULOCYTES NFR BLD AUTO: 0.7 % — SIGNIFICANT CHANGE UP (ref 0–1.5)
INR BLD: 1.54 RATIO — HIGH (ref 0.88–1.16)
LACTATE BLDV-MCNC: 1.2 MMOL/L — SIGNIFICANT CHANGE UP (ref 0.7–2)
LACTATE BLDV-MCNC: 1.8 MMOL/L — SIGNIFICANT CHANGE UP (ref 0.7–2)
LACTATE BLDV-MCNC: 2.1 MMOL/L — HIGH (ref 0.7–2)
LACTATE BLDV-MCNC: 2.2 MMOL/L — HIGH (ref 0.7–2)
LACTATE BLDV-MCNC: 2.4 MMOL/L — HIGH (ref 0.7–2)
LYMPHOCYTES # BLD AUTO: 2.53 K/UL — SIGNIFICANT CHANGE UP (ref 1–3.3)
LYMPHOCYTES # BLD AUTO: 25 % — SIGNIFICANT CHANGE UP (ref 13–44)
LYMPHOCYTES # BLD AUTO: 4.37 K/UL — HIGH (ref 1–3.3)
LYMPHOCYTES # BLD AUTO: 49.5 % — HIGH (ref 13–44)
MAGNESIUM SERPL-MCNC: 3.4 MG/DL — HIGH (ref 1.6–2.6)
MCHC RBC-ENTMCNC: 30.1 PG — SIGNIFICANT CHANGE UP (ref 27–34)
MCHC RBC-ENTMCNC: 30.2 PG — SIGNIFICANT CHANGE UP (ref 27–34)
MCHC RBC-ENTMCNC: 30.5 PG — SIGNIFICANT CHANGE UP (ref 27–34)
MCHC RBC-ENTMCNC: 32.6 GM/DL — SIGNIFICANT CHANGE UP (ref 32–36)
MCHC RBC-ENTMCNC: 33.5 GM/DL — SIGNIFICANT CHANGE UP (ref 32–36)
MCHC RBC-ENTMCNC: 33.7 GM/DL — SIGNIFICANT CHANGE UP (ref 32–36)
MCV RBC AUTO: 89.7 FL — SIGNIFICANT CHANGE UP (ref 80–100)
MCV RBC AUTO: 91 FL — SIGNIFICANT CHANGE UP (ref 80–100)
MCV RBC AUTO: 92.3 FL — SIGNIFICANT CHANGE UP (ref 80–100)
MONOCYTES # BLD AUTO: 0.59 K/UL — SIGNIFICANT CHANGE UP (ref 0–0.9)
MONOCYTES # BLD AUTO: 0.6 K/UL — SIGNIFICANT CHANGE UP (ref 0–0.9)
MONOCYTES NFR BLD AUTO: 5.9 % — SIGNIFICANT CHANGE UP (ref 2–14)
MONOCYTES NFR BLD AUTO: 6.7 % — SIGNIFICANT CHANGE UP (ref 2–14)
NEUTROPHILS # BLD AUTO: 3.49 K/UL — SIGNIFICANT CHANGE UP (ref 1.8–7.4)
NEUTROPHILS # BLD AUTO: 6.84 K/UL — SIGNIFICANT CHANGE UP (ref 1.8–7.4)
NEUTROPHILS NFR BLD AUTO: 39.5 % — LOW (ref 43–77)
NEUTROPHILS NFR BLD AUTO: 67.4 % — SIGNIFICANT CHANGE UP (ref 43–77)
NRBC # BLD: 0 /100 WBCS — SIGNIFICANT CHANGE UP (ref 0–0)
PCO2 BLDV: 31 MMHG — LOW (ref 39–42)
PCO2 BLDV: 34 MMHG — LOW (ref 39–42)
PCO2 BLDV: 40 MMHG — SIGNIFICANT CHANGE UP (ref 39–42)
PCO2 BLDV: 43 MMHG — HIGH (ref 39–42)
PCO2 BLDV: 49 MMHG — HIGH (ref 39–42)
PCO2 BLDV: 52 MMHG — HIGH (ref 39–42)
PF4 HEPARIN CMPLX AB SER-ACNC: NEGATIVE — SIGNIFICANT CHANGE UP
PH BLDV: 7.3 — LOW (ref 7.32–7.43)
PH BLDV: 7.32 — SIGNIFICANT CHANGE UP (ref 7.32–7.43)
PH BLDV: 7.36 — SIGNIFICANT CHANGE UP (ref 7.32–7.43)
PH BLDV: 7.36 — SIGNIFICANT CHANGE UP (ref 7.32–7.43)
PH BLDV: 7.41 — SIGNIFICANT CHANGE UP (ref 7.32–7.43)
PH BLDV: 7.44 — HIGH (ref 7.32–7.43)
PHOSPHATE SERPL-MCNC: 2 MG/DL — LOW (ref 2.5–4.5)
PLATELET # BLD AUTO: 116 K/UL — LOW (ref 150–400)
PLATELET # BLD AUTO: 44 K/UL — LOW (ref 150–400)
PLATELET # BLD AUTO: 53 K/UL — LOW (ref 150–400)
PO2 BLDV: 101 MMHG — HIGH (ref 25–45)
PO2 BLDV: 104 MMHG — HIGH (ref 25–45)
PO2 BLDV: 115 MMHG — HIGH (ref 25–45)
PO2 BLDV: 155 MMHG — HIGH (ref 25–45)
PO2 BLDV: 49 MMHG — HIGH (ref 25–45)
PO2 BLDV: 60 MMHG — HIGH (ref 25–45)
POTASSIUM BLDV-SCNC: 5.1 MMOL/L — SIGNIFICANT CHANGE UP (ref 3.5–5.1)
POTASSIUM BLDV-SCNC: 5.4 MMOL/L — HIGH (ref 3.5–5.1)
POTASSIUM BLDV-SCNC: 5.5 MMOL/L — HIGH (ref 3.5–5.1)
POTASSIUM BLDV-SCNC: 5.8 MMOL/L — HIGH (ref 3.5–5.1)
POTASSIUM BLDV-SCNC: 5.9 MMOL/L — HIGH (ref 3.5–5.1)
POTASSIUM SERPL-MCNC: 4.4 MMOL/L — SIGNIFICANT CHANGE UP (ref 3.5–5.3)
POTASSIUM SERPL-SCNC: 4.4 MMOL/L — SIGNIFICANT CHANGE UP (ref 3.5–5.3)
PROT SERPL-MCNC: 4.5 G/DL — LOW (ref 6–8.3)
PROTHROM AB SERPL-ACNC: 18.1 SEC — HIGH (ref 10.6–13.6)
RAPIDTEG MAXIMUM AMPLITUDE: 56.4 MM — SIGNIFICANT CHANGE UP (ref 52–70)
RBC # BLD: 3.54 M/UL — LOW (ref 3.8–5.2)
RBC # BLD: 3.77 M/UL — LOW (ref 3.8–5.2)
RBC # BLD: 4.92 M/UL — SIGNIFICANT CHANGE UP (ref 3.8–5.2)
RBC # FLD: 14 % — SIGNIFICANT CHANGE UP (ref 10.3–14.5)
RBC # FLD: 14 % — SIGNIFICANT CHANGE UP (ref 10.3–14.5)
RBC # FLD: 14.2 % — SIGNIFICANT CHANGE UP (ref 10.3–14.5)
SAO2 % BLDV: 82.9 % — SIGNIFICANT CHANGE UP (ref 67–88)
SAO2 % BLDV: 92.8 % — HIGH (ref 67–88)
SAO2 % BLDV: 98.8 % — HIGH (ref 67–88)
SAO2 % BLDV: 99.2 % — HIGH (ref 67–88)
SAO2 % BLDV: 99.2 % — HIGH (ref 67–88)
SAO2 % BLDV: 99.3 % — HIGH (ref 67–88)
SODIUM SERPL-SCNC: 144 MMOL/L — SIGNIFICANT CHANGE UP (ref 135–145)
TEG FUNCTIONAL FIBRINOGEN: 19.2 MM — SIGNIFICANT CHANGE UP (ref 15–32)
TEG MAXIMUM AMPLITUDE: 57.9 MM — SIGNIFICANT CHANGE UP (ref 52–69)
TEG REACTION TIME: 7.9 MIN — SIGNIFICANT CHANGE UP (ref 4.6–9.1)
TROPONIN T, HIGH SENSITIVITY RESULT: 2817 NG/L — HIGH (ref 0–51)
WBC # BLD: 10.14 K/UL — SIGNIFICANT CHANGE UP (ref 3.8–10.5)
WBC # BLD: 7.41 K/UL — SIGNIFICANT CHANGE UP (ref 3.8–10.5)
WBC # BLD: 8.83 K/UL — SIGNIFICANT CHANGE UP (ref 3.8–10.5)
WBC # FLD AUTO: 10.14 K/UL — SIGNIFICANT CHANGE UP (ref 3.8–10.5)
WBC # FLD AUTO: 7.41 K/UL — SIGNIFICANT CHANGE UP (ref 3.8–10.5)
WBC # FLD AUTO: 8.83 K/UL — SIGNIFICANT CHANGE UP (ref 3.8–10.5)

## 2021-11-08 PROCEDURE — 33430 REPLACEMENT OF MITRAL VALVE: CPT

## 2021-11-08 PROCEDURE — 99292 CRITICAL CARE ADDL 30 MIN: CPT

## 2021-11-08 PROCEDURE — 94010 BREATHING CAPACITY TEST: CPT | Mod: 26

## 2021-11-08 PROCEDURE — 88305 TISSUE EXAM BY PATHOLOGIST: CPT | Mod: 26

## 2021-11-08 PROCEDURE — 99291 CRITICAL CARE FIRST HOUR: CPT

## 2021-11-08 PROCEDURE — 93010 ELECTROCARDIOGRAM REPORT: CPT

## 2021-11-08 PROCEDURE — 71045 X-RAY EXAM CHEST 1 VIEW: CPT | Mod: 26

## 2021-11-08 RX ORDER — CEFUROXIME AXETIL 250 MG
1500 TABLET ORAL EVERY 8 HOURS
Refills: 0 | Status: COMPLETED | OUTPATIENT
Start: 2021-11-08 | End: 2021-11-10

## 2021-11-08 RX ORDER — DEXTROSE 50 % IN WATER 50 %
50 SYRINGE (ML) INTRAVENOUS
Refills: 0 | Status: DISCONTINUED | OUTPATIENT
Start: 2021-11-08 | End: 2021-11-15

## 2021-11-08 RX ORDER — POTASSIUM CHLORIDE 20 MEQ
10 PACKET (EA) ORAL
Refills: 0 | Status: COMPLETED | OUTPATIENT
Start: 2021-11-08 | End: 2021-11-08

## 2021-11-08 RX ORDER — SODIUM CHLORIDE 9 MG/ML
1000 INJECTION, SOLUTION INTRAVENOUS ONCE
Refills: 0 | Status: COMPLETED | OUTPATIENT
Start: 2021-11-08 | End: 2021-11-08

## 2021-11-08 RX ORDER — AMIODARONE HYDROCHLORIDE 400 MG/1
400 TABLET ORAL
Refills: 0 | Status: DISCONTINUED | OUTPATIENT
Start: 2021-11-08 | End: 2021-11-08

## 2021-11-08 RX ORDER — FENTANYL CITRATE 50 UG/ML
50 INJECTION INTRAVENOUS ONCE
Refills: 0 | Status: DISCONTINUED | OUTPATIENT
Start: 2021-11-08 | End: 2021-11-08

## 2021-11-08 RX ORDER — MEPERIDINE HYDROCHLORIDE 50 MG/ML
25 INJECTION INTRAMUSCULAR; INTRAVENOUS; SUBCUTANEOUS ONCE
Refills: 0 | Status: DISCONTINUED | OUTPATIENT
Start: 2021-11-08 | End: 2021-11-09

## 2021-11-08 RX ORDER — PANTOPRAZOLE SODIUM 20 MG/1
40 TABLET, DELAYED RELEASE ORAL DAILY
Refills: 0 | Status: DISCONTINUED | OUTPATIENT
Start: 2021-11-08 | End: 2021-11-09

## 2021-11-08 RX ORDER — DOBUTAMINE HCL 250MG/20ML
3 VIAL (ML) INTRAVENOUS
Qty: 500 | Refills: 0 | Status: DISCONTINUED | OUTPATIENT
Start: 2021-11-08 | End: 2021-11-09

## 2021-11-08 RX ORDER — HYDROMORPHONE HYDROCHLORIDE 2 MG/ML
0.5 INJECTION INTRAMUSCULAR; INTRAVENOUS; SUBCUTANEOUS EVERY 6 HOURS
Refills: 0 | Status: DISCONTINUED | OUTPATIENT
Start: 2021-11-08 | End: 2021-11-09

## 2021-11-08 RX ORDER — POTASSIUM CHLORIDE 20 MEQ
10 PACKET (EA) ORAL
Refills: 0 | Status: DISCONTINUED | OUTPATIENT
Start: 2021-11-08 | End: 2021-11-10

## 2021-11-08 RX ORDER — GABAPENTIN 400 MG/1
100 CAPSULE ORAL EVERY 8 HOURS
Refills: 0 | Status: COMPLETED | OUTPATIENT
Start: 2021-11-08 | End: 2021-11-13

## 2021-11-08 RX ORDER — DEXTROSE 50 % IN WATER 50 %
25 SYRINGE (ML) INTRAVENOUS
Refills: 0 | Status: DISCONTINUED | OUTPATIENT
Start: 2021-11-08 | End: 2021-11-15

## 2021-11-08 RX ORDER — AMIODARONE HYDROCHLORIDE 400 MG/1
0.51 TABLET ORAL
Qty: 900 | Refills: 0 | Status: DISCONTINUED | OUTPATIENT
Start: 2021-11-08 | End: 2021-11-09

## 2021-11-08 RX ORDER — ACETAMINOPHEN 500 MG
650 TABLET ORAL EVERY 6 HOURS
Refills: 0 | Status: DISCONTINUED | OUTPATIENT
Start: 2021-11-11 | End: 2021-11-20

## 2021-11-08 RX ORDER — DEXMEDETOMIDINE HYDROCHLORIDE IN 0.9% SODIUM CHLORIDE 4 UG/ML
0.2 INJECTION INTRAVENOUS
Qty: 200 | Refills: 0 | Status: DISCONTINUED | OUTPATIENT
Start: 2021-11-08 | End: 2021-11-09

## 2021-11-08 RX ORDER — ACETAMINOPHEN 500 MG
650 TABLET ORAL EVERY 6 HOURS
Refills: 0 | Status: COMPLETED | OUTPATIENT
Start: 2021-11-08 | End: 2021-11-11

## 2021-11-08 RX ORDER — ALBUMIN HUMAN 25 %
250 VIAL (ML) INTRAVENOUS
Refills: 0 | Status: COMPLETED | OUTPATIENT
Start: 2021-11-08 | End: 2021-11-08

## 2021-11-08 RX ORDER — CHLORHEXIDINE GLUCONATE 213 G/1000ML
1 SOLUTION TOPICAL DAILY
Refills: 0 | Status: DISCONTINUED | OUTPATIENT
Start: 2021-11-08 | End: 2021-11-20

## 2021-11-08 RX ORDER — VANCOMYCIN HCL 1 G
1000 VIAL (EA) INTRAVENOUS EVERY 12 HOURS
Refills: 0 | Status: COMPLETED | OUTPATIENT
Start: 2021-11-08 | End: 2021-11-09

## 2021-11-08 RX ORDER — INSULIN HUMAN 100 [IU]/ML
3 INJECTION, SOLUTION SUBCUTANEOUS
Qty: 100 | Refills: 0 | Status: DISCONTINUED | OUTPATIENT
Start: 2021-11-08 | End: 2021-11-10

## 2021-11-08 RX ORDER — OXYCODONE HYDROCHLORIDE 5 MG/1
10 TABLET ORAL EVERY 4 HOURS
Refills: 0 | Status: DISCONTINUED | OUTPATIENT
Start: 2021-11-08 | End: 2021-11-14

## 2021-11-08 RX ORDER — NOREPINEPHRINE BITARTRATE/D5W 8 MG/250ML
0.05 PLASTIC BAG, INJECTION (ML) INTRAVENOUS
Qty: 8 | Refills: 0 | Status: DISCONTINUED | OUTPATIENT
Start: 2021-11-08 | End: 2021-11-09

## 2021-11-08 RX ORDER — OXYCODONE HYDROCHLORIDE 5 MG/1
5 TABLET ORAL EVERY 4 HOURS
Refills: 0 | Status: DISCONTINUED | OUTPATIENT
Start: 2021-11-08 | End: 2021-11-13

## 2021-11-08 RX ORDER — CHLORHEXIDINE GLUCONATE 213 G/1000ML
5 SOLUTION TOPICAL
Refills: 0 | Status: DISCONTINUED | OUTPATIENT
Start: 2021-11-08 | End: 2021-11-10

## 2021-11-08 RX ORDER — SENNA PLUS 8.6 MG/1
2 TABLET ORAL AT BEDTIME
Refills: 0 | Status: DISCONTINUED | OUTPATIENT
Start: 2021-11-09 | End: 2021-11-20

## 2021-11-08 RX ORDER — POLYETHYLENE GLYCOL 3350 17 G/17G
17 POWDER, FOR SOLUTION ORAL DAILY
Refills: 0 | Status: DISCONTINUED | OUTPATIENT
Start: 2021-11-09 | End: 2021-11-20

## 2021-11-08 RX ORDER — SODIUM CHLORIDE 9 MG/ML
1000 INJECTION INTRAMUSCULAR; INTRAVENOUS; SUBCUTANEOUS
Refills: 0 | Status: DISCONTINUED | OUTPATIENT
Start: 2021-11-08 | End: 2021-11-15

## 2021-11-08 RX ORDER — ASCORBIC ACID 60 MG
500 TABLET,CHEWABLE ORAL
Refills: 0 | Status: COMPLETED | OUTPATIENT
Start: 2021-11-08 | End: 2021-11-13

## 2021-11-08 RX ADMIN — AMIODARONE HYDROCHLORIDE 17 MG/MIN: 400 TABLET ORAL at 21:25

## 2021-11-08 RX ADMIN — PANTOPRAZOLE SODIUM 40 MILLIGRAM(S): 20 TABLET, DELAYED RELEASE ORAL at 18:50

## 2021-11-08 RX ADMIN — DEXMEDETOMIDINE HYDROCHLORIDE IN 0.9% SODIUM CHLORIDE 4.05 MICROGRAM(S)/KG/HR: 4 INJECTION INTRAVENOUS at 17:07

## 2021-11-08 RX ADMIN — Medication 250 MILLIGRAM(S): at 20:14

## 2021-11-08 RX ADMIN — Medication 500 MILLILITER(S): at 18:48

## 2021-11-08 RX ADMIN — Medication 30 MILLIGRAM(S): at 05:04

## 2021-11-08 RX ADMIN — Medication 500 MILLIGRAM(S): at 18:54

## 2021-11-08 RX ADMIN — SODIUM CHLORIDE 1000 MILLILITER(S): 9 INJECTION, SOLUTION INTRAVENOUS at 15:17

## 2021-11-08 RX ADMIN — CHLORHEXIDINE GLUCONATE 5 MILLILITER(S): 213 SOLUTION TOPICAL at 18:50

## 2021-11-08 RX ADMIN — FENTANYL CITRATE 50 MICROGRAM(S): 50 INJECTION INTRAVENOUS at 16:40

## 2021-11-08 RX ADMIN — GABAPENTIN 100 MILLIGRAM(S): 400 CAPSULE ORAL at 18:54

## 2021-11-08 RX ADMIN — Medication 650 MILLIGRAM(S): at 19:00

## 2021-11-08 RX ADMIN — SODIUM CHLORIDE 10 MILLILITER(S): 9 INJECTION INTRAMUSCULAR; INTRAVENOUS; SUBCUTANEOUS at 17:09

## 2021-11-08 RX ADMIN — Medication 100 MILLIGRAM(S): at 14:34

## 2021-11-08 RX ADMIN — Medication 7.28 MICROGRAM(S)/KG/MIN: at 21:25

## 2021-11-08 RX ADMIN — Medication 650 MILLIGRAM(S): at 18:55

## 2021-11-08 RX ADMIN — Medication 7.28 MICROGRAM(S)/KG/MIN: at 17:08

## 2021-11-08 RX ADMIN — Medication 100 MILLIEQUIVALENT(S): at 14:11

## 2021-11-08 RX ADMIN — INSULIN HUMAN 3 UNIT(S)/HR: 100 INJECTION, SOLUTION SUBCUTANEOUS at 21:25

## 2021-11-08 RX ADMIN — MUPIROCIN 1 APPLICATION(S): 20 OINTMENT TOPICAL at 05:05

## 2021-11-08 RX ADMIN — Medication 500 MILLILITER(S): at 18:23

## 2021-11-08 RX ADMIN — Medication 7.58 MICROGRAM(S)/KG/MIN: at 17:07

## 2021-11-08 RX ADMIN — AMIODARONE HYDROCHLORIDE 17 MG/MIN: 400 TABLET ORAL at 19:13

## 2021-11-08 RX ADMIN — GABAPENTIN 300 MILLIGRAM(S): 400 CAPSULE ORAL at 05:05

## 2021-11-08 RX ADMIN — INSULIN HUMAN 3 UNIT(S)/HR: 100 INJECTION, SOLUTION SUBCUTANEOUS at 17:09

## 2021-11-08 RX ADMIN — SODIUM CHLORIDE 3 MILLILITER(S): 9 INJECTION INTRAMUSCULAR; INTRAVENOUS; SUBCUTANEOUS at 05:16

## 2021-11-08 RX ADMIN — FENTANYL CITRATE 50 MICROGRAM(S): 50 INJECTION INTRAVENOUS at 16:10

## 2021-11-08 RX ADMIN — CHLORHEXIDINE GLUCONATE 30 MILLILITER(S): 213 SOLUTION TOPICAL at 05:09

## 2021-11-08 RX ADMIN — Medication 100 MILLIGRAM(S): at 21:22

## 2021-11-08 RX ADMIN — Medication 20 MILLIGRAM(S): at 05:05

## 2021-11-08 NOTE — BRIEF OPERATIVE NOTE - NSICDXBRIEFPROCEDURE_GEN_ALL_CORE_FT
PROCEDURES:  Mitral valve replacement 08-Nov-2021 13:40:13  Perez Dangelo  Msoer maze IV procedure 08-Nov-2021 13:40:24  Perez Dangelo  Ligation, left atrial appendage 08-Nov-2021 13:40:34  Perez Dangelo

## 2021-11-08 NOTE — PROGRESS NOTE ADULT - SUBJECTIVE AND OBJECTIVE BOX
EP     DATE OF SERVICE: 11-08-21    Intubated, unable to obtain ROS.  	  MEDICATIONS:  MEDICATIONS  (STANDING):  acetaminophen     Tablet .. 650 milliGRAM(s) Oral every 6 hours  aMIOdarone Infusion 17 mG/Min (567 mL/Hr) IV Continuous <Continuous>  ascorbic acid 500 milliGRAM(s) Oral two times a day  cefuroxime  IVPB 1500 milliGRAM(s) IV Intermittent every 8 hours  chlorhexidine 0.12% Liquid 5 milliLiter(s) Oral Mucosa two times a day  chlorhexidine 2% Cloths 1 Application(s) Topical daily  dexMEDEtomidine Infusion 0.2 MICROgram(s)/kG/Hr (4.05 mL/Hr) IV Continuous <Continuous>  dextrose 50% Injectable 50 milliLiter(s) IV Push every 15 minutes  dextrose 50% Injectable 25 milliLiter(s) IV Push every 15 minutes  DOBUTamine Infusion 3 MICROgram(s)/kG/Min (7.28 mL/Hr) IV Continuous <Continuous>  gabapentin 100 milliGRAM(s) Oral every 8 hours  insulin regular Infusion 3 Unit(s)/Hr (3 mL/Hr) IV Continuous <Continuous>  meperidine     Injectable 25 milliGRAM(s) IV Push once  norepinephrine Infusion 0.05 MICROgram(s)/kG/Min (7.58 mL/Hr) IV Continuous <Continuous>  pantoprazole  Injectable 40 milliGRAM(s) IV Push daily  potassium chloride  10 mEq/50 mL IVPB 10 milliEquivalent(s) IV Intermittent every 1 hour  potassium chloride  10 mEq/50 mL IVPB 10 milliEquivalent(s) IV Intermittent every 1 hour  potassium chloride  10 mEq/50 mL IVPB 10 milliEquivalent(s) IV Intermittent every 1 hour  potassium chloride  10 mEq/50 mL IVPB 10 milliEquivalent(s) IV Intermittent every 1 hour  sodium chloride 0.9%. 1000 milliLiter(s) (10 mL/Hr) IV Continuous <Continuous>  vancomycin  IVPB 1000 milliGRAM(s) IV Intermittent every 12 hours      LABS:	 	    CARDIAC MARKERS:  CARDIAC MARKERS ( 08 Nov 2021 13:27 )  x     / x     / 392 U/L / x     / 63.8 ng/mL                                10.8   10.14 )-----------( 44       ( 08 Nov 2021 13:28 )             32.2     Hemoglobin: 10.8 g/dL (11-08 @ 13:28)  Hemoglobin: 14.8 g/dL (11-08 @ 05:42)  Hemoglobin: 14.1 g/dL (11-07 @ 06:59)  Hemoglobin: 14.2 g/dL (11-06 @ 07:03)  Hemoglobin: 14.2 g/dL (11-05 @ 22:35)      11-08    144  |  106  |  17  ----------------------------<  139<H>  4.4   |  19<L>  |  0.79    Ca    7.8<L>      08 Nov 2021 13:27  Phos  2.0     11-08  Mg     3.4     11-08    TPro  4.5<L>  /  Alb  3.4  /  TBili  1.2  /  DBili  x   /  AST  86<H>  /  ALT  49<H>  /  AlkPhos  63  11-08    Creatinine Trend: 0.79<--, 0.95<--, 0.96<--, 0.92<--, 1.09<--, 1.29<--    COAGS:   PT/INR - ( 08 Nov 2021 13:28 )   PT: 18.1 sec;   INR: 1.54 ratio         PTT - ( 08 Nov 2021 13:28 )  PTT:35.2 sec    proBNP:   Lipid Profile:   HgA1c:   TSH:       PHYSICAL EXAM:  T(C): 35.9 (11-08-21 @ 15:00), Max: 36.6 (11-07-21 @ 19:37)  HR: 57 (11-08-21 @ 15:00) (56 - 104)  BP: 128/77 (11-08-21 @ 04:14) (117/61 - 148/82)  RR: 10 (11-08-21 @ 15:00) (10 - 18)  SpO2: 99% (11-08-21 @ 15:00) (93% - 100%)  Wt(kg): --  I&O's Summary    07 Nov 2021 07:01  -  08 Nov 2021 07:00  --------------------------------------------------------  IN: 635 mL / OUT: 250 mL / NET: 385 mL    08 Nov 2021 07:01  -  08 Nov 2021 15:34  --------------------------------------------------------  IN: 1153.8 mL / OUT: 385 mL / NET: 768.8 mL      Height (cm): 172.7 (11-08 @ 13:10)  Weight (kg): 80.9 (11-08 @ 13:10)  BMI (kg/m2): 27.1 (11-08 @ 13:10)  BSA (m2): 1.95 (11-08 @ 13:10)    Gen: Intubated  HEENT:  (-)icterus (-)pallor  CV: N S1 S2 1/6 ZITA (+)2 Pulses B/l  Resp:  Clear to auscultation B/L, normal effort  GI: (+) BS Soft, NT, ND  Lymph:  (-)Edema, (-)obvious lymphadenopathy  Skin: Warm to touch, Normal turgor  Psych: Unable to assess mood and affect      TELEMETRY: SR 60	      ASSESSMENT/PLAN: Patient is a 75 y/o Female with PMH of HTN and HLD who presented with SOB found to have new onset atrial fibrillation and severe MR. EP consulted for Afib management.    - Now s/p MVR, Moser Maze IV procedure, and LISBET ligation on 11/8  - Currently on amio gtt  - Monitor telemetry  - Will follow with you post op  - Supportive care per CTU    Oren Muhammad PA-C  Pager: [phone]

## 2021-11-08 NOTE — PROGRESS NOTE ADULT - SUBJECTIVE AND OBJECTIVE BOX
C A R D I O L O G Y  **********************************     DATE OF SERVICE: 11-08-21    Patient is on ventilator support, unable to obtain review of symptoms.    	  MEDICATIONS:  MEDICATIONS  (STANDING):  acetaminophen     Tablet .. 650 milliGRAM(s) Oral every 6 hours  aMIOdarone Infusion 0.51 mG/Min (17 mL/Hr) IV Continuous <Continuous>  ascorbic acid 500 milliGRAM(s) Oral two times a day  cefuroxime  IVPB 1500 milliGRAM(s) IV Intermittent every 8 hours  chlorhexidine 0.12% Liquid 5 milliLiter(s) Oral Mucosa two times a day  chlorhexidine 2% Cloths 1 Application(s) Topical daily  dexMEDEtomidine Infusion 0.2 MICROgram(s)/kG/Hr (4.05 mL/Hr) IV Continuous <Continuous>  dextrose 50% Injectable 50 milliLiter(s) IV Push every 15 minutes  dextrose 50% Injectable 25 milliLiter(s) IV Push every 15 minutes  DOBUTamine Infusion 3 MICROgram(s)/kG/Min (7.28 mL/Hr) IV Continuous <Continuous>  gabapentin 100 milliGRAM(s) Oral every 8 hours  insulin regular Infusion 3 Unit(s)/Hr (3 mL/Hr) IV Continuous <Continuous>  meperidine     Injectable 25 milliGRAM(s) IV Push once  norepinephrine Infusion 0.05 MICROgram(s)/kG/Min (7.58 mL/Hr) IV Continuous <Continuous>  pantoprazole  Injectable 40 milliGRAM(s) IV Push daily  potassium chloride  10 mEq/50 mL IVPB 10 milliEquivalent(s) IV Intermittent every 1 hour  potassium chloride  10 mEq/50 mL IVPB 10 milliEquivalent(s) IV Intermittent every 1 hour  potassium chloride  10 mEq/50 mL IVPB 10 milliEquivalent(s) IV Intermittent every 1 hour  sodium chloride 0.9%. 1000 milliLiter(s) (10 mL/Hr) IV Continuous <Continuous>  vancomycin  IVPB 1000 milliGRAM(s) IV Intermittent every 12 hours      LABS:	 	    CARDIAC MARKERS:  CARDIAC MARKERS ( 08 Nov 2021 13:27 )  x     / x     / 392 U/L / x     / 63.8 ng/mL                                11.4   7.41  )-----------( 53       ( 08 Nov 2021 15:38 )             33.8     Hemoglobin: 11.4 g/dL (11-08 @ 15:38)  Hemoglobin: 10.8 g/dL (11-08 @ 13:28)  Hemoglobin: 14.8 g/dL (11-08 @ 05:42)  Hemoglobin: 14.1 g/dL (11-07 @ 06:59)  Hemoglobin: 14.2 g/dL (11-06 @ 07:03)      11-08    144  |  106  |  17  ----------------------------<  139<H>  4.4   |  19<L>  |  0.79    Ca    7.8<L>      08 Nov 2021 13:27  Phos  2.0     11-08  Mg     3.4     11-08    TPro  4.5<L>  /  Alb  3.4  /  TBili  1.2  /  DBili  x   /  AST  86<H>  /  ALT  49<H>  /  AlkPhos  63  11-08    Creatinine Trend: 0.79<--, 0.95<--, 0.96<--, 0.92<--, 1.09<--, 1.29<--    COAGS:   PT/INR - ( 08 Nov 2021 13:28 )   PT: 18.1 sec;   INR: 1.54 ratio         PTT - ( 08 Nov 2021 13:28 )  PTT:35.2 sec    proBNP:   Lipid Profile:   HgA1c:   TSH:       PHYSICAL EXAM:  T(C): 37.2 (11-08-21 @ 17:00), Max: 37.2 (11-08-21 @ 17:00)  HR: 62 (11-08-21 @ 18:04) (56 - 104)  BP: 128/77 (11-08-21 @ 04:14) (117/61 - 148/82)  RR: 12 (11-08-21 @ 17:30) (10 - 18)  SpO2: 97% (11-08-21 @ 18:04) (95% - 100%)  Wt(kg): --  I&O's Summary    07 Nov 2021 07:01  -  08 Nov 2021 07:00  --------------------------------------------------------  IN: 635 mL / OUT: 250 mL / NET: 385 mL    08 Nov 2021 07:01  -  08 Nov 2021 18:06  --------------------------------------------------------  IN: 1242.8 mL / OUT: 715 mL / NET: 527.8 mL      Height (cm): 172.7 (11-08 @ 13:10)  Weight (kg): 80.9 (11-08 @ 13:10)  BMI (kg/m2): 27.1 (11-08 @ 13:10)  BSA (m2): 1.95 (11-08 @ 13:10)      HEENT:  (-)icterus (-)pallor  CV: N S1 S2 1/6 ZITA (+)2 Pulses B/l  Resp:  Clear to ausculatation B/L, normal effort  GI: (+) BS Soft, NT, ND  Lymph:  (-)Edema, (-)obvious lymphadenopathy  Skin: Warm to touch, Normal turgor  Psych: Unable to assess mood and affect      TELEMETRY: 	  sinus        ASSESSMENT/PLAN: 	75y  Female   pmh of HTN, HLD,  who presented to the ED with complains of SOB found with pulmonary edema and new afib DOUG revealed severe MR now s/p MV ring, LISBET ligation and MAZE.    - Wean Levo off  - Wean vent per CTS  - On amio gtt EP f/u  - Anticoagulation when ok with CTS    Jair Mullins MD, [facility]  BEEPER [phone]       C A R D I O L O G Y  **********************************     DATE OF SERVICE: 11-08-21    Patient is on ventilator support, unable to obtain review of symptoms.    	  MEDICATIONS:  MEDICATIONS  (STANDING):  acetaminophen     Tablet .. 650 milliGRAM(s) Oral every 6 hours  aMIOdarone Infusion 0.51 mG/Min (17 mL/Hr) IV Continuous <Continuous>  ascorbic acid 500 milliGRAM(s) Oral two times a day  cefuroxime  IVPB 1500 milliGRAM(s) IV Intermittent every 8 hours  chlorhexidine 0.12% Liquid 5 milliLiter(s) Oral Mucosa two times a day  chlorhexidine 2% Cloths 1 Application(s) Topical daily  dexMEDEtomidine Infusion 0.2 MICROgram(s)/kG/Hr (4.05 mL/Hr) IV Continuous <Continuous>  dextrose 50% Injectable 50 milliLiter(s) IV Push every 15 minutes  dextrose 50% Injectable 25 milliLiter(s) IV Push every 15 minutes  DOBUTamine Infusion 3 MICROgram(s)/kG/Min (7.28 mL/Hr) IV Continuous <Continuous>  gabapentin 100 milliGRAM(s) Oral every 8 hours  insulin regular Infusion 3 Unit(s)/Hr (3 mL/Hr) IV Continuous <Continuous>  meperidine     Injectable 25 milliGRAM(s) IV Push once  norepinephrine Infusion 0.05 MICROgram(s)/kG/Min (7.58 mL/Hr) IV Continuous <Continuous>  pantoprazole  Injectable 40 milliGRAM(s) IV Push daily  potassium chloride  10 mEq/50 mL IVPB 10 milliEquivalent(s) IV Intermittent every 1 hour  potassium chloride  10 mEq/50 mL IVPB 10 milliEquivalent(s) IV Intermittent every 1 hour  potassium chloride  10 mEq/50 mL IVPB 10 milliEquivalent(s) IV Intermittent every 1 hour  sodium chloride 0.9%. 1000 milliLiter(s) (10 mL/Hr) IV Continuous <Continuous>  vancomycin  IVPB 1000 milliGRAM(s) IV Intermittent every 12 hours      LABS:	 	    CARDIAC MARKERS:  CARDIAC MARKERS ( 08 Nov 2021 13:27 )  x     / x     / 392 U/L / x     / 63.8 ng/mL                                11.4   7.41  )-----------( 53       ( 08 Nov 2021 15:38 )             33.8     Hemoglobin: 11.4 g/dL (11-08 @ 15:38)  Hemoglobin: 10.8 g/dL (11-08 @ 13:28)  Hemoglobin: 14.8 g/dL (11-08 @ 05:42)  Hemoglobin: 14.1 g/dL (11-07 @ 06:59)  Hemoglobin: 14.2 g/dL (11-06 @ 07:03)      11-08    144  |  106  |  17  ----------------------------<  139<H>  4.4   |  19<L>  |  0.79    Ca    7.8<L>      08 Nov 2021 13:27  Phos  2.0     11-08  Mg     3.4     11-08    TPro  4.5<L>  /  Alb  3.4  /  TBili  1.2  /  DBili  x   /  AST  86<H>  /  ALT  49<H>  /  AlkPhos  63  11-08    Creatinine Trend: 0.79<--, 0.95<--, 0.96<--, 0.92<--, 1.09<--, 1.29<--    COAGS:   PT/INR - ( 08 Nov 2021 13:28 )   PT: 18.1 sec;   INR: 1.54 ratio         PTT - ( 08 Nov 2021 13:28 )  PTT:35.2 sec    proBNP:   Lipid Profile:   HgA1c:   TSH:       PHYSICAL EXAM:  T(C): 37.2 (11-08-21 @ 17:00), Max: 37.2 (11-08-21 @ 17:00)  HR: 62 (11-08-21 @ 18:04) (56 - 104)  BP: 128/77 (11-08-21 @ 04:14) (117/61 - 148/82)  RR: 12 (11-08-21 @ 17:30) (10 - 18)  SpO2: 97% (11-08-21 @ 18:04) (95% - 100%)  Wt(kg): --  I&O's Summary    07 Nov 2021 07:01  -  08 Nov 2021 07:00  --------------------------------------------------------  IN: 635 mL / OUT: 250 mL / NET: 385 mL    08 Nov 2021 07:01  -  08 Nov 2021 18:06  --------------------------------------------------------  IN: 1242.8 mL / OUT: 715 mL / NET: 527.8 mL      Height (cm): 172.7 (11-08 @ 13:10)  Weight (kg): 80.9 (11-08 @ 13:10)  BMI (kg/m2): 27.1 (11-08 @ 13:10)  BSA (m2): 1.95 (11-08 @ 13:10)      HEENT:  (-)icterus (-)pallor  CV: N S1 S2 1/6 ZITA (+)2 Pulses B/l  Resp:  Clear to ausculatation B/L, normal effort  GI: (+) BS Soft, NT, ND  Lymph:  (-)Edema, (-)obvious lymphadenopathy  Skin: Warm to touch, Normal turgor  Psych: Unable to assess mood and affect      TELEMETRY: 	  sinus        ASSESSMENT/PLAN: 	75y  Female   pmh of HTN, HLD,  who presented to the ED with complains of SOB found with pulmonary edema and new afib DOUG revealed severe MR now s/p Tissue MVR, LISBET ligation and MAZE.    - Wean Levo off  - Wean vent per CTS  - On amio gtt EP f/u  - Anticoagulation when ok with CTS    Jair Mullins MD, [facility]  BEEPER [phone]

## 2021-11-08 NOTE — PROGRESS NOTE ADULT - SUBJECTIVE AND OBJECTIVE BOX
ALEX ESPINOZA  MRN-[id number]  Patient is a 75y old  Female who presents with a chief complaint of Sev MR (2021 18:06)    HPI:  75 y/o F with pmhx of HTN, HLD, varicose veins, and vertigo who presented to the ED with complains of worsening SOB for the past 3 weeks, worse with exertion and laying flat and associated with intermittent 3/10 left sided chest pain and palpitations. Labs showed elevated pro-BNP and UA was positive for a possible infection. CTA of chest was negative for PE but showed pleural effusions. Patient was admitted for CHF exacerbation and new onset A-fib. Patient was put on metoprolol for her Afib and Lasix for CHF and was also put on telemetry and FD lovenox. An echocardiogram was performed on her which showed moderate M.    Rapid response was called for dizziness and pre-syncope  and nausea while in the bathroom, Cardiac monitor showing rapid afib with fluctuating HR, 500ml bolus given for low BP, repeat measurement was normal, EKG was obtained, showed A -fib. Her morning medications were given including cardizem drip, lasix and metoprolol  Patient states she is feeling better after interventions.    Cardiology  reviewed her Echo cardiogram which showed:  Moderate to severe MR, Moderately increased RV pressure, diastolic function assessment was inconclusive, EF 55%  She is being transferred to [facility] for ablation and MR assessment for possible repair (2021 21:25)      Surgery/Hospital course:   MVR(T), LAAL, Maze    Today/Overnight:  - Patient underwent a MVR for mitral regurgitation  - New onset Afib requiring Amio   - Lactate elevated tp 3.4,continue trending and monitor while ensuring adequate perfusion.   - Pt opened her eyes but did not move extremities when off sedation, continue to assess neuro status per protocol when pt is off sedation.     Vital Signs Last 24 Hrs  T(C): 37.1 (2021 19:00), Max: 37.2 (2021 17:00)  T(F): 98.8 (2021 19:00), Max: 99 (2021 17:00)  HR: 69 (2021 19:15) (56 - 101)  BP: 128/77 (2021 04:14) (117/61 - 128/77)  BP(mean): --  RR: 11 (2021 19:15) (10 - 18)  SpO2: 98% (2021 19:15) (95% - 100%)  ============================I/O===========================  I&O's Detail    2021 07:01  -  2021 07:00  --------------------------------------------------------  IN:    Heparin: 155 mL    Oral Fluid: 480 mL  Total IN: 635 mL    OUT:    Voided (mL): 250 mL  Total OUT: 250 mL    Total NET: 385 mL      2021 07:01  -  2021 20:17  --------------------------------------------------------  IN:    Albumin 5%  - 250 mL: 500 mL    Amiodarone: 100.2 mL    Dexmedetomidine: 52.8 mL    DOBUTamine: 43.8 mL    Insulin: 22 mL    IV PiggyBack: 100 mL    Lactated Ringers Bolus: 1000 mL    Norepinephrine: 24 mL    sodium chloride 0.9%: 60 mL  Total IN: 1902.8 mL    OUT:    Bulb (mL): 120 mL    Chest Tube (mL): 40 mL    Indwelling Catheter - Urethral (mL): 705 mL    Nasogastric/Oral tube (mL): 5 mL  Total OUT: 870 mL    Total NET: 1032.8 mL        ============================ LABS =========================                        11.4   7.41  )-----------( 53       ( 2021 15:38 )             33.8         144  |  106  |  17  ----------------------------<  139<H>  4.4   |  19<L>  |  0.79    Ca    7.8<L>      2021 13:27  Phos  2.0       Mg     3.4         TPro  4.5<L>  /  Alb  3.4  /  TBili  1.2  /  DBili  x   /  AST  86<H>  /  ALT  49<H>  /  AlkPhos  63      LIVER FUNCTIONS - ( 2021 13:27 )  Alb: 3.4 g/dL / Pro: 4.5 g/dL / ALK PHOS: 63 U/L / ALT: 49 U/L / AST: 86 U/L / GGT: x           PT/INR - ( 2021 13:28 )   PT: 18.1 sec;   INR: 1.54 ratio         PTT - ( 2021 13:28 )  PTT:35.2 sec  ABG - ( 2021 19:11 )  pH, Arterial: 7.41  pH, Blood: x     /  pCO2: 37    /  pO2: 96    / HCO3: 24    / Base Excess: -0.8  /  SaO2: 99.0              Urinalysis Basic - ( 2021 07:50 )    Color: Colorless / Appearance: Clear / S.007 / pH: x  Gluc: x / Ketone: Negative  / Bili: Negative / Urobili: Negative   Blood: x / Protein: Negative / Nitrite: Negative   Leuk Esterase: Small / RBC: 1 /hpf / WBC 3 /HPF   Sq Epi: x / Non Sq Epi: 0 /hpf / Bacteria: Negative      ======================Micro/Rad/Cardio=================  CXR: Reviewed   Echo: Reviewed  ======================================================  PAST MEDICAL & SURGICAL HISTORY:  Hypertension    Vertigo    H/O varicose veins    No significant past surgical history      ========================ASSESSMENT ================  Mitral regurgitation s/p MVR on 21  New onset afib   Lactic acidosis   Acute blood loss anemia  Thrombocytopenia   Stress Hyperglycemia     Plan:  ====================== NEUROLOGY=====================  Sedated with IV Precedex drip for ventilator synchrony.   Pt opened her eyes but did not move extremities when off sedation, continue to assess neuro status per protocol when pt is off sedation.   Tylenol, Gabapentin, Meperidine, PRN Oxycodone, and PRN Dilaudid for pain management.     acetaminophen     Tablet .. 650 milliGRAM(s) Oral every 6 hours  dexMEDEtomidine Infusion 0.2 MICROgram(s)/kG/Hr (4.05 mL/Hr) IV Continuous <Continuous>  gabapentin 100 milliGRAM(s) Oral every 8 hours  HYDROmorphone  Injectable 0.5 milliGRAM(s) IV Push every 6 hours PRN Severe Pain (7 - 10)  meperidine     Injectable 25 milliGRAM(s) IV Push once  oxyCODONE    IR 5 milliGRAM(s) Oral every 4 hours PRN Moderate Pain (4 - 6)  oxyCODONE    IR 10 milliGRAM(s) Oral every 4 hours PRN Severe Pain (7 - 10)    ==================== RESPIRATORY======================  On full vent support, requiring close monitoring of respiratory rate, breathing pattern, pulse oximetry monitoring, and intermittent blood gas analysis. TTE on 21 revealed bilateral pleural effusions.    Mechanical Ventilation:  Mode: AC/ CMV (Assist Control/ Continuous Mandatory Ventilation)  RR (machine): 10  TV (machine): 600  FiO2: 40  PEEP: 5  ITime: 1  MAP: 11  PIP: 22      ====================CARDIOVASCULAR==================  Mitral regurgitation s/p MVR on 21  Invasive hemodynamic monitoring, assess perfusion indices.   New onset afib requiring IV Amio for rate control.  Elevated lactate at 3.4, continue trending and monitor while ensuring adequate perfusion.   TTE on  revealed EF >75% and severely dilated left atrium.   Continue with IV Dobutamine for inotropic support   Continue with IV Levophed  for pressor support   Back up pacing wires in place.     aMIOdarone Infusion 0.51 mG/Min (17 mL/Hr) IV Continuous <Continuous>  DOBUTamine Infusion 3 MICROgram(s)/kG/Min (7.28 mL/Hr) IV Continuous <Continuous>  norepinephrine Infusion 0.05 MICROgram(s)/kG/Min (7.58 mL/Hr) IV Continuous <Continuous>    ===================HEMATOLOGIC/ONC ===================  Anemia and thrombocytopenia due to acute blood loss.   Continue to monitor hemoglobin/hematocrit levels and platelets.     ===================== RENAL =========================  Continue to monitor I/Os, BUN/Creatinine, and urine output.   Goal net negative fluid balance. Replete lytes PRN. Keep K> 4 and Mg >2.     ==================== GASTROINTESTINAL===================  NPO after recent procedure, advance diet as tolerated.   Continue Protonix for stress ulcer prophylaxis.     ascorbic acid 500 milliGRAM(s) Oral two times a day  pantoprazole  Injectable 40 milliGRAM(s) IV Push daily  potassium chloride  10 mEq/50 mL IVPB 10 milliEquivalent(s) IV Intermittent every 1 hour  potassium chloride  10 mEq/50 mL IVPB 10 milliEquivalent(s) IV Intermittent every 1 hour  potassium chloride  10 mEq/50 mL IVPB 10 milliEquivalent(s) IV Intermittent every 1 hour  sodium chloride 0.9%. 1000 milliLiter(s) (10 mL/Hr) IV Continuous <Continuous>    =======================    ENDOCRINE  =====================  Stress Hyperglycemia, requiring glucose control with insulin gtt, titrate as per insulin drip protocol along with hourly glucose checks.     dextrose 50% Injectable 50 milliLiter(s) IV Push every 15 minutes  dextrose 50% Injectable 25 milliLiter(s) IV Push every 15 minutes  insulin regular Infusion 3 Unit(s)/Hr (3 mL/Hr) IV Continuous <Continuous>    ========================INFECTIOUS DISEASE================  Afebrile, white blood cells within normal limits.  Monitor temperature and trend white blood cells.   Continue Cefuroxime and Vancomycin for perioperative antibiotic coverage.    cefuroxime  IVPB 1500 milliGRAM(s) IV Intermittent every 8 hours  vancomycin  IVPB 1000 milliGRAM(s) IV Intermittent every 12 hours      Patient requires continuous monitoring with bedside rhythm monitoring, pulse oximetry monitoring, and continuous central venous and arterial pressure monitoring; and intermittent blood gas analysis.  Care plan discussed with ICU care team.    Patient remained critical, at risk for life threatening decompensation.   I have spent 35 minutes providing acute care with multiple re-evaluations throughout the evening.     By signing my name below, I, Alba Abdullahi, attest that this documentation has been prepared under the direction and in the presence of Jeni Bey NP.  Electronically signed: Denis Paez, 21 @ 20:17    I, Jeni Bey NP, personally performed the services described in this documentation. All medical record entries made by the scribe were at my direction and in my presence. I have reviewed the chart and agree that the record reflects my personal performance and is accurate and complete  Electronically signed: Jeni Bey NP, 21 @ 20:17       ALEX ESPINOZA  MRN-[id number]  Patient is a 75y old  Female who presents with a chief complaint of Sev MR (2021 18:06)    HPI:  75 y/o F with pmhx of HTN, HLD, varicose veins, and vertigo who presented to the ED with complains of worsening SOB for the past 3 weeks, worse with exertion and laying flat and associated with intermittent 3/10 left sided chest pain and palpitations. Labs showed elevated pro-BNP and UA was positive for a possible infection. CTA of chest was negative for PE but showed pleural effusions. Patient was admitted for CHF exacerbation and new onset A-fib. Patient was put on metoprolol for her Afib and Lasix for CHF and was also put on telemetry and FD lovenox. An echocardiogram was performed on her which showed moderate M.    Rapid response was called for dizziness and pre-syncope  and nausea while in the bathroom, Cardiac monitor showing rapid afib with fluctuating HR, 500ml bolus given for low BP, repeat measurement was normal, EKG was obtained, showed A -fib. Her morning medications were given including cardizem drip, lasix and metoprolol  Patient states she is feeling better after interventions.    Cardiology  reviewed her Echo cardiogram which showed:  Moderate to severe MR, Moderately increased RV pressure, diastolic function assessment was inconclusive, EF 55%  She is being transferred to [facility] for ablation and MR assessment for possible repair (2021 21:25)      Surgery/Hospital course:   MVR(T), LAAL, Maze    Today/Overnight:  - Patient underwent a MVR for mitral regurgitation  - New onset Afib requiring Amio   - Lactate elevated t0 3.4,continue trending and monitor while ensuring adequate perfusion.   - Pt opened her eyes but did not move extremities when off sedation, continue to assess neuro status per protocol when pt is off sedation.     Vital Signs Last 24 Hrs  T(C): 37.1 (2021 19:00), Max: 37.2 (2021 17:00)  T(F): 98.8 (2021 19:00), Max: 99 (2021 17:00)  HR: 69 (2021 19:15) (56 - 101)  BP: 128/77 (2021 04:14) (117/61 - 128/77)  BP(mean): --  RR: 11 (2021 19:15) (10 - 18)  SpO2: 98% (2021 19:15) (95% - 100%)  ============================I/O===========================  I&O's Detail    2021 07:01  -  2021 07:00  --------------------------------------------------------  IN:    Heparin: 155 mL    Oral Fluid: 480 mL  Total IN: 635 mL    OUT:    Voided (mL): 250 mL  Total OUT: 250 mL    Total NET: 385 mL      2021 07:01  -  2021 20:17  --------------------------------------------------------  IN:    Albumin 5%  - 250 mL: 500 mL    Amiodarone: 100.2 mL    Dexmedetomidine: 52.8 mL    DOBUTamine: 43.8 mL    Insulin: 22 mL    IV PiggyBack: 100 mL    Lactated Ringers Bolus: 1000 mL    Norepinephrine: 24 mL    sodium chloride 0.9%: 60 mL  Total IN: 1902.8 mL    OUT:    Bulb (mL): 120 mL    Chest Tube (mL): 40 mL    Indwelling Catheter - Urethral (mL): 705 mL    Nasogastric/Oral tube (mL): 5 mL  Total OUT: 870 mL    Total NET: 1032.8 mL        ============================ LABS =========================                        11.4   7.41  )-----------( 53       ( 2021 15:38 )             33.8         144  |  106  |  17  ----------------------------<  139<H>  4.4   |  19<L>  |  0.79    Ca    7.8<L>      2021 13:27  Phos  2.0       Mg     3.4         TPro  4.5<L>  /  Alb  3.4  /  TBili  1.2  /  DBili  x   /  AST  86<H>  /  ALT  49<H>  /  AlkPhos  63      LIVER FUNCTIONS - ( 2021 13:27 )  Alb: 3.4 g/dL / Pro: 4.5 g/dL / ALK PHOS: 63 U/L / ALT: 49 U/L / AST: 86 U/L / GGT: x           PT/INR - ( 2021 13:28 )   PT: 18.1 sec;   INR: 1.54 ratio         PTT - ( 2021 13:28 )  PTT:35.2 sec  ABG - ( 2021 19:11 )  pH, Arterial: 7.41  pH, Blood: x     /  pCO2: 37    /  pO2: 96    / HCO3: 24    / Base Excess: -0.8  /  SaO2: 99.0              Urinalysis Basic - ( 2021 07:50 )    Color: Colorless / Appearance: Clear / S.007 / pH: x  Gluc: x / Ketone: Negative  / Bili: Negative / Urobili: Negative   Blood: x / Protein: Negative / Nitrite: Negative   Leuk Esterase: Small / RBC: 1 /hpf / WBC 3 /HPF   Sq Epi: x / Non Sq Epi: 0 /hpf / Bacteria: Negative      ======================Micro/Rad/Cardio=================  CXR: Reviewed   Echo: Reviewed  ======================================================  PAST MEDICAL & SURGICAL HISTORY:  Hypertension    Vertigo    H/O varicose veins    No significant past surgical history      ========================ASSESSMENT ================  Mitral regurgitation s/p MVR on 21  New onset afib   Lactic acidosis   Acute blood loss anemia  Thrombocytopenia   Stress Hyperglycemia     Plan:  ====================== NEUROLOGY=====================  Sedated with IV Precedex drip for ventilator synchrony.   Pt opened her eyes but did not move extremities when off sedation, continue to assess neuro status per protocol when pt is off sedation.   Tylenol, Gabapentin, Meperidine, PRN Oxycodone, and PRN Dilaudid for pain management.     acetaminophen     Tablet .. 650 milliGRAM(s) Oral every 6 hours  dexMEDEtomidine Infusion 0.2 MICROgram(s)/kG/Hr (4.05 mL/Hr) IV Continuous <Continuous>  gabapentin 100 milliGRAM(s) Oral every 8 hours  HYDROmorphone  Injectable 0.5 milliGRAM(s) IV Push every 6 hours PRN Severe Pain (7 - 10)  meperidine     Injectable 25 milliGRAM(s) IV Push once  oxyCODONE    IR 5 milliGRAM(s) Oral every 4 hours PRN Moderate Pain (4 - 6)  oxyCODONE    IR 10 milliGRAM(s) Oral every 4 hours PRN Severe Pain (7 - 10)    ==================== RESPIRATORY======================  On full vent support, requiring close monitoring of respiratory rate, breathing pattern, pulse oximetry monitoring, and intermittent blood gas analysis. TTE on 21 revealed bilateral pleural effusions.    Mechanical Ventilation:  Mode: AC/ CMV (Assist Control/ Continuous Mandatory Ventilation)  RR (machine): 10  TV (machine): 600  FiO2: 40  PEEP: 5  ITime: 1  MAP: 11  PIP: 22      ====================CARDIOVASCULAR==================  Mitral regurgitation s/p MVR on 21  Invasive hemodynamic monitoring, assess perfusion indices.   New onset afib requiring IV Amio for rate control.  Elevated lactate at 3.4, continue trending and monitor while ensuring adequate perfusion.   TTE on  revealed EF >75% and severely dilated left atrium.   Continue with IV Dobutamine for inotropic support   Continue with IV Levophed  for pressor support   Back up pacing wires in place.     aMIOdarone Infusion 0.51 mG/Min (17 mL/Hr) IV Continuous <Continuous>  DOBUTamine Infusion 3 MICROgram(s)/kG/Min (7.28 mL/Hr) IV Continuous <Continuous>  norepinephrine Infusion 0.05 MICROgram(s)/kG/Min (7.58 mL/Hr) IV Continuous <Continuous>    ===================HEMATOLOGIC/ONC ===================  Anemia and thrombocytopenia due to acute blood loss.   Continue to monitor hemoglobin/hematocrit levels and platelets.     ===================== RENAL =========================  Continue to monitor I/Os, BUN/Creatinine, and urine output.   Goal net negative fluid balance. Replete lytes PRN. Keep K> 4 and Mg >2.     ==================== GASTROINTESTINAL===================  NPO after recent procedure, advance diet as tolerated.   Continue Protonix for stress ulcer prophylaxis.     ascorbic acid 500 milliGRAM(s) Oral two times a day  pantoprazole  Injectable 40 milliGRAM(s) IV Push daily  potassium chloride  10 mEq/50 mL IVPB 10 milliEquivalent(s) IV Intermittent every 1 hour  potassium chloride  10 mEq/50 mL IVPB 10 milliEquivalent(s) IV Intermittent every 1 hour  potassium chloride  10 mEq/50 mL IVPB 10 milliEquivalent(s) IV Intermittent every 1 hour  sodium chloride 0.9%. 1000 milliLiter(s) (10 mL/Hr) IV Continuous <Continuous>    =======================    ENDOCRINE  =====================  Stress Hyperglycemia, requiring glucose control with insulin gtt, titrate as per insulin drip protocol along with hourly glucose checks.     dextrose 50% Injectable 50 milliLiter(s) IV Push every 15 minutes  dextrose 50% Injectable 25 milliLiter(s) IV Push every 15 minutes  insulin regular Infusion 3 Unit(s)/Hr (3 mL/Hr) IV Continuous <Continuous>    ========================INFECTIOUS DISEASE================  Afebrile, white blood cells within normal limits.  Monitor temperature and trend white blood cells.   Continue Cefuroxime and Vancomycin for perioperative antibiotic coverage.    cefuroxime  IVPB 1500 milliGRAM(s) IV Intermittent every 8 hours  vancomycin  IVPB 1000 milliGRAM(s) IV Intermittent every 12 hours      Patient requires continuous monitoring with bedside rhythm monitoring, pulse oximetry monitoring, and continuous central venous and arterial pressure monitoring; and intermittent blood gas analysis.  Care plan discussed with ICU care team.    Patient remained critical, at risk for life threatening decompensation.   I have spent 35 minutes providing acute care with multiple re-evaluations throughout the evening.     By signing my name below, I, Alba Abdullahi, attest that this documentation has been prepared under the direction and in the presence of Jeni Bey NP.  Electronically signed: Denis Paez, 21 @ 20:17    I, Jeni Bey NP, personally performed the services described in this documentation. All medical record entries made by the scribe were at my direction and in my presence. I have reviewed the chart and agree that the record reflects my personal performance and is accurate and complete  Electronically signed: Jeni Bey NP, 21 @ 20:17

## 2021-11-08 NOTE — BRIEF OPERATIVE NOTE - OPERATION/FINDINGS
Aortic XClamp: 126 | Total CPB: 155  Procedure: Mitral valve replacement (t) with Magnaease 29  Moser-Maze IV Cryo-RF  LAAL with AtriClip 35mm

## 2021-11-08 NOTE — PROGRESS NOTE ADULT - SUBJECTIVE AND OBJECTIVE BOX
CRITICAL CARE ATTENDING - CTICU    MEDICATIONS  (STANDING):  acetaminophen     Tablet .. 650 milliGRAM(s) Oral every 6 hours  aMIOdarone    Tablet 400 milliGRAM(s) Oral two times a day  ascorbic acid 500 milliGRAM(s) Oral two times a day  cefuroxime  IVPB 1500 milliGRAM(s) IV Intermittent once  chlorhexidine 0.12% Liquid 5 milliLiter(s) Oral Mucosa two times a day  chlorhexidine 2% Cloths 1 Application(s) Topical daily  dextrose 50% Injectable 50 milliLiter(s) IV Push every 15 minutes  dextrose 50% Injectable 25 milliLiter(s) IV Push every 15 minutes  gabapentin 100 milliGRAM(s) Oral every 8 hours  insulin regular Infusion 3 Unit(s)/Hr (3 mL/Hr) IV Continuous <Continuous>  meperidine     Injectable 25 milliGRAM(s) IV Push once  pantoprazole  Injectable 40 milliGRAM(s) IV Push daily  potassium chloride  10 mEq/50 mL IVPB 10 milliEquivalent(s) IV Intermittent every 1 hour  potassium chloride  10 mEq/50 mL IVPB 10 milliEquivalent(s) IV Intermittent every 1 hour  potassium chloride  10 mEq/50 mL IVPB 10 milliEquivalent(s) IV Intermittent every 1 hour  sodium chloride 0.9%. 1000 milliLiter(s) (10 mL/Hr) IV Continuous <Continuous>                                    14.8   8.83  )-----------( 116      ( 2021 05:42 )             45.4       11-07    139  |  103  |  22  ----------------------------<  101<H>  4.2   |  22  |  0.95    Ca    9.2      2021 06:55    TPro  6.4  /  Alb  4.1  /  TBili  0.5  /  DBili  x   /  AST  71<H>  /  ALT  89<H>  /  AlkPhos  111  11-07      PTT - ( 2021 05:42 )  PTT:70.2 sec    Mode: AC/ CMV (Assist Control/ Continuous Mandatory Ventilation)  RR (machine): 10  TV (machine): 600  FiO2: 100  PEEP: 5  ITime: 1  MAP: 11  PIP: 22      Daily Height in cm: 172.72 (2021 09:40)    Daily Weight in k.9 (2021 04:14)       @ 07:01  -   @ 07:00  --------------------------------------------------------  IN: 635 mL / OUT: 250 mL / NET: 385 mL        Critically Ill patient  : [ ] preoperative ,   [ x] post operative    Requires :  [ x] Arterial Line   [ x] Central Line  [ ] PA catheter  [ ] IABP  [ ] ECMO  [ ] LVAD  [x ] Ventilator  [x ] pacemaker - TPM  [ ] Impella.                      [ x] ABG's     [ x] Pulse Oxymetry Monitoring  Bedside evaluation , monitoring , treatment of hemodynamics , fluids , IVP/ IVCD meds.        Diagnosis:     Op Day - MVR / LAAL    Hypotension     Hypovolemia     Hemodynamic lability,  instability. Requires IVCD [ x] vasopressors [x ] inotropes  [ ] vasodilator  [ x]IVSS fluid  to maintain MAP, perfusion, C.I.     CHF- acute [ x]   chronic [x ]    systolic [x ]   diatolic [ x]          - Echo- EF -  MR / MS           [x ] RV dysfunction - post op           - Cxr-cardiomegally, edema          - Clinical-  [ x]inotropes   [ x]pressors   [ ]diuresis   [ ]IABP   [ ]ECMO   [ ]LVAD   [ ]Respiratory Failure    Ventilator Management:  [x ]AC-rest    [x ]CPAP-PS Wean this PM    [ ]Trach Collar     [ ]Extubate    [ ] T-Piece  [ ]peep>5     Requires chest PT, pulmonary toilet, ambu bagging, suctioning to maintain SaO2,  patent airway and treat atelectasis.     Temporary pacemaker (TPM) interrogation and setting.     Chest Tube Drainage     Requires bedside physical therapy, mobilization and total MCFP care.     Thrombocytopenia     IVCD Insulin                           -                     Discussed with CT surgeon, Physician's Assistant - Nurse Practitioner- Critical care medicine team.   Discussed at  AM / PM rounds.   Chart, labs , films reviewed.    Cumulative Critical Care Time Given Today : 30 min

## 2021-11-09 LAB
ALBUMIN SERPL ELPH-MCNC: 4.3 G/DL — SIGNIFICANT CHANGE UP (ref 3.3–5)
ALP SERPL-CCNC: 58 U/L — SIGNIFICANT CHANGE UP (ref 40–120)
ALT FLD-CCNC: 51 U/L — HIGH (ref 10–45)
ANION GAP SERPL CALC-SCNC: 14 MMOL/L — SIGNIFICANT CHANGE UP (ref 5–17)
APTT BLD: 44.5 SEC — HIGH (ref 27.5–35.5)
AST SERPL-CCNC: 97 U/L — HIGH (ref 10–40)
BASE EXCESS BLDV CALC-SCNC: 0.3 MMOL/L — SIGNIFICANT CHANGE UP (ref -2–2)
BASE EXCESS BLDV CALC-SCNC: 1.2 MMOL/L — SIGNIFICANT CHANGE UP (ref -2–2)
BILIRUB SERPL-MCNC: 2 MG/DL — HIGH (ref 0.2–1.2)
BUN SERPL-MCNC: 19 MG/DL — SIGNIFICANT CHANGE UP (ref 7–23)
CALCIUM SERPL-MCNC: 8.6 MG/DL — SIGNIFICANT CHANGE UP (ref 8.4–10.5)
CHLORIDE SERPL-SCNC: 108 MMOL/L — SIGNIFICANT CHANGE UP (ref 96–108)
CO2 BLDV-SCNC: 27 MMOL/L — HIGH (ref 22–26)
CO2 BLDV-SCNC: 28 MMOL/L — HIGH (ref 22–26)
CO2 SERPL-SCNC: 21 MMOL/L — LOW (ref 22–31)
CREAT SERPL-MCNC: 1.05 MG/DL — SIGNIFICANT CHANGE UP (ref 0.5–1.3)
GAS PNL BLDA: SIGNIFICANT CHANGE UP
GAS PNL BLDV: SIGNIFICANT CHANGE UP
GAS PNL BLDV: SIGNIFICANT CHANGE UP
GLUCOSE SERPL-MCNC: 122 MG/DL — HIGH (ref 70–99)
HCO3 BLDV-SCNC: 25 MMOL/L — SIGNIFICANT CHANGE UP (ref 22–29)
HCO3 BLDV-SCNC: 27 MMOL/L — SIGNIFICANT CHANGE UP (ref 22–29)
HCT VFR BLD CALC: 29.1 % — LOW (ref 34.5–45)
HCT VFR BLD CALC: 31.1 % — LOW (ref 34.5–45)
HGB BLD-MCNC: 10.3 G/DL — LOW (ref 11.5–15.5)
HGB BLD-MCNC: 9.2 G/DL — LOW (ref 11.5–15.5)
HOROWITZ INDEX BLDV+IHG-RTO: 40 — SIGNIFICANT CHANGE UP
HOROWITZ INDEX BLDV+IHG-RTO: 40 — SIGNIFICANT CHANGE UP
INR BLD: 1.18 RATIO — HIGH (ref 0.88–1.16)
MAGNESIUM SERPL-MCNC: 2.8 MG/DL — HIGH (ref 1.6–2.6)
MCHC RBC-ENTMCNC: 30.1 PG — SIGNIFICANT CHANGE UP (ref 27–34)
MCHC RBC-ENTMCNC: 30.2 PG — SIGNIFICANT CHANGE UP (ref 27–34)
MCHC RBC-ENTMCNC: 31.6 GM/DL — LOW (ref 32–36)
MCHC RBC-ENTMCNC: 33.1 GM/DL — SIGNIFICANT CHANGE UP (ref 32–36)
MCV RBC AUTO: 90.9 FL — SIGNIFICANT CHANGE UP (ref 80–100)
MCV RBC AUTO: 95.4 FL — SIGNIFICANT CHANGE UP (ref 80–100)
NRBC # BLD: 0 /100 WBCS — SIGNIFICANT CHANGE UP (ref 0–0)
NRBC # BLD: 0 /100 WBCS — SIGNIFICANT CHANGE UP (ref 0–0)
PCO2 BLDV: 42 MMHG — SIGNIFICANT CHANGE UP (ref 39–42)
PCO2 BLDV: 44 MMHG — HIGH (ref 39–42)
PH BLDV: 7.39 — SIGNIFICANT CHANGE UP (ref 7.32–7.43)
PH BLDV: 7.39 — SIGNIFICANT CHANGE UP (ref 7.32–7.43)
PHOSPHATE SERPL-MCNC: 2.4 MG/DL — LOW (ref 2.5–4.5)
PLATELET # BLD AUTO: 57 K/UL — LOW (ref 150–400)
PLATELET # BLD AUTO: 68 K/UL — LOW (ref 150–400)
PO2 BLDV: 41 MMHG — SIGNIFICANT CHANGE UP (ref 25–45)
PO2 BLDV: 43 MMHG — SIGNIFICANT CHANGE UP (ref 25–45)
POTASSIUM BLDV-SCNC: 4.3 MMOL/L — SIGNIFICANT CHANGE UP (ref 3.5–5.1)
POTASSIUM SERPL-MCNC: 4.4 MMOL/L — SIGNIFICANT CHANGE UP (ref 3.5–5.3)
POTASSIUM SERPL-SCNC: 4.4 MMOL/L — SIGNIFICANT CHANGE UP (ref 3.5–5.3)
PROT SERPL-MCNC: 5.6 G/DL — LOW (ref 6–8.3)
PROTHROM AB SERPL-ACNC: 14 SEC — HIGH (ref 10.6–13.6)
RBC # BLD: 3.05 M/UL — LOW (ref 3.8–5.2)
RBC # BLD: 3.42 M/UL — LOW (ref 3.8–5.2)
RBC # FLD: 14.7 % — HIGH (ref 10.3–14.5)
RBC # FLD: 15.2 % — HIGH (ref 10.3–14.5)
SAO2 % BLDV: 75.3 % — SIGNIFICANT CHANGE UP (ref 67–88)
SAO2 % BLDV: 75.8 % — SIGNIFICANT CHANGE UP (ref 67–88)
SODIUM SERPL-SCNC: 143 MMOL/L — SIGNIFICANT CHANGE UP (ref 135–145)
WBC # BLD: 13.75 K/UL — HIGH (ref 3.8–10.5)
WBC # BLD: 7.43 K/UL — SIGNIFICANT CHANGE UP (ref 3.8–10.5)
WBC # FLD AUTO: 13.75 K/UL — HIGH (ref 3.8–10.5)
WBC # FLD AUTO: 7.43 K/UL — SIGNIFICANT CHANGE UP (ref 3.8–10.5)

## 2021-11-09 PROCEDURE — 93010 ELECTROCARDIOGRAM REPORT: CPT

## 2021-11-09 PROCEDURE — 71045 X-RAY EXAM CHEST 1 VIEW: CPT | Mod: 26

## 2021-11-09 PROCEDURE — 99291 CRITICAL CARE FIRST HOUR: CPT

## 2021-11-09 RX ORDER — AMIODARONE HYDROCHLORIDE 400 MG/1
400 TABLET ORAL EVERY 12 HOURS
Refills: 0 | Status: DISCONTINUED | OUTPATIENT
Start: 2021-11-09 | End: 2021-11-10

## 2021-11-09 RX ORDER — FUROSEMIDE 40 MG
20 TABLET ORAL ONCE
Refills: 0 | Status: COMPLETED | OUTPATIENT
Start: 2021-11-09 | End: 2021-11-09

## 2021-11-09 RX ORDER — PANTOPRAZOLE SODIUM 20 MG/1
40 TABLET, DELAYED RELEASE ORAL
Refills: 0 | Status: DISCONTINUED | OUTPATIENT
Start: 2021-11-09 | End: 2021-11-20

## 2021-11-09 RX ADMIN — Medication 20 MILLIGRAM(S): at 12:00

## 2021-11-09 RX ADMIN — Medication 650 MILLIGRAM(S): at 02:30

## 2021-11-09 RX ADMIN — AMIODARONE HYDROCHLORIDE 400 MILLIGRAM(S): 400 TABLET ORAL at 04:56

## 2021-11-09 RX ADMIN — Medication 650 MILLIGRAM(S): at 23:38

## 2021-11-09 RX ADMIN — Medication 100 MILLIGRAM(S): at 05:58

## 2021-11-09 RX ADMIN — GABAPENTIN 100 MILLIGRAM(S): 400 CAPSULE ORAL at 17:02

## 2021-11-09 RX ADMIN — AMIODARONE HYDROCHLORIDE 400 MILLIGRAM(S): 400 TABLET ORAL at 17:01

## 2021-11-09 RX ADMIN — HYDROMORPHONE HYDROCHLORIDE 0.5 MILLIGRAM(S): 2 INJECTION INTRAMUSCULAR; INTRAVENOUS; SUBCUTANEOUS at 08:40

## 2021-11-09 RX ADMIN — Medication 650 MILLIGRAM(S): at 17:01

## 2021-11-09 RX ADMIN — Medication 62.5 MILLIMOLE(S): at 06:55

## 2021-11-09 RX ADMIN — Medication 250 MILLIGRAM(S): at 21:00

## 2021-11-09 RX ADMIN — POLYETHYLENE GLYCOL 3350 17 GRAM(S): 17 POWDER, FOR SOLUTION ORAL at 12:01

## 2021-11-09 RX ADMIN — Medication 650 MILLIGRAM(S): at 17:46

## 2021-11-09 RX ADMIN — HYDROMORPHONE HYDROCHLORIDE 0.5 MILLIGRAM(S): 2 INJECTION INTRAMUSCULAR; INTRAVENOUS; SUBCUTANEOUS at 08:25

## 2021-11-09 RX ADMIN — Medication 250 MILLIGRAM(S): at 08:24

## 2021-11-09 RX ADMIN — Medication 650 MILLIGRAM(S): at 06:00

## 2021-11-09 RX ADMIN — GABAPENTIN 100 MILLIGRAM(S): 400 CAPSULE ORAL at 02:16

## 2021-11-09 RX ADMIN — Medication 650 MILLIGRAM(S): at 23:08

## 2021-11-09 RX ADMIN — PANTOPRAZOLE SODIUM 40 MILLIGRAM(S): 20 TABLET, DELAYED RELEASE ORAL at 12:00

## 2021-11-09 RX ADMIN — CHLORHEXIDINE GLUCONATE 5 MILLILITER(S): 213 SOLUTION TOPICAL at 05:58

## 2021-11-09 RX ADMIN — Medication 650 MILLIGRAM(S): at 02:00

## 2021-11-09 RX ADMIN — Medication 650 MILLIGRAM(S): at 12:00

## 2021-11-09 RX ADMIN — Medication 20 MILLIGRAM(S): at 06:55

## 2021-11-09 RX ADMIN — CHLORHEXIDINE GLUCONATE 1 APPLICATION(S): 213 SOLUTION TOPICAL at 12:36

## 2021-11-09 RX ADMIN — SENNA PLUS 2 TABLET(S): 8.6 TABLET ORAL at 23:07

## 2021-11-09 RX ADMIN — Medication 500 MILLIGRAM(S): at 17:02

## 2021-11-09 RX ADMIN — Medication 100 MILLIGRAM(S): at 13:00

## 2021-11-09 RX ADMIN — Medication 500 MILLIGRAM(S): at 05:58

## 2021-11-09 RX ADMIN — Medication 650 MILLIGRAM(S): at 12:35

## 2021-11-09 RX ADMIN — Medication 100 MILLIGRAM(S): at 23:00

## 2021-11-09 RX ADMIN — GABAPENTIN 100 MILLIGRAM(S): 400 CAPSULE ORAL at 11:59

## 2021-11-09 RX ADMIN — Medication 650 MILLIGRAM(S): at 06:30

## 2021-11-09 NOTE — PHYSICAL THERAPY INITIAL EVALUATION ADULT - PATIENT/FAMILY/SIGNIFICANT OTHER GOALS STATEMENT, PT EVAL
Patient achieved a 15% reduction in IOP from pretreatment levels or a plan is in place to achieve this goal. to go home; to get better

## 2021-11-09 NOTE — PHYSICAL THERAPY INITIAL EVALUATION ADULT - PRECAUTIONS/LIMITATIONS, REHAB EVAL
CONTINUED: Pt was given her morning medications and her symptoms resolved. Echo revealed moderate to severe MR, moderately increased RV pressure, diastolic function was inconclusive. Pt underwent MVR, LISBET (11/8)./fall precautions/sternal precautions/surgical precautions

## 2021-11-09 NOTE — PROGRESS NOTE ADULT - SUBJECTIVE AND OBJECTIVE BOX
CRITICAL CARE ATTENDING - CTICU    MEDICATIONS  (STANDING):  acetaminophen     Tablet .. 650 milliGRAM(s) Oral every 6 hours  aMIOdarone    Tablet 400 milliGRAM(s) Oral every 12 hours  ascorbic acid 500 milliGRAM(s) Oral two times a day  cefuroxime  IVPB 1500 milliGRAM(s) IV Intermittent every 8 hours  chlorhexidine 0.12% Liquid 5 milliLiter(s) Oral Mucosa two times a day  chlorhexidine 2% Cloths 1 Application(s) Topical daily  dexMEDEtomidine Infusion 0.2 MICROgram(s)/kG/Hr (4.05 mL/Hr) IV Continuous <Continuous>  dextrose 50% Injectable 50 milliLiter(s) IV Push every 15 minutes  dextrose 50% Injectable 25 milliLiter(s) IV Push every 15 minutes  DOBUTamine Infusion 3 MICROgram(s)/kG/Min (7.28 mL/Hr) IV Continuous <Continuous>  gabapentin 100 milliGRAM(s) Oral every 8 hours  insulin regular Infusion 3 Unit(s)/Hr (3 mL/Hr) IV Continuous <Continuous>  meperidine     Injectable 25 milliGRAM(s) IV Push once  norepinephrine Infusion 0.05 MICROgram(s)/kG/Min (7.58 mL/Hr) IV Continuous <Continuous>  pantoprazole  Injectable 40 milliGRAM(s) IV Push daily  polyethylene glycol 3350 17 Gram(s) Oral daily  potassium chloride  10 mEq/50 mL IVPB 10 milliEquivalent(s) IV Intermittent every 1 hour  potassium chloride  10 mEq/50 mL IVPB 10 milliEquivalent(s) IV Intermittent every 1 hour  potassium chloride  10 mEq/50 mL IVPB 10 milliEquivalent(s) IV Intermittent every 1 hour  senna 2 Tablet(s) Oral at bedtime  sodium chloride 0.9%. 1000 milliLiter(s) (10 mL/Hr) IV Continuous <Continuous>  vancomycin  IVPB 1000 milliGRAM(s) IV Intermittent every 12 hours                                    10.3   7.43  )-----------( 57       ( 09 Nov 2021 00:37 )             31.1       11-09    143  |  108  |  19  ----------------------------<  122<H>  4.4   |  21<L>  |  1.05    Ca    8.6      09 Nov 2021 00:34  Phos  2.4     11-09  Mg     2.8     11-09    TPro  5.6<L>  /  Alb  4.3  /  TBili  2.0<H>  /  DBili  x   /  AST  97<H>  /  ALT  51<H>  /  AlkPhos  58  11-09      PT/INR - ( 09 Nov 2021 00:33 )   PT: 14.0 sec;   INR: 1.18 ratio         PTT - ( 09 Nov 2021 00:33 )  PTT:44.5 sec    Mode: OFF  FiO2: 40      Daily Height in cm: 172.7 (08 Nov 2021 13:10)    Daily       11-08 @ 07:01  -  11-09 @ 07:00  --------------------------------------------------------  IN: 3191.1 mL / OUT: 1656 mL / NET: 1535.1 mL        Critically Ill patient  : [ ] preoperative ,   [x] post operative    Requires :  [x] Arterial Line   [x] Central Line  [ ] PA catheter  [ ] IABP  [ ] ECMO  [ ] LVAD  [x] Ventilator  [x] pacemaker [ ] Impella.                      [x] ABG's     [x] Pulse Oxymetry Monitoring  Bedside evaluation , monitoring , treatment of hemodynamics , fluids , IVP/ IVCD meds.        Diagnosis:     POD 1 - MVR / LAAL    Hypotension     Hypovolemia     Hemodynamic lability,  instability. Requires IVCD [ x] vasopressors [x ] inotropes  [ ] vasodilator  [ x]IVSS fluid  to maintain MAP, perfusion, C.I.     CHF- acute [ x]   chronic [x ]    systolic [x ]   diatolic [ x]          - Echo- EF -  MR / MS           [x ] RV dysfunction - post op           - Cxr-cardiomegally, edema          - Clinical-  [ x]inotropes   [ x]pressors   [ ]diuresis   [ ]IABP   [ ]ECMO   [ ]LVAD   [ ]Respiratory Failure    Ventilator Management:  [x ]AC-rest    [x ]CPAP-PS Wean this PM    [ ]Trach Collar     [ ]Extubate    [ ] T-Piece  [ ]peep>5     Requires chest PT, pulmonary toilet, ambu bagging, suctioning to maintain SaO2,  patent airway and treat atelectasis.     Temporary pacemaker (TPM) interrogation and setting.     Chest Tube Drainage     Requires bedside physical therapy, mobilization and total half-way care.     Thrombocytopenia     IVCD Insulin           I, Judson De Anda, personally performed the services described in this documentation. All medical record entries made by the scribe were at my direction and in my presence. I have reviewed the chart and agree that the record reflects my personal performance and is accurate and complete.   Judson De Anda MD.       By signing my name below, I, Magda Rock, attest that this documentation has been prepared under the direction and in the presence of Judson De Anda MD.   Electronically Signed: Magda Rock Scribe 11-09-21 @ 07:29        Discussed with CT surgeon, Physician Assistant - Nurse Practitioner- Critical care medicine team.   Dicussed at  AM / PM rounds.   Chart, labs , films reviewed.    Cumulative Critical Care Time Given Today:  CRITICAL CARE ATTENDING - CTICU    MEDICATIONS  (STANDING):  acetaminophen     Tablet .. 650 milliGRAM(s) Oral every 6 hours  aMIOdarone    Tablet 400 milliGRAM(s) Oral every 12 hours  ascorbic acid 500 milliGRAM(s) Oral two times a day  cefuroxime  IVPB 1500 milliGRAM(s) IV Intermittent every 8 hours  chlorhexidine 0.12% Liquid 5 milliLiter(s) Oral Mucosa two times a day  chlorhexidine 2% Cloths 1 Application(s) Topical daily  dexMEDEtomidine Infusion 0.2 MICROgram(s)/kG/Hr (4.05 mL/Hr) IV Continuous <Continuous>  dextrose 50% Injectable 50 milliLiter(s) IV Push every 15 minutes  dextrose 50% Injectable 25 milliLiter(s) IV Push every 15 minutes  DOBUTamine Infusion 3 MICROgram(s)/kG/Min (7.28 mL/Hr) IV Continuous <Continuous>  gabapentin 100 milliGRAM(s) Oral every 8 hours  insulin regular Infusion 3 Unit(s)/Hr (3 mL/Hr) IV Continuous <Continuous>  meperidine     Injectable 25 milliGRAM(s) IV Push once  norepinephrine Infusion 0.05 MICROgram(s)/kG/Min (7.58 mL/Hr) IV Continuous <Continuous>  pantoprazole  Injectable 40 milliGRAM(s) IV Push daily  polyethylene glycol 3350 17 Gram(s) Oral daily  potassium chloride  10 mEq/50 mL IVPB 10 milliEquivalent(s) IV Intermittent every 1 hour  potassium chloride  10 mEq/50 mL IVPB 10 milliEquivalent(s) IV Intermittent every 1 hour  potassium chloride  10 mEq/50 mL IVPB 10 milliEquivalent(s) IV Intermittent every 1 hour  senna 2 Tablet(s) Oral at bedtime  sodium chloride 0.9%. 1000 milliLiter(s) (10 mL/Hr) IV Continuous <Continuous>  vancomycin  IVPB 1000 milliGRAM(s) IV Intermittent every 12 hours                                    10.3   7.43  )-----------( 57       ( 09 Nov 2021 00:37 )             31.1       11-09    143  |  108  |  19  ----------------------------<  122<H>  4.4   |  21<L>  |  1.05    Ca    8.6      09 Nov 2021 00:34  Phos  2.4     11-09  Mg     2.8     11-09    TPro  5.6<L>  /  Alb  4.3  /  TBili  2.0<H>  /  DBili  x   /  AST  97<H>  /  ALT  51<H>  /  AlkPhos  58  11-09      PT/INR - ( 09 Nov 2021 00:33 )   PT: 14.0 sec;   INR: 1.18 ratio         PTT - ( 09 Nov 2021 00:33 )  PTT:44.5 sec    Mode: OFF  FiO2: 40      Daily Height in cm: 172.7 (08 Nov 2021 13:10)    Daily       11-08 @ 07:01  -  11-09 @ 07:00  --------------------------------------------------------  IN: 3191.1 mL / OUT: 1656 mL / NET: 1535.1 mL        Critically Ill patient  : [ ] preoperative ,   [x] post operative    Requires :  [x] Arterial Line   [x] Central Line  [ ] PA catheter  [ ] IABP  [ ] ECMO  [ ] LVAD  [x] Ventilator  [x] pacemaker - TPM  [ ] Impella.                      [x] ABG's     [x] Pulse Oxymetry Monitoring  Bedside evaluation , monitoring , treatment of hemodynamics , fluids , IVP/ IVCD meds.        Diagnosis:     POD 1 - MVR / LAAL / Maze    Hypotension     Hypovolemia     Hemodynamic lability,  instability. Requires IVCD [ x] vasopressors [x ] inotropes  [ ] vasodilator  [ x]IVSS fluid  to maintain MAP, perfusion, C.I.     CHF- acute [ x]   chronic [x ]    systolic [x ]   diatolic [ x]          - Echo- EF -  MR / MS           [x ] RV dysfunction - post op           - Cxr-cardiomegally, edema          - Clinical-  [ x]inotropes   [ x]pressors   [ ]diuresis   [ ]IABP   [ ]ECMO   [ ]LVAD   [ ]Respiratory Failure    Ventilator Management:  [x ]AC-rest    [x ]CPAP-PS Wean this PM    [ ]Trach Collar     [ x]Extubated    [ ] T-Piece  [ ]peep>5     Requires chest PT, pulmonary toilet, ambu bagging, suctioning to maintain SaO2,  patent airway and treat atelectasis.     Temporary pacemaker (TPM) interrogation and setting.     Chest Tube Drainage     Requires bedside physical therapy, mobilization and total USP care.     Thrombocytopenia     IVCD Insulin           I, Judson De Anda, personally performed the services described in this documentation. All medical record entries made by the scribe were at my direction and in my presence. I have reviewed the chart and agree that the record reflects my personal performance and is accurate and complete.   Judson De Anda MD.       By signing my name below, I, Magda Rock, attest that this documentation has been prepared under the direction and in the presence of Judson De Anda MD.   Electronically Signed: Magda Rock Scribe 11-09-21 @ 07:29        Discussed with CT surgeon, Physician Assistant - Nurse Practitioner- Critical care medicine team.   Dicussed at  AM / PM rounds.   Chart, labs , films reviewed.    Cumulative Critical Care Time Given Today:  30 min

## 2021-11-09 NOTE — PHYSICAL THERAPY INITIAL EVALUATION ADULT - PERTINENT HX OF CURRENT PROBLEM, REHAB EVAL
Pt is a 74 y/o female admitted with severe MR. PMH: HTN, HLD, varicose veins, and vertigo. Pt presented to ED with complaints of worening SOB x3 weeks. SOB worsens with exertion and laying flat. CTA chest was negative for PE but showed pleural effusions. Pt was admitted for CHF exacerbation and new onset Afib. RRT was called for dizziness and pre-syncope while in the bathroom.

## 2021-11-09 NOTE — PROGRESS NOTE ADULT - SUBJECTIVE AND OBJECTIVE BOX
EP     DATE OF SERVICE: 11-09-21  s/p Bio-MVR, Maze, and LA appendage clip on 11/8.  Awake, extubated, states "I have some pain in my chest, especially with a deep breath".    No events overnight.  Pressors/inotropes are ordered, but OFF, only fluids + insulin, and oral amiodarone to maintain sinus rhythm.  	  acetaminophen     Tablet .. 650 milliGRAM(s) Oral every 6 hours  aMIOdarone    Tablet 400 milliGRAM(s) Oral every 12 hours  ascorbic acid 500 milliGRAM(s) Oral two times a day  cefuroxime  IVPB 1500 milliGRAM(s) IV Intermittent every 8 hours  chlorhexidine 0.12% Liquid 5 milliLiter(s) Oral Mucosa two times a day  chlorhexidine 2% Cloths 1 Application(s) Topical daily  dexMEDEtomidine Infusion 0.2 MICROgram(s)/kG/Hr IV Continuous <Continuous>  dextrose 50% Injectable 50 milliLiter(s) IV Push every 15 minutes  dextrose 50% Injectable 25 milliLiter(s) IV Push every 15 minutes  DOBUTamine Infusion 3 MICROgram(s)/kG/Min IV Continuous <Continuous>  gabapentin 100 milliGRAM(s) Oral every 8 hours  HYDROmorphone  Injectable 0.5 milliGRAM(s) IV Push every 6 hours PRN  insulin regular Infusion 3 Unit(s)/Hr IV Continuous <Continuous>  meperidine     Injectable 25 milliGRAM(s) IV Push once  norepinephrine Infusion 0.05 MICROgram(s)/kG/Min IV Continuous <Continuous>  oxyCODONE    IR 5 milliGRAM(s) Oral every 4 hours PRN  oxyCODONE    IR 10 milliGRAM(s) Oral every 4 hours PRN  pantoprazole  Injectable 40 milliGRAM(s) IV Push daily  polyethylene glycol 3350 17 Gram(s) Oral daily  potassium chloride  10 mEq/50 mL IVPB 10 milliEquivalent(s) IV Intermittent every 1 hour  potassium chloride  10 mEq/50 mL IVPB 10 milliEquivalent(s) IV Intermittent every 1 hour  potassium chloride  10 mEq/50 mL IVPB 10 milliEquivalent(s) IV Intermittent every 1 hour  senna 2 Tablet(s) Oral at bedtime  sodium chloride 0.9%. 1000 milliLiter(s) IV Continuous <Continuous>  vancomycin  IVPB 1000 milliGRAM(s) IV Intermittent every 12 hours                          10.3   7.43  )-----------( 57       ( 09 Nov 2021 00:37 )             31.1     11-09    143  |  108  |  19  ----------------------------<  122<H>  4.4   |  21<L>  |  1.05    Ca    8.6      09 Nov 2021 00:34  Phos  2.4     11-09  Mg     2.8     11-09    TPro  5.6<L>  /  Alb  4.3  /  TBili  2.0<H>  /  DBili  x   /  AST  97<H>  /  ALT  51<H>  /  AlkPhos  58  11-09      CARDIAC MARKERS ( 08 Nov 2021 13:27 )  x     / x     / 392 U/L / x     / 63.8 ng/mL    T(C): 37.6 (11-09-21 @ 08:00), Max: 37.6 (11-09-21 @ 08:00)  HR: 64 (11-09-21 @ 09:00) (56 - 81)  BP: 152/73 (11-09-21 @ 08:00) (128/60 - 152/73)  RR: 18 (11-09-21 @ 09:00) (10 - 23)  SpO2: 97% (11-09-21 @ 09:00) (95% - 100%)  Wt(kg): --    I&O's Summary    08 Nov 2021 07:01  -  09 Nov 2021 07:00  --------------------------------------------------------  IN: 3191.1 mL / OUT: 1656 mL / NET: 1535.1 mL    09 Nov 2021 07:01  -  09 Nov 2021 09:08  --------------------------------------------------------  IN: 75.5 mL / OUT: 260 mL / NET: -184.5 mL    Gen: awake and alert, cooperative.  HEENT:  (-)icterus (-)pallor  CV: Regular S1 S2 1/6 ZITA (+)2 Pulses B/l, chest tubes, pacer wires, right IJ CVL.  Resp:  Clear to auscultation B/L, normal effort  GI: (+) BS Soft, NT, ND  Lymph:  (-)Edema, (-)obvious lymphadenopathy  Skin: Warm to touch, Normal turgor  Psych: Mood is "I feel good", affect is normal, in good spirits.    TELEMETRY: SR 60's	      ASSESSMENT/PLAN: Patient is a 77 y/o Female with PMH of HTN and HLD who presented with SOB found to have new onset atrial fibrillation and severe MR. EP consulted for Afib management.    - s/p MVR, Moser Maze IV procedure, and LISBET clip on 11/8.  - Oral Amiodarone, recommend 5 gram load (IV infusion, + 400mg x 12 doses), then 200mg daily.  With her degree of heart failure, there is a high priority to maintain sinus rhythm.  - Not requiring pacing at this time.  Maintain telemetry, maintain pacing wires.  - Will follow.    Perez Bolton M.D.  Cardiac Electrophysiology  [phone]

## 2021-11-09 NOTE — PROGRESS NOTE ADULT - SUBJECTIVE AND OBJECTIVE BOX
C A R D I O L O G Y  **********************************     DATE OF SERVICE: 11-09-21    Extubated, denies chest pain or SOB. Review of systems otherwise (-)  	    MEDICATIONS  (STANDING):  acetaminophen     Tablet .. 650 milliGRAM(s) Oral every 6 hours  aMIOdarone    Tablet 400 milliGRAM(s) Oral every 12 hours  ascorbic acid 500 milliGRAM(s) Oral two times a day  cefuroxime  IVPB 1500 milliGRAM(s) IV Intermittent every 8 hours  chlorhexidine 0.12% Liquid 5 milliLiter(s) Oral Mucosa two times a day  chlorhexidine 2% Cloths 1 Application(s) Topical daily  dextrose 50% Injectable 50 milliLiter(s) IV Push every 15 minutes  dextrose 50% Injectable 25 milliLiter(s) IV Push every 15 minutes  furosemide   Injectable 20 milliGRAM(s) IV Push once  gabapentin 100 milliGRAM(s) Oral every 8 hours  insulin regular Infusion 3 Unit(s)/Hr (3 mL/Hr) IV Continuous <Continuous>  pantoprazole    Tablet 40 milliGRAM(s) Oral before breakfast  polyethylene glycol 3350 17 Gram(s) Oral daily  potassium chloride  10 mEq/50 mL IVPB 10 milliEquivalent(s) IV Intermittent every 1 hour  potassium chloride  10 mEq/50 mL IVPB 10 milliEquivalent(s) IV Intermittent every 1 hour  potassium chloride  10 mEq/50 mL IVPB 10 milliEquivalent(s) IV Intermittent every 1 hour  senna 2 Tablet(s) Oral at bedtime  sodium chloride 0.9%. 1000 milliLiter(s) (10 mL/Hr) IV Continuous <Continuous>  vancomycin  IVPB 1000 milliGRAM(s) IV Intermittent every 12 hours    MEDICATIONS  (PRN):  HYDROmorphone  Injectable 0.5 milliGRAM(s) IV Push every 6 hours PRN Severe Pain (7 - 10)  oxyCODONE    IR 5 milliGRAM(s) Oral every 4 hours PRN Moderate Pain (4 - 6)  oxyCODONE    IR 10 milliGRAM(s) Oral every 4 hours PRN Severe Pain (7 - 10)      LABS:                          10.3   7.43  )-----------( 57       ( 09 Nov 2021 00:37 )             31.1     Hemoglobin: 10.3 g/dL (11-09 @ 00:37)  Hemoglobin: 11.4 g/dL (11-08 @ 15:38)  Hemoglobin: 10.8 g/dL (11-08 @ 13:28)  Hemoglobin: 14.8 g/dL (11-08 @ 05:42)  Hemoglobin: 14.1 g/dL (11-07 @ 06:59)    11-09    143  |  108  |  19  ----------------------------<  122<H>  4.4   |  21<L>  |  1.05    Ca    8.6      09 Nov 2021 00:34  Phos  2.4     11-09  Mg     2.8     11-09    TPro  5.6<L>  /  Alb  4.3  /  TBili  2.0<H>  /  DBili  x   /  AST  97<H>  /  ALT  51<H>  /  AlkPhos  58  11-09    Creatinine Trend: 1.05<--, 0.79<--, 0.95<--, 0.96<--, 0.92<--, 1.09<--   PT/INR - ( 09 Nov 2021 00:33 )   PT: 14.0 sec;   INR: 1.18 ratio         PTT - ( 09 Nov 2021 00:33 )  PTT:44.5 sec  CARDIAC MARKERS ( 08 Nov 2021 13:27 )  x     / x     / 392 U/L / x     / 63.8 ng/mL          11-08-21 @ 07:01  -  11-09-21 @ 07:00  --------------------------------------------------------  IN: 3191.1 mL / OUT: 1656 mL / NET: 1535.1 mL    11-09-21 @ 07:01  -  11-09-21 @ 11:42  --------------------------------------------------------  IN: 539.5 mL / OUT: 855 mL / NET: -315.5 mL        PHYSICAL EXAM  Vital Signs Last 24 Hrs  T(C): 37.6 (09 Nov 2021 08:00), Max: 37.6 (09 Nov 2021 08:00)  T(F): 99.7 (09 Nov 2021 08:00), Max: 99.7 (09 Nov 2021 08:00)  HR: 64 (09 Nov 2021 11:00) (56 - 81)  BP: 153/67 (09 Nov 2021 11:00) (128/60 - 153/67)  BP(mean): 96 (09 Nov 2021 11:00) (87 - 105)  RR: 20 (09 Nov 2021 11:00) (10 - 23)  SpO2: 97% (09 Nov 2021 11:00) (95% - 100%)      HEENT:  (-)icterus (-)pallor  CV: N S1 S2 1/6 ZITA (+)2 Pulses B/l  Resp:  Clear to auscultation B/L, normal effort  GI: (+) BS Soft, NT, ND  Lymph:  (-)Edema, (-)obvious lymphadenopathy  Skin: Warm to touch, Normal turgor  Psych: Appropriate mood and affect      TELEMETRY: SR 60s    ASSESSMENT/PLAN: 	75y  Female   pmh of HTN, HLD,  who presented to the ED with complains of SOB found with pulmonary edema and new afib DOUG revealed severe MR now s/p MV ring, LISBET ligation and MAZE.    - Extubated, wean O2 as tolerated  - Weaned off pressors  - Amio per EP  - Anticoagulation when ok with CTS  - Supportive care per CTU appreciated    Oren Muhammad PA-C  Pager: [phone]         C A R D I O L O G Y  **********************************     DATE OF SERVICE: 11-09-21    Extubated, denies chest pain or SOB. Review of systems otherwise (-)  	    MEDICATIONS  (STANDING):  acetaminophen     Tablet .. 650 milliGRAM(s) Oral every 6 hours  aMIOdarone    Tablet 400 milliGRAM(s) Oral every 12 hours  ascorbic acid 500 milliGRAM(s) Oral two times a day  cefuroxime  IVPB 1500 milliGRAM(s) IV Intermittent every 8 hours  chlorhexidine 0.12% Liquid 5 milliLiter(s) Oral Mucosa two times a day  chlorhexidine 2% Cloths 1 Application(s) Topical daily  dextrose 50% Injectable 50 milliLiter(s) IV Push every 15 minutes  dextrose 50% Injectable 25 milliLiter(s) IV Push every 15 minutes  furosemide   Injectable 20 milliGRAM(s) IV Push once  gabapentin 100 milliGRAM(s) Oral every 8 hours  insulin regular Infusion 3 Unit(s)/Hr (3 mL/Hr) IV Continuous <Continuous>  pantoprazole    Tablet 40 milliGRAM(s) Oral before breakfast  polyethylene glycol 3350 17 Gram(s) Oral daily  potassium chloride  10 mEq/50 mL IVPB 10 milliEquivalent(s) IV Intermittent every 1 hour  potassium chloride  10 mEq/50 mL IVPB 10 milliEquivalent(s) IV Intermittent every 1 hour  potassium chloride  10 mEq/50 mL IVPB 10 milliEquivalent(s) IV Intermittent every 1 hour  senna 2 Tablet(s) Oral at bedtime  sodium chloride 0.9%. 1000 milliLiter(s) (10 mL/Hr) IV Continuous <Continuous>  vancomycin  IVPB 1000 milliGRAM(s) IV Intermittent every 12 hours    MEDICATIONS  (PRN):  HYDROmorphone  Injectable 0.5 milliGRAM(s) IV Push every 6 hours PRN Severe Pain (7 - 10)  oxyCODONE    IR 5 milliGRAM(s) Oral every 4 hours PRN Moderate Pain (4 - 6)  oxyCODONE    IR 10 milliGRAM(s) Oral every 4 hours PRN Severe Pain (7 - 10)      LABS:                          10.3   7.43  )-----------( 57       ( 09 Nov 2021 00:37 )             31.1     Hemoglobin: 10.3 g/dL (11-09 @ 00:37)  Hemoglobin: 11.4 g/dL (11-08 @ 15:38)  Hemoglobin: 10.8 g/dL (11-08 @ 13:28)  Hemoglobin: 14.8 g/dL (11-08 @ 05:42)  Hemoglobin: 14.1 g/dL (11-07 @ 06:59)    11-09    143  |  108  |  19  ----------------------------<  122<H>  4.4   |  21<L>  |  1.05    Ca    8.6      09 Nov 2021 00:34  Phos  2.4     11-09  Mg     2.8     11-09    TPro  5.6<L>  /  Alb  4.3  /  TBili  2.0<H>  /  DBili  x   /  AST  97<H>  /  ALT  51<H>  /  AlkPhos  58  11-09    Creatinine Trend: 1.05<--, 0.79<--, 0.95<--, 0.96<--, 0.92<--, 1.09<--   PT/INR - ( 09 Nov 2021 00:33 )   PT: 14.0 sec;   INR: 1.18 ratio         PTT - ( 09 Nov 2021 00:33 )  PTT:44.5 sec  CARDIAC MARKERS ( 08 Nov 2021 13:27 )  x     / x     / 392 U/L / x     / 63.8 ng/mL          11-08-21 @ 07:01  -  11-09-21 @ 07:00  --------------------------------------------------------  IN: 3191.1 mL / OUT: 1656 mL / NET: 1535.1 mL    11-09-21 @ 07:01  -  11-09-21 @ 11:42  --------------------------------------------------------  IN: 539.5 mL / OUT: 855 mL / NET: -315.5 mL        PHYSICAL EXAM  Vital Signs Last 24 Hrs  T(C): 37.6 (09 Nov 2021 08:00), Max: 37.6 (09 Nov 2021 08:00)  T(F): 99.7 (09 Nov 2021 08:00), Max: 99.7 (09 Nov 2021 08:00)  HR: 64 (09 Nov 2021 11:00) (56 - 81)  BP: 153/67 (09 Nov 2021 11:00) (128/60 - 153/67)  BP(mean): 96 (09 Nov 2021 11:00) (87 - 105)  RR: 20 (09 Nov 2021 11:00) (10 - 23)  SpO2: 97% (09 Nov 2021 11:00) (95% - 100%)      HEENT:  (-)icterus (-)pallor  CV: N S1 S2 1/6 ZITA (+)2 Pulses B/l  Resp:  Clear to auscultation B/L, normal effort  GI: (+) BS Soft, NT, ND  Lymph:  (-)Edema, (-)obvious lymphadenopathy  Skin: Warm to touch, Normal turgor  Psych: Appropriate mood and affect      TELEMETRY: SR 60s    ASSESSMENT/PLAN: 	75y  Female   pmh of HTN, HLD,  who presented to the ED with complains of SOB found with pulmonary edema and new afib DOUG revealed severe MR now s/p tissue MVR, LISBET ligation and MAZE.    - Extubated, wean O2 as tolerated  - Weaned off pressors  - Amio per EP  - Anticoagulation when ok with CTS  - Supportive care per CTU appreciated    Oren Muhammad PA-C  Pager: [phone]

## 2021-11-09 NOTE — PROGRESS NOTE ADULT - ASSESSMENT
Patient seen and examined, agree with above assessment and plan as transcribed above.    - progressing well  - AC when ok with CTS    Jair Mullins MD, [facility]  [facility]EEPER [phone]

## 2021-11-09 NOTE — PHYSICAL THERAPY INITIAL EVALUATION ADULT - ADDITIONAL COMMENTS
Pt lives with her son and  in a PH +4 steps +one flight. Pt was independent with all mobility prior to admission.

## 2021-11-09 NOTE — PHYSICAL THERAPY INITIAL EVALUATION ADULT - GENERAL OBSERVATIONS, REHAB EVAL
Received sitting in chair +Rubia, +pineda, +chest tube, +ICU monitoring. Pt A&Ox4, follows 100% of commands. Pt Polish speaking,  services used, Angela ID# [id number].

## 2021-11-10 DIAGNOSIS — R00.1 BRADYCARDIA, UNSPECIFIED: ICD-10-CM

## 2021-11-10 DIAGNOSIS — Z95.2 PRESENCE OF PROSTHETIC HEART VALVE: ICD-10-CM

## 2021-11-10 LAB
BASOPHILS # BLD AUTO: 0.01 K/UL — SIGNIFICANT CHANGE UP (ref 0–0.2)
BASOPHILS NFR BLD AUTO: 0.1 % — SIGNIFICANT CHANGE UP (ref 0–2)
EOSINOPHIL # BLD AUTO: 0 K/UL — SIGNIFICANT CHANGE UP (ref 0–0.5)
EOSINOPHIL NFR BLD AUTO: 0 % — SIGNIFICANT CHANGE UP (ref 0–6)
HCT VFR BLD CALC: 32.4 % — LOW (ref 34.5–45)
HGB BLD-MCNC: 10.3 G/DL — LOW (ref 11.5–15.5)
IMM GRANULOCYTES NFR BLD AUTO: 0.7 % — SIGNIFICANT CHANGE UP (ref 0–1.5)
LYMPHOCYTES # BLD AUTO: 24.7 % — SIGNIFICANT CHANGE UP (ref 13–44)
LYMPHOCYTES # BLD AUTO: 3.69 K/UL — HIGH (ref 1–3.3)
MAGNESIUM SERPL-MCNC: 2.5 MG/DL — SIGNIFICANT CHANGE UP (ref 1.6–2.6)
MCHC RBC-ENTMCNC: 30.1 PG — SIGNIFICANT CHANGE UP (ref 27–34)
MCHC RBC-ENTMCNC: 31.8 GM/DL — LOW (ref 32–36)
MCV RBC AUTO: 94.7 FL — SIGNIFICANT CHANGE UP (ref 80–100)
MONOCYTES # BLD AUTO: 1.1 K/UL — HIGH (ref 0–0.9)
MONOCYTES NFR BLD AUTO: 7.4 % — SIGNIFICANT CHANGE UP (ref 2–14)
NEUTROPHILS # BLD AUTO: 10 K/UL — HIGH (ref 1.8–7.4)
NEUTROPHILS NFR BLD AUTO: 67.1 % — SIGNIFICANT CHANGE UP (ref 43–77)
NRBC # BLD: 0 /100 WBCS — SIGNIFICANT CHANGE UP (ref 0–0)
PHOSPHATE SERPL-MCNC: 3.4 MG/DL — SIGNIFICANT CHANGE UP (ref 2.5–4.5)
PLATELET # BLD AUTO: 81 K/UL — LOW (ref 150–400)
RBC # BLD: 3.42 M/UL — LOW (ref 3.8–5.2)
RBC # FLD: 15.4 % — HIGH (ref 10.3–14.5)
WBC # BLD: 14.91 K/UL — HIGH (ref 3.8–10.5)
WBC # FLD AUTO: 14.91 K/UL — HIGH (ref 3.8–10.5)

## 2021-11-10 PROCEDURE — 93010 ELECTROCARDIOGRAM REPORT: CPT

## 2021-11-10 PROCEDURE — 71045 X-RAY EXAM CHEST 1 VIEW: CPT | Mod: 26

## 2021-11-10 PROCEDURE — 99233 SBSQ HOSP IP/OBS HIGH 50: CPT

## 2021-11-10 RX ORDER — FUROSEMIDE 40 MG
20 TABLET ORAL EVERY 12 HOURS
Refills: 0 | Status: DISCONTINUED | OUTPATIENT
Start: 2021-11-10 | End: 2021-11-11

## 2021-11-10 RX ORDER — INSULIN LISPRO 100/ML
VIAL (ML) SUBCUTANEOUS
Refills: 0 | Status: DISCONTINUED | OUTPATIENT
Start: 2021-11-10 | End: 2021-11-11

## 2021-11-10 RX ORDER — ONDANSETRON 8 MG/1
4 TABLET, FILM COATED ORAL ONCE
Refills: 0 | Status: COMPLETED | OUTPATIENT
Start: 2021-11-10 | End: 2021-11-10

## 2021-11-10 RX ORDER — ASPIRIN/CALCIUM CARB/MAGNESIUM 324 MG
81 TABLET ORAL DAILY
Refills: 0 | Status: DISCONTINUED | OUTPATIENT
Start: 2021-11-10 | End: 2021-11-20

## 2021-11-10 RX ORDER — FUROSEMIDE 40 MG
20 TABLET ORAL ONCE
Refills: 0 | Status: COMPLETED | OUTPATIENT
Start: 2021-11-10 | End: 2021-11-10

## 2021-11-10 RX ORDER — APIXABAN 2.5 MG/1
2.5 TABLET, FILM COATED ORAL EVERY 12 HOURS
Refills: 0 | Status: DISCONTINUED | OUTPATIENT
Start: 2021-11-10 | End: 2021-11-12

## 2021-11-10 RX ORDER — SPIRONOLACTONE 25 MG/1
25 TABLET, FILM COATED ORAL EVERY 12 HOURS
Refills: 0 | Status: DISCONTINUED | OUTPATIENT
Start: 2021-11-10 | End: 2021-11-11

## 2021-11-10 RX ORDER — HYDROMORPHONE HYDROCHLORIDE 2 MG/ML
0.5 INJECTION INTRAMUSCULAR; INTRAVENOUS; SUBCUTANEOUS ONCE
Refills: 0 | Status: DISCONTINUED | OUTPATIENT
Start: 2021-11-10 | End: 2021-11-10

## 2021-11-10 RX ADMIN — CHLORHEXIDINE GLUCONATE 1 APPLICATION(S): 213 SOLUTION TOPICAL at 11:14

## 2021-11-10 RX ADMIN — Medication 81 MILLIGRAM(S): at 06:30

## 2021-11-10 RX ADMIN — Medication 500 MILLIGRAM(S): at 17:47

## 2021-11-10 RX ADMIN — GABAPENTIN 100 MILLIGRAM(S): 400 CAPSULE ORAL at 10:50

## 2021-11-10 RX ADMIN — Medication 650 MILLIGRAM(S): at 18:18

## 2021-11-10 RX ADMIN — Medication 20 MILLIGRAM(S): at 17:48

## 2021-11-10 RX ADMIN — Medication 650 MILLIGRAM(S): at 12:23

## 2021-11-10 RX ADMIN — GABAPENTIN 100 MILLIGRAM(S): 400 CAPSULE ORAL at 17:47

## 2021-11-10 RX ADMIN — POLYETHYLENE GLYCOL 3350 17 GRAM(S): 17 POWDER, FOR SOLUTION ORAL at 12:56

## 2021-11-10 RX ADMIN — Medication 650 MILLIGRAM(S): at 06:30

## 2021-11-10 RX ADMIN — APIXABAN 2.5 MILLIGRAM(S): 2.5 TABLET, FILM COATED ORAL at 17:50

## 2021-11-10 RX ADMIN — Medication 650 MILLIGRAM(S): at 07:00

## 2021-11-10 RX ADMIN — ONDANSETRON 4 MILLIGRAM(S): 8 TABLET, FILM COATED ORAL at 04:22

## 2021-11-10 RX ADMIN — Medication 650 MILLIGRAM(S): at 11:53

## 2021-11-10 RX ADMIN — Medication 100 MILLIGRAM(S): at 06:30

## 2021-11-10 RX ADMIN — PANTOPRAZOLE SODIUM 40 MILLIGRAM(S): 20 TABLET, DELAYED RELEASE ORAL at 06:30

## 2021-11-10 RX ADMIN — Medication 650 MILLIGRAM(S): at 17:48

## 2021-11-10 RX ADMIN — HYDROMORPHONE HYDROCHLORIDE 0.5 MILLIGRAM(S): 2 INJECTION INTRAMUSCULAR; INTRAVENOUS; SUBCUTANEOUS at 04:35

## 2021-11-10 RX ADMIN — Medication 2: at 11:53

## 2021-11-10 RX ADMIN — GABAPENTIN 100 MILLIGRAM(S): 400 CAPSULE ORAL at 02:21

## 2021-11-10 RX ADMIN — Medication 20 MILLIGRAM(S): at 03:15

## 2021-11-10 RX ADMIN — HYDROMORPHONE HYDROCHLORIDE 0.5 MILLIGRAM(S): 2 INJECTION INTRAMUSCULAR; INTRAVENOUS; SUBCUTANEOUS at 04:20

## 2021-11-10 RX ADMIN — Medication 2: at 21:17

## 2021-11-10 RX ADMIN — SPIRONOLACTONE 25 MILLIGRAM(S): 25 TABLET, FILM COATED ORAL at 06:30

## 2021-11-10 RX ADMIN — Medication 500 MILLIGRAM(S): at 06:30

## 2021-11-10 RX ADMIN — SPIRONOLACTONE 25 MILLIGRAM(S): 25 TABLET, FILM COATED ORAL at 17:47

## 2021-11-10 NOTE — PROGRESS NOTE ADULT - SUBJECTIVE AND OBJECTIVE BOX
C A R D I O L O G Y  **********************************     DATE OF SERVICE: 11-10-21    Transferred to step down unit. Denies chest pain or SOB. Review of systems otherwise (-)  	    MEDICATIONS  (STANDING):  acetaminophen     Tablet .. 650 milliGRAM(s) Oral every 6 hours  apixaban 2.5 milliGRAM(s) Oral every 12 hours  ascorbic acid 500 milliGRAM(s) Oral two times a day  aspirin enteric coated 81 milliGRAM(s) Oral daily  bisacodyl Suppository 10 milliGRAM(s) Rectal once  chlorhexidine 2% Cloths 1 Application(s) Topical daily  dextrose 50% Injectable 50 milliLiter(s) IV Push every 15 minutes  dextrose 50% Injectable 25 milliLiter(s) IV Push every 15 minutes  furosemide   Injectable 20 milliGRAM(s) IV Push every 12 hours  gabapentin 100 milliGRAM(s) Oral every 8 hours  insulin lispro (ADMELOG) corrective regimen sliding scale   SubCutaneous Before meals and at bedtime  pantoprazole    Tablet 40 milliGRAM(s) Oral before breakfast  polyethylene glycol 3350 17 Gram(s) Oral daily  senna 2 Tablet(s) Oral at bedtime  sodium chloride 0.9%. 1000 milliLiter(s) (10 mL/Hr) IV Continuous <Continuous>  spironolactone 25 milliGRAM(s) Oral every 12 hours    MEDICATIONS  (PRN):  HYDROmorphone  Injectable 0.5 milliGRAM(s) IV Push every 6 hours PRN Severe Pain (7 - 10)  oxyCODONE    IR 5 milliGRAM(s) Oral every 4 hours PRN Moderate Pain (4 - 6)  oxyCODONE    IR 10 milliGRAM(s) Oral every 4 hours PRN Severe Pain (7 - 10)      LABS:                          10.3   14.91 )-----------( 81       ( 10 Nov 2021 00:35 )             32.4     Hemoglobin: 10.3 g/dL (11-10 @ 00:35)  Hemoglobin: 9.2 g/dL (11-09 @ 13:35)  Hemoglobin: 10.3 g/dL (11-09 @ 00:37)  Hemoglobin: 11.4 g/dL (11-08 @ 15:38)  Hemoglobin: 10.8 g/dL (11-08 @ 13:28)    11-10    139  |  105  |  27<H>  ----------------------------<  137<H>  4.3   |  24  |  0.99    Ca    8.5      10 Nov 2021 00:35  Phos  3.4     11-10  Mg     2.5     11-10    TPro  5.5<L>  /  Alb  4.1  /  TBili  0.6  /  DBili  x   /  AST  163<H>  /  ALT  133<H>  /  AlkPhos  87  11-10    Creatinine Trend: 0.99<--, 1.05<--, 0.79<--, 0.95<--, 0.96<--, 0.92<--     CARDIAC MARKERS ( 08 Nov 2021 13:27 )  x     / x     / 392 U/L / x     / 63.8 ng/mL          11-09-21 @ 07:01  -  11-10-21 @ 07:00  --------------------------------------------------------  IN: 1391.5 mL / OUT: 2917 mL / NET: -1525.5 mL    11-10-21 @ 07:01  -  11-10-21 @ 11:21  --------------------------------------------------------  IN: 20 mL / OUT: 155 mL / NET: -135 mL        PHYSICAL EXAM  Vital Signs Last 24 Hrs  T(C): 36.8 (10 Nov 2021 09:20), Max: 37.5 (09 Nov 2021 12:00)  T(F): 98.2 (10 Nov 2021 09:20), Max: 99.5 (09 Nov 2021 12:00)  HR: 69 (10 Nov 2021 09:20) (47 - 71)  BP: 139/64 (10 Nov 2021 08:00) (127/65 - 159/71)  BP(mean): 92 (10 Nov 2021 08:00) (89 - 104)  RR: 19 (10 Nov 2021 09:20) (12 - 34)  SpO2: 96% (10 Nov 2021 09:20) (93% - 99%)      HEENT:  (-)icterus (-)pallor  CV: N S1 S2 1/6 ZITA (+)2 Pulses B/l  Resp:  Clear to auscultation B/L, normal effort  GI: (+) BS Soft, NT, ND  Lymph:  (-)Edema, (-)obvious lymphadenopathy  Skin: Warm to touch, Normal turgor  Psych: Appropriate mood and affect      TELEMETRY: AP 60    ASSESSMENT/PLAN: 	75y  Female   pmh of HTN, HLD,  who presented to the ED with complains of SOB found with pulmonary edema and new afib DOUG revealed severe MR now s/p tissue MVR, LISBET ligation and MAZE.    - Progressing well, transferred to step down unit  - Extubated, wean O2 as tolerated  - Keep net negative with IV lasix  - Weaned off pressors  - EP f/u appreciated  - Anticoagulation resumed with Eliquis per CTS  - Supportive care per CTU appreciated    Oren Muhammad PA-C  Pager: [phone]

## 2021-11-10 NOTE — PROGRESS NOTE ADULT - ASSESSMENT
77 y/o F with pmhx of HTN, HLD, varicose veins, and vertigo who presented to the ED with complains of worsening SOB for the past 3 weeks, worse with exertion and laying flat and associated with intermittent 3/10 left sided chest pain and palpitations. Labs showed elevated pro-BNP and UA was positive for a possible infection. CTA of chest was negative for PE but showed pleural effusions. Patient was admitted for CHF exacerbation and new onset A-fib. Patient was put on metoprolol for her Afib and Lasix for CHF and was also put on telemetry and FD lovenox. An echocardiogram was performed on her which showed moderate M.    Rapid response was called for dizziness and pre-syncope  and nausea while in the bathroom, Cardiac monitor showing rapid afib with fluctuating HR, 500ml bolus given for low BP, repeat measurement was normal, EKG was obtained, showed A -fib. Her morning medications were given including cardizem drip, lasix and metoprolol  Patient states she is feeling better after interventions.    Cardiology  reviewed her Echo cardiogram which showed:  Moderate to severe MR, Moderately increased RV pressure, diastolic function assessment was inconclusive, EF 55%     11/18/21 Mitral valve replacement (t) with Magnaease 29  Moser-Maze IV Cryo-RF  LAAL with AtriClip 35mm    Inotropes/ pressors  New onset Afib requiring Amio -EPS following-->  Oral Amiodarone, recommend 5 gram load (IV infusion, + 400mg x 12 doses), then 200mg daily.  With her degree of heart failure, there is a high priority to maintain sinus rhythm.  Junctional rhythm 40 's  paced at 80, av.  Insulin infusion for  hyperglycemia- weaned off  Eliquis started   Transfered to sdu  EPS follow up,  pacer av interval adjusted to  allow a pacing, 60 .  Underlying junctional 50

## 2021-11-10 NOTE — PROGRESS NOTE ADULT - SUBJECTIVE AND OBJECTIVE BOX
CRITICAL CARE ATTENDING - CTICU    MEDICATIONS  (STANDING):  acetaminophen     Tablet .. 650 milliGRAM(s) Oral every 6 hours  apixaban 2.5 milliGRAM(s) Oral every 12 hours  ascorbic acid 500 milliGRAM(s) Oral two times a day  aspirin enteric coated 81 milliGRAM(s) Oral daily  bisacodyl Suppository 10 milliGRAM(s) Rectal once  chlorhexidine 2% Cloths 1 Application(s) Topical daily  dextrose 50% Injectable 50 milliLiter(s) IV Push every 15 minutes  dextrose 50% Injectable 25 milliLiter(s) IV Push every 15 minutes  furosemide   Injectable 20 milliGRAM(s) IV Push every 12 hours  gabapentin 100 milliGRAM(s) Oral every 8 hours  insulin regular Infusion 3 Unit(s)/Hr (3 mL/Hr) IV Continuous <Continuous>  pantoprazole    Tablet 40 milliGRAM(s) Oral before breakfast  polyethylene glycol 3350 17 Gram(s) Oral daily  potassium chloride  10 mEq/50 mL IVPB 10 milliEquivalent(s) IV Intermittent every 1 hour  potassium chloride  10 mEq/50 mL IVPB 10 milliEquivalent(s) IV Intermittent every 1 hour  potassium chloride  10 mEq/50 mL IVPB 10 milliEquivalent(s) IV Intermittent every 1 hour  senna 2 Tablet(s) Oral at bedtime  sodium chloride 0.9%. 1000 milliLiter(s) (10 mL/Hr) IV Continuous <Continuous>  spironolactone 25 milliGRAM(s) Oral every 12 hours                                    10.3   14.91 )-----------( 81       ( 10 Nov 2021 00:35 )             32.4       11-10    139  |  105  |  27<H>  ----------------------------<  137<H>  4.3   |  24  |  0.99    Ca    8.5      10 Nov 2021 00:35  Phos  3.4     11-10  Mg     2.5     11-10    TPro  5.5<L>  /  Alb  4.1  /  TBili  0.6  /  DBili  x   /  AST  163<H>  /  ALT  133<H>  /  AlkPhos  87  11-10      PT/INR - ( 2021 00:33 )   PT: 14.0 sec;   INR: 1.18 ratio         PTT - ( 2021 00:33 )  PTT:44.5 sec        Daily     Daily Weight in k.9 (10 Nov 2021 00:00)       @ 07:  -   @ 07:00  --------------------------------------------------------  IN: 3191.1 mL / OUT: 1656 mL / NET: 1535.1 mL     @ :  -  11-10 @ 06:44  --------------------------------------------------------  IN: 1341.5 mL / OUT: 2857 mL / NET: -1515.5 mL        Critically Ill patient  : [ ] preoperative ,   [x] post operative    Requires :  [ ] Arterial Line   [x] Central Line  [ ] PA catheter  [ ] IABP  [ ] ECMO  [ ] LVAD  [] Ventilator  [x] pacemaker - TPM  [ ] Impella.                      [x] ABG's     [x] Pulse Oxymetry Monitoring  Bedside evaluation , monitoring , treatment of hemodynamics , fluids , IVP/ IVCD meds.        Diagnosis:     POD 2 - MVR / LAAL / Maze    Hypotension     Hypovolemia     Hemodynamic lability,  instability. Requires IVCD [ ] vasopressors [ ] inotropes  [ ] vasodilator  [ x]IVSS fluid  to maintain MAP, perfusion, C.I.     CHF- acute [ x]   chronic [x ]    systolic [x ]   diatolic [ x]          - Echo- EF -  MR / MS           [x ] RV dysfunction - post op           - Cxr-cardiomegally, edema          - Clinical-  [ ]inotropes   [ ]pressors   [ x]diuresis   [ ]IABP   [ ]ECMO   [ ]LVAD   [ ]Respiratory Failure    Requires chest PT, pulmonary toilet,  suctioning to maintain SaO2,  patent airway and treat atelectasis.     Requires  [ ]DDD  [ ]VVI    [ x]AII  temporary pacing at 50  to maintain HR, MAP, CI, and perfusion.     Chest Tube Drainage     Prerenal Azotemia    Requires bedside physical therapy, mobilization and total assisted care.     Thrombocytopenia     IVCD Insulin                 By signing my name below, I, Michael Wilkins, attest that this documentation has been prepared under the direction and in the presence of Judson De Anda MD.   Electronically Signed: Denis Abdullahi 11-10-21 @ 06:44      Discussed with CT surgeon, Physician Assistant - Nurse Practitioner- Critical care medicine team.   Discussed at  AM / PM rounds.   Chart, labs , films reviewed.    Cumulative Critical Care Time Given Today:  CRITICAL CARE ATTENDING - CTICU    MEDICATIONS  (STANDING):  acetaminophen     Tablet .. 650 milliGRAM(s) Oral every 6 hours  apixaban 2.5 milliGRAM(s) Oral every 12 hours  ascorbic acid 500 milliGRAM(s) Oral two times a day  aspirin enteric coated 81 milliGRAM(s) Oral daily  bisacodyl Suppository 10 milliGRAM(s) Rectal once  chlorhexidine 2% Cloths 1 Application(s) Topical daily  dextrose 50% Injectable 50 milliLiter(s) IV Push every 15 minutes  dextrose 50% Injectable 25 milliLiter(s) IV Push every 15 minutes  furosemide   Injectable 20 milliGRAM(s) IV Push every 12 hours  gabapentin 100 milliGRAM(s) Oral every 8 hours  insulin regular Infusion 3 Unit(s)/Hr (3 mL/Hr) IV Continuous <Continuous>  pantoprazole    Tablet 40 milliGRAM(s) Oral before breakfast  polyethylene glycol 3350 17 Gram(s) Oral daily  potassium chloride  10 mEq/50 mL IVPB 10 milliEquivalent(s) IV Intermittent every 1 hour  potassium chloride  10 mEq/50 mL IVPB 10 milliEquivalent(s) IV Intermittent every 1 hour  potassium chloride  10 mEq/50 mL IVPB 10 milliEquivalent(s) IV Intermittent every 1 hour  senna 2 Tablet(s) Oral at bedtime  sodium chloride 0.9%. 1000 milliLiter(s) (10 mL/Hr) IV Continuous <Continuous>  spironolactone 25 milliGRAM(s) Oral every 12 hours                                    10.3   14.91 )-----------( 81       ( 10 Nov 2021 00:35 )             32.4       11-10    139  |  105  |  27<H>  ----------------------------<  137<H>  4.3   |  24  |  0.99    Ca    8.5      10 Nov 2021 00:35  Phos  3.4     11-10  Mg     2.5     11-10    TPro  5.5<L>  /  Alb  4.1  /  TBili  0.6  /  DBili  x   /  AST  163<H>  /  ALT  133<H>  /  AlkPhos  87  11-10      PT/INR - ( 2021 00:33 )   PT: 14.0 sec;   INR: 1.18 ratio         PTT - ( 2021 00:33 )  PTT:44.5 sec        Daily     Daily Weight in k.9 (10 Nov 2021 00:00)       @ 07:  -   @ 07:00  --------------------------------------------------------  IN: 3191.1 mL / OUT: 1656 mL / NET: 1535.1 mL     @ 07:  -  11-10 @ 06:44  --------------------------------------------------------  IN: 1341.5 mL / OUT: 2857 mL / NET: -1515.5 mL        Critically Ill patient  : [ ] preoperative ,   [x] post operative    Requires :  [ ] Arterial Line   [x] Central Line  [ ] PA catheter  [ ] IABP  [ ] ECMO  [ ] LVAD  [] Ventilator  [x] pacemaker - TPM  [ ] Impella.                      [x] ABG's     [x] Pulse Oxymetry Monitoring  Bedside evaluation , monitoring , treatment of hemodynamics , fluids , IVP/ IVCD meds.        Diagnosis:     POD 2 - MVR / LAAL / Maze    Hypotension - resolved    Hypovolemia     Hemodynamic lability,  instability. Requires IVCD [ ] vasopressors [ ] inotropes  [ ] vasodilator  [ x]IVSS fluid  to maintain MAP, perfusion, C.I.     CHF- acute [ x]   chronic [x ]    systolic [x ]   diatolic [ x]          - Echo- EF -  MR / MS           [x ] RV dysfunction - post op           - Cxr-cardiomegally, edema          - Clinical-  [ ]inotropes   [ ]pressors   [ x]diuresis   [ ]IABP   [ ]ECMO   [ ]LVAD   [ ]Respiratory Failure    Requires chest PT, pulmonary toilet,  suctioning to maintain SaO2,  patent airway and treat atelectasis.     Temporary pacemaker (TPM) interrogation and setting.     Chest Tube Drainage     Prerenal Azotemia    Requires bedside physical therapy, mobilization and total [facility] care.     Thrombocytopenia     IVCD Insulin     Prerenal Azotemia     ASA / Eliquis                By signing my name below, I, Michael Wilkins, attest that this documentation has been prepared under the direction and in the presence of Judson De Anda MD.   Electronically Signed: Denis Abdullahi 11-10-21 @ 06:44    I, Judson De Anda, personally performed the services described in this documentation. All medical record entries made by the scribe were at my direction and in my presence. I have reviewed the chart and agree that the record reflects my personal performance and is accurate and complete.   Judson De Anda MD.       Discussed with CT surgeon, Physician Assistant - Nurse Practitioner- Critical care medicine team.   Discussed at  AM / PM rounds.   Chart, labs , films reviewed.    Cumulative Critical Care Time Given Today:  20 min

## 2021-11-10 NOTE — PROGRESS NOTE ADULT - SUBJECTIVE AND OBJECTIVE BOX
VITAL SIGNS    Telemetry:  a paced 60    Vital Signs Last 24 Hrs  T(C): 36.8 (11-10-21 @ 09:20), Max: 37.5 (21 @ 12:00)  T(F): 98.2 (11-10-21 @ 09:20), Max: 99.5 (21 @ 12:00)  HR: 69 (11-10-21 @ 09:20) (47 - 71)  BP: 139/64 (11-10-21 @ 08:00) (127/65 - 159/71)  RR: 19 (11-10-21 @ 09:20) (12 - 34)  SpO2: 96% (11-10-21 @ 09:20) (93% - 99%)                    07:01  -  11-10 @ 07:00  --------------------------------------------------------  IN: 1391.5 mL / OUT: 2917 mL / NET: -1525.5 mL    11-10 @ 07:01  -  11-10 @ 11:35  --------------------------------------------------------  IN: 20 mL / OUT: 155 mL / NET: -135 mL          Daily     Daily Weight in k.9 (10 Nov 2021 00:00)            CAPILLARY BLOOD GLUCOSE  118 (10 Nov 2021 07:00)  126 (10 Nov 2021 06:00)  93 (10 Nov 2021 05:00)  106 (10 Nov 2021 04:00)  110 (10 Nov 2021 03:00)  114 (10 Nov 2021 02:00)  118 (10 Nov 2021 01:00)  146 (10 Nov 2021 00:00)  145 (2021 23:00)  152 (2021 21:00)  184 (2021 20:00)  228 (2021 19:00)  198 (2021 18:00)  125 (2021 17:00)  126 (2021 16:00)  125 (2021 15:00)  129 (2021 14:00)  166 (2021 13:00)  147 (2021 12:00)      POCT Blood Glucose.: 142 mg/dL (10 Nov 2021 07:56)  POCT Blood Glucose.: 118 mg/dL (10 Nov 2021 07:00)  POCT Blood Glucose.: 126 mg/dL (10 Nov 2021 06:14)  POCT Blood Glucose.: 93 mg/dL (10 Nov 2021 05:12)  POCT Blood Glucose.: 106 mg/dL (10 Nov 2021 04:03)  POCT Blood Glucose.: 110 mg/dL (10 Nov 2021 02:50)  POCT Blood Glucose.: 114 mg/dL (10 Nov 2021 02:18)  POCT Blood Glucose.: 118 mg/dL (10 Nov 2021 00:58)  POCT Blood Glucose.: 146 mg/dL (2021 23:55)  POCT Blood Glucose.: 145 mg/dL (2021 22:57)  POCT Blood Glucose.: 152 mg/dL (2021 21:17)  POCT Blood Glucose.: 184 mg/dL (2021 19:58)  POCT Blood Glucose.: 228 mg/dL (2021 19:02)  POCT Blood Glucose.: 198 mg/dL (2021 18:15)  POCT Blood Glucose.: 125 mg/dL (2021 17:03)  POCT Blood Glucose.: 126 mg/dL (2021 16:23)  POCT Blood Glucose.: 125 mg/dL (2021 14:51)  POCT Blood Glucose.: 129 mg/dL (2021 13:56)  POCT Blood Glucose.: 166 mg/dL (2021 12:50)  POCT Blood Glucose.: 147 mg/dL (2021 12:04)                Pacing Wires        [x  ]   Settings:       av paced 60                           Isolated  [  ]    Coumadin    [ ] YES          [  x]      NO         eliquis                          PHYSICAL EXAM        Neurology: alert and oriented x 3, nonfocal, no gross deficits  CV : s1 s2 RRR  Sternal Wound :  CDI , Stable  Lungs: cta  Abdomen: soft, nontender, nondistended, positive bowel sounds,                         :     pineda - sbd         Extremities:      -edema   /  -   calve tenderness ,             acetaminophen     Tablet .. 650 milliGRAM(s) Oral every 6 hours  apixaban 2.5 milliGRAM(s) Oral every 12 hours  ascorbic acid 500 milliGRAM(s) Oral two times a day  aspirin enteric coated 81 milliGRAM(s) Oral daily  bisacodyl Suppository 10 milliGRAM(s) Rectal once  chlorhexidine 2% Cloths 1 Application(s) Topical daily  dextrose 50% Injectable 50 milliLiter(s) IV Push every 15 minutes  dextrose 50% Injectable 25 milliLiter(s) IV Push every 15 minutes  furosemide   Injectable 20 milliGRAM(s) IV Push every 12 hours  gabapentin 100 milliGRAM(s) Oral every 8 hours  HYDROmorphone  Injectable 0.5 milliGRAM(s) IV Push every 6 hours PRN  insulin lispro (ADMELOG) corrective regimen sliding scale   SubCutaneous Before meals and at bedtime  oxyCODONE    IR 5 milliGRAM(s) Oral every 4 hours PRN  oxyCODONE    IR 10 milliGRAM(s) Oral every 4 hours PRN  pantoprazole    Tablet 40 milliGRAM(s) Oral before breakfast  polyethylene glycol 3350 17 Gram(s) Oral daily  senna 2 Tablet(s) Oral at bedtime  sodium chloride 0.9%. 1000 milliLiter(s) IV Continuous <Continuous>  spironolactone 25 milliGRAM(s) Oral every 12 hours                    Physical Therapy Rec:   Home  [  ]   Home w/ PT  [  ]  Rehab  [  ]  Discussed with Cardiothoracic Team at AM rounds.

## 2021-11-10 NOTE — PROGRESS NOTE ADULT - ATTENDING COMMENTS
Agree with above  Plans for MVR tomorrow  Follow up CTS    Danica Cormier MD
Patient seen and examined.  Agree with above.   Progressing well transferred to step down unit   Appreciate EP eval - will evaluate pacing requirements    Danica Cormier MD
Patient seen and examined.  Agree with above.   Plans for MVR next week   Follow up CTS    Danica Cormier MD

## 2021-11-10 NOTE — PROGRESS NOTE ADULT - SUBJECTIVE AND OBJECTIVE BOX
EP     DATE OF SERVICE: 11-10-21  s/p Bio-MVR, Maze, and LA appendage clip on 11/8.  Feeling well, No events overnight.    Moved to stepdown today.  Temporary pacemaker checked at bedside today.      acetaminophen     Tablet .. 650 milliGRAM(s) Oral every 6 hours  apixaban 2.5 milliGRAM(s) Oral every 12 hours  ascorbic acid 500 milliGRAM(s) Oral two times a day  aspirin enteric coated 81 milliGRAM(s) Oral daily  bisacodyl Suppository 10 milliGRAM(s) Rectal once  chlorhexidine 2% Cloths 1 Application(s) Topical daily  dextrose 50% Injectable 50 milliLiter(s) IV Push every 15 minutes  dextrose 50% Injectable 25 milliLiter(s) IV Push every 15 minutes  furosemide   Injectable 20 milliGRAM(s) IV Push every 12 hours  gabapentin 100 milliGRAM(s) Oral every 8 hours  HYDROmorphone  Injectable 0.5 milliGRAM(s) IV Push every 6 hours PRN  insulin lispro (ADMELOG) corrective regimen sliding scale   SubCutaneous Before meals and at bedtime  oxyCODONE    IR 5 milliGRAM(s) Oral every 4 hours PRN  oxyCODONE    IR 10 milliGRAM(s) Oral every 4 hours PRN  pantoprazole    Tablet 40 milliGRAM(s) Oral before breakfast  polyethylene glycol 3350 17 Gram(s) Oral daily  senna 2 Tablet(s) Oral at bedtime  sodium chloride 0.9%. 1000 milliLiter(s) IV Continuous <Continuous>  spironolactone 25 milliGRAM(s) Oral every 12 hours                            10.3   14.91 )-----------( 81       ( 10 Nov 2021 00:35 )             32.4       11-10    139  |  105  |  27<H>  ----------------------------<  137<H>  4.3   |  24  |  0.99    Ca    8.5      10 Nov 2021 00:35  Phos  3.4     11-10  Mg     2.5     11-10    TPro  5.5<L>  /  Alb  4.1  /  TBili  0.6  /  DBili  x   /  AST  163<H>  /  ALT  133<H>  /  AlkPhos  87  11-10      CARDIAC MARKERS ( 08 Nov 2021 13:27 )  x     / x     / 392 U/L / x     / 63.8 ng/mL    T(C): 36.8 (11-10-21 @ 09:20), Max: 37.5 (11-09-21 @ 12:00)  HR: 69 (11-10-21 @ 09:20) (47 - 71)  BP: 139/64 (11-10-21 @ 08:00) (127/65 - 159/71)  RR: 19 (11-10-21 @ 09:20) (12 - 34)  SpO2: 96% (11-10-21 @ 09:20) (93% - 99%)  Wt(kg): --    I&O's Summary    09 Nov 2021 07:01  -  10 Nov 2021 07:00  --------------------------------------------------------  IN: 1391.5 mL / OUT: 2917 mL / NET: -1525.5 mL    10 Nov 2021 07:01  -  10 Nov 2021 10:16  --------------------------------------------------------  IN: 20 mL / OUT: 155 mL / NET: -135 mL        Gen: awake and alert, cooperative.  HEENT:  (-)icterus (-)pallor  CV: Regular S1 S2 1/6 ZITA (+)2 Pulses B/l, chest tubes, pacer wires,  Resp:  Clear to auscultation B/L, normal effort  GI: (+) BS Soft, NT, ND  Lymph:  (-)Edema, (-)obvious lymphadenopathy  Skin: Warm to touch, Normal turgor  Psych: Mood is "I feel good", affect is normal, in good spirits.    TELEMETRY: initially AV paced at 80bpm, AV delay 190ms.    Pacemaker check:  Ventricular underlying rhythm is JUNCTIONAL at 50bpm.    Capture threshold then checked, V-capture is 1.5mA.  Output set to 10mA.  Atrial rhythm is ATRIAL ASYSTOLE, but not AFib.   A-pacing produces a broad flat low-amplitude waveform on 12-lead EKG.  Atrial capture threshold is 4.5mA.  Output set to 10mA.  (15mA causes some diaphragm stimulation).  Her paced AV interval is 240ms.  Any shorter mandates V-pacing.  Paced rhythm has been reprogrammed to DDD, 60bpm, with AV delays of 260ms to minimize V-pacing, but provide backup V-pacing if a P-wave fails to conduct.      ASSESSMENT/PLAN: Patient is a 77 y/o Female with PMH of HTN and HLD who presented with SOB found to have new onset atrial fibrillation and severe MR. EP consulted for Afib management.    - s/p MVR, Moser Maze IV procedure, and LISBET clip on 11/8.  - Was on oral Amiodarone on 11/9.  With her degree of heart failure, there is a high priority to maintain sinus rhythm.  - Currently, amiodarone is discontinued.    - Maintain pacing wires.  Will reassess necessity of permanent pacing daily.  - Will follow.    Perez Bolton M.D.  Cardiac Electrophysiology  [phone]

## 2021-11-11 LAB
ANION GAP SERPL CALC-SCNC: 11 MMOL/L — SIGNIFICANT CHANGE UP (ref 5–17)
BASOPHILS # BLD AUTO: 0.03 K/UL — SIGNIFICANT CHANGE UP (ref 0–0.2)
BASOPHILS NFR BLD AUTO: 0.3 % — SIGNIFICANT CHANGE UP (ref 0–2)
BUN SERPL-MCNC: 24 MG/DL — HIGH (ref 7–23)
CALCIUM SERPL-MCNC: 8.6 MG/DL — SIGNIFICANT CHANGE UP (ref 8.4–10.5)
CHLORIDE SERPL-SCNC: 101 MMOL/L — SIGNIFICANT CHANGE UP (ref 96–108)
CO2 SERPL-SCNC: 27 MMOL/L — SIGNIFICANT CHANGE UP (ref 22–31)
CREAT SERPL-MCNC: 0.87 MG/DL — SIGNIFICANT CHANGE UP (ref 0.5–1.3)
EOSINOPHIL # BLD AUTO: 0.07 K/UL — SIGNIFICANT CHANGE UP (ref 0–0.5)
EOSINOPHIL NFR BLD AUTO: 0.6 % — SIGNIFICANT CHANGE UP (ref 0–6)
GLUCOSE SERPL-MCNC: 97 MG/DL — SIGNIFICANT CHANGE UP (ref 70–99)
HCT VFR BLD CALC: 32 % — LOW (ref 34.5–45)
HGB BLD-MCNC: 10 G/DL — LOW (ref 11.5–15.5)
IMM GRANULOCYTES NFR BLD AUTO: 0.6 % — SIGNIFICANT CHANGE UP (ref 0–1.5)
LYMPHOCYTES # BLD AUTO: 3.71 K/UL — HIGH (ref 1–3.3)
LYMPHOCYTES # BLD AUTO: 32.7 % — SIGNIFICANT CHANGE UP (ref 13–44)
MCHC RBC-ENTMCNC: 30.3 PG — SIGNIFICANT CHANGE UP (ref 27–34)
MCHC RBC-ENTMCNC: 31.3 GM/DL — LOW (ref 32–36)
MCV RBC AUTO: 97 FL — SIGNIFICANT CHANGE UP (ref 80–100)
MONOCYTES # BLD AUTO: 0.88 K/UL — SIGNIFICANT CHANGE UP (ref 0–0.9)
MONOCYTES NFR BLD AUTO: 7.8 % — SIGNIFICANT CHANGE UP (ref 2–14)
NEUTROPHILS # BLD AUTO: 6.58 K/UL — SIGNIFICANT CHANGE UP (ref 1.8–7.4)
NEUTROPHILS NFR BLD AUTO: 58 % — SIGNIFICANT CHANGE UP (ref 43–77)
NRBC # BLD: 0 /100 WBCS — SIGNIFICANT CHANGE UP (ref 0–0)
PLATELET # BLD AUTO: 81 K/UL — LOW (ref 150–400)
POTASSIUM SERPL-MCNC: 4.4 MMOL/L — SIGNIFICANT CHANGE UP (ref 3.5–5.3)
POTASSIUM SERPL-SCNC: 4.4 MMOL/L — SIGNIFICANT CHANGE UP (ref 3.5–5.3)
RBC # BLD: 3.3 M/UL — LOW (ref 3.8–5.2)
RBC # FLD: 15.4 % — HIGH (ref 10.3–14.5)
SODIUM SERPL-SCNC: 139 MMOL/L — SIGNIFICANT CHANGE UP (ref 135–145)
WBC # BLD: 11.34 K/UL — HIGH (ref 3.8–10.5)
WBC # FLD AUTO: 11.34 K/UL — HIGH (ref 3.8–10.5)

## 2021-11-11 PROCEDURE — 71045 X-RAY EXAM CHEST 1 VIEW: CPT | Mod: 26

## 2021-11-11 PROCEDURE — 93010 ELECTROCARDIOGRAM REPORT: CPT

## 2021-11-11 RX ORDER — AMIODARONE HYDROCHLORIDE 400 MG/1
400 TABLET ORAL
Refills: 0 | Status: DISCONTINUED | OUTPATIENT
Start: 2021-11-11 | End: 2021-11-11

## 2021-11-11 RX ORDER — SPIRONOLACTONE 25 MG/1
50 TABLET, FILM COATED ORAL EVERY 12 HOURS
Refills: 0 | Status: DISCONTINUED | OUTPATIENT
Start: 2021-11-11 | End: 2021-11-15

## 2021-11-11 RX ADMIN — Medication 500 MILLIGRAM(S): at 17:30

## 2021-11-11 RX ADMIN — Medication 81 MILLIGRAM(S): at 11:48

## 2021-11-11 RX ADMIN — Medication 650 MILLIGRAM(S): at 12:20

## 2021-11-11 RX ADMIN — GABAPENTIN 100 MILLIGRAM(S): 400 CAPSULE ORAL at 13:04

## 2021-11-11 RX ADMIN — OXYCODONE HYDROCHLORIDE 10 MILLIGRAM(S): 5 TABLET ORAL at 09:30

## 2021-11-11 RX ADMIN — GABAPENTIN 100 MILLIGRAM(S): 400 CAPSULE ORAL at 05:31

## 2021-11-11 RX ADMIN — SENNA PLUS 2 TABLET(S): 8.6 TABLET ORAL at 21:00

## 2021-11-11 RX ADMIN — OXYCODONE HYDROCHLORIDE 5 MILLIGRAM(S): 5 TABLET ORAL at 20:58

## 2021-11-11 RX ADMIN — Medication 20 MILLIGRAM(S): at 17:28

## 2021-11-11 RX ADMIN — SPIRONOLACTONE 50 MILLIGRAM(S): 25 TABLET, FILM COATED ORAL at 17:28

## 2021-11-11 RX ADMIN — SPIRONOLACTONE 25 MILLIGRAM(S): 25 TABLET, FILM COATED ORAL at 05:32

## 2021-11-11 RX ADMIN — CHLORHEXIDINE GLUCONATE 1 APPLICATION(S): 213 SOLUTION TOPICAL at 11:50

## 2021-11-11 RX ADMIN — POLYETHYLENE GLYCOL 3350 17 GRAM(S): 17 POWDER, FOR SOLUTION ORAL at 11:48

## 2021-11-11 RX ADMIN — PANTOPRAZOLE SODIUM 40 MILLIGRAM(S): 20 TABLET, DELAYED RELEASE ORAL at 05:32

## 2021-11-11 RX ADMIN — APIXABAN 2.5 MILLIGRAM(S): 2.5 TABLET, FILM COATED ORAL at 05:31

## 2021-11-11 RX ADMIN — Medication 650 MILLIGRAM(S): at 05:31

## 2021-11-11 RX ADMIN — GABAPENTIN 100 MILLIGRAM(S): 400 CAPSULE ORAL at 20:58

## 2021-11-11 RX ADMIN — Medication 650 MILLIGRAM(S): at 06:00

## 2021-11-11 RX ADMIN — OXYCODONE HYDROCHLORIDE 5 MILLIGRAM(S): 5 TABLET ORAL at 21:30

## 2021-11-11 RX ADMIN — Medication 20 MILLIGRAM(S): at 05:32

## 2021-11-11 RX ADMIN — Medication 500 MILLIGRAM(S): at 05:31

## 2021-11-11 RX ADMIN — AMIODARONE HYDROCHLORIDE 400 MILLIGRAM(S): 400 TABLET ORAL at 11:51

## 2021-11-11 RX ADMIN — APIXABAN 2.5 MILLIGRAM(S): 2.5 TABLET, FILM COATED ORAL at 17:30

## 2021-11-11 RX ADMIN — Medication 650 MILLIGRAM(S): at 11:47

## 2021-11-11 RX ADMIN — OXYCODONE HYDROCHLORIDE 10 MILLIGRAM(S): 5 TABLET ORAL at 08:49

## 2021-11-11 NOTE — DIETITIAN INITIAL EVALUATION ADULT. - ADD RECOMMEND
2) Reinforce nutrition education as able. 3) Continue to trend labs, weight, skin integrity, and intake. 4) As medically feasible, continue to provide micronutrients.

## 2021-11-11 NOTE — DIETITIAN INITIAL EVALUATION ADULT. - PERTINENT LABORATORY DATA
11-11 Na 139 mmol/L Glu 97 mg/dL K+ 4.4 mmol/L Cr  0.87 mg/dL BUN 24 mg/dL<H>  Hgb 10.0 g/dL<L> Hct 32.0 %<L>  A1C with Estimated Average Glucose Result: 5.4 % (11-05-21 @ 06:29)  CAPILLARY BLOOD GLUCOSE  POCT Blood Glucose.: 107 mg/dL (11 Nov 2021 08:01)  POCT Blood Glucose.: 166 mg/dL (10 Nov 2021 21:11)  POCT Blood Glucose.: 137 mg/dL (10 Nov 2021 16:55)

## 2021-11-11 NOTE — PROGRESS NOTE ADULT - SUBJECTIVE AND OBJECTIVE BOX
C A R D I O L O G Y  **********************************     DATE OF SERVICE: 11-11-21    Patient denies chest pain or shortness of breath.   Review of symptoms otherwise negative.    acetaminophen     Tablet .. 650 milliGRAM(s) Oral every 6 hours PRN  apixaban 2.5 milliGRAM(s) Oral every 12 hours  ascorbic acid 500 milliGRAM(s) Oral two times a day  aspirin enteric coated 81 milliGRAM(s) Oral daily  bisacodyl Suppository 10 milliGRAM(s) Rectal once  chlorhexidine 2% Cloths 1 Application(s) Topical daily  dextrose 50% Injectable 50 milliLiter(s) IV Push every 15 minutes  dextrose 50% Injectable 25 milliLiter(s) IV Push every 15 minutes  gabapentin 100 milliGRAM(s) Oral every 8 hours  oxyCODONE    IR 5 milliGRAM(s) Oral every 4 hours PRN  oxyCODONE    IR 10 milliGRAM(s) Oral every 4 hours PRN  pantoprazole    Tablet 40 milliGRAM(s) Oral before breakfast  polyethylene glycol 3350 17 Gram(s) Oral daily  senna 2 Tablet(s) Oral at bedtime  sodium chloride 0.9%. 1000 milliLiter(s) IV Continuous <Continuous>  spironolactone 50 milliGRAM(s) Oral every 12 hours  torsemide 20 milliGRAM(s) Oral two times a day                            10.0   11.34 )-----------( 81       ( 11 Nov 2021 05:29 )             32.0       Hemoglobin: 10.0 g/dL (11-11 @ 05:29)  Hemoglobin: 10.3 g/dL (11-10 @ 00:35)  Hemoglobin: 9.2 g/dL (11-09 @ 13:35)  Hemoglobin: 10.3 g/dL (11-09 @ 00:37)  Hemoglobin: 11.4 g/dL (11-08 @ 15:38)      11-11    139  |  101  |  24<H>  ----------------------------<  97  4.4   |  27  |  0.87    Ca    8.6      11 Nov 2021 05:29  Phos  3.4     11-10  Mg     2.5     11-10    TPro  5.5<L>  /  Alb  4.1  /  TBili  0.6  /  DBili  x   /  AST  163<H>  /  ALT  133<H>  /  AlkPhos  87  11-10    Creatinine Trend: 0.87<--, 0.99<--, 1.05<--, 0.79<--, 0.95<--, 0.96<--    COAGS:           T(C): 36.9 (11-11-21 @ 15:51), Max: 36.9 (11-10-21 @ 19:04)  HR: 66 (11-11-21 @ 17:25) (59 - 76)  BP: 129/60 (11-11-21 @ 17:25) (114/56 - 145/71)  RR: 19 (11-11-21 @ 17:25) (18 - 19)  SpO2: 94% (11-11-21 @ 17:25) (90% - 95%)  Wt(kg): --    I&O's Summary    10 Nov 2021 07:01  -  11 Nov 2021 07:00  --------------------------------------------------------  IN: 260 mL / OUT: 2205 mL / NET: -1945 mL    11 Nov 2021 07:01  -  11 Nov 2021 18:51  --------------------------------------------------------  IN: 1200 mL / OUT: 1350 mL / NET: -150 mL        HEENT:  (-)icterus (-)pallor  CV: N S1 S2 1/6 ZITA (+)2 Pulses B/l  Resp:  Clear to auscultation B/L, normal effort  GI: (+) BS Soft, NT, ND  Lymph:  (-)Edema, (-)obvious lymphadenopathy  Skin: Warm to touch, Normal turgor  Psych: Appropriate mood and affect      TELEMETRY: AP 60    ASSESSMENT/PLAN: 	75y  Female   pmh of HTN, HLD,  who presented to the ED with complains of SOB found with pulmonary edema and new afib DOUG revealed severe MR now s/p tissue MVR, LISBET ligation and MAZE.    - Progressing well, transferred to step down unit  - Keep net negative with IV lasix  - off pressors   - EP f/u appreciated  - Anticoagulation resumed with Eliquis per CTS  - Amio per ep    Jair Mullins MD, FACC  BEEPER [phone]

## 2021-11-11 NOTE — DIETITIAN INITIAL EVALUATION ADULT. - ORAL INTAKE PTA/DIET HISTORY
Pt was eating well with no changes in appetite. Pt was not following therapeutic diet. Diet recall declined. Confirms no known food allergies. Denies Hx of chewing or swallowing issues (has top dentures in-house). Denies regular oral nutrient supplement use. Took Vitamin D and C.

## 2021-11-11 NOTE — DIETITIAN INITIAL EVALUATION ADULT. - CHIEF COMPLAINT
Per Chart: Pt is a 77 y/o F with pmhx of HTN, HLD, varicose veins, and vertigo who presented to the ED with complains of worsening SOB for the past 3 weeks.  11/18/21 Mitral valve replacement (t) with Magnaease 29

## 2021-11-11 NOTE — PROGRESS NOTE ADULT - SUBJECTIVE AND OBJECTIVE BOX
VITAL SIGNS    Telemetry: SR 60   Vital Signs Last 24 Hrs  T(C): 36.7 (21 @ 07:12), Max: 36.9 (11-10-21 @ 19:04)  T(F): 98 (21 @ 07:12), Max: 98.5 (11-10-21 @ 19:04)  HR: 61 (21 @ 07:12) (59 - 74)  BP: 119/56 (21 @ 07:12) (119/56 - 133/62)  RR: 18 (21 @ 07:12) (18 - 20)  SpO2: 94% (21 @ 07:12) (94% - 98%)            11-10 @ 07:01  -   @ 07:00  --------------------------------------------------------  IN: 260 mL / OUT: 2205 mL / NET: -1945 mL     @ 07:01  -   @ 08:47  --------------------------------------------------------  IN: 240 mL / OUT: 350 mL / NET: -110 mL       Daily     Daily Weight in k.4 (2021 04:51)  Admit Wt: Drug Dosing Weight  Height (cm): 172.7 (2021 13:10)  Weight (kg): 80.9 (2021 13:10)  BMI (kg/m2): 27.1 (2021 13:10)  BSA (m2): 1.95 (2021 13:10)      CAPILLARY BLOOD GLUCOSE      POCT Blood Glucose.: 107 mg/dL (2021 08:01)  POCT Blood Glucose.: 166 mg/dL (10 Nov 2021 21:11)  POCT Blood Glucose.: 137 mg/dL (10 Nov 2021 16:55)  POCT Blood Glucose.: 189 mg/dL (10 Nov 2021 11:41)          MEDICATIONS  acetaminophen     Tablet .. 650 milliGRAM(s) Oral every 6 hours  acetaminophen     Tablet .. 650 milliGRAM(s) Oral every 6 hours PRN  apixaban 2.5 milliGRAM(s) Oral every 12 hours  ascorbic acid 500 milliGRAM(s) Oral two times a day  aspirin enteric coated 81 milliGRAM(s) Oral daily  bisacodyl Suppository 10 milliGRAM(s) Rectal once  chlorhexidine 2% Cloths 1 Application(s) Topical daily  dextrose 50% Injectable 50 milliLiter(s) IV Push every 15 minutes  dextrose 50% Injectable 25 milliLiter(s) IV Push every 15 minutes  gabapentin 100 milliGRAM(s) Oral every 8 hours  insulin lispro (ADMELOG) corrective regimen sliding scale   SubCutaneous Before meals and at bedtime  oxyCODONE    IR 5 milliGRAM(s) Oral every 4 hours PRN  oxyCODONE    IR 10 milliGRAM(s) Oral every 4 hours PRN  pantoprazole    Tablet 40 milliGRAM(s) Oral before breakfast  polyethylene glycol 3350 17 Gram(s) Oral daily  senna 2 Tablet(s) Oral at bedtime  sodium chloride 0.9%. 1000 milliLiter(s) IV Continuous <Continuous>  spironolactone 50 milliGRAM(s) Oral every 12 hours  torsemide 20 milliGRAM(s) Oral two times a day      >>> <<<  PHYSICAL EXAM  Subjective: NAD  Neurology: alert and oriented x 3, nonfocal, no gross deficits  CV :s1s2  Sternal Wound :  CDI , Stable +PW DDD 50 ma 10 threshhold 4  Lungs: CTA b/l  Abdomen: soft, NT,ND, ( -)BM +flatus  :  pineda  Extremities:   +1 edema b/l    LABS      139  |  101  |  24<H>  ----------------------------<  97  4.4   |  27  |  0.87    Ca    8.6      2021 05:29  Phos  3.4     11-10  Mg     2.5     11-10    TPro  5.5<L>  /  Alb  4.1  /  TBili  0.6  /  DBili  x   /  AST  163<H>  /  ALT  133<H>  /  AlkPhos  87  11-10                                 10.0   11.34 )-----------( 81       ( 2021 05:29 )             32.0                 PAST MEDICAL & SURGICAL HISTORY:  Hypertension    Vertigo    H/O varicose veins    No significant past surgical history

## 2021-11-11 NOTE — PROGRESS NOTE ADULT - ASSESSMENT
75 y/o F with pmhx of HTN, HLD, varicose veins, and vertigo who presented to the ED with complains of worsening SOB for the past 3 weeks, worse with exertion and laying flat and associated with intermittent 3/10 left sided chest pain and palpitations. Labs showed elevated pro-BNP and UA was positive for a possible infection. CTA of chest was negative for PE but showed pleural effusions. Patient was admitted for CHF exacerbation and new onset A-fib. Patient was put on metoprolol for her Afib and Lasix for CHF and was also put on telemetry and FD lovenox. An echocardiogram was performed on her which showed moderate M.    Rapid response was called for dizziness and pre-syncope  and nausea while in the bathroom, Cardiac monitor showing rapid afib with fluctuating HR, 500ml bolus given for low BP, repeat measurement was normal, EKG was obtained, showed A -fib. Her morning medications were given including cardizem drip, lasix and metoprolol  Patient states she is feeling better after interventions.    Cardiology  reviewed her Echo cardiogram which showed:  Moderate to severe MR, Moderately increased RV pressure, diastolic function assessment was inconclusive, EF 55%     11/18/21 Mitral valve replacement (t) with Magnaease 29  Moser-Maze IV Cryo-RF  LAAL with AtriClip 35mm    Inotropes/ pressors  New onset Afib requiring Amio -EPS following-->  Oral Amiodarone, recommend 5 gram load (IV infusion, + 400mg x 12 doses), then 200mg daily.  With her degree of heart failure, there is a high priority to maintain sinus rhythm.Junctional rhythm 40 's  paced at 80, av.Insulin infusion for  hyperglycemia- weaned  started Transfered to [facility] follow up,  pacer av interval adjusted to  allow a pacing, 60 .Underlying junctional 50  11/11 maintaining SR no evidence of junctional rhythm on tele. Continue to hold amiodarone. EP to follow. Consider ziopath placement upon discharge later this week if PPM not indicated.     77 y/o F with pmhx of HTN, HLD, varicose veins, and vertigo who presented to the ED with complains of worsening SOB for the past 3 weeks, worse with exertion and laying flat and associated with intermittent 3/10 left sided chest pain and palpitations. Labs showed elevated pro-BNP and UA was positive for a possible infection. CTA of chest was negative for PE but showed pleural effusions. Patient was admitted for CHF exacerbation and new onset A-fib. Patient was put on metoprolol for her Afib and Lasix for CHF and was also put on telemetry and FD lovenox. An echocardiogram was performed on her which showed moderate M.    Rapid response was called for dizziness and pre-syncope  and nausea while in the bathroom, Cardiac monitor showing rapid afib with fluctuating HR, 500ml bolus given for low BP, repeat measurement was normal, EKG was obtained, showed A -fib. Her morning medications were given including cardizem drip, lasix and metoprolol  Patient states she is feeling better after interventions.    Cardiology  reviewed her Echo cardiogram which showed:  Moderate to severe MR, Moderately increased RV pressure, diastolic function assessment was inconclusive, EF 55%     11/18/21 Mitral valve replacement (t) with Magnaease 29  Moser-Maze IV Cryo-RF  LAAL with AtriClip 35mm    Inotropes/ pressors  New onset Afib requiring Amio -EPS following-->  Oral Amiodarone, recommend 5 gram load (IV infusion, + 400mg x 12 doses), then 200mg daily.  With her degree of heart failure, there is a high priority to maintain sinus rhythm.Junctional rhythm 40 's  paced at 80, av.Insulin infusion for  hyperglycemia- weaned  started Transfered to [facility] follow up,  pacer av interval adjusted to  allow a pacing, 60 .Underlying junctional 50  11/11 maintaining SR no evidence of junctional rhythm on tele. Amiodarone held yesterday. EP to follow. Consider ziopath placement upon discharge later this week if PPM not indicated. Amiodarone 400 bid reinstated as per Dr Arango.

## 2021-11-11 NOTE — DIETITIAN INITIAL EVALUATION ADULT. - PERTINENT MEDS FT
MEDICATIONS  (STANDING):  aMIOdarone    Tablet 400 milliGRAM(s) Oral two times a day  apixaban 2.5 milliGRAM(s) Oral every 12 hours  ascorbic acid 500 milliGRAM(s) Oral two times a day  aspirin enteric coated 81 milliGRAM(s) Oral daily  bisacodyl Suppository 10 milliGRAM(s) Rectal once  chlorhexidine 2% Cloths 1 Application(s) Topical daily  dextrose 50% Injectable 50 milliLiter(s) IV Push every 15 minutes  dextrose 50% Injectable 25 milliLiter(s) IV Push every 15 minutes  gabapentin 100 milliGRAM(s) Oral every 8 hours  pantoprazole    Tablet 40 milliGRAM(s) Oral before breakfast  polyethylene glycol 3350 17 Gram(s) Oral daily  senna 2 Tablet(s) Oral at bedtime  sodium chloride 0.9%. 1000 milliLiter(s) (10 mL/Hr) IV Continuous <Continuous>  spironolactone 50 milliGRAM(s) Oral every 12 hours  torsemide 20 milliGRAM(s) Oral two times a day

## 2021-11-11 NOTE — DIETITIAN INITIAL EVALUATION ADULT. - OTHER INFO
Dosing wt: 178.3 lbs. Daily wt in lbs: 186 (11/11), 178.3 (11/8). Wt fluctuations during admission likely 2/2 intraoperative fluid shifts. Reports UBW of ~180 lbs, denies any significant changes in wt PTA.    Pt is eating well with no changes in appetite. Denies recent N/V, diarrhea, or constipation. Last BM 11/10.    RD reviewed nutrition therapy for s/p midsternal incision. Emphasized importance of adequate lean protein intake and optimal blood glucose levels for wound healing. Advised pt to limit concentrated sweets. Pt made aware RD to remain available.

## 2021-11-11 NOTE — PROGRESS NOTE ADULT - SUBJECTIVE AND OBJECTIVE BOX
EP     DATE OF SERVICE: 11-11-21  s/p Bio-MVR, Maze, and LA appendage clip on 11/8.  Feeling well, No events overnight.    Temporary pacemaker checked at bedside today.    acetaminophen     Tablet .. 650 milliGRAM(s) Oral every 6 hours PRN  aMIOdarone    Tablet 400 milliGRAM(s) Oral two times a day  apixaban 2.5 milliGRAM(s) Oral every 12 hours  ascorbic acid 500 milliGRAM(s) Oral two times a day  aspirin enteric coated 81 milliGRAM(s) Oral daily  bisacodyl Suppository 10 milliGRAM(s) Rectal once  chlorhexidine 2% Cloths 1 Application(s) Topical daily  dextrose 50% Injectable 50 milliLiter(s) IV Push every 15 minutes  dextrose 50% Injectable 25 milliLiter(s) IV Push every 15 minutes  gabapentin 100 milliGRAM(s) Oral every 8 hours  oxyCODONE    IR 5 milliGRAM(s) Oral every 4 hours PRN  oxyCODONE    IR 10 milliGRAM(s) Oral every 4 hours PRN  pantoprazole    Tablet 40 milliGRAM(s) Oral before breakfast  polyethylene glycol 3350 17 Gram(s) Oral daily  senna 2 Tablet(s) Oral at bedtime  sodium chloride 0.9%. 1000 milliLiter(s) IV Continuous <Continuous>  spironolactone 50 milliGRAM(s) Oral every 12 hours  torsemide 20 milliGRAM(s) Oral two times a day                          10.0   11.34 )-----------( 81       ( 11 Nov 2021 05:29 )             32.0       11-11    139  |  101  |  24<H>  ----------------------------<  97  4.4   |  27  |  0.87    Ca    8.6      11 Nov 2021 05:29  Phos  3.4     11-10  Mg     2.5     11-10    TPro  5.5<L>  /  Alb  4.1  /  TBili  0.6  /  DBili  x   /  AST  163<H>  /  ALT  133<H>  /  AlkPhos  87  11-10    T(C): 36.7 (11-11-21 @ 11:10), Max: 36.9 (11-10-21 @ 19:04)  HR: 76 (11-11-21 @ 11:10) (59 - 76)  BP: 145/71 (11-11-21 @ 11:10) (119/56 - 145/71)  RR: 18 (11-11-21 @ 11:10) (18 - 18)  SpO2: 90% (11-11-21 @ 11:10) (90% - 95%)  Wt(kg): --    I&O's Summary    10 Nov 2021 07:01  -  11 Nov 2021 07:00  --------------------------------------------------------  IN: 260 mL / OUT: 2205 mL / NET: -1945 mL    11 Nov 2021 07:01  -  11 Nov 2021 15:46  --------------------------------------------------------  IN: 840 mL / OUT: 1000 mL / NET: -160 mL      Gen: awake and alert, cooperative.  HEENT:  (-)icterus (-)pallor  CV: Regular S1 S2 1/6 ZITA (+)2 Pulses B/l, chest tubes, pacer wires,  Resp:  Clear to auscultation B/L, normal effort  GI: (+) BS Soft, NT, ND  Lymph:  (-)Edema, (-)obvious lymphadenopathy  Skin: Warm to touch, Normal turgor  Psych: Mood is "I feel good", affect is normal, in good spirits.    TELEMETRY: initially AV paced at 80bpm, AV delay 190ms.    Pacemaker check:  Underlying rhythm is SINUS at 58-63bpm.  Capture threshold then checked, V-capture is 1.5mA.  Output set to 10mA.  Atrial rhythm sinus, just VERY LOW AMPLITUDE P WAVES after Maze.  A-pacing produces a broad flat low-amplitude waveform on 12-lead EKG.  Atrial capture threshold is 5mA.  Output set to 10mA.  (15mA causes some diaphragm stimulation).  Her paced AV interval is 240ms.  Any shorter mandates V-pacing.  Paced rhythm has been reprogrammed to DDD, 50bpm, with AV delays of 260ms to minimize V-pacing, but provide backup V-pacing if a P-wave fails to conduct.      ASSESSMENT/PLAN: Patient is a 77 y/o Female with PMH of HTN and HLD who presented with SOB found to have new onset atrial fibrillation and severe MR. EP consulted for Afib management.    - s/p MVR, Moser Maze IV procedure, and LISBET clip on 11/8.  - Was on oral Amiodarone on 11/9, now resumed.  With her degree of heart failure, there is a high priority to maintain sinus rhythm.  - Maintain pacing wires.  Will reassess necessity of permanent pacing daily.  - Her atrial rhythm is coming through.    - Repeat limited echo for LV EF%.    - Ambulate on telemetry to assess sinus node chronotropic competence.  - If she goes home without a pacemaker, consider adding a ZioPatch for short term monitoring.  - Will follow.    Perez Bolton M.D.  Cardiac Electrophysiology  [phone]

## 2021-11-12 ENCOUNTER — TRANSCRIPTION ENCOUNTER (OUTPATIENT)
Age: 76
End: 2021-11-12

## 2021-11-12 LAB
ALBUMIN SERPL ELPH-MCNC: 4.5 G/DL — SIGNIFICANT CHANGE UP (ref 3.3–5)
ALP SERPL-CCNC: 124 U/L — HIGH (ref 40–120)
ALT FLD-CCNC: 88 U/L — HIGH (ref 10–45)
ANION GAP SERPL CALC-SCNC: 14 MMOL/L — SIGNIFICANT CHANGE UP (ref 5–17)
AST SERPL-CCNC: 32 U/L — SIGNIFICANT CHANGE UP (ref 10–40)
BASOPHILS # BLD AUTO: 0.04 K/UL — SIGNIFICANT CHANGE UP (ref 0–0.2)
BASOPHILS NFR BLD AUTO: 0.3 % — SIGNIFICANT CHANGE UP (ref 0–2)
BILIRUB SERPL-MCNC: 0.6 MG/DL — SIGNIFICANT CHANGE UP (ref 0.2–1.2)
BUN SERPL-MCNC: 22 MG/DL — SIGNIFICANT CHANGE UP (ref 7–23)
CALCIUM SERPL-MCNC: 9.3 MG/DL — SIGNIFICANT CHANGE UP (ref 8.4–10.5)
CHLORIDE SERPL-SCNC: 95 MMOL/L — LOW (ref 96–108)
CO2 SERPL-SCNC: 29 MMOL/L — SIGNIFICANT CHANGE UP (ref 22–31)
CREAT SERPL-MCNC: 0.93 MG/DL — SIGNIFICANT CHANGE UP (ref 0.5–1.3)
EOSINOPHIL # BLD AUTO: 0.33 K/UL — SIGNIFICANT CHANGE UP (ref 0–0.5)
EOSINOPHIL NFR BLD AUTO: 2.8 % — SIGNIFICANT CHANGE UP (ref 0–6)
GLUCOSE SERPL-MCNC: 106 MG/DL — HIGH (ref 70–99)
HCT VFR BLD CALC: 33.7 % — LOW (ref 34.5–45)
HGB BLD-MCNC: 10.8 G/DL — LOW (ref 11.5–15.5)
IMM GRANULOCYTES NFR BLD AUTO: 0.8 % — SIGNIFICANT CHANGE UP (ref 0–1.5)
LYMPHOCYTES # BLD AUTO: 43.1 % — SIGNIFICANT CHANGE UP (ref 13–44)
LYMPHOCYTES # BLD AUTO: 5.17 K/UL — HIGH (ref 1–3.3)
MANUAL SMEAR VERIFICATION: SIGNIFICANT CHANGE UP
MCHC RBC-ENTMCNC: 30.4 PG — SIGNIFICANT CHANGE UP (ref 27–34)
MCHC RBC-ENTMCNC: 32 GM/DL — SIGNIFICANT CHANGE UP (ref 32–36)
MCV RBC AUTO: 94.9 FL — SIGNIFICANT CHANGE UP (ref 80–100)
MONOCYTES # BLD AUTO: 0.72 K/UL — SIGNIFICANT CHANGE UP (ref 0–0.9)
MONOCYTES NFR BLD AUTO: 6 % — SIGNIFICANT CHANGE UP (ref 2–14)
NEUTROPHILS # BLD AUTO: 5.64 K/UL — SIGNIFICANT CHANGE UP (ref 1.8–7.4)
NEUTROPHILS NFR BLD AUTO: 47 % — SIGNIFICANT CHANGE UP (ref 43–77)
NRBC # BLD: 0 /100 WBCS — SIGNIFICANT CHANGE UP (ref 0–0)
PLAT MORPH BLD: NORMAL — SIGNIFICANT CHANGE UP
PLATELET # BLD AUTO: 123 K/UL — LOW (ref 150–400)
POTASSIUM SERPL-MCNC: 4.3 MMOL/L — SIGNIFICANT CHANGE UP (ref 3.5–5.3)
POTASSIUM SERPL-SCNC: 4.3 MMOL/L — SIGNIFICANT CHANGE UP (ref 3.5–5.3)
PROT SERPL-MCNC: 6.2 G/DL — SIGNIFICANT CHANGE UP (ref 6–8.3)
RBC # BLD: 3.55 M/UL — LOW (ref 3.8–5.2)
RBC # FLD: 15.3 % — HIGH (ref 10.3–14.5)
RBC BLD AUTO: SIGNIFICANT CHANGE UP
SODIUM SERPL-SCNC: 138 MMOL/L — SIGNIFICANT CHANGE UP (ref 135–145)
SURGICAL PATHOLOGY STUDY: SIGNIFICANT CHANGE UP
WBC # BLD: 11.99 K/UL — HIGH (ref 3.8–10.5)
WBC # FLD AUTO: 11.99 K/UL — HIGH (ref 3.8–10.5)

## 2021-11-12 PROCEDURE — 71045 X-RAY EXAM CHEST 1 VIEW: CPT | Mod: 26

## 2021-11-12 RX ORDER — AMIODARONE HYDROCHLORIDE 400 MG/1
200 TABLET ORAL DAILY
Refills: 0 | Status: DISCONTINUED | OUTPATIENT
Start: 2021-11-12 | End: 2021-11-16

## 2021-11-12 RX ADMIN — APIXABAN 2.5 MILLIGRAM(S): 2.5 TABLET, FILM COATED ORAL at 05:01

## 2021-11-12 RX ADMIN — SPIRONOLACTONE 50 MILLIGRAM(S): 25 TABLET, FILM COATED ORAL at 05:01

## 2021-11-12 RX ADMIN — SENNA PLUS 2 TABLET(S): 8.6 TABLET ORAL at 21:06

## 2021-11-12 RX ADMIN — PANTOPRAZOLE SODIUM 40 MILLIGRAM(S): 20 TABLET, DELAYED RELEASE ORAL at 05:01

## 2021-11-12 RX ADMIN — AMIODARONE HYDROCHLORIDE 200 MILLIGRAM(S): 400 TABLET ORAL at 09:56

## 2021-11-12 RX ADMIN — GABAPENTIN 100 MILLIGRAM(S): 400 CAPSULE ORAL at 09:56

## 2021-11-12 RX ADMIN — Medication 500 MILLIGRAM(S): at 05:01

## 2021-11-12 RX ADMIN — SPIRONOLACTONE 50 MILLIGRAM(S): 25 TABLET, FILM COATED ORAL at 17:24

## 2021-11-12 RX ADMIN — Medication 20 MILLIGRAM(S): at 17:24

## 2021-11-12 RX ADMIN — OXYCODONE HYDROCHLORIDE 10 MILLIGRAM(S): 5 TABLET ORAL at 13:00

## 2021-11-12 RX ADMIN — POLYETHYLENE GLYCOL 3350 17 GRAM(S): 17 POWDER, FOR SOLUTION ORAL at 12:01

## 2021-11-12 RX ADMIN — Medication 500 MILLIGRAM(S): at 17:23

## 2021-11-12 RX ADMIN — OXYCODONE HYDROCHLORIDE 10 MILLIGRAM(S): 5 TABLET ORAL at 05:15

## 2021-11-12 RX ADMIN — GABAPENTIN 100 MILLIGRAM(S): 400 CAPSULE ORAL at 17:23

## 2021-11-12 RX ADMIN — GABAPENTIN 100 MILLIGRAM(S): 400 CAPSULE ORAL at 01:01

## 2021-11-12 RX ADMIN — OXYCODONE HYDROCHLORIDE 10 MILLIGRAM(S): 5 TABLET ORAL at 06:15

## 2021-11-12 RX ADMIN — OXYCODONE HYDROCHLORIDE 5 MILLIGRAM(S): 5 TABLET ORAL at 21:05

## 2021-11-12 RX ADMIN — Medication 81 MILLIGRAM(S): at 12:02

## 2021-11-12 RX ADMIN — OXYCODONE HYDROCHLORIDE 5 MILLIGRAM(S): 5 TABLET ORAL at 21:30

## 2021-11-12 RX ADMIN — Medication 20 MILLIGRAM(S): at 05:00

## 2021-11-12 RX ADMIN — OXYCODONE HYDROCHLORIDE 10 MILLIGRAM(S): 5 TABLET ORAL at 12:06

## 2021-11-12 NOTE — PROGRESS NOTE ADULT - SUBJECTIVE AND OBJECTIVE BOX
C A R D I O L O G Y  **********************************     DATE OF SERVICE: 11-12-21    Patient denies chest pain or shortness of breath.   Review of symptoms otherwise negative.    acetaminophen     Tablet .. 650 milliGRAM(s) Oral every 6 hours PRN  aMIOdarone    Tablet 200 milliGRAM(s) Oral daily  ascorbic acid 500 milliGRAM(s) Oral two times a day  aspirin enteric coated 81 milliGRAM(s) Oral daily  bisacodyl Suppository 10 milliGRAM(s) Rectal once  chlorhexidine 2% Cloths 1 Application(s) Topical daily  dextrose 50% Injectable 50 milliLiter(s) IV Push every 15 minutes  dextrose 50% Injectable 25 milliLiter(s) IV Push every 15 minutes  gabapentin 100 milliGRAM(s) Oral every 8 hours  oxyCODONE    IR 5 milliGRAM(s) Oral every 4 hours PRN  oxyCODONE    IR 10 milliGRAM(s) Oral every 4 hours PRN  pantoprazole    Tablet 40 milliGRAM(s) Oral before breakfast  polyethylene glycol 3350 17 Gram(s) Oral daily  senna 2 Tablet(s) Oral at bedtime  sodium chloride 0.9%. 1000 milliLiter(s) IV Continuous <Continuous>  spironolactone 50 milliGRAM(s) Oral every 12 hours  torsemide 20 milliGRAM(s) Oral two times a day                            10.8   11.99 )-----------( 123      ( 12 Nov 2021 05:55 )             33.7       Hemoglobin: 10.8 g/dL (11-12 @ 05:55)  Hemoglobin: 10.0 g/dL (11-11 @ 05:29)  Hemoglobin: 10.3 g/dL (11-10 @ 00:35)  Hemoglobin: 9.2 g/dL (11-09 @ 13:35)  Hemoglobin: 10.3 g/dL (11-09 @ 00:37)      11-12    138  |  95<L>  |  22  ----------------------------<  106<H>  4.3   |  29  |  0.93    Ca    9.3      12 Nov 2021 05:55    TPro  6.2  /  Alb  4.5  /  TBili  0.6  /  DBili  x   /  AST  32  /  ALT  88<H>  /  AlkPhos  124<H>  11-12    Creatinine Trend: 0.93<--, 0.87<--, 0.99<--, 1.05<--, 0.79<--, 0.95<--    COAGS:           T(C): 36.8 (11-12-21 @ 15:08), Max: 37.3 (11-12-21 @ 02:58)  HR: 67 (11-12-21 @ 15:08) (56 - 82)  BP: 115/54 (11-12-21 @ 15:08) (115/54 - 128/62)  RR: 18 (11-12-21 @ 15:08) (18 - 19)  SpO2: 95% (11-12-21 @ 15:08) (91% - 95%)  Wt(kg): --    I&O's Summary    11 Nov 2021 07:01  -  12 Nov 2021 07:00  --------------------------------------------------------  IN: 1650 mL / OUT: 2800 mL / NET: -1150 mL    12 Nov 2021 07:01  -  12 Nov 2021 17:43  --------------------------------------------------------  IN: 480 mL / OUT: 0 mL / NET: 480 mL      HEENT:  (-)icterus (-)pallor  CV: N S1 S2 1/6 ZITA (+)2 Pulses B/l  Resp:  Clear to auscultation B/L, normal effort  GI: (+) BS Soft, NT, ND  Lymph:  (-)Edema, (-)obvious lymphadenopathy  Skin: Warm to touch, Normal turgor  Psych: Appropriate mood and affect      TELEMETRY: AP 60    ASSESSMENT/PLAN: 	75y  Female   pmh of HTN, HLD,  who presented to the ED with complains of SOB found with pulmonary edema and new afib DOUG revealed severe MR now s/p tissue MVR, LISBET ligation and MAZE.    - Progressing well  - Keep net negative with IV lasix  - EP f/u appreciated  - Anticoagulation resumed with Eliquis per CTS  - Amio per ep    Jair Mullins MD, FACC  BEEPER [phone]

## 2021-11-12 NOTE — PROGRESS NOTE ADULT - SUBJECTIVE AND OBJECTIVE BOX
VITAL SIGNS    Telemetry:  nsr 74    Vital Signs Last 24 Hrs  T(C): 37.1 (21 @ 07:11), Max: 37.3 (21 @ 02:58)  T(F): 98.7 (21 @ 07:11), Max: 99.1 (21 @ 02:58)  HR: 63 (21 @ 07:11) (56 - 76)  BP: 127/60 (21 @ 07:11) (114/56 - 145/71)  RR: 18 (21 @ 07:11) (18 - 19)  SpO2: 93% (21 @ 07:11) (90% - 95%)                    @ 07:01  -   @ 07:00  --------------------------------------------------------  IN: 1650 mL / OUT: 2800 mL / NET: -1150 mL     @ 07:01  -   @ 10:10  --------------------------------------------------------  IN: 240 mL / OUT: 0 mL / NET: 240 mL          Daily     Daily Weight in k.2 (2021 05:22)            CAPILLARY BLOOD GLUCOSE                    Pacing Wires         Coumadin    [ ] YES          [ x ]      NO        eliquis                           PHYSICAL EXAM        Neurology: alert and oriented x 3, nonfocal, no gross deficits  CV : s1 s2 RRR  Sternal Wound :  CDI , Stable  Lungs: cta  Abdomen: soft, nontender, nondistended, positive bowel sounds, last bowel movement                       :    voiding   Extremities:    -  edema   /  -   calve tenderness ,              acetaminophen     Tablet .. 650 milliGRAM(s) Oral every 6 hours PRN  aMIOdarone    Tablet 200 milliGRAM(s) Oral daily  apixaban 2.5 milliGRAM(s) Oral every 12 hours  ascorbic acid 500 milliGRAM(s) Oral two times a day  aspirin enteric coated 81 milliGRAM(s) Oral daily  bisacodyl Suppository 10 milliGRAM(s) Rectal once  chlorhexidine 2% Cloths 1 Application(s) Topical daily  dextrose 50% Injectable 50 milliLiter(s) IV Push every 15 minutes  dextrose 50% Injectable 25 milliLiter(s) IV Push every 15 minutes  gabapentin 100 milliGRAM(s) Oral every 8 hours  oxyCODONE    IR 5 milliGRAM(s) Oral every 4 hours PRN  oxyCODONE    IR 10 milliGRAM(s) Oral every 4 hours PRN  pantoprazole    Tablet 40 milliGRAM(s) Oral before breakfast  polyethylene glycol 3350 17 Gram(s) Oral daily  senna 2 Tablet(s) Oral at bedtime  sodium chloride 0.9%. 1000 milliLiter(s) IV Continuous <Continuous>  spironolactone 50 milliGRAM(s) Oral every 12 hours  torsemide 20 milliGRAM(s) Oral two times a day                    Physical Therapy Rec:   Home  [  ]   Home w/ PT  [  ]  Rehab  [  ]  Discussed with Cardiothoracic Team at AM rounds.

## 2021-11-12 NOTE — PROGRESS NOTE ADULT - SUBJECTIVE AND OBJECTIVE BOX
EP     DATE OF SERVICE: 11-12-21  s/p Bio-MVR, Maze, and LA appendage clip on 11/8.  Feeling well, No events overnight.  Heartbeat <50 overnight leading to some AV pacing.   Temporary pacemaker checked at bedside today.  Feels well on ambulation.    acetaminophen     Tablet .. 650 milliGRAM(s) Oral every 6 hours PRN  aMIOdarone    Tablet 200 milliGRAM(s) Oral daily  apixaban 2.5 milliGRAM(s) Oral every 12 hours  ascorbic acid 500 milliGRAM(s) Oral two times a day  aspirin enteric coated 81 milliGRAM(s) Oral daily  bisacodyl Suppository 10 milliGRAM(s) Rectal once  chlorhexidine 2% Cloths 1 Application(s) Topical daily  dextrose 50% Injectable 50 milliLiter(s) IV Push every 15 minutes  dextrose 50% Injectable 25 milliLiter(s) IV Push every 15 minutes  gabapentin 100 milliGRAM(s) Oral every 8 hours  oxyCODONE    IR 5 milliGRAM(s) Oral every 4 hours PRN  oxyCODONE    IR 10 milliGRAM(s) Oral every 4 hours PRN  pantoprazole    Tablet 40 milliGRAM(s) Oral before breakfast  polyethylene glycol 3350 17 Gram(s) Oral daily  senna 2 Tablet(s) Oral at bedtime  sodium chloride 0.9%. 1000 milliLiter(s) IV Continuous <Continuous>  spironolactone 50 milliGRAM(s) Oral every 12 hours  torsemide 20 milliGRAM(s) Oral two times a day                            10.8   11.99 )-----------( 123      ( 12 Nov 2021 05:55 )             33.7       11-12    138  |  95<L>  |  22  ----------------------------<  106<H>  4.3   |  29  |  0.93    Ca    9.3      12 Nov 2021 05:55    TPro  6.2  /  Alb  4.5  /  TBili  0.6  /  DBili  x   /  AST  32  /  ALT  88<H>  /  AlkPhos  124<H>  11-12    T(C): 37.1 (11-12-21 @ 07:11), Max: 37.3 (11-12-21 @ 02:58)  HR: 63 (11-12-21 @ 07:11) (56 - 76)  BP: 127/60 (11-12-21 @ 07:11) (114/56 - 145/71)  RR: 18 (11-12-21 @ 07:11) (18 - 19)  SpO2: 93% (11-12-21 @ 07:11) (90% - 95%)  Wt(kg): --    I&O's Summary    11 Nov 2021 07:01  -  12 Nov 2021 07:00  --------------------------------------------------------  IN: 1650 mL / OUT: 2800 mL / NET: -1150 mL    12 Nov 2021 07:01  -  12 Nov 2021 10:37  --------------------------------------------------------  IN: 240 mL / OUT: 0 mL / NET: 240 mL      Gen: awake and alert, cooperative.  HEENT:  (-)icterus (-)pallor  CV: Regular S1 S2 1/6 ZITA (+)2 Pulses  pacer wires,  Resp:  Clear to auscultation B/L, normal effort  GI: (+) BS Soft, NT, ND  Lymph:  (-)Edema, (-)obvious lymphadenopathy  Skin: Warm to touch, Normal turgor  Psych: Mood is "I feel good", affect is normal, in good spirits.    TELEMETRY: Sinus rhythm 55-80bpm.  Rare AV pacing overnight.    ASSESSMENT/PLAN: Patient is a 77 y/o Female with PMH of HTN and HLD who presented with SOB found to have new onset atrial fibrillation and severe MR. EP consulted for Afib management.    - s/p MVR, Moser Maze IV procedure, and LISBET clip on 11/8.  - Discharge on Apixaban 5mg BID, and Amiodarone 200mg daily  - No permanent cardiac pacing indicated.  Sinus node function is stable, with good chronotropic competence during ambulation.  - No signs of pathologic AV block.  She is almost having Wenckebach overnight if not for her epicardial wires.  This is physiologic during sleep.  - Zio patch for short-term monitoring.  Gave our group's answering service # for the contact phone number.  - Will follow.  From my perspective, OK for discharge when Stepdown unit is ready.    Perez Bolton M.D.  Cardiac Electrophysiology  [phone]

## 2021-11-12 NOTE — PROGRESS NOTE ADULT - ASSESSMENT
77 y/o F with pmhx of HTN, HLD, varicose veins, and vertigo who presented to the ED with complains of worsening SOB for the past 3 weeks, worse with exertion and laying flat and associated with intermittent 3/10 left sided chest pain and palpitations. Labs showed elevated pro-BNP and UA was positive for a possible infection. CTA of chest was negative for PE but showed pleural effusions. Patient was admitted for CHF exacerbation and new onset A-fib. Patient was put on metoprolol for her Afib and Lasix for CHF and was also put on telemetry and FD lovenox. An echocardiogram was performed on her which showed moderate M.    Rapid response was called for dizziness and pre-syncope  and nausea while in the bathroom, Cardiac monitor showing rapid afib with fluctuating HR, 500ml bolus given for low BP, repeat measurement was normal, EKG was obtained, showed A -fib. Her morning medications were given including cardizem drip, lasix and metoprolol  Patient states she is feeling better after interventions.    Cardiology  reviewed her Echo cardiogram which showed:  Moderate to severe MR, Moderately increased RV pressure, diastolic function assessment was inconclusive, EF 55%     11/18/21 Mitral valve replacement (t) with Magnaease 29  Moser-Maze IV Cryo-RF  LAAL with AtriClip 35mm    Inotropes/ pressors  New onset Afib requiring Amio -EPS following-->  Oral Amiodarone, recommend 5 gram load (IV infusion, + 400mg x 12 doses), then 200mg daily.  With her degree of heart failure, there is a high priority to maintain sinus rhythm.Junctional rhythm 40 's  paced at 80, av.Insulin infusion for  hyperglycemia- weaned  started Transfered to [facility] follow up,  pacer av interval adjusted to  allow a pacing, 60 .Underlying junctional 50  11/11 maintaining SR no evidence of junctional rhythm on tele. Amiodarone held yesterday. EP to follow. Consider ziopath placement upon discharge later this week if PPM not indicated. Amiodarone 400 bid reinstated as per Dr Arango.  11/12 Pacing ovenight , amio d/c. NSR 70's this am amio resumed at 200 qd.  EPS following, no pacer at this time.  d/c planning with yancy this weekend

## 2021-11-12 NOTE — DISCHARGE NOTE NURSING/CASE MANAGEMENT/SOCIAL WORK - PATIENT PORTAL LINK FT
You can access the FollowMyHealth Patient Portal offered by [facility] by registering at the following website: [email or web address]. By joining [employer]’s FollowMyHealth portal, you will also be able to view your health information using other applications (apps) compatible with our system.

## 2021-11-13 LAB
ALBUMIN SERPL ELPH-MCNC: 4.3 G/DL — SIGNIFICANT CHANGE UP (ref 3.3–5)
ALP SERPL-CCNC: 126 U/L — HIGH (ref 40–120)
ALT FLD-CCNC: 69 U/L — HIGH (ref 10–45)
ANION GAP SERPL CALC-SCNC: 15 MMOL/L — SIGNIFICANT CHANGE UP (ref 5–17)
AST SERPL-CCNC: 24 U/L — SIGNIFICANT CHANGE UP (ref 10–40)
BILIRUB SERPL-MCNC: 0.6 MG/DL — SIGNIFICANT CHANGE UP (ref 0.2–1.2)
BUN SERPL-MCNC: 27 MG/DL — HIGH (ref 7–23)
CALCIUM SERPL-MCNC: 9.5 MG/DL — SIGNIFICANT CHANGE UP (ref 8.4–10.5)
CHLORIDE SERPL-SCNC: 90 MMOL/L — LOW (ref 96–108)
CO2 SERPL-SCNC: 30 MMOL/L — SIGNIFICANT CHANGE UP (ref 22–31)
CREAT SERPL-MCNC: 1.02 MG/DL — SIGNIFICANT CHANGE UP (ref 0.5–1.3)
GLUCOSE SERPL-MCNC: 114 MG/DL — HIGH (ref 70–99)
HCT VFR BLD CALC: 34.5 % — SIGNIFICANT CHANGE UP (ref 34.5–45)
HGB BLD-MCNC: 11.3 G/DL — LOW (ref 11.5–15.5)
MCHC RBC-ENTMCNC: 30.2 PG — SIGNIFICANT CHANGE UP (ref 27–34)
MCHC RBC-ENTMCNC: 32.8 GM/DL — SIGNIFICANT CHANGE UP (ref 32–36)
MCV RBC AUTO: 92.2 FL — SIGNIFICANT CHANGE UP (ref 80–100)
NRBC # BLD: 0 /100 WBCS — SIGNIFICANT CHANGE UP (ref 0–0)
PLATELET # BLD AUTO: 156 K/UL — SIGNIFICANT CHANGE UP (ref 150–400)
POTASSIUM SERPL-MCNC: 4.5 MMOL/L — SIGNIFICANT CHANGE UP (ref 3.5–5.3)
POTASSIUM SERPL-SCNC: 4.5 MMOL/L — SIGNIFICANT CHANGE UP (ref 3.5–5.3)
PROT SERPL-MCNC: 6.7 G/DL — SIGNIFICANT CHANGE UP (ref 6–8.3)
RBC # BLD: 3.74 M/UL — LOW (ref 3.8–5.2)
RBC # FLD: 14.9 % — HIGH (ref 10.3–14.5)
SARS-COV-2 RNA SPEC QL NAA+PROBE: SIGNIFICANT CHANGE UP
SODIUM SERPL-SCNC: 135 MMOL/L — SIGNIFICANT CHANGE UP (ref 135–145)
WBC # BLD: 11.84 K/UL — HIGH (ref 3.8–10.5)
WBC # FLD AUTO: 11.84 K/UL — HIGH (ref 3.8–10.5)

## 2021-11-13 PROCEDURE — 71045 X-RAY EXAM CHEST 1 VIEW: CPT | Mod: 26

## 2021-11-13 PROCEDURE — 32557 INSERT CATH PLEURA W/ IMAGE: CPT

## 2021-11-13 RX ORDER — APIXABAN 2.5 MG/1
5 TABLET, FILM COATED ORAL EVERY 12 HOURS
Refills: 0 | Status: DISCONTINUED | OUTPATIENT
Start: 2021-11-13 | End: 2021-11-15

## 2021-11-13 RX ADMIN — Medication 20 MILLIGRAM(S): at 17:31

## 2021-11-13 RX ADMIN — CHLORHEXIDINE GLUCONATE 1 APPLICATION(S): 213 SOLUTION TOPICAL at 11:34

## 2021-11-13 RX ADMIN — Medication 20 MILLIGRAM(S): at 05:00

## 2021-11-13 RX ADMIN — SPIRONOLACTONE 50 MILLIGRAM(S): 25 TABLET, FILM COATED ORAL at 17:31

## 2021-11-13 RX ADMIN — GABAPENTIN 100 MILLIGRAM(S): 400 CAPSULE ORAL at 01:34

## 2021-11-13 RX ADMIN — SENNA PLUS 2 TABLET(S): 8.6 TABLET ORAL at 21:44

## 2021-11-13 RX ADMIN — GABAPENTIN 100 MILLIGRAM(S): 400 CAPSULE ORAL at 11:32

## 2021-11-13 RX ADMIN — AMIODARONE HYDROCHLORIDE 200 MILLIGRAM(S): 400 TABLET ORAL at 05:00

## 2021-11-13 RX ADMIN — SPIRONOLACTONE 50 MILLIGRAM(S): 25 TABLET, FILM COATED ORAL at 05:01

## 2021-11-13 RX ADMIN — OXYCODONE HYDROCHLORIDE 5 MILLIGRAM(S): 5 TABLET ORAL at 18:17

## 2021-11-13 RX ADMIN — PANTOPRAZOLE SODIUM 40 MILLIGRAM(S): 20 TABLET, DELAYED RELEASE ORAL at 05:00

## 2021-11-13 RX ADMIN — Medication 81 MILLIGRAM(S): at 11:33

## 2021-11-13 RX ADMIN — APIXABAN 5 MILLIGRAM(S): 2.5 TABLET, FILM COATED ORAL at 21:44

## 2021-11-13 RX ADMIN — OXYCODONE HYDROCHLORIDE 5 MILLIGRAM(S): 5 TABLET ORAL at 17:47

## 2021-11-13 RX ADMIN — OXYCODONE HYDROCHLORIDE 5 MILLIGRAM(S): 5 TABLET ORAL at 12:53

## 2021-11-13 RX ADMIN — Medication 500 MILLIGRAM(S): at 05:00

## 2021-11-13 RX ADMIN — OXYCODONE HYDROCHLORIDE 5 MILLIGRAM(S): 5 TABLET ORAL at 13:33

## 2021-11-13 NOTE — PROGRESS NOTE ADULT - ASSESSMENT
75 y/o F with pmhx of HTN, HLD, varicose veins, and vertigo who presented to the ED with complains of worsening SOB for the past 3 weeks, worse with exertion and laying flat and associated with intermittent 3/10 left sided chest pain and palpitations. Labs showed elevated pro-BNP and UA was positive for a possible infection. CTA of chest was negative for PE but showed pleural effusions. Patient was admitted for CHF exacerbation and new onset A-fib. Patient was put on metoprolol for her Afib and Lasix for CHF and was also put on telemetry and FD lovenox. An echocardiogram was performed on her which showed moderate M.    Rapid response was called for dizziness and pre-syncope  and nausea while in the bathroom, Cardiac monitor showing rapid afib with fluctuating HR, 500ml bolus given for low BP, repeat measurement was normal, EKG was obtained, showed A -fib. Her morning medications were given including cardizem drip, lasix and metoprolol  Patient states she is feeling better after interventions.    Cardiology  reviewed her Echo cardiogram which showed:  Moderate to severe MR, Moderately increased RV pressure, diastolic function assessment was inconclusive, EF 55%     11/18/21 Mitral valve replacement (t) with Magnaease 29  Moser-Maze IV Cryo-RF  LAAL with AtriClip 35mm    Inotropes/ pressors  New onset Afib requiring Amio -EPS following-->  Oral Amiodarone, recommend 5 gram load (IV infusion, + 400mg x 12 doses), then 200mg daily.  With her degree of heart failure, there is a high priority to maintain sinus rhythm.Junctional rhythm 40 's  paced at 80, av.Insulin infusion for  hyperglycemia- weaned  started Transfered to [facility] follow up,  pacer av interval adjusted to  allow a pacing, 60 .Underlying junctional 50  11/11 maintaining SR no evidence of junctional rhythm on tele. Amiodarone held yesterday. EP to follow. Consider ziopath placement upon discharge later this week if PPM not indicated. Amiodarone 400 bid reinstated as per Dr Arango.  11/12 Pacing ovenight , amio d/c. NSR 70's this am amio resumed at 200 qd.  EPS following, no pacer at this time.  d/c planning with ziopatch   11/13 VSS - will u/s r & l pleural space to assess for pigtail placement. eliquis is on hold for such d/c plan home w/ mcot ? monday

## 2021-11-13 NOTE — PROGRESS NOTE ADULT - ASSESSMENT
Agree with above assessment and plan as outlined above.    - chest tubes per CTS    Jair Mullins MD, [facility]  BEEPER [phone]

## 2021-11-13 NOTE — PROGRESS NOTE ADULT - SUBJECTIVE AND OBJECTIVE BOX
C A R D I O L O G Y  **********************************     DATE OF SERVICE: 11-13-21    pt seen and examined, no complaints, ROS - .          acetaminophen     Tablet .. 650 milliGRAM(s) Oral every 6 hours PRN  aMIOdarone    Tablet 200 milliGRAM(s) Oral daily  aspirin enteric coated 81 milliGRAM(s) Oral daily  bisacodyl Suppository 10 milliGRAM(s) Rectal once  chlorhexidine 2% Cloths 1 Application(s) Topical daily  dextrose 50% Injectable 50 milliLiter(s) IV Push every 15 minutes  dextrose 50% Injectable 25 milliLiter(s) IV Push every 15 minutes  gabapentin 100 milliGRAM(s) Oral every 8 hours  oxyCODONE    IR 5 milliGRAM(s) Oral every 4 hours PRN  oxyCODONE    IR 10 milliGRAM(s) Oral every 4 hours PRN  pantoprazole    Tablet 40 milliGRAM(s) Oral before breakfast  polyethylene glycol 3350 17 Gram(s) Oral daily  senna 2 Tablet(s) Oral at bedtime  sodium chloride 0.9%. 1000 milliLiter(s) IV Continuous <Continuous>  spironolactone 50 milliGRAM(s) Oral every 12 hours  torsemide 20 milliGRAM(s) Oral two times a day                            11.3   11.84 )-----------( 156      ( 13 Nov 2021 05:13 )             34.5       Hemoglobin: 11.3 g/dL (11-13 @ 05:13)  Hemoglobin: 10.8 g/dL (11-12 @ 05:55)  Hemoglobin: 10.0 g/dL (11-11 @ 05:29)  Hemoglobin: 10.3 g/dL (11-10 @ 00:35)  Hemoglobin: 9.2 g/dL (11-09 @ 13:35)      11-12    138  |  95<L>  |  22  ----------------------------<  106<H>  4.3   |  29  |  0.93    Ca    9.3      12 Nov 2021 05:55    TPro  6.2  /  Alb  4.5  /  TBili  0.6  /  DBili  x   /  AST  32  /  ALT  88<H>  /  AlkPhos  124<H>  11-12    Creatinine Trend: 0.93<--, 0.87<--, 0.99<--, 1.05<--, 0.79<--, 0.95<--    COAGS:           T(C): 36.8 (11-13-21 @ 03:08), Max: 37.1 (11-12-21 @ 07:11)  HR: 80 (11-13-21 @ 03:08) (63 - 82)  BP: 119/67 (11-13-21 @ 03:08) (112/55 - 128/62)  RR: 18 (11-13-21 @ 03:08) (18 - 20)  SpO2: 96% (11-13-21 @ 03:08) (91% - 96%)  Wt(kg): --    I&O's Summary    11 Nov 2021 07:01  -  12 Nov 2021 07:00  --------------------------------------------------------  IN: 1650 mL / OUT: 2800 mL / NET: -1150 mL    12 Nov 2021 07:01  -  13 Nov 2021 05:45  --------------------------------------------------------  IN: 930 mL / OUT: 1600 mL / NET: -670 mL      HEENT:  (-)icterus (-)pallor  CV: N S1 S2 1/6 ZITA (+)2 Pulses B/l  Resp:  Clear to auscultation B/L, normal effort  GI: (+) BS Soft, NT, ND  Lymph:  (-)Edema, (-)obvious lymphadenopathy  Skin: Warm to touch, Normal turgor  Psych: Appropriate mood and affect      TELEMETRY: AP 60    ASSESSMENT/PLAN: 	75y  Female   pmh of HTN, HLD,  who presented to the ED with complains of SOB found with pulmonary edema and new afib DOUG revealed severe MR now s/p tissue MVR, LISBET ligation and MAZE.    - Progressing well  - asa , statin   - Keep net negative w/ torsemide.   - EP  , cont Amio   - Anticoagulation resumed with Eliquis per CTS

## 2021-11-13 NOTE — PROGRESS NOTE ADULT - SUBJECTIVE AND OBJECTIVE BOX
VITAL SIGNS-Telemetry:  SR 60-80  Vital Signs Last 24 Hrs  T(C): 36.8 (21 @ 07:21), Max: 37 (21 @ 23:02)  T(F): 98.3 (21 @ 07:21), Max: 98.6 (21 @ 23:02)  HR: 74 (21 @ 07:21) (66 - 82)  BP: 124/56 (21 @ 07:21) (112/55 - 128/62)  RR: 18 (21 @ 07:21) (18 - 20)  SpO2: 94% (21 @ 07:21) (91% - 96%)          07:01  -   @ 07:00  --------------------------------------------------------  IN: 930 mL / OUT: 1600 mL / NET: -670 mL    Daily     Daily Weight in k.7 (2021 04:42)    Pacing Wires        [  ]   Settings:                                  Isolated  [ x ]  Eliquis on hold for possible pigtail placement    PHYSICAL EXAM:  Neurology: alert and oriented x 3, nonfocal, no gross deficits  CV : S1S2  Sternal Wound :  CDI , Stable  Lungs: cta-diminshed bs  Abdomen: soft, nontender, nondistended, positive bowel sounds, last bowel movement         Extremities:     tr. edema b/l no calf tenderness     acetaminophen     Tablet .. 650 milliGRAM(s) Oral every 6 hours PRN  aMIOdarone    Tablet 200 milliGRAM(s) Oral daily  aspirin enteric coated 81 milliGRAM(s) Oral daily  bisacodyl Suppository 10 milliGRAM(s) Rectal once  chlorhexidine 2% Cloths 1 Application(s) Topical daily  dextrose 50% Injectable 50 milliLiter(s) IV Push every 15 minutes  dextrose 50% Injectable 25 milliLiter(s) IV Push every 15 minutes  gabapentin 100 milliGRAM(s) Oral every 8 hours  oxyCODONE    IR 5 milliGRAM(s) Oral every 4 hours PRN  oxyCODONE    IR 10 milliGRAM(s) Oral every 4 hours PRN  pantoprazole    Tablet 40 milliGRAM(s) Oral before breakfast  polyethylene glycol 3350 17 Gram(s) Oral daily  senna 2 Tablet(s) Oral at bedtime  sodium chloride 0.9%. 1000 milliLiter(s) IV Continuous <Continuous>  spironolactone 50 milliGRAM(s) Oral every 12 hours  torsemide 20 milliGRAM(s) Oral two times a day      Physical Therapy Rec:   Home  [  ]   Home w/ PT  [  ]  Rehab  [  ]  Discussed with Cardiothoracic Team at AM rounds.

## 2021-11-13 NOTE — PROCEDURE NOTE - NSPROCDETAILS_GEN_ALL_CORE
Seldinger technique/secured in place/sterile dressing applied/thoracostomy tube placed percutaneously

## 2021-11-14 LAB
ANION GAP SERPL CALC-SCNC: 15 MMOL/L — SIGNIFICANT CHANGE UP (ref 5–17)
BUN SERPL-MCNC: 27 MG/DL — HIGH (ref 7–23)
CALCIUM SERPL-MCNC: 9.9 MG/DL — SIGNIFICANT CHANGE UP (ref 8.4–10.5)
CHLORIDE SERPL-SCNC: 91 MMOL/L — LOW (ref 96–108)
CO2 SERPL-SCNC: 30 MMOL/L — SIGNIFICANT CHANGE UP (ref 22–31)
CREAT SERPL-MCNC: 1.02 MG/DL — SIGNIFICANT CHANGE UP (ref 0.5–1.3)
GLUCOSE SERPL-MCNC: 116 MG/DL — HIGH (ref 70–99)
HCT VFR BLD CALC: 37.8 % — SIGNIFICANT CHANGE UP (ref 34.5–45)
HGB BLD-MCNC: 12.3 G/DL — SIGNIFICANT CHANGE UP (ref 11.5–15.5)
MCHC RBC-ENTMCNC: 30.4 PG — SIGNIFICANT CHANGE UP (ref 27–34)
MCHC RBC-ENTMCNC: 32.5 GM/DL — SIGNIFICANT CHANGE UP (ref 32–36)
MCV RBC AUTO: 93.3 FL — SIGNIFICANT CHANGE UP (ref 80–100)
NRBC # BLD: 0 /100 WBCS — SIGNIFICANT CHANGE UP (ref 0–0)
PLATELET # BLD AUTO: 222 K/UL — SIGNIFICANT CHANGE UP (ref 150–400)
POTASSIUM SERPL-MCNC: 4.1 MMOL/L — SIGNIFICANT CHANGE UP (ref 3.5–5.3)
POTASSIUM SERPL-SCNC: 4.1 MMOL/L — SIGNIFICANT CHANGE UP (ref 3.5–5.3)
RBC # BLD: 4.05 M/UL — SIGNIFICANT CHANGE UP (ref 3.8–5.2)
RBC # FLD: 14.7 % — HIGH (ref 10.3–14.5)
SODIUM SERPL-SCNC: 136 MMOL/L — SIGNIFICANT CHANGE UP (ref 135–145)
WBC # BLD: 15.45 K/UL — HIGH (ref 3.8–10.5)
WBC # FLD AUTO: 15.45 K/UL — HIGH (ref 3.8–10.5)

## 2021-11-14 PROCEDURE — 71045 X-RAY EXAM CHEST 1 VIEW: CPT | Mod: 26

## 2021-11-14 RX ADMIN — CHLORHEXIDINE GLUCONATE 1 APPLICATION(S): 213 SOLUTION TOPICAL at 05:51

## 2021-11-14 RX ADMIN — APIXABAN 5 MILLIGRAM(S): 2.5 TABLET, FILM COATED ORAL at 21:16

## 2021-11-14 RX ADMIN — AMIODARONE HYDROCHLORIDE 200 MILLIGRAM(S): 400 TABLET ORAL at 05:51

## 2021-11-14 RX ADMIN — SPIRONOLACTONE 50 MILLIGRAM(S): 25 TABLET, FILM COATED ORAL at 17:02

## 2021-11-14 RX ADMIN — APIXABAN 5 MILLIGRAM(S): 2.5 TABLET, FILM COATED ORAL at 11:07

## 2021-11-14 RX ADMIN — OXYCODONE HYDROCHLORIDE 10 MILLIGRAM(S): 5 TABLET ORAL at 04:53

## 2021-11-14 RX ADMIN — POLYETHYLENE GLYCOL 3350 17 GRAM(S): 17 POWDER, FOR SOLUTION ORAL at 11:07

## 2021-11-14 RX ADMIN — Medication 20 MILLIGRAM(S): at 05:50

## 2021-11-14 RX ADMIN — Medication 20 MILLIGRAM(S): at 17:02

## 2021-11-14 RX ADMIN — PANTOPRAZOLE SODIUM 40 MILLIGRAM(S): 20 TABLET, DELAYED RELEASE ORAL at 05:51

## 2021-11-14 RX ADMIN — OXYCODONE HYDROCHLORIDE 10 MILLIGRAM(S): 5 TABLET ORAL at 06:00

## 2021-11-14 RX ADMIN — Medication 81 MILLIGRAM(S): at 11:07

## 2021-11-14 RX ADMIN — SPIRONOLACTONE 50 MILLIGRAM(S): 25 TABLET, FILM COATED ORAL at 05:51

## 2021-11-14 NOTE — PROGRESS NOTE ADULT - ASSESSMENT
Agree with above assessment and plan as outlined above.    - EP f/u appreciated   - Plan to d/c on Mario Mullins MD, [facility]  BEEPER [phone]

## 2021-11-14 NOTE — PROGRESS NOTE ADULT - SUBJECTIVE AND OBJECTIVE BOX
EP     DATE OF SERVICE: 11-14-21  s/p Bio-MVR, Maze, and LA appendage clip on 11/8.  Feeling well, telemetry stable, HR now 90 at rest in sinus with 1:1 conduction.  Amiodarone resumed with no further load.  Stable 200mg dose going forward.  Has pleural effusions, got a chest tube on 11/13.  DC delayed until this is resolved.    acetaminophen     Tablet .. 650 milliGRAM(s) Oral every 6 hours PRN  aMIOdarone    Tablet 200 milliGRAM(s) Oral daily  apixaban 5 milliGRAM(s) Oral every 12 hours  aspirin enteric coated 81 milliGRAM(s) Oral daily  bisacodyl Suppository 10 milliGRAM(s) Rectal once  chlorhexidine 2% Cloths 1 Application(s) Topical daily  dextrose 50% Injectable 50 milliLiter(s) IV Push every 15 minutes  dextrose 50% Injectable 25 milliLiter(s) IV Push every 15 minutes  oxyCODONE    IR 5 milliGRAM(s) Oral every 4 hours PRN  oxyCODONE    IR 10 milliGRAM(s) Oral every 4 hours PRN  pantoprazole    Tablet 40 milliGRAM(s) Oral before breakfast  polyethylene glycol 3350 17 Gram(s) Oral daily  senna 2 Tablet(s) Oral at bedtime  sodium chloride 0.9%. 1000 milliLiter(s) IV Continuous <Continuous>  spironolactone 50 milliGRAM(s) Oral every 12 hours  torsemide 20 milliGRAM(s) Oral two times a day                            12.3   15.45 )-----------( 222      ( 14 Nov 2021 06:24 )             37.8       11-14    136  |  91<L>  |  27<H>  ----------------------------<  116<H>  4.1   |  30  |  1.02    Ca    9.9      14 Nov 2021 06:24    TPro  6.7  /  Alb  4.3  /  TBili  0.6  /  DBili  x   /  AST  24  /  ALT  69<H>  /  AlkPhos  126<H>  11-13    T(C): 36.8 (11-14-21 @ 07:46), Max: 36.8 (11-13-21 @ 11:25)  HR: 71 (11-14-21 @ 07:46) (67 - 78)  BP: 110/59 (11-14-21 @ 07:46) (110/58 - 137/63)  RR: 18 (11-14-21 @ 07:46) (18 - 18)  SpO2: 97% (11-14-21 @ 07:46) (94% - 97%)  Wt(kg): --    I&O's Summary    13 Nov 2021 07:01  -  14 Nov 2021 07:00  --------------------------------------------------------  IN: 1017 mL / OUT: 2895 mL / NET: -1878 mL    14 Nov 2021 07:01  -  14 Nov 2021 11:18  --------------------------------------------------------  IN: 360 mL / OUT: 530 mL / NET: -170 mL    Gen: awake and alert, cooperative.  HEENT:  (-)icterus (-)pallor  CV: Regular S1 S2 1/6 ZITA (+)2 Pulses  pacer wires,  Resp:  Clear to auscultation B/L, normal effort  GI: (+) BS Soft, NT, ND  Lymph:  (-)Edema, (-)obvious lymphadenopathy  Skin: Warm to touch, Normal turgor  Psych: Mood is "I feel good", affect is normal, in good spirits.    TELEMETRY: Sinus rhythm 55-80bpm.  Rare AV pacing overnight.    ASSESSMENT/PLAN: Patient is a 77 y/o Female with PMH of HTN and HLD who presented with SOB found to have new onset atrial fibrillation and severe MR. EP consulted for Afib management.    - s/p MVR, Moser Maze IV procedure, and LISBET clip on 11/8.  - Discharge on Apixaban 5mg BID, and Amiodarone 200mg daily  - No permanent cardiac pacing indicated.  Sinus node function is stable, with good chronotropic competence during ambulation.  - As she will stay for further management, I will be comfortable sending her home WITHOUT monitoring if there is no AV block or sinus pauses.  Inpatient telemetry for the next couple of days is sufficient.  - Will follow.  From my perspective, OK for discharge when Stepdown unit is ready.  Pigtail chest tubes first.    Perez Bolton M.D.  Cardiac Electrophysiology  [phone]

## 2021-11-14 NOTE — PROGRESS NOTE ADULT - SUBJECTIVE AND OBJECTIVE BOX
C A R D I O L O G Y  **********************************     DATE OF SERVICE: 11-14-21    pt seen and examined, no complaints, ROS - .   C/o pain over pigtail site        acetaminophen     Tablet .. 650 milliGRAM(s) Oral every 6 hours PRN  aMIOdarone    Tablet 200 milliGRAM(s) Oral daily  apixaban 5 milliGRAM(s) Oral every 12 hours  aspirin enteric coated 81 milliGRAM(s) Oral daily  bisacodyl Suppository 10 milliGRAM(s) Rectal once  chlorhexidine 2% Cloths 1 Application(s) Topical daily  dextrose 50% Injectable 25 milliLiter(s) IV Push every 15 minutes  dextrose 50% Injectable 50 milliLiter(s) IV Push every 15 minutes  oxyCODONE    IR 5 milliGRAM(s) Oral every 4 hours PRN  oxyCODONE    IR 10 milliGRAM(s) Oral every 4 hours PRN  pantoprazole    Tablet 40 milliGRAM(s) Oral before breakfast  polyethylene glycol 3350 17 Gram(s) Oral daily  senna 2 Tablet(s) Oral at bedtime  sodium chloride 0.9%. 1000 milliLiter(s) IV Continuous <Continuous>  spironolactone 50 milliGRAM(s) Oral every 12 hours  torsemide 20 milliGRAM(s) Oral two times a day                            11.3   11.84 )-----------( 156      ( 13 Nov 2021 05:13 )             34.5       Hemoglobin: 11.3 g/dL (11-13 @ 05:13)  Hemoglobin: 10.8 g/dL (11-12 @ 05:55)  Hemoglobin: 10.0 g/dL (11-11 @ 05:29)  Hemoglobin: 10.3 g/dL (11-10 @ 00:35)  Hemoglobin: 9.2 g/dL (11-09 @ 13:35)      11-13    135  |  90<L>  |  27<H>  ----------------------------<  114<H>  4.5   |  30  |  1.02    Ca    9.5      13 Nov 2021 05:13    TPro  6.7  /  Alb  4.3  /  TBili  0.6  /  DBili  x   /  AST  24  /  ALT  69<H>  /  AlkPhos  126<H>  11-13    Creatinine Trend: 1.02<--, 0.93<--, 0.87<--, 0.99<--, 1.05<--, 0.79<--    COAGS:           T(C): 36.8 (11-14-21 @ 02:57), Max: 36.8 (11-13-21 @ 07:21)  HR: 70 (11-14-21 @ 02:57) (67 - 78)  BP: 110/58 (11-14-21 @ 02:57) (110/58 - 137/63)  RR: 18 (11-14-21 @ 02:57) (18 - 18)  SpO2: 95% (11-14-21 @ 02:57) (94% - 96%)  Wt(kg): --    I&O's Summary    12 Nov 2021 07:01  -  13 Nov 2021 07:00  --------------------------------------------------------  IN: 930 mL / OUT: 1600 mL / NET: -670 mL    13 Nov 2021 07:01  -  14 Nov 2021 04:07  --------------------------------------------------------  IN: 777 mL / OUT: 2290 mL / NET: -1513 mL      HEENT:  (-)icterus (-)pallor  CV: N S1 S2 1/6 ZITA (+)2 Pulses B/l  Resp:  Clear to auscultation B/L, normal effort  GI: (+) BS Soft, NT, ND  Lymph:  (-)Edema, (-)obvious lymphadenopathy  Skin: Warm to touch, Normal turgor  Psych: Appropriate mood and affect      TELEMETRY: AP 60    ASSESSMENT/PLAN: 	75y  Female   pmh of HTN, HLD,  who presented to the ED with complains of SOB found with pulmonary edema and new afib DOUG revealed severe MR now s/p tissue MVR, LISBET ligation and MAZE.    - Right pigtail site , c/d/i  draining 20 cc/ hr   - Progressing well  - asa , statin   - Keep net negative w/ torsemide.   - EP  , cont Amio   - Anticoagulation resumed with Eliquis per CTS

## 2021-11-15 LAB
ANION GAP SERPL CALC-SCNC: 16 MMOL/L — SIGNIFICANT CHANGE UP (ref 5–17)
APPEARANCE UR: CLEAR — SIGNIFICANT CHANGE UP
BACTERIA # UR AUTO: NEGATIVE — SIGNIFICANT CHANGE UP
BILIRUB UR-MCNC: NEGATIVE — SIGNIFICANT CHANGE UP
BUN SERPL-MCNC: 32 MG/DL — HIGH (ref 7–23)
CALCIUM SERPL-MCNC: 10.1 MG/DL — SIGNIFICANT CHANGE UP (ref 8.4–10.5)
CHLORIDE SERPL-SCNC: 92 MMOL/L — LOW (ref 96–108)
CO2 SERPL-SCNC: 29 MMOL/L — SIGNIFICANT CHANGE UP (ref 22–31)
COLOR SPEC: SIGNIFICANT CHANGE UP
COVID-19 NUCLEOCAPSID GAM AB INTERP: NEGATIVE — SIGNIFICANT CHANGE UP
COVID-19 NUCLEOCAPSID TOTAL GAM ANTIBODY RESULT: 0.08 INDEX — SIGNIFICANT CHANGE UP
COVID-19 SPIKE DOMAIN AB INTERP: POSITIVE
COVID-19 SPIKE DOMAIN ANTIBODY RESULT: >250 U/ML — HIGH
CREAT SERPL-MCNC: 1.18 MG/DL — SIGNIFICANT CHANGE UP (ref 0.5–1.3)
DIFF PNL FLD: ABNORMAL
EPI CELLS # UR: 1 /HPF — SIGNIFICANT CHANGE UP
GLUCOSE SERPL-MCNC: 88 MG/DL — SIGNIFICANT CHANGE UP (ref 70–99)
GLUCOSE UR QL: NEGATIVE — SIGNIFICANT CHANGE UP
HCT VFR BLD CALC: 40.5 % — SIGNIFICANT CHANGE UP (ref 34.5–45)
HGB BLD-MCNC: 13 G/DL — SIGNIFICANT CHANGE UP (ref 11.5–15.5)
HYALINE CASTS # UR AUTO: 0 /LPF — SIGNIFICANT CHANGE UP (ref 0–2)
KETONES UR-MCNC: NEGATIVE — SIGNIFICANT CHANGE UP
LEUKOCYTE ESTERASE UR-ACNC: ABNORMAL
MCHC RBC-ENTMCNC: 30 PG — SIGNIFICANT CHANGE UP (ref 27–34)
MCHC RBC-ENTMCNC: 32.1 GM/DL — SIGNIFICANT CHANGE UP (ref 32–36)
MCV RBC AUTO: 93.3 FL — SIGNIFICANT CHANGE UP (ref 80–100)
NITRITE UR-MCNC: NEGATIVE — SIGNIFICANT CHANGE UP
NRBC # BLD: 0 /100 WBCS — SIGNIFICANT CHANGE UP (ref 0–0)
PH UR: 6 — SIGNIFICANT CHANGE UP (ref 5–8)
PLATELET # BLD AUTO: 261 K/UL — SIGNIFICANT CHANGE UP (ref 150–400)
POTASSIUM SERPL-MCNC: 4.5 MMOL/L — SIGNIFICANT CHANGE UP (ref 3.5–5.3)
POTASSIUM SERPL-SCNC: 4.5 MMOL/L — SIGNIFICANT CHANGE UP (ref 3.5–5.3)
PROT UR-MCNC: SIGNIFICANT CHANGE UP
RBC # BLD: 4.34 M/UL — SIGNIFICANT CHANGE UP (ref 3.8–5.2)
RBC # FLD: 14.6 % — HIGH (ref 10.3–14.5)
RBC CASTS # UR COMP ASSIST: 8 /HPF — HIGH (ref 0–4)
SARS-COV-2 IGG+IGM SERPL QL IA: 0.08 INDEX — SIGNIFICANT CHANGE UP
SARS-COV-2 IGG+IGM SERPL QL IA: >250 U/ML — HIGH
SARS-COV-2 IGG+IGM SERPL QL IA: NEGATIVE — SIGNIFICANT CHANGE UP
SARS-COV-2 IGG+IGM SERPL QL IA: POSITIVE
SODIUM SERPL-SCNC: 137 MMOL/L — SIGNIFICANT CHANGE UP (ref 135–145)
SP GR SPEC: 1.02 — SIGNIFICANT CHANGE UP (ref 1.01–1.02)
UROBILINOGEN FLD QL: NEGATIVE — SIGNIFICANT CHANGE UP
WBC # BLD: 16.19 K/UL — HIGH (ref 3.8–10.5)
WBC # FLD AUTO: 16.19 K/UL — HIGH (ref 3.8–10.5)
WBC UR QL: 7 /HPF — HIGH (ref 0–5)

## 2021-11-15 PROCEDURE — 93010 ELECTROCARDIOGRAM REPORT: CPT

## 2021-11-15 PROCEDURE — 93321 DOPPLER ECHO F-UP/LMTD STD: CPT | Mod: 26

## 2021-11-15 PROCEDURE — 93308 TTE F-UP OR LMTD: CPT | Mod: 26

## 2021-11-15 PROCEDURE — 71045 X-RAY EXAM CHEST 1 VIEW: CPT | Mod: 26

## 2021-11-15 RX ORDER — POTASSIUM BICARBONATE 978 MG/1
25 TABLET, EFFERVESCENT ORAL ONCE
Refills: 0 | Status: COMPLETED | OUTPATIENT
Start: 2021-11-15 | End: 2021-11-15

## 2021-11-15 RX ORDER — MAGNESIUM SULFATE 500 MG/ML
2 VIAL (ML) INJECTION ONCE
Refills: 0 | Status: COMPLETED | OUTPATIENT
Start: 2021-11-15 | End: 2021-11-15

## 2021-11-15 RX ORDER — APIXABAN 2.5 MG/1
2.5 TABLET, FILM COATED ORAL
Refills: 0 | Status: DISCONTINUED | OUTPATIENT
Start: 2021-11-15 | End: 2021-11-20

## 2021-11-15 RX ORDER — METOPROLOL TARTRATE 50 MG
12.5 TABLET ORAL EVERY 12 HOURS
Refills: 0 | Status: DISCONTINUED | OUTPATIENT
Start: 2021-11-15 | End: 2021-11-16

## 2021-11-15 RX ORDER — SPIRONOLACTONE 25 MG/1
25 TABLET, FILM COATED ORAL DAILY
Refills: 0 | Status: DISCONTINUED | OUTPATIENT
Start: 2021-11-16 | End: 2021-11-20

## 2021-11-15 RX ORDER — FUROSEMIDE 40 MG
40 TABLET ORAL DAILY
Refills: 0 | Status: DISCONTINUED | OUTPATIENT
Start: 2021-11-16 | End: 2021-11-20

## 2021-11-15 RX ORDER — METOPROLOL TARTRATE 50 MG
5 TABLET ORAL ONCE
Refills: 0 | Status: COMPLETED | OUTPATIENT
Start: 2021-11-15 | End: 2021-11-15

## 2021-11-15 RX ADMIN — Medication 20 MILLIGRAM(S): at 05:25

## 2021-11-15 RX ADMIN — Medication 12.5 MILLIGRAM(S): at 23:43

## 2021-11-15 RX ADMIN — Medication 81 MILLIGRAM(S): at 11:13

## 2021-11-15 RX ADMIN — Medication 5 MILLIGRAM(S): at 17:30

## 2021-11-15 RX ADMIN — CHLORHEXIDINE GLUCONATE 1 APPLICATION(S): 213 SOLUTION TOPICAL at 05:11

## 2021-11-15 RX ADMIN — AMIODARONE HYDROCHLORIDE 200 MILLIGRAM(S): 400 TABLET ORAL at 05:26

## 2021-11-15 RX ADMIN — APIXABAN 2.5 MILLIGRAM(S): 2.5 TABLET, FILM COATED ORAL at 17:04

## 2021-11-15 RX ADMIN — POTASSIUM BICARBONATE 25 MILLIEQUIVALENT(S): 978 TABLET, EFFERVESCENT ORAL at 17:47

## 2021-11-15 RX ADMIN — SPIRONOLACTONE 50 MILLIGRAM(S): 25 TABLET, FILM COATED ORAL at 05:26

## 2021-11-15 RX ADMIN — PANTOPRAZOLE SODIUM 40 MILLIGRAM(S): 20 TABLET, DELAYED RELEASE ORAL at 05:26

## 2021-11-15 RX ADMIN — Medication 50 GRAM(S): at 17:47

## 2021-11-15 NOTE — PROGRESS NOTE ADULT - SUBJECTIVE AND OBJECTIVE BOX
EP     DATE OF SERVICE: 11-15-21  s/p Bio-MVR, Maze, and LA appendage clip on 11/8.  Feeling well, telemetry stable, HR now 90 at rest in sinus with 1:1 conduction.  Amiodarone resumed with no further load.  Stable 200mg dose going forward.  Chest tube out today.  Called by nurses later today stating she is in ventricular bigeminy.  Will be monitored on telemetry tonite.    acetaminophen     Tablet .. 650 milliGRAM(s) Oral every 6 hours PRN  aMIOdarone    Tablet 200 milliGRAM(s) Oral daily  apixaban 2.5 milliGRAM(s) Oral two times a day  aspirin enteric coated 81 milliGRAM(s) Oral daily  bisacodyl Suppository 10 milliGRAM(s) Rectal once  chlorhexidine 2% Cloths 1 Application(s) Topical daily  oxyCODONE    IR 5 milliGRAM(s) Oral every 4 hours PRN  oxyCODONE    IR 10 milliGRAM(s) Oral every 4 hours PRN  pantoprazole    Tablet 40 milliGRAM(s) Oral before breakfast  polyethylene glycol 3350 17 Gram(s) Oral daily  senna 2 Tablet(s) Oral at bedtime                            13.0   16.19 )-----------( 261      ( 15 Nov 2021 09:25 )             40.5       11-15    137  |  92<L>  |  32<H>  ----------------------------<  88  4.5   |  29  |  1.18    Ca    10.1      15 Nov 2021 09:25              T(C): 36.9 (11-15-21 @ 15:09), Max: 37.2 (11-14-21 @ 23:07)  HR: 75 (11-15-21 @ 15:09) (75 - 87)  BP: 133/63 (11-15-21 @ 15:09) (109/54 - 134/63)  RR: 18 (11-15-21 @ 15:09) (18 - 18)  SpO2: 93% (11-15-21 @ 15:09) (91% - 94%)  Wt(kg): --    I&O's Summary    14 Nov 2021 07:01  -  15 Nov 2021 07:00  --------------------------------------------------------  IN: 1260 mL / OUT: 2130 mL / NET: -870 mL    15 Nov 2021 07:01  -  15 Nov 2021 15:56  --------------------------------------------------------  IN: 600 mL / OUT: 400 mL / NET: 200 mL        Gen: awake and alert, cooperative.  HEENT:  (-)icterus (-)pallor  CV: Regular S1 S2 1/6 ZITA (+)2 Pulses   Resp:  Clear to auscultation B/L, normal effort, chest tube out.  GI: (+) BS Soft, NT, ND  Lymph:  (-)Edema, (-)obvious lymphadenopathy  Skin: Warm to touch, Normal turgor  Psych: Mood is "I feel good", affect is normal, in good spirits.    TELEMETRY: Sinus rhythm 55-80bpm.  Rare AV pacing overnight.    ASSESSMENT/PLAN: Patient is a 77 y/o Female with PMH of HTN and HLD who presented with SOB found to have new onset atrial fibrillation and severe MR. EP consulted for Afib management.    - s/p MVR, Moser Maze IV procedure, and LISBET clip on 11/8.  - Discharge on Apixaban 5mg BID, and Amiodarone 200mg daily  - Monitor on telemetry re: ventricular bigeminy.  - No permanent cardiac pacing indicated.  Sinus node function is stable, with good chronotropic competence during ambulation.  - As she will stay for further management, I will be comfortable sending her home WITHOUT monitoring if there is no AV block or sinus pauses.  Inpatient telemetry for the next couple of days is sufficient.  - Will follow.      Perez Bolton M.D.  Cardiac Electrophysiology  [phone]

## 2021-11-15 NOTE — PROGRESS NOTE ADULT - SUBJECTIVE AND OBJECTIVE BOX
VITAL SIGNS-Telemetry:  SR 75  Vital Signs Last 24 Hrs  T(C): 37 (11-15-21 @ 07:22), Max: 37.2 (21 @ 23:07)  T(F): 98.6 (11-15-21 @ 07:22), Max: 98.9 (21 @ 23:07)  HR: 77 (11-15-21 @ 07:22) (77 - 81)  BP: 118/58 (11-15-21 @ 07:22) (109/54 - 125/62)  RR: 18 (11-15-21 @ 07:22) (18 - 18)  SpO2: 93% (11-15-21 @ 07:22) (91% - 96%)          @ 07:01  -  11-15 @ 07:00  --------------------------------------------------------  IN: 1260 mL / OUT: 2130 mL / NET: -870 mL    11-15 @ 07:01  -  11-15 @ 10:16  --------------------------------------------------------  IN: 240 mL / OUT: 200 mL / NET: 40 mL    Daily     Daily Weight in k.3 (15 Nov 2021 06:05)    PHYSICAL EXAM:  Neurology: alert and oriented x 3, nonfocal, no gross deficits  CV : S1S2  Sternal Wound :  CDI , Stable  Lungs: cta  Abdomen: soft, nontender, nondistended, positive bowel sounds, last bowel movement    +bm     Extremities:     tr edema no calf tenderness     acetaminophen     Tablet .. 650 milliGRAM(s) Oral every 6 hours PRN  aMIOdarone    Tablet 200 milliGRAM(s) Oral daily  apixaban 2.5 milliGRAM(s) Oral two times a day  aspirin enteric coated 81 milliGRAM(s) Oral daily  bisacodyl Suppository 10 milliGRAM(s) Rectal once  chlorhexidine 2% Cloths 1 Application(s) Topical daily  dextrose 50% Injectable 50 milliLiter(s) IV Push every 15 minutes  dextrose 50% Injectable 25 milliLiter(s) IV Push every 15 minutes  oxyCODONE    IR 5 milliGRAM(s) Oral every 4 hours PRN  oxyCODONE    IR 10 milliGRAM(s) Oral every 4 hours PRN  pantoprazole    Tablet 40 milliGRAM(s) Oral before breakfast  polyethylene glycol 3350 17 Gram(s) Oral daily  senna 2 Tablet(s) Oral at bedtime  sodium chloride 0.9%. 1000 milliLiter(s) IV Continuous <Continuous>  spironolactone 50 milliGRAM(s) Oral every 12 hours  torsemide 20 milliGRAM(s) Oral two times a day      Physical Therapy Rec:   Home  [ x ]   Home w/ PT  [  ]  Rehab  [  ]  Discussed with Cardiothoracic Team at AM rounds.

## 2021-11-15 NOTE — PROGRESS NOTE ADULT - SUBJECTIVE AND OBJECTIVE BOX
C A R D I O L O G Y  **********************************     DATE OF SERVICE: 11-15-21    Denies chest pain, SOB, or palpitations. Review of systems otherwise (-)      MEDICATIONS  (STANDING):  aMIOdarone    Tablet 200 milliGRAM(s) Oral daily  apixaban 2.5 milliGRAM(s) Oral two times a day  aspirin enteric coated 81 milliGRAM(s) Oral daily  bisacodyl Suppository 10 milliGRAM(s) Rectal once  chlorhexidine 2% Cloths 1 Application(s) Topical daily  pantoprazole    Tablet 40 milliGRAM(s) Oral before breakfast  polyethylene glycol 3350 17 Gram(s) Oral daily  senna 2 Tablet(s) Oral at bedtime    MEDICATIONS  (PRN):  acetaminophen     Tablet .. 650 milliGRAM(s) Oral every 6 hours PRN Mild Pain (1 - 3)  oxyCODONE    IR 5 milliGRAM(s) Oral every 4 hours PRN Moderate Pain (4 - 6)  oxyCODONE    IR 10 milliGRAM(s) Oral every 4 hours PRN Severe Pain (7 - 10)      LABS:                          13.0   16.19 )-----------( 261      ( 15 Nov 2021 09:25 )             40.5     Hemoglobin: 13.0 g/dL (11-15 @ 09:25)  Hemoglobin: 12.3 g/dL (11-14 @ 06:24)  Hemoglobin: 11.3 g/dL (11-13 @ 05:13)  Hemoglobin: 10.8 g/dL (11-12 @ 05:55)  Hemoglobin: 10.0 g/dL (11-11 @ 05:29)    11-15    137  |  92<L>  |  32<H>  ----------------------------<  88  4.5   |  29  |  1.18    Ca    10.1      15 Nov 2021 09:25      Creatinine Trend: 1.18<--, 1.02<--, 1.02<--, 0.93<--, 0.87<--, 0.99<--             11-14-21 @ 07:01  -  11-15-21 @ 07:00  --------------------------------------------------------  IN: 1260 mL / OUT: 2130 mL / NET: -870 mL    11-15-21 @ 07:01  -  11-15-21 @ 15:42  --------------------------------------------------------  IN: 600 mL / OUT: 400 mL / NET: 200 mL        PHYSICAL EXAM  Vital Signs Last 24 Hrs  T(C): 36.9 (15 Nov 2021 15:09), Max: 37.2 (14 Nov 2021 23:07)  T(F): 98.5 (15 Nov 2021 15:09), Max: 98.9 (14 Nov 2021 23:07)  HR: 75 (15 Nov 2021 15:09) (75 - 87)  BP: 133/63 (15 Nov 2021 15:09) (109/54 - 134/63)  BP(mean): 90 (15 Nov 2021 15:09) (78 - 90)  RR: 18 (15 Nov 2021 15:09) (18 - 18)  SpO2: 93% (15 Nov 2021 15:09) (91% - 94%)      HEENT:  (-)icterus (-)pallor  CV: N S1 S2 1/6 ZITA (+)2 Pulses B/l  Resp:  Clear to auscultation B/L, normal effort  GI: (+) BS Soft, NT, ND  Lymph:  (-)Edema, (-)obvious lymphadenopathy  Skin: Warm to touch, Normal turgor  Psych: Appropriate mood and affect      TELEMETRY: SR 70-90, jose c    ASSESSMENT/PLAN: 76 y/o Female with pmh of HTN, HLD,  who presented to the ED with complains of SOB found with pulmonary edema and new afib DOUG revealed severe MR now s/p tissue MVR, LISBET ligation and MAZE.    - s/p chest tube removal  - Progressing well  - Continue AC with Eliquis per CTS  - Keep net negative with lasix/aldactone  - Continue ASA/Statin  - EP f/u - on amio  - CTS follow up appreciated - d/c planning tomorrow  - Will setup patient for f/u in our office after d/c    Oren Muhammad PA-C  Pager: [phone]

## 2021-11-15 NOTE — PROGRESS NOTE ADULT - ASSESSMENT
77 y/o F with pmhx of HTN, HLD, varicose veins, and vertigo who presented to the ED with complains of worsening SOB for the past 3 weeks, worse with exertion and laying flat and associated with intermittent 3/10 left sided chest pain and palpitations. Labs showed elevated pro-BNP and UA was positive for a possible infection. CTA of chest was negative for PE but showed pleural effusions. Patient was admitted for CHF exacerbation and new onset A-fib. Patient was put on metoprolol for her Afib and Lasix for CHF and was also put on telemetry and FD lovenox. An echocardiogram was performed on her which showed moderate M.    Rapid response was called for dizziness and pre-syncope  and nausea while in the bathroom, Cardiac monitor showing rapid afib with fluctuating HR, 500ml bolus given for low BP, repeat measurement was normal, EKG was obtained, showed A -fib. Her morning medications were given including cardizem drip, lasix and metoprolol  Patient states she is feeling better after interventions.    Cardiology  reviewed her Echo cardiogram which showed:  Moderate to severe MR, Moderately increased RV pressure, diastolic function assessment was inconclusive, EF 55%     11/18/21 Mitral valve replacement (t) with Magnaease 29  Moser-Maze IV Cryo-RF  LAAL with AtriClip 35mm    Inotropes/ pressors  New onset Afib requiring Amio -EPS following-->  Oral Amiodarone, recommend 5 gram load (IV infusion, + 400mg x 12 doses), then 200mg daily.  With her degree of heart failure, there is a high priority to maintain sinus rhythm.Junctional rhythm 40 's  paced at 80, av.Insulin infusion for  hyperglycemia- weaned  started Transfered to [facility] follow up,  pacer av interval adjusted to  allow a pacing, 60 .Underlying junctional 50  11/11 maintaining SR no evidence of junctional rhythm on tele. Amiodarone held yesterday. EP to follow. Consider ziopath placement upon discharge later this week if PPM not indicated. Amiodarone 400 bid reinstated as per Dr Arango.  11/12 Pacing ovenight , amio d/c. NSR 70's this am amio resumed at 200 qd.  EPS following, no pacer at this time.  d/c planning with ziopatch   11/13 VSS - will u/s r & l pleural space to assess for pigtail placement. eliquis is on hold for such d/c plan home w/ mcot ? monday 11/14 VSS - 95% off O2 - WBC 16 incision cdi afebrile - will transfer to floor & d/c home tomorrow     77 y/o F with pmhx of HTN, HLD, varicose veins, and vertigo who presented to the ED with complains of worsening SOB for the past 3 weeks, worse with exertion and laying flat and associated with intermittent 3/10 left sided chest pain and palpitations. Labs showed elevated pro-BNP and UA was positive for a possible infection. CTA of chest was negative for PE but showed pleural effusions. Patient was admitted for CHF exacerbation and new onset A-fib. Patient was put on metoprolol for her Afib and Lasix for CHF and was also put on telemetry and FD lovenox. An echocardiogram was performed on her which showed moderate M.    Rapid response was called for dizziness and pre-syncope  and nausea while in the bathroom, Cardiac monitor showing rapid afib with fluctuating HR, 500ml bolus given for low BP, repeat measurement was normal, EKG was obtained, showed A -fib. Her morning medications were given including cardizem drip, lasix and metoprolol  Patient states she is feeling better after interventions.    Cardiology  reviewed her Echo cardiogram which showed:  Moderate to severe MR, Moderately increased RV pressure, diastolic function assessment was inconclusive, EF 55%     11/18/21 Mitral valve replacement (t) with Magnaease 29  Moser-Maze IV Cryo-RF  LAAL with AtriClip 35mm    Inotropes/ pressors  New onset Afib requiring Amio -EPS following-->  Oral Amiodarone, recommend 5 gram load (IV infusion, + 400mg x 12 doses), then 200mg daily.  With her degree of heart failure, there is a high priority to maintain sinus rhythm.Junctional rhythm 40 's  paced at 80, av.Insulin infusion for  hyperglycemia- weaned  started Transfered to [facility] follow up,  pacer av interval adjusted to  allow a pacing, 60 .Underlying junctional 50  11/11 maintaining SR no evidence of junctional rhythm on tele. Amiodarone held yesterday. EP to follow. Consider ziopath placement upon discharge later this week if PPM not indicated. Amiodarone 400 bid reinstated as per Dr Arango.  11/12 Pacing ovenight , amio d/c. NSR 70's this am amio resumed at 200 qd.  EPS following, no pacer at this time.  d/c planning with ziopatch   11/13 VSS - will u/s r & l pleural space to assess for pigtail placement. eliquis is on hold for such d/c plan home w/ mcot ? monday 11/14 VSS - 95% off O2 - WBC 16 incision cdi afebrile IV site no reddness.  will ck u/a - no overt signs of infection will monitor cbc daily - will transfer to floor & d/c home tomorrow-

## 2021-11-16 ENCOUNTER — TRANSCRIPTION ENCOUNTER (OUTPATIENT)
Age: 76
End: 2021-11-16

## 2021-11-16 LAB
ANION GAP SERPL CALC-SCNC: 16 MMOL/L — SIGNIFICANT CHANGE UP (ref 5–17)
BUN SERPL-MCNC: 30 MG/DL — HIGH (ref 7–23)
CALCIUM SERPL-MCNC: 9.8 MG/DL — SIGNIFICANT CHANGE UP (ref 8.4–10.5)
CHLORIDE SERPL-SCNC: 93 MMOL/L — LOW (ref 96–108)
CO2 SERPL-SCNC: 27 MMOL/L — SIGNIFICANT CHANGE UP (ref 22–31)
CREAT SERPL-MCNC: 1.04 MG/DL — SIGNIFICANT CHANGE UP (ref 0.5–1.3)
GLUCOSE SERPL-MCNC: 120 MG/DL — HIGH (ref 70–99)
HCT VFR BLD CALC: 40.7 % — SIGNIFICANT CHANGE UP (ref 34.5–45)
HGB BLD-MCNC: 13 G/DL — SIGNIFICANT CHANGE UP (ref 11.5–15.5)
MCHC RBC-ENTMCNC: 30.1 PG — SIGNIFICANT CHANGE UP (ref 27–34)
MCHC RBC-ENTMCNC: 31.9 GM/DL — LOW (ref 32–36)
MCV RBC AUTO: 94.2 FL — SIGNIFICANT CHANGE UP (ref 80–100)
NRBC # BLD: 0 /100 WBCS — SIGNIFICANT CHANGE UP (ref 0–0)
PLATELET # BLD AUTO: 279 K/UL — SIGNIFICANT CHANGE UP (ref 150–400)
POTASSIUM SERPL-MCNC: 4.7 MMOL/L — SIGNIFICANT CHANGE UP (ref 3.5–5.3)
POTASSIUM SERPL-SCNC: 4.7 MMOL/L — SIGNIFICANT CHANGE UP (ref 3.5–5.3)
RBC # BLD: 4.32 M/UL — SIGNIFICANT CHANGE UP (ref 3.8–5.2)
RBC # FLD: 14.4 % — SIGNIFICANT CHANGE UP (ref 10.3–14.5)
SODIUM SERPL-SCNC: 136 MMOL/L — SIGNIFICANT CHANGE UP (ref 135–145)
WBC # BLD: 16.54 K/UL — HIGH (ref 3.8–10.5)
WBC # FLD AUTO: 16.54 K/UL — HIGH (ref 3.8–10.5)

## 2021-11-16 PROCEDURE — 93010 ELECTROCARDIOGRAM REPORT: CPT

## 2021-11-16 RX ORDER — AMIODARONE HYDROCHLORIDE 400 MG/1
400 TABLET ORAL
Refills: 0 | Status: DISCONTINUED | OUTPATIENT
Start: 2021-11-16 | End: 2021-11-16

## 2021-11-16 RX ORDER — METOPROLOL TARTRATE 50 MG
4 TABLET ORAL
Refills: 0 | Status: DISCONTINUED | OUTPATIENT
Start: 2021-11-16 | End: 2021-11-16

## 2021-11-16 RX ORDER — FUROSEMIDE 40 MG
1 TABLET ORAL
Qty: 30 | Refills: 0
Start: 2021-11-16 | End: 2021-12-15

## 2021-11-16 RX ORDER — PANTOPRAZOLE SODIUM 20 MG/1
1 TABLET, DELAYED RELEASE ORAL
Qty: 30 | Refills: 0
Start: 2021-11-16 | End: 2021-12-15

## 2021-11-16 RX ORDER — AMIODARONE HYDROCHLORIDE 400 MG/1
400 TABLET ORAL EVERY 8 HOURS
Refills: 0 | Status: DISCONTINUED | OUTPATIENT
Start: 2021-11-16 | End: 2021-11-19

## 2021-11-16 RX ORDER — AMIODARONE HYDROCHLORIDE 400 MG/1
1 TABLET ORAL
Qty: 30 | Refills: 0
Start: 2021-11-16 | End: 2021-12-15

## 2021-11-16 RX ORDER — ASPIRIN/CALCIUM CARB/MAGNESIUM 324 MG
1 TABLET ORAL
Qty: 30 | Refills: 0
Start: 2021-11-16 | End: 2021-12-15

## 2021-11-16 RX ORDER — MIRTAZAPINE 45 MG/1
1 TABLET, ORALLY DISINTEGRATING ORAL
Qty: 0 | Refills: 0 | DISCHARGE

## 2021-11-16 RX ORDER — METOPROLOL TARTRATE 50 MG
5 TABLET ORAL
Refills: 0 | Status: DISCONTINUED | OUTPATIENT
Start: 2021-11-16 | End: 2021-11-17

## 2021-11-16 RX ORDER — METOPROLOL TARTRATE 50 MG
0.5 TABLET ORAL
Qty: 30 | Refills: 0
Start: 2021-11-16 | End: 2021-12-15

## 2021-11-16 RX ORDER — MIRTAZAPINE 45 MG/1
45 TABLET, ORALLY DISINTEGRATING ORAL AT BEDTIME
Refills: 0 | Status: DISCONTINUED | OUTPATIENT
Start: 2021-11-16 | End: 2021-11-20

## 2021-11-16 RX ORDER — METOPROLOL TARTRATE 50 MG
12.5 TABLET ORAL ONCE
Refills: 0 | Status: COMPLETED | OUTPATIENT
Start: 2021-11-16 | End: 2021-11-16

## 2021-11-16 RX ORDER — AMIODARONE HYDROCHLORIDE 400 MG/1
200 TABLET ORAL ONCE
Refills: 0 | Status: COMPLETED | OUTPATIENT
Start: 2021-11-16 | End: 2021-11-16

## 2021-11-16 RX ORDER — SPIRONOLACTONE 25 MG/1
1 TABLET, FILM COATED ORAL
Qty: 30 | Refills: 0
Start: 2021-11-16 | End: 2021-12-15

## 2021-11-16 RX ORDER — APIXABAN 2.5 MG/1
1 TABLET, FILM COATED ORAL
Qty: 60 | Refills: 0
Start: 2021-11-16 | End: 2021-12-15

## 2021-11-16 RX ORDER — SENNA PLUS 8.6 MG/1
2 TABLET ORAL
Qty: 20 | Refills: 0
Start: 2021-11-16 | End: 2021-11-25

## 2021-11-16 RX ORDER — METOPROLOL TARTRATE 50 MG
25 TABLET ORAL
Refills: 0 | Status: DISCONTINUED | OUTPATIENT
Start: 2021-11-16 | End: 2021-11-17

## 2021-11-16 RX ORDER — ACETAMINOPHEN 500 MG
2 TABLET ORAL
Qty: 0 | Refills: 0 | DISCHARGE
Start: 2021-11-16

## 2021-11-16 RX ADMIN — Medication 81 MILLIGRAM(S): at 11:15

## 2021-11-16 RX ADMIN — CHLORHEXIDINE GLUCONATE 1 APPLICATION(S): 213 SOLUTION TOPICAL at 05:30

## 2021-11-16 RX ADMIN — SPIRONOLACTONE 25 MILLIGRAM(S): 25 TABLET, FILM COATED ORAL at 05:38

## 2021-11-16 RX ADMIN — AMIODARONE HYDROCHLORIDE 400 MILLIGRAM(S): 400 TABLET ORAL at 21:11

## 2021-11-16 RX ADMIN — Medication 4 MILLIGRAM(S): at 14:15

## 2021-11-16 RX ADMIN — Medication 40 MILLIGRAM(S): at 05:38

## 2021-11-16 RX ADMIN — PANTOPRAZOLE SODIUM 40 MILLIGRAM(S): 20 TABLET, DELAYED RELEASE ORAL at 05:39

## 2021-11-16 RX ADMIN — AMIODARONE HYDROCHLORIDE 200 MILLIGRAM(S): 400 TABLET ORAL at 11:15

## 2021-11-16 RX ADMIN — Medication 12.5 MILLIGRAM(S): at 05:39

## 2021-11-16 RX ADMIN — AMIODARONE HYDROCHLORIDE 400 MILLIGRAM(S): 400 TABLET ORAL at 16:29

## 2021-11-16 RX ADMIN — MIRTAZAPINE 45 MILLIGRAM(S): 45 TABLET, ORALLY DISINTEGRATING ORAL at 21:11

## 2021-11-16 RX ADMIN — APIXABAN 2.5 MILLIGRAM(S): 2.5 TABLET, FILM COATED ORAL at 05:38

## 2021-11-16 RX ADMIN — AMIODARONE HYDROCHLORIDE 200 MILLIGRAM(S): 400 TABLET ORAL at 05:38

## 2021-11-16 RX ADMIN — APIXABAN 2.5 MILLIGRAM(S): 2.5 TABLET, FILM COATED ORAL at 16:28

## 2021-11-16 RX ADMIN — Medication 12.5 MILLIGRAM(S): at 18:24

## 2021-11-16 RX ADMIN — Medication 12.5 MILLIGRAM(S): at 16:29

## 2021-11-16 RX ADMIN — Medication 5 MILLIGRAM(S): at 11:16

## 2021-11-16 NOTE — PROGRESS NOTE ADULT - SUBJECTIVE AND OBJECTIVE BOX
EP     DATE OF SERVICE: 11-16-21  s/p Bio-MVR, Maze, and LA appendage clip on 11/8.  Feeling well, telemetry stable, HR now 90 at rest in sinus with 1:1 conduction.  Amiodarone resumed with no further load.  Stable 200mg dose going forward.  Chest tube out 11/15.  Having episodes of rapid AFib that are somewhat symptomatic. Discussed w/ NP re: reattempting a loading dose of Amiodarone.    acetaminophen     Tablet .. 650 milliGRAM(s) Oral every 6 hours PRN  aMIOdarone    Tablet 400 milliGRAM(s) Oral two times a day  apixaban 2.5 milliGRAM(s) Oral two times a day  aspirin enteric coated 81 milliGRAM(s) Oral daily  bisacodyl Suppository 10 milliGRAM(s) Rectal once  chlorhexidine 2% Cloths 1 Application(s) Topical daily  furosemide    Tablet 40 milliGRAM(s) Oral daily  metoprolol tartrate 12.5 milliGRAM(s) Oral every 12 hours  metoprolol tartrate Injectable 5 milliGRAM(s) IV Push every 5 minutes  pantoprazole    Tablet 40 milliGRAM(s) Oral before breakfast  polyethylene glycol 3350 17 Gram(s) Oral daily  senna 2 Tablet(s) Oral at bedtime  spironolactone 25 milliGRAM(s) Oral daily                          13.0   16.54 )-----------( 279      ( 16 Nov 2021 06:27 )             40.7       11-16    136  |  93<L>  |  30<H>  ----------------------------<  120<H>  4.7   |  27  |  1.04    Ca    9.8      16 Nov 2021 06:27      T(C): 36.7 (11-16-21 @ 07:26), Max: 37.1 (11-15-21 @ 19:10)  HR: 111 (11-16-21 @ 11:38) (57 - 177)  BP: 126/70 (11-16-21 @ 11:38) (101/54 - 135/51)  RR: 18 (11-16-21 @ 11:38) (18 - 18)  SpO2: 97% (11-16-21 @ 11:38) (93% - 97%)  Wt(kg): --    I&O's Summary    15 Nov 2021 07:01  -  16 Nov 2021 07:00  --------------------------------------------------------  IN: 1010 mL / OUT: 900 mL / NET: 110 mL    Gen: awake and alert, cooperative.  HEENT:  (-)icterus (-)pallor  CV: Regular S1 S2 1/6 ZITA (+)2 Pulses   Resp:  Clear to auscultation B/L, normal effort, chest tube out.  GI: (+) BS Soft, NT, ND  Lymph:  (-)Edema, (-)obvious lymphadenopathy  Skin: Warm to touch, Normal turgor  Psych: Mood is "I feel good", affect is normal, in good spirits.    TELEMETRY: off    ASSESSMENT/PLAN: Patient is a 77 y/o Female with PMH of HTN and HLD who presented with SOB found to have new onset atrial fibrillation and severe MR. EP consulted for Afib management.    - s/p MVR, Moser Maze IV procedure, and LISBET clip on 11/8.  - Apixaban 5mg BID for stroke prevention.  - Amiodarone loading dose, 400mg BID x 12 doses, then 200mg daily.  She did not complete but ~1 day of loading protocol initially.  - Restart telemetry?   - No permanent cardiac pacing indicated.  Sinus node function is stable, with good chronotropic competence during ambulation.  - Will follow.      Perez Bolton M.D.  Cardiac Electrophysiology  [phone]

## 2021-11-16 NOTE — PROGRESS NOTE ADULT - SUBJECTIVE AND OBJECTIVE BOX
C A R D I O L O G Y  **********************************     DATE OF SERVICE: 11-16-21    Events noted, had afib RVR and palpitations. Denies chest pain or SOB. Review of systems otherwise (-)      MEDICATIONS  (STANDING):  aMIOdarone    Tablet 400 milliGRAM(s) Oral two times a day  apixaban 2.5 milliGRAM(s) Oral two times a day  aspirin enteric coated 81 milliGRAM(s) Oral daily  bisacodyl Suppository 10 milliGRAM(s) Rectal once  chlorhexidine 2% Cloths 1 Application(s) Topical daily  furosemide    Tablet 40 milliGRAM(s) Oral daily  metoprolol tartrate 12.5 milliGRAM(s) Oral every 12 hours  metoprolol tartrate Injectable 5 milliGRAM(s) IV Push every 5 minutes  metoprolol tartrate Injectable 4 milliGRAM(s) IV Push every 5 minutes  mirtazapine 45 milliGRAM(s) Oral at bedtime  pantoprazole    Tablet 40 milliGRAM(s) Oral before breakfast  polyethylene glycol 3350 17 Gram(s) Oral daily  senna 2 Tablet(s) Oral at bedtime  spironolactone 25 milliGRAM(s) Oral daily    MEDICATIONS  (PRN):  acetaminophen     Tablet .. 650 milliGRAM(s) Oral every 6 hours PRN Mild Pain (1 - 3)      LABS:                          13.0   16.54 )-----------( 279      ( 16 Nov 2021 06:27 )             40.7     Hemoglobin: 13.0 g/dL (11-16 @ 06:27)  Hemoglobin: 13.0 g/dL (11-15 @ 09:25)  Hemoglobin: 12.3 g/dL (11-14 @ 06:24)  Hemoglobin: 11.3 g/dL (11-13 @ 05:13)  Hemoglobin: 10.8 g/dL (11-12 @ 05:55)    11-16    136  |  93<L>  |  30<H>  ----------------------------<  120<H>  4.7   |  27  |  1.04    Ca    9.8      16 Nov 2021 06:27      Creatinine Trend: 1.04<--, 1.18<--, 1.02<--, 1.02<--, 0.93<--, 0.87<--             11-15-21 @ 07:01  -  11-16-21 @ 07:00  --------------------------------------------------------  IN: 1010 mL / OUT: 900 mL / NET: 110 mL        PHYSICAL EXAM  Vital Signs Last 24 Hrs  T(C): 36.7 (16 Nov 2021 07:26), Max: 37.1 (15 Nov 2021 19:10)  T(F): 98 (16 Nov 2021 07:26), Max: 98.7 (15 Nov 2021 19:10)  HR: 111 (16 Nov 2021 11:38) (57 - 177)  BP: 126/70 (16 Nov 2021 11:38) (101/54 - 135/51)  BP(mean): 84 (16 Nov 2021 03:17) (76 - 90)  RR: 18 (16 Nov 2021 11:38) (18 - 18)  SpO2: 97% (16 Nov 2021 11:38) (93% - 97%)      HEENT:  (-)icterus (-)pallor  CV: N S1 S2 1/6 ZITA (+)2 Pulses B/l  Resp:  Clear to auscultation B/L, normal effort  GI: (+) BS Soft, NT, ND  Lymph:  (-)Edema, (-)obvious lymphadenopathy  Skin: Warm to touch, Normal turgor  Psych: Appropriate mood and affect      TELEMETRY: SR -> -140    ASSESSMENT/PLAN: 74 y/o Female with pmh of HTN, HLD,  who presented to the ED with complains of SOB found with pulmonary edema and new afib DOUG revealed severe MR now s/p tissue MVR, LISBET ligation and MAZE.    - Continue AC with Eliquis for AF/Stroke prevention  - Keep net negative with lasix/aldactone  - Continue ASA/Statin  - EP f/u noted - amio load  - CTS follow up noted  - Patient to f/u with her cardiologist Dr. Solo Ocampo after d/c    Oren Muhammad PA-C  Pager: [phone]

## 2021-11-16 NOTE — DISCHARGE NOTE PROVIDER - CARE PROVIDERS DIRECT ADDRESSES
,[email or web address],DirectAddress_Unknown

## 2021-11-16 NOTE — DISCHARGE NOTE PROVIDER - NSDCPNSUBOBJ_GEN_ALL_CORE
PHYSICAL EXAM:  Neurology: alert and oriented x 3, nonfocal, no gross deficits  CV : S1S2  Sternal Wound :  CDI , Stable  Lungs: cta  Abdomen: soft, nontender, nondistended, positive bowel sounds, last bowel movement    +bm     Extremities:     tr edema no calf tenderness

## 2021-11-16 NOTE — DISCHARGE NOTE PROVIDER - HOSPITAL COURSE
75 y/o F with pmhx of HTN, HLD, varicose veins, and vertigo who presented to the ED with complains of worsening SOB for the past 3 weeks, worse with exertion and laying flat and associated with intermittent 3/10 left sided chest pain and palpitations. Labs showed elevated pro-BNP and UA was positive for a possible infection. CTA of chest was negative for PE but showed pleural effusions. Patient was admitted for CHF exacerbation and new onset A-fib. Patient was put on metoprolol for her Afib and Lasix for CHF and was also put on telemetry and FD lovenox. An echocardiogram was performed on her which showed moderate M.    Rapid response was called for dizziness and pre-syncope  and nausea while in the bathroom, Cardiac monitor showing rapid afib with fluctuating HR, 500ml bolus given for low BP, repeat measurement was normal, EKG was obtained, showed A -fib. Her morning medications were given including cardizem drip, lasix and metoprolol  Patient states she is feeling better after interventions.    Cardiology  reviewed her Echo cardiogram which showed:  Moderate to severe MR, Moderately increased RV pressure, diastolic function assessment was inconclusive, EF 55%     11/18/21 Mitral valve replacement (t) with Magnaease 29  Moser-Maze IV Cryo-RF  LAAL with AtriClip 35mm    Inotropes/ pressors  New onset Afib requiring Amio -EPS following-->  Oral Amiodarone, recommend 5 gram load (IV infusion, + 400mg x 12 doses), then 200mg daily.  With her degree of heart failure, there is a high priority to maintain sinus rhythm.Junctional rhythm 40 's  paced at 80, av.Insulin infusion for  hyperglycemia- weaned  started Transfered to [facility] follow up,  pacer av interval adjusted to  allow a pacing, 60 .Underlying junctional 50  11/11 maintaining SR no evidence of junctional rhythm on tele. Amiodarone held yesterday. EP to follow. Consider ziopath placement upon discharge later this week if PPM not indicated. Amiodarone 400 bid reinstated as per Dr Arango.  11/12 Pacing ovenight , amio d/c. NSR 70's this am amio resumed at 200 qd.  EPS following, no pacer at this time.  d/c planning with ziopatch   11/13 VSS - will u/s r & l pleural space to assess for pigtail placement. eliquis is on hold for such d/c plan home w/ mcot ? monday 11/14 VSS - 95% off O2 - WBC 16 incision cdi afebrile IV site no reddness.  will ck u/a - no overt signs of infection will monitor cbc daily - will transfer to floor & d/c home tomorrow-   11/16 VSS SR 60-70 on low dose bb & amio 200 qd - remeron not restarted d/t interaction w/ amiodarone - pt. has been off >10 days 77 y/o F with pmhx of HTN, HLD, varicose veins, and vertigo who presented to the ED with complains of worsening SOB for the past 3 weeks, worse with exertion and laying flat and associated with intermittent 3/10 left sided chest pain and palpitations. Labs showed elevated pro-BNP and UA was positive for a possible infection. CTA of chest was negative for PE but showed pleural effusions. Patient was admitted for CHF exacerbation and new onset A-fib. Patient was put on metoprolol for her Afib and Lasix for CHF and was also put on telemetry and FD lovenox. An echocardiogram was performed on her which showed moderate M.    Rapid response was called for dizziness and pre-syncope  and nausea while in the bathroom, Cardiac monitor showing rapid afib with fluctuating HR, 500ml bolus given for low BP, repeat measurement was normal, EKG was obtained, showed A -fib. Her morning medications were given including cardizem drip, lasix and metoprolol  Patient states she is feeling better after interventions.    Cardiology  reviewed her Echo cardiogram which showed:  Moderate to severe MR, Moderately increased RV pressure, diastolic function assessment was inconclusive, EF 55%     11/18/21 Mitral valve replacement (t) with Magnaease 29  Moser-Maze IV Cryo-RF  LAAL with AtriClip 35mm    Inotropes/ pressors  New onset Afib requiring Amio -EPS following-->  Oral Amiodarone, recommend 5 gram load (IV infusion, + 400mg x 12 doses), then 200mg daily.  With her degree of heart failure, there is a high priority to maintain sinus rhythm.Junctional rhythm 40 's  paced at 80, av.Insulin infusion for  hyperglycemia- weaned  started Transfered to [facility] follow up,  pacer av interval adjusted to  allow a pacing, 60 .Underlying junctional 50  11/11 maintaining SR no evidence of junctional rhythm on tele. Amiodarone held yesterday. EP to follow. Consider ziopath placement upon discharge later this week if PPM not indicated. Amiodarone 400 bid reinstated as per Dr Arango.  11/12 Pacing ovenight , amio d/c. NSR 70's this am amio resumed at 200 qd.  EPS following, no pacer at this time.  d/c planning with ziopatch   11/13 VSS - will u/s r & l pleural space to assess for pigtail placement. eliquis is on hold for such d/c plan home w/ mcot ? monday 11/14 VSS - 95% off O2 - WBC 16 incision cdi afebrile IV site no reddness.  will ck u/a - no overt signs of infection will monitor cbc daily - will transfer to floor & d/c home tomorrow-   11/16 VSS SR 60-70 on low dose bb & amio 200 qd - remeron not restarted d/t interaction w/ amiodarone - pt. has been off >10 days  11/19    PPM placed  11/20   dc home

## 2021-11-16 NOTE — DISCHARGE NOTE PROVIDER - CARE PROVIDER_API CALL
Narendra Arango (MD)  Surgery; Surgical Critical Care; Thoracic and Cardiac Surgery  [address] [place] [address]  Phone: [phone]  Fax: [phone]  Follow Up Time:    Narendra Arango)  Surgery; Surgical Critical Care; Thoracic and Cardiac Surgery  [address] [place] [address]  Phone: [phone]  Fax: [phone]  Follow Up Time:     Art Walker  CARDIAC ELECTROPHYSIOLOGY  [address] [place] [address] [address]  [place] [address]  Phone: [phone]  Fax: [phone]  Follow Up Time:

## 2021-11-16 NOTE — DISCHARGE NOTE PROVIDER - NSDCMRMEDTOKEN_GEN_ALL_CORE_FT
acetaminophen: 2 tab(s) orally every 8 hours, As Needed  for mild pain/headache  amiodarone 200 mg oral tablet: 1 tab(s) orally once a day  apixaban 2.5 mg oral tablet: 1 tab(s) orally 2 times a day  aspirin 81 mg oral delayed release tablet: 1 tab(s) orally once a day  folic acid 1 mg oral tablet: 1 tab(s) orally once a day  furosemide 40 mg oral tablet: 1 tab(s) orally once a day  gabapentin 300 mg oral capsule: 1 cap(s) orally 2 times a day  metoprolol tartrate 25 mg oral tablet: 0.5 tab(s) orally 2 times a day   pantoprazole 40 mg oral delayed release tablet: 1 tab(s) orally once a day (before a meal)  senna oral tablet: 2 tab(s) orally once a day (at bedtime)  spironolactone 25 mg oral tablet: 1 tab(s) orally once a day   acetaminophen: 2 tab(s) orally every 8 hours, As Needed  for mild pain/headache  amiodarone 200 mg oral tablet: 1 tab(s) orally once a day  apixaban 2.5 mg oral tablet: 1 tab(s) orally 2 times a day  aspirin 81 mg oral delayed release tablet: 1 tab(s) orally once a day  folic acid 1 mg oral tablet: 1 tab(s) orally once a day  furosemide 20 mg oral tablet: 1 tab(s) orally once a day  gabapentin 300 mg oral capsule: 1 cap(s) orally 2 times a day  metoprolol succinate 100 mg oral tablet, extended release: 1 tab(s) orally once a day  mirtazapine 45 mg oral tablet: 1 tab(s) orally once a day (at bedtime)  pantoprazole 40 mg oral delayed release tablet: 1 tab(s) orally once a day (before a meal)  spironolactone 25 mg oral tablet: 1 tab(s) orally once a day

## 2021-11-16 NOTE — DISCHARGE NOTE PROVIDER - NSDCCPCAREPLAN_GEN_ALL_CORE_FT
PRINCIPAL DISCHARGE DIAGNOSIS  Diagnosis: S/P MVR (mitral valve replacement)  Assessment and Plan of Treatment: Refer to your Cardiac Surgery Do's & Don'ts Fact sheet  shower daily & wash your incisions with mild soap and water  weigh yourself daily - report weight gain > 2.5 pounds overnight to your MD  low salt diet  ambulate 4-5 times a day  follow up appt appt with DR Narendra Arango on Monday, November 22 @ 2:45pm  follow up appt with your Cardiologist and /or Primary Care MD in 3-4 wks         PRINCIPAL DISCHARGE DIAGNOSIS  Diagnosis: S/P MVR (mitral valve replacement)  Assessment and Plan of Treatment: Refer to your Cardiac Surgery Do's & Don'ts Fact sheet  shower daily & wash your incisions with mild soap and water  weigh yourself daily - report weight gain > 2.5 pounds overnight to your MD  low salt diet  ambulate 4-5 times a day  follow up appt appt with DR Narendra Arango 2 weeks  follow up   with Dr Denise YOUNGBLOOD

## 2021-11-16 NOTE — DISCHARGE NOTE PROVIDER - NSDCFUADDAPPT_GEN_ALL_CORE_FT
follow up appt with Dr. Narendra Arango on Monday, November 22 @ 2:45pm  follow up appt with your Cardiologist in 2-3 weeks  follow up appt with your Primary  Care MD in 4 weeks follow up appt with Dr. Narendra Arango   for 7-10 days   ramesh his office monday am to make appt  follow up appt with your Cardiologist in 2-3 weeks  follow up appt with your Primary  Care MD in 4 weeks follow up appt with Dr. Narendra Arango   for 7-10 days   ramesh his office monday am to make appt  follow up appt with your Cardiologist in 2-3 weeks  make appt with Dr Denise YOUNGBLOOD   for PPM   check  2 weeks

## 2021-11-16 NOTE — DISCHARGE NOTE PROVIDER - PROVIDER TOKENS
PROVIDER:[TOKEN:[[id number]]] PROVIDER:[TOKEN:[[id number]]],PROVIDER:[TOKEN:[[id number]]]

## 2021-11-17 PROBLEM — Z00.00 ENCOUNTER FOR PREVENTIVE HEALTH EXAMINATION: Status: ACTIVE | Noted: 2021-11-17

## 2021-11-17 LAB
ANION GAP SERPL CALC-SCNC: 19 MMOL/L — HIGH (ref 5–17)
BUN SERPL-MCNC: 31 MG/DL — HIGH (ref 7–23)
CALCIUM SERPL-MCNC: 9.5 MG/DL — SIGNIFICANT CHANGE UP (ref 8.4–10.5)
CHLORIDE SERPL-SCNC: 95 MMOL/L — LOW (ref 96–108)
CO2 SERPL-SCNC: 19 MMOL/L — LOW (ref 22–31)
CREAT SERPL-MCNC: 1.01 MG/DL — SIGNIFICANT CHANGE UP (ref 0.5–1.3)
GLUCOSE SERPL-MCNC: 109 MG/DL — HIGH (ref 70–99)
HCT VFR BLD CALC: 39.1 % — SIGNIFICANT CHANGE UP (ref 34.5–45)
HGB BLD-MCNC: 12.6 G/DL — SIGNIFICANT CHANGE UP (ref 11.5–15.5)
MAGNESIUM SERPL-MCNC: 2.4 MG/DL — SIGNIFICANT CHANGE UP (ref 1.6–2.6)
MCHC RBC-ENTMCNC: 30.4 PG — SIGNIFICANT CHANGE UP (ref 27–34)
MCHC RBC-ENTMCNC: 32.2 GM/DL — SIGNIFICANT CHANGE UP (ref 32–36)
MCV RBC AUTO: 94.4 FL — SIGNIFICANT CHANGE UP (ref 80–100)
NRBC # BLD: 0 /100 WBCS — SIGNIFICANT CHANGE UP (ref 0–0)
PHOSPHATE SERPL-MCNC: 3.2 MG/DL — SIGNIFICANT CHANGE UP (ref 2.5–4.5)
PLATELET # BLD AUTO: 226 K/UL — SIGNIFICANT CHANGE UP (ref 150–400)
POTASSIUM SERPL-MCNC: 4.1 MMOL/L — SIGNIFICANT CHANGE UP (ref 3.5–5.3)
POTASSIUM SERPL-SCNC: 4.1 MMOL/L — SIGNIFICANT CHANGE UP (ref 3.5–5.3)
RBC # BLD: 4.14 M/UL — SIGNIFICANT CHANGE UP (ref 3.8–5.2)
RBC # FLD: 14.2 % — SIGNIFICANT CHANGE UP (ref 10.3–14.5)
SODIUM SERPL-SCNC: 133 MMOL/L — LOW (ref 135–145)
WBC # BLD: 15.26 K/UL — HIGH (ref 3.8–10.5)
WBC # FLD AUTO: 15.26 K/UL — HIGH (ref 3.8–10.5)

## 2021-11-17 PROCEDURE — 93010 ELECTROCARDIOGRAM REPORT: CPT

## 2021-11-17 RX ORDER — METOPROLOL TARTRATE 50 MG
25 TABLET ORAL EVERY 8 HOURS
Refills: 0 | Status: DISCONTINUED | OUTPATIENT
Start: 2021-11-17 | End: 2021-11-17

## 2021-11-17 RX ORDER — METOPROLOL TARTRATE 50 MG
5 TABLET ORAL ONCE
Refills: 0 | Status: COMPLETED | OUTPATIENT
Start: 2021-11-17 | End: 2021-11-17

## 2021-11-17 RX ORDER — METOPROLOL TARTRATE 50 MG
25 TABLET ORAL EVERY 6 HOURS
Refills: 0 | Status: DISCONTINUED | OUTPATIENT
Start: 2021-11-17 | End: 2021-11-18

## 2021-11-17 RX ADMIN — Medication 25 MILLIGRAM(S): at 14:15

## 2021-11-17 RX ADMIN — Medication 40 MILLIGRAM(S): at 05:11

## 2021-11-17 RX ADMIN — PANTOPRAZOLE SODIUM 40 MILLIGRAM(S): 20 TABLET, DELAYED RELEASE ORAL at 05:11

## 2021-11-17 RX ADMIN — SPIRONOLACTONE 25 MILLIGRAM(S): 25 TABLET, FILM COATED ORAL at 05:12

## 2021-11-17 RX ADMIN — Medication 25 MILLIGRAM(S): at 08:29

## 2021-11-17 RX ADMIN — Medication 5 MILLIGRAM(S): at 08:30

## 2021-11-17 RX ADMIN — AMIODARONE HYDROCHLORIDE 400 MILLIGRAM(S): 400 TABLET ORAL at 14:14

## 2021-11-17 RX ADMIN — Medication 81 MILLIGRAM(S): at 08:26

## 2021-11-17 RX ADMIN — Medication 25 MILLIGRAM(S): at 02:48

## 2021-11-17 RX ADMIN — APIXABAN 2.5 MILLIGRAM(S): 2.5 TABLET, FILM COATED ORAL at 17:01

## 2021-11-17 RX ADMIN — Medication 5 MILLIGRAM(S): at 14:57

## 2021-11-17 RX ADMIN — AMIODARONE HYDROCHLORIDE 400 MILLIGRAM(S): 400 TABLET ORAL at 21:19

## 2021-11-17 RX ADMIN — APIXABAN 2.5 MILLIGRAM(S): 2.5 TABLET, FILM COATED ORAL at 05:11

## 2021-11-17 RX ADMIN — MIRTAZAPINE 45 MILLIGRAM(S): 45 TABLET, ORALLY DISINTEGRATING ORAL at 21:19

## 2021-11-17 RX ADMIN — CHLORHEXIDINE GLUCONATE 1 APPLICATION(S): 213 SOLUTION TOPICAL at 14:15

## 2021-11-17 RX ADMIN — Medication 25 MILLIGRAM(S): at 19:28

## 2021-11-17 RX ADMIN — AMIODARONE HYDROCHLORIDE 400 MILLIGRAM(S): 400 TABLET ORAL at 05:11

## 2021-11-17 NOTE — PROGRESS NOTE ADULT - ASSESSMENT
77 y/o F with pmhx of HTN, HLD, varicose veins, and vertigo who presented to the ED with complains of worsening SOB for the past 3 weeks, worse with exertion and laying flat and associated with intermittent 3/10 left sided chest pain and palpitations. Labs showed elevated pro-BNP and UA was positive for a possible infection. CTA of chest was negative for PE but showed pleural effusions. Patient was admitted for CHF exacerbation and new onset A-fib. Patient was put on metoprolol for her Afib and Lasix for CHF and was also put on telemetry and FD lovenox. An echocardiogram was performed on her which showed moderate M.    Rapid response was called for dizziness and pre-syncope  and nausea while in the bathroom, Cardiac monitor showing rapid afib with fluctuating HR, 500ml bolus given for low BP, repeat measurement was normal, EKG was obtained, showed A -fib. Her morning medications were given including cardizem drip, lasix and metoprolol  Patient states she is feeling better after interventions.    Cardiology  reviewed her Echo cardiogram which showed:  Moderate to severe MR, Moderately increased RV pressure, diastolic function assessment was inconclusive, EF 55%     11/18/21 Mitral valve replacement (t) with Magnaease 29  Moser-Maze IV Cryo-RF  LAAL with AtriClip 35mm    Inotropes/ pressors  New onset Afib requiring Amio -EPS following-->  Oral Amiodarone, recommend 5 gram load (IV infusion, + 400mg x 12 doses), then 200mg daily.  With her degree of heart failure, there is a high priority to maintain sinus rhythm.Junctional rhythm 40 's  paced at 80, av.Insulin infusion for  hyperglycemia- weaned  started Transfered to [facility] follow up,  pacer av interval adjusted to  allow a pacing, 60 .Underlying junctional 50  11/11 maintaining SR no evidence of junctional rhythm on tele. Amiodarone held yesterday. EP to follow. Consider ziopath placement upon discharge later this week if PPM not indicated. Amiodarone 400 bid reinstated as per Dr Arango.  11/12 Pacing ovenight , amio d/c. NSR 70's this am amio resumed at 200 qd.  EPS following, no pacer at this time.  d/c planning with ziopatch   11/13 VSS - will u/s r & l pleural space to assess for pigtail placement. eliquis is on hold for such d/c plan home w/ mcot ? monday 11/14 VSS - 95% off O2 - WBC 16 incision cdi afebrile IV site no reddness.  will ck u/a - no overt signs of infection will monitor cbc daily - will transfer to floor & d/c home tomorrow-   11/16   Pt was for d/c developed diaphoresis, palpitations--> sbp 220 with rapid heart fate glucose 141- monitor placed back on patient.  rapid afib 140's sbp down 1p 126.  echo done last evening reveals no effusion.  call to EP Dr. Bolton - plan: continue low dose bb - & give amio 400 bid x 5 days.   IV BB 5mg x 3 for rate control.  will continue to monitor in step down.  11/17 remains rapid afib       75 y/o F with pmhx of HTN, HLD, varicose veins, and vertigo who presented to the ED with complains of worsening SOB for the past 3 weeks, worse with exertion and laying flat and associated with intermittent 3/10 left sided chest pain and palpitations. Labs showed elevated pro-BNP and UA was positive for a possible infection. CTA of chest was negative for PE but showed pleural effusions. Patient was admitted for CHF exacerbation and new onset A-fib. Patient was put on metoprolol for her Afib and Lasix for CHF and was also put on telemetry and FD lovenox. An echocardiogram was performed on her which showed moderate M.    Rapid response was called for dizziness and pre-syncope  and nausea while in the bathroom, Cardiac monitor showing rapid afib with fluctuating HR, 500ml bolus given for low BP, repeat measurement was normal, EKG was obtained, showed A -fib. Her morning medications were given including cardizem drip, lasix and metoprolol  Patient states she is feeling better after interventions.    Cardiology  reviewed her Echo cardiogram which showed:  Moderate to severe MR, Moderately increased RV pressure, diastolic function assessment was inconclusive, EF 55%     11/18/21 Mitral valve replacement (t) with Magnaease 29  Moser-Maze IV Cryo-RF  LAAL with AtriClip 35mm    Inotropes/ pressors  New onset Afib requiring Amio -EPS following-->  Oral Amiodarone, recommend 5 gram load (IV infusion, + 400mg x 12 doses), then 200mg daily.  With her degree of heart failure, there is a high priority to maintain sinus rhythm.Junctional rhythm 40 's  paced at 80, av.Insulin infusion for  hyperglycemia- weaned  started Transfered to [facility] follow up,  pacer av interval adjusted to  allow a pacing, 60 .Underlying junctional 50  11/11 maintaining SR no evidence of junctional rhythm on tele. Amiodarone held yesterday. EP to follow. Consider ziopath placement upon discharge later this week if PPM not indicated. Amiodarone 400 bid reinstated as per Dr Arango.  11/12 Pacing ovenight , amio d/c. NSR 70's this am amio resumed at 200 qd.  EPS following, no pacer at this time.  d/c planning with ziopatch   11/13 VSS - will u/s r & l pleural space to assess for pigtail placement. eliquis is on hold for such d/c plan home w/ mcot ? monday 11/14 VSS - 95% off O2 - WBC 16 incision cdi afebrile IV site no reddness.  will ck u/a - no overt signs of infection will monitor cbc daily - will transfer to floor & d/c home tomorrow-   11/16   Pt was for d/c developed diaphoresis, palpitations--> sbp 220 with rapid heart fate glucose 141- monitor placed back on patient.  rapid afib 140's sbp down 1p 126.  echo done last evening reveals no effusion.  call to EP Dr. Bolton - plan: continue low dose bb - & give amio 400 bid x 5 days.   IV BB 5mg x 3 for rate control.  will continue to monitor in step down.  11/17 remains rapid afib, lopressor inc 25 q6, amio 400tid

## 2021-11-17 NOTE — PROGRESS NOTE ADULT - SUBJECTIVE AND OBJECTIVE BOX
VITAL SIGNS    Telemetry:      Vital Signs Last 24 Hrs  T(C): 36.4 (21 @ 02:48), Max: 36.7 (21 @ 07:26)  T(F): 97.6 (21 @ 02:48), Max: 98.1 (21 @ 15:07)  HR: 120 (21 @ 05:23) (61 - 130)  BP: 126/71 (21 @ 05:23) (95/52 - 126/71)  RR: 16 (21 @ 05:23) (16 - 18)  SpO2: 97% (21 @ 05:23) (93% - 97%)                    @ 07:01  -   @ 07:00  --------------------------------------------------------  IN: 450 mL / OUT: 500 mL / NET: -50 mL          Daily     Daily Weight in k.6 (2021 05:12)            CAPILLARY BLOOD GLUCOSE      POCT Blood Glucose.: 141 mg/dL (2021 10:37)            Drains:     MS         [  ] Drainage:                 L Pleural  [  ]  Drainage:                R Pleural  [  ]  Drainage:    Pacing Wires        [  ]   Settings:                                  Isolated  [  ]    Coumadin    [ ] YES          [  ]      NO                                   PHYSICAL EXAM        Neurology: alert and oriented x 3, nonfocal, no gross deficits  CV : s1 s2 RRR  Sternal Wound :  CDI , Stable  Lungs: cta  Abdomen: soft, nontender, nondistended, positive bowel sounds, last bowel movement                       chest tubes  :    voiding / pineda - sbd         Extremities:      edema   /  -   calve tenderness ,    L leg  /  R leg  incisions cdi          acetaminophen     Tablet .. 650 milliGRAM(s) Oral every 6 hours PRN  aMIOdarone    Tablet 400 milliGRAM(s) Oral every 8 hours  apixaban 2.5 milliGRAM(s) Oral two times a day  aspirin enteric coated 81 milliGRAM(s) Oral daily  bisacodyl Suppository 10 milliGRAM(s) Rectal once  chlorhexidine 2% Cloths 1 Application(s) Topical daily  furosemide    Tablet 40 milliGRAM(s) Oral daily  metoprolol tartrate 25 milliGRAM(s) Oral every 8 hours  metoprolol tartrate Injectable 5 milliGRAM(s) IV Push every 5 minutes  mirtazapine 45 milliGRAM(s) Oral at bedtime  pantoprazole    Tablet 40 milliGRAM(s) Oral before breakfast  polyethylene glycol 3350 17 Gram(s) Oral daily  senna 2 Tablet(s) Oral at bedtime  spironolactone 25 milliGRAM(s) Oral daily                    Physical Therapy Rec:   Home  [  ]   Home w/ PT  [  ]  Rehab  [  ]  Discussed with Cardiothoracic Team at AM rounds.     VITAL SIGNS    Telemetry:  afib/flutter 120    Vital Signs Last 24 Hrs  T(C): 36.4 (21 @ 02:48), Max: 36.7 (21 @ 07:26)  T(F): 97.6 (21 @ 02:48), Max: 98.1 (21 @ 15:07)  HR: 120 (21 @ 05:23) (61 - 130)  BP: 126/71 (21 @ 05:23) (95/52 - 126/71)  RR: 16 (21 @ 05:23) (16 - 18)  SpO2: 97% (21 @ 05:23) (93% - 97%)                    @ 07:01  -   @ 07:00  --------------------------------------------------------  IN: 450 mL / OUT: 500 mL / NET: -50 mL          Daily     Daily Weight in k.6 (2021 05:12)            CAPILLARY BLOOD GLUCOSE      POCT Blood Glucose.: 141 mg/dL (2021 10:37)                    Coumadin    [ ] YES          [ x ]      NO         eliquis                          PHYSICAL EXAM        Neurology: alert and oriented x 3, nonfocal, no gross deficits  CV : irrg, regula  Sternal Wound :  CDI , Stable  Lungs: cta  Abdomen: soft, nontender, nondistended, positive bowel sounds, last bowel movement                       :    voiding       Extremities:   trace    edema   /  -   calve tenderness ,              acetaminophen     Tablet .. 650 milliGRAM(s) Oral every 6 hours PRN  aMIOdarone    Tablet 400 milliGRAM(s) Oral every 8 hours  apixaban 2.5 milliGRAM(s) Oral two times a day  aspirin enteric coated 81 milliGRAM(s) Oral daily  bisacodyl Suppository 10 milliGRAM(s) Rectal once  chlorhexidine 2% Cloths 1 Application(s) Topical daily  furosemide    Tablet 40 milliGRAM(s) Oral daily  metoprolol tartrate 25 milliGRAM(s) Oral every 8 hours  metoprolol tartrate Injectable 5 milliGRAM(s) IV Push every 5 minutes  mirtazapine 45 milliGRAM(s) Oral at bedtime  pantoprazole    Tablet 40 milliGRAM(s) Oral before breakfast  polyethylene glycol 3350 17 Gram(s) Oral daily  senna 2 Tablet(s) Oral at bedtime  spironolactone 25 milliGRAM(s) Oral daily                    Physical Therapy Rec:   Home  [  ]   Home w/ PT  [  ]  Rehab  [  ]  Discussed with Cardiothoracic Team at AM rounds.

## 2021-11-17 NOTE — PROGRESS NOTE ADULT - SUBJECTIVE AND OBJECTIVE BOX
C A R D I O L O G Y  **********************************     DATE OF SERVICE: 11-17-21    Denies chest pain, palpitations, or SOB. Review of systems otherwise (-)      MEDICATIONS  (STANDING):  aMIOdarone    Tablet 400 milliGRAM(s) Oral every 8 hours  apixaban 2.5 milliGRAM(s) Oral two times a day  aspirin enteric coated 81 milliGRAM(s) Oral daily  bisacodyl Suppository 10 milliGRAM(s) Rectal once  chlorhexidine 2% Cloths 1 Application(s) Topical daily  furosemide    Tablet 40 milliGRAM(s) Oral daily  metoprolol tartrate 25 milliGRAM(s) Oral every 6 hours  mirtazapine 45 milliGRAM(s) Oral at bedtime  pantoprazole    Tablet 40 milliGRAM(s) Oral before breakfast  polyethylene glycol 3350 17 Gram(s) Oral daily  senna 2 Tablet(s) Oral at bedtime  spironolactone 25 milliGRAM(s) Oral daily    MEDICATIONS  (PRN):  acetaminophen     Tablet .. 650 milliGRAM(s) Oral every 6 hours PRN Mild Pain (1 - 3)      LABS:                          12.6   15.26 )-----------( 226      ( 17 Nov 2021 05:08 )             39.1     Hemoglobin: 12.6 g/dL (11-17 @ 05:08)  Hemoglobin: 13.0 g/dL (11-16 @ 06:27)  Hemoglobin: 13.0 g/dL (11-15 @ 09:25)  Hemoglobin: 12.3 g/dL (11-14 @ 06:24)  Hemoglobin: 11.3 g/dL (11-13 @ 05:13)    11-17    133<L>  |  95<L>  |  31<H>  ----------------------------<  109<H>  4.1   |  19<L>  |  1.01    Ca    9.5      17 Nov 2021 05:08  Phos  3.2     11-17  Mg     2.4     11-17      Creatinine Trend: 1.01<--, 1.04<--, 1.18<--, 1.02<--, 1.02<--, 0.93<--             11-16-21 @ 07:01  -  11-17-21 @ 07:00  --------------------------------------------------------  IN: 450 mL / OUT: 700 mL / NET: -250 mL    11-17-21 @ 07:01  -  11-17-21 @ 15:55  --------------------------------------------------------  IN: 420 mL / OUT: 50 mL / NET: 370 mL        PHYSICAL EXAM  Vital Signs Last 24 Hrs  T(C): 36.5 (17 Nov 2021 15:04), Max: 36.7 (16 Nov 2021 19:00)  T(F): 97.7 (17 Nov 2021 15:04), Max: 98.1 (16 Nov 2021 19:00)  HR: 107 (17 Nov 2021 15:04) (90 - 133)  BP: 108/74 (17 Nov 2021 15:04) (95/52 - 126/71)  BP(mean): 85 (17 Nov 2021 15:04) (66 - 91)  RR: 18 (17 Nov 2021 15:04) (16 - 18)  SpO2: 95% (17 Nov 2021 15:04) (94% - 97%)      HEENT:  (-)icterus (-)pallor  CV: N S1 S2 1/6 ZITA (+)2 Pulses B/l  Resp:  Clear to auscultation B/L, normal effort  GI: (+) BS Soft, NT, ND  Lymph:  (-)Edema, (-)obvious lymphadenopathy  Skin: Warm to touch, Normal turgor  Psych: Appropriate mood and affect      TELEMETRY: AF 80-110s    ASSESSMENT/PLAN: 74 y/o Female with pmh of HTN, HLD,  who presented to the ED with complains of SOB found with pulmonary edema and new afib DOUG revealed severe MR now s/p tissue MVR, LISBET ligation and MAZE.    - Continue AC with Eliquis for AF/Stroke prevention  - Keep net negative with lasix/aldactone  - Continue ASA/Statin  - EP f/u noted - amio load and metoprolol, may need DCCV  - CTS follow up noted  - Patient to f/u with her cardiologist Dr. Solo Ocampo after d/c    Oren Muhammad PA-C  Pager: [phone]

## 2021-11-18 LAB
ALBUMIN SERPL ELPH-MCNC: 4 G/DL — SIGNIFICANT CHANGE UP (ref 3.3–5)
ALP SERPL-CCNC: 113 U/L — SIGNIFICANT CHANGE UP (ref 40–120)
ALT FLD-CCNC: 24 U/L — SIGNIFICANT CHANGE UP (ref 10–45)
ANION GAP SERPL CALC-SCNC: 14 MMOL/L — SIGNIFICANT CHANGE UP (ref 5–17)
AST SERPL-CCNC: 13 U/L — SIGNIFICANT CHANGE UP (ref 10–40)
BASOPHILS # BLD AUTO: 0 K/UL — SIGNIFICANT CHANGE UP (ref 0–0.2)
BASOPHILS NFR BLD AUTO: 0 % — SIGNIFICANT CHANGE UP (ref 0–2)
BILIRUB SERPL-MCNC: 0.5 MG/DL — SIGNIFICANT CHANGE UP (ref 0.2–1.2)
BUN SERPL-MCNC: 34 MG/DL — HIGH (ref 7–23)
CALCIUM SERPL-MCNC: 9 MG/DL — SIGNIFICANT CHANGE UP (ref 8.4–10.5)
CHLORIDE SERPL-SCNC: 97 MMOL/L — SIGNIFICANT CHANGE UP (ref 96–108)
CO2 SERPL-SCNC: 24 MMOL/L — SIGNIFICANT CHANGE UP (ref 22–31)
CREAT SERPL-MCNC: 1.16 MG/DL — SIGNIFICANT CHANGE UP (ref 0.5–1.3)
EOSINOPHIL # BLD AUTO: 0.63 K/UL — HIGH (ref 0–0.5)
EOSINOPHIL NFR BLD AUTO: 4.3 % — SIGNIFICANT CHANGE UP (ref 0–6)
GIANT PLATELETS BLD QL SMEAR: PRESENT — SIGNIFICANT CHANGE UP
GLUCOSE SERPL-MCNC: 112 MG/DL — HIGH (ref 70–99)
HCT VFR BLD CALC: 37.4 % — SIGNIFICANT CHANGE UP (ref 34.5–45)
HGB BLD-MCNC: 11.9 G/DL — SIGNIFICANT CHANGE UP (ref 11.5–15.5)
LYMPHOCYTES # BLD AUTO: 28.2 % — SIGNIFICANT CHANGE UP (ref 13–44)
LYMPHOCYTES # BLD AUTO: 4.12 K/UL — HIGH (ref 1–3.3)
MAGNESIUM SERPL-MCNC: 2.3 MG/DL — SIGNIFICANT CHANGE UP (ref 1.6–2.6)
MANUAL SMEAR VERIFICATION: SIGNIFICANT CHANGE UP
MCHC RBC-ENTMCNC: 29.4 PG — SIGNIFICANT CHANGE UP (ref 27–34)
MCHC RBC-ENTMCNC: 31.8 GM/DL — LOW (ref 32–36)
MCV RBC AUTO: 92.3 FL — SIGNIFICANT CHANGE UP (ref 80–100)
MONOCYTES # BLD AUTO: 0.99 K/UL — HIGH (ref 0–0.9)
MONOCYTES NFR BLD AUTO: 6.8 % — SIGNIFICANT CHANGE UP (ref 2–14)
NEUTROPHILS # BLD AUTO: 8.86 K/UL — HIGH (ref 1.8–7.4)
NEUTROPHILS NFR BLD AUTO: 60.7 % — SIGNIFICANT CHANGE UP (ref 43–77)
PHOSPHATE SERPL-MCNC: 3.1 MG/DL — SIGNIFICANT CHANGE UP (ref 2.5–4.5)
PLAT MORPH BLD: NORMAL — SIGNIFICANT CHANGE UP
PLATELET # BLD AUTO: 262 K/UL — SIGNIFICANT CHANGE UP (ref 150–400)
POTASSIUM SERPL-MCNC: 4.2 MMOL/L — SIGNIFICANT CHANGE UP (ref 3.5–5.3)
POTASSIUM SERPL-SCNC: 4.2 MMOL/L — SIGNIFICANT CHANGE UP (ref 3.5–5.3)
PROT SERPL-MCNC: 6.6 G/DL — SIGNIFICANT CHANGE UP (ref 6–8.3)
RBC # BLD: 4.05 M/UL — SIGNIFICANT CHANGE UP (ref 3.8–5.2)
RBC # FLD: 14.1 % — SIGNIFICANT CHANGE UP (ref 10.3–14.5)
RBC BLD AUTO: NORMAL — SIGNIFICANT CHANGE UP
SMUDGE CELLS # BLD: PRESENT — SIGNIFICANT CHANGE UP
SODIUM SERPL-SCNC: 135 MMOL/L — SIGNIFICANT CHANGE UP (ref 135–145)
WBC # BLD: 14.6 K/UL — HIGH (ref 3.8–10.5)
WBC # FLD AUTO: 14.6 K/UL — HIGH (ref 3.8–10.5)

## 2021-11-18 RX ORDER — METOPROLOL TARTRATE 50 MG
100 TABLET ORAL DAILY
Refills: 0 | Status: DISCONTINUED | OUTPATIENT
Start: 2021-11-18 | End: 2021-11-19

## 2021-11-18 RX ORDER — METOPROLOL TARTRATE 50 MG
100 TABLET ORAL
Refills: 0 | Status: DISCONTINUED | OUTPATIENT
Start: 2021-11-18 | End: 2021-11-19

## 2021-11-18 RX ADMIN — Medication 100 MILLIGRAM(S): at 22:17

## 2021-11-18 RX ADMIN — SENNA PLUS 2 TABLET(S): 8.6 TABLET ORAL at 22:01

## 2021-11-18 RX ADMIN — Medication 100 MILLIGRAM(S): at 11:05

## 2021-11-18 RX ADMIN — Medication 81 MILLIGRAM(S): at 11:05

## 2021-11-18 RX ADMIN — CHLORHEXIDINE GLUCONATE 1 APPLICATION(S): 213 SOLUTION TOPICAL at 08:40

## 2021-11-18 RX ADMIN — SPIRONOLACTONE 25 MILLIGRAM(S): 25 TABLET, FILM COATED ORAL at 05:05

## 2021-11-18 RX ADMIN — AMIODARONE HYDROCHLORIDE 400 MILLIGRAM(S): 400 TABLET ORAL at 22:01

## 2021-11-18 RX ADMIN — Medication 40 MILLIGRAM(S): at 05:05

## 2021-11-18 RX ADMIN — APIXABAN 2.5 MILLIGRAM(S): 2.5 TABLET, FILM COATED ORAL at 05:05

## 2021-11-18 RX ADMIN — Medication 25 MILLIGRAM(S): at 07:27

## 2021-11-18 RX ADMIN — PANTOPRAZOLE SODIUM 40 MILLIGRAM(S): 20 TABLET, DELAYED RELEASE ORAL at 05:05

## 2021-11-18 RX ADMIN — Medication 5 MILLIGRAM(S): at 17:38

## 2021-11-18 RX ADMIN — MIRTAZAPINE 45 MILLIGRAM(S): 45 TABLET, ORALLY DISINTEGRATING ORAL at 22:01

## 2021-11-18 RX ADMIN — AMIODARONE HYDROCHLORIDE 400 MILLIGRAM(S): 400 TABLET ORAL at 13:40

## 2021-11-18 RX ADMIN — AMIODARONE HYDROCHLORIDE 400 MILLIGRAM(S): 400 TABLET ORAL at 05:05

## 2021-11-18 RX ADMIN — APIXABAN 2.5 MILLIGRAM(S): 2.5 TABLET, FILM COATED ORAL at 17:45

## 2021-11-18 RX ADMIN — POLYETHYLENE GLYCOL 3350 17 GRAM(S): 17 POWDER, FOR SOLUTION ORAL at 11:05

## 2021-11-18 RX ADMIN — Medication 25 MILLIGRAM(S): at 01:01

## 2021-11-18 NOTE — PROGRESS NOTE ADULT - ASSESSMENT
77 y/o F with pmhx of HTN, HLD, varicose veins, and vertigo who presented to the ED with complains of worsening SOB for the past 3 weeks, worse with exertion and laying flat and associated with intermittent 3/10 left sided chest pain and palpitations. Labs showed elevated pro-BNP and UA was positive for a possible infection. CTA of chest was negative for PE but showed pleural effusions. Patient was admitted for CHF exacerbation and new onset A-fib. Patient was put on metoprolol for her Afib and Lasix for CHF and was also put on telemetry and FD lovenox. An echocardiogram was performed on her which showed moderate M.    Rapid response was called for dizziness and pre-syncope  and nausea while in the bathroom, Cardiac monitor showing rapid afib with fluctuating HR, 500ml bolus given for low BP, repeat measurement was normal, EKG was obtained, showed A -fib. Her morning medications were given including cardizem drip, lasix and metoprolol  Patient states she is feeling better after interventions.    Cardiology  reviewed her Echo cardiogram which showed:  Moderate to severe MR, Moderately increased RV pressure, diastolic function assessment was inconclusive, EF 55%     11/18/21 Mitral valve replacement (t) with Magnaease 29  Moser-Maze IV Cryo-RF  LAAL with AtriClip 35mm    Inotropes/ pressors  New onset Afib requiring Amio -EPS following-->  Oral Amiodarone, recommend 5 gram load (IV infusion, + 400mg x 12 doses), then 200mg daily.  With her degree of heart failure, there is a high priority to maintain sinus rhythm.Junctional rhythm 40 's  paced at 80, av.Insulin infusion for  hyperglycemia- weaned  started Transfered to [facility] follow up,  pacer av interval adjusted to  allow a pacing, 60 .Underlying junctional 50  11/11 maintaining SR no evidence of junctional rhythm on tele. Amiodarone held yesterday. EP to follow. Consider ziopath placement upon discharge later this week if PPM not indicated. Amiodarone 400 bid reinstated as per Dr Arango.  11/12 Pacing ovenight , amio d/c. NSR 70's this am amio resumed at 200 qd.  EPS following, no pacer at this time.  d/c planning with ziopatch   11/13 VSS - will u/s r & l pleural space to assess for pigtail placement. eliquis is on hold for such d/c plan home w/ mcot ? monday 11/14 VSS - 95% off O2 - WBC 16 incision cdi afebrile IV site no reddness.  will ck u/a - no overt signs of infection will monitor cbc daily - will transfer to floor & d/c home tomorrow-   11/16   Pt was for d/c developed diaphoresis, palpitations--> sbp 220 with rapid heart fate glucose 141- monitor placed back on patient.  rapid afib 140's sbp down 1p 126.  echo done last evening reveals no effusion.  call to EP Dr. Bolton - plan: continue low dose bb - & give amio 400 bid x 5 days.   IV BB 5mg x 3 for rate control.  will continue to monitor in step down.  11/17 remains rapid afib, lopressor inc 25 q6, amio 400tid  11/18 afib w/ better rate control  tr. to fl d/c home ?friday

## 2021-11-18 NOTE — PROGRESS NOTE ADULT - SUBJECTIVE AND OBJECTIVE BOX
VITAL SIGNS-Telemetry:    Vital Signs Last 24 Hrs  T(C): 36.8 (21 @ 07:05), Max: 36.8 (21 @ 23:01)  T(F): 98.2 (21 @ 07:05), Max: 98.3 (21 @ 23:01)  HR: 104 (21 @ 07:05) (90 - 133)  BP: 102/57 (21 @ 07:05) (102/57 - 134/90)  RR: 18 (21 @ 07:05) (17 - 18)  SpO2: 96% (21 @ 07:05) (91% - 97%)          @ 07:01  -   @ 07:00  --------------------------------------------------------  IN: 920 mL / OUT: 650 mL / NET: 270 mL        Daily     Daily Weight in k.9 (2021 06:27)  PHYSICAL EXAM:  Neurology: alert and oriented x 3, nonfocal, no gross deficits  CV : IRR IRR  Sternal Wound :  CDI , Stable  Lungs: cta  Abdomen: soft, nontender, nondistended, positive bowel sounds, last bowel movement         Extremities:     no edema no calf tenderness    acetaminophen     Tablet .. 650 milliGRAM(s) Oral every 6 hours PRN  aMIOdarone    Tablet 400 milliGRAM(s) Oral every 8 hours  apixaban 2.5 milliGRAM(s) Oral two times a day  aspirin enteric coated 81 milliGRAM(s) Oral daily  bisacodyl Suppository 10 milliGRAM(s) Rectal once  chlorhexidine 2% Cloths 1 Application(s) Topical daily  furosemide    Tablet 40 milliGRAM(s) Oral daily  metoprolol tartrate 25 milliGRAM(s) Oral every 6 hours  mirtazapine 45 milliGRAM(s) Oral at bedtime  pantoprazole    Tablet 40 milliGRAM(s) Oral before breakfast  polyethylene glycol 3350 17 Gram(s) Oral daily  senna 2 Tablet(s) Oral at bedtime  spironolactone 25 milliGRAM(s) Oral daily      Physical Therapy Rec:   Home  [  x]   Home w/ PT  [  ]  Rehab  [  ]  Discussed with Cardiothoracic Team at AM rounds.

## 2021-11-18 NOTE — PROGRESS NOTE ADULT - SUBJECTIVE AND OBJECTIVE BOX
C A R D I O L O G Y  **********************************     DATE OF SERVICE: 11-18-21    Denies chest pain, palpitations, or SOB. Review of systems otherwise (-)      MEDICATIONS  (STANDING):  aMIOdarone    Tablet 400 milliGRAM(s) Oral every 8 hours  apixaban 2.5 milliGRAM(s) Oral two times a day  aspirin enteric coated 81 milliGRAM(s) Oral daily  bisacodyl Suppository 10 milliGRAM(s) Rectal once  chlorhexidine 2% Cloths 1 Application(s) Topical daily  furosemide    Tablet 40 milliGRAM(s) Oral daily  metoprolol succinate  milliGRAM(s) Oral two times a day  metoprolol succinate  milliGRAM(s) Oral daily  mirtazapine 45 milliGRAM(s) Oral at bedtime  pantoprazole    Tablet 40 milliGRAM(s) Oral before breakfast  polyethylene glycol 3350 17 Gram(s) Oral daily  senna 2 Tablet(s) Oral at bedtime  spironolactone 25 milliGRAM(s) Oral daily    MEDICATIONS  (PRN):  acetaminophen     Tablet .. 650 milliGRAM(s) Oral every 6 hours PRN Mild Pain (1 - 3)      LABS:                          11.9   14.60 )-----------( 262      ( 18 Nov 2021 06:00 )             37.4     Hemoglobin: 11.9 g/dL (11-18 @ 06:00)  Hemoglobin: 12.6 g/dL (11-17 @ 05:08)  Hemoglobin: 13.0 g/dL (11-16 @ 06:27)  Hemoglobin: 13.0 g/dL (11-15 @ 09:25)  Hemoglobin: 12.3 g/dL (11-14 @ 06:24)    11-18    135  |  97  |  34<H>  ----------------------------<  112<H>  4.2   |  24  |  1.16    Ca    9.0      18 Nov 2021 06:00  Phos  3.1     11-18  Mg     2.3     11-18    TPro  6.6  /  Alb  4.0  /  TBili  0.5  /  DBili  x   /  AST  13  /  ALT  24  /  AlkPhos  113  11-18    Creatinine Trend: 1.16<--, 1.01<--, 1.04<--, 1.18<--, 1.02<--, 1.02<--             11-17-21 @ 07:01  -  11-18-21 @ 07:00  --------------------------------------------------------  IN: 920 mL / OUT: 650 mL / NET: 270 mL    11-18-21 @ 07:01  -  11-18-21 @ 15:23  --------------------------------------------------------  IN: 600 mL / OUT: 300 mL / NET: 300 mL        PHYSICAL EXAM  Vital Signs Last 24 Hrs  T(C): 36.9 (18 Nov 2021 11:59), Max: 36.9 (18 Nov 2021 11:59)  T(F): 98.5 (18 Nov 2021 11:59), Max: 98.5 (18 Nov 2021 11:59)  HR: 113 (18 Nov 2021 11:59) (104 - 113)  BP: 127/61 (18 Nov 2021 11:59) (102/57 - 134/90)  BP(mean): 88 (18 Nov 2021 11:59) (63 - 107)  RR: 18 (18 Nov 2021 11:59) (18 - 18)  SpO2: 95% (18 Nov 2021 11:59) (91% - 97%)      HEENT:  (-)icterus (-)pallor  CV: N S1 S2 1/6 ZITA (+)2 Pulses B/l  Resp:  Clear to auscultation B/L, normal effort  GI: (+) BS Soft, NT, ND  Lymph:  (-)Edema, (-)obvious lymphadenopathy  Skin: Warm to touch, Normal turgor  Psych: Appropriate mood and affect      TELEMETRY: AF     ASSESSMENT/PLAN: 76 y/o Female with pmh of HTN, HLD,  who presented to the ED with complains of SOB found with pulmonary edema and new afib DOUG revealed severe MR now s/p tissue MVR, LISBET ligation and MAZE.    - Continue AC with Eliquis for AF/Stroke prevention  - Keep net negative with lasix/aldactone  - Continue ASA/Statin  - EP f/u noted - amio load and metoprolol, may need DCCV prior to d/c  - CTS follow up noted  - Patient to f/u with her cardiologist Dr. Solo Ocampo after d/c    Oren Muhammad PA-C  Pager: [phone]

## 2021-11-18 NOTE — PROGRESS NOTE ADULT - SUBJECTIVE AND OBJECTIVE BOX
EP ATTENDING    tele: AFib 110bpm  denies palpitations, syncope, nor angina, ROS otherwise -  DATE OF SERVICE - 11-18-21     Review of Systems:   Constitutional: [ ] fevers, [ ] chills.   Skin: [ ] dry skin. [ ] rashes.  Psychiatric: [ ] depression, [ ] anxiety.   Gastrointestinal: [ ] BRBPR, [ ] melena.   Neurological: [ ] confusion. [ ] seizures. [ ] shuffling gait.   Ears,Nose,Mouth and Throat: [ ] ear pain [ ] sore throat.   Eyes: [ ] diplopia.   Respiratory: [ ] hemoptysis. [ ] shortness of breath  Cardiovascular: See HPI above  Hematologic/Lymphatic: [ ] anemia. [ ] painful nodes. [ ] prolonged bleeding.   Genitourinary: [ ] hematuria. [ ] flank pain.   Endocrine: [ ] significant change in weight. [ ] intolerance to heat and cold.     Review of systems [ x] otherwise negative, [ ] otherwise unable to obtain    FH: no family history of sudden cardiac death in first degree relatives    SH: [ ] tobacco, [ ] alcohol, [ ] drugs    acetaminophen     Tablet .. 650 milliGRAM(s) Oral every 6 hours PRN  aMIOdarone    Tablet 400 milliGRAM(s) Oral every 8 hours  apixaban 2.5 milliGRAM(s) Oral two times a day  aspirin enteric coated 81 milliGRAM(s) Oral daily  bisacodyl Suppository 10 milliGRAM(s) Rectal once  chlorhexidine 2% Cloths 1 Application(s) Topical daily  furosemide    Tablet 40 milliGRAM(s) Oral daily  metoprolol succinate  milliGRAM(s) Oral two times a day  metoprolol succinate  milliGRAM(s) Oral daily  mirtazapine 45 milliGRAM(s) Oral at bedtime  pantoprazole    Tablet 40 milliGRAM(s) Oral before breakfast  polyethylene glycol 3350 17 Gram(s) Oral daily  senna 2 Tablet(s) Oral at bedtime  spironolactone 25 milliGRAM(s) Oral daily                            11.9   14.60 )-----------( 262      ( 18 Nov 2021 06:00 )             37.4       11-18    135  |  97  |  34<H>  ----------------------------<  112<H>  4.2   |  24  |  1.16    Ca    9.0      18 Nov 2021 06:00  Phos  3.1     11-18  Mg     2.3     11-18    TPro  6.6  /  Alb  4.0  /  TBili  0.5  /  DBili  x   /  AST  13  /  ALT  24  /  AlkPhos  113  11-18    T(C): 36.9 (11-18-21 @ 11:59), Max: 36.9 (11-18-21 @ 11:59)  HR: 113 (11-18-21 @ 11:59) (104 - 113)  BP: 127/61 (11-18-21 @ 11:59) (102/57 - 134/90)  RR: 18 (11-18-21 @ 11:59) (18 - 18)  SpO2: 95% (11-18-21 @ 11:59) (91% - 97%)  Wt(kg): --    I&O's Summary    17 Nov 2021 07:01  -  18 Nov 2021 07:00  --------------------------------------------------------  IN: 920 mL / OUT: 650 mL / NET: 270 mL    18 Nov 2021 07:01  -  18 Nov 2021 12:14  --------------------------------------------------------  IN: 400 mL / OUT: 300 mL / NET: 100 mL    General: Well nourished in no acute distress. Alert and Oriented * 3.   Head: Normocephalic and atraumatic.   Neck: No JVD. No bruits. Supple. Does not appear to be enlarged.   Cardiovascular: + S1,S2 ; tachy, IRR Soft systolic murmur at the left lower sternal border. No rubs noted.    Lungs: CTA b/l. No rhonchi, rales or wheezes.   Abdomen: + BS, soft. Non tender. Non distended. No rebound. No guarding.   Extremities: No clubbing/cyanosis/edema.   Neurologic: Moves all four extremities. Full range of motion.   Skin: Warm and moist. The patient's skin has normal elasticity and good skin turgor.   Psychiatric: Appropriate mood and affect.  Musculoskeletal: Normal range of motion, normal strength    LVEF normal    A/P) 76 y/o female PMH hypertension, hyperlipidemia a/w symptomatic severe MR and new onset AF. She is now s/p bio-MVR, MAZE and LISBET ligation on 11/8. She was doing well in NSR until yesterday when she went back into rapid atypical AFL.    -increase eliquis to 5mg bid  -continue amio reload  -continue metoprolol at current doses  --make NPO after midnight Thursday night in case she requires DCCV Friday morning.  -no obvious indication for pacing for SSS at this time  -f/u with her cardiologist Dr Solo Ocampo after discharge    Perez Bolton M.D.  Cardiac Electrophysiology  [phone]

## 2021-11-19 LAB
ALBUMIN SERPL ELPH-MCNC: 4.2 G/DL — SIGNIFICANT CHANGE UP (ref 3.3–5)
ALP SERPL-CCNC: 112 U/L — SIGNIFICANT CHANGE UP (ref 40–120)
ALT FLD-CCNC: 20 U/L — SIGNIFICANT CHANGE UP (ref 10–45)
ANION GAP SERPL CALC-SCNC: 11 MMOL/L — SIGNIFICANT CHANGE UP (ref 5–17)
AST SERPL-CCNC: 14 U/L — SIGNIFICANT CHANGE UP (ref 10–40)
BILIRUB SERPL-MCNC: 0.6 MG/DL — SIGNIFICANT CHANGE UP (ref 0.2–1.2)
BUN SERPL-MCNC: 28 MG/DL — HIGH (ref 7–23)
CALCIUM SERPL-MCNC: 9.1 MG/DL — SIGNIFICANT CHANGE UP (ref 8.4–10.5)
CHLORIDE SERPL-SCNC: 99 MMOL/L — SIGNIFICANT CHANGE UP (ref 96–108)
CO2 SERPL-SCNC: 27 MMOL/L — SIGNIFICANT CHANGE UP (ref 22–31)
CREAT SERPL-MCNC: 1.12 MG/DL — SIGNIFICANT CHANGE UP (ref 0.5–1.3)
GLUCOSE SERPL-MCNC: 94 MG/DL — SIGNIFICANT CHANGE UP (ref 70–99)
HCT VFR BLD CALC: 37.1 % — SIGNIFICANT CHANGE UP (ref 34.5–45)
HGB BLD-MCNC: 11.9 G/DL — SIGNIFICANT CHANGE UP (ref 11.5–15.5)
MCHC RBC-ENTMCNC: 30.2 PG — SIGNIFICANT CHANGE UP (ref 27–34)
MCHC RBC-ENTMCNC: 32.1 GM/DL — SIGNIFICANT CHANGE UP (ref 32–36)
MCV RBC AUTO: 94.2 FL — SIGNIFICANT CHANGE UP (ref 80–100)
NRBC # BLD: 0 /100 WBCS — SIGNIFICANT CHANGE UP (ref 0–0)
PLATELET # BLD AUTO: 217 K/UL — SIGNIFICANT CHANGE UP (ref 150–400)
POTASSIUM SERPL-MCNC: 4.3 MMOL/L — SIGNIFICANT CHANGE UP (ref 3.5–5.3)
POTASSIUM SERPL-SCNC: 4.3 MMOL/L — SIGNIFICANT CHANGE UP (ref 3.5–5.3)
PROT SERPL-MCNC: 6.5 G/DL — SIGNIFICANT CHANGE UP (ref 6–8.3)
RBC # BLD: 3.94 M/UL — SIGNIFICANT CHANGE UP (ref 3.8–5.2)
RBC # FLD: 14.2 % — SIGNIFICANT CHANGE UP (ref 10.3–14.5)
SARS-COV-2 RNA SPEC QL NAA+PROBE: SIGNIFICANT CHANGE UP
SODIUM SERPL-SCNC: 137 MMOL/L — SIGNIFICANT CHANGE UP (ref 135–145)
WBC # BLD: 12.31 K/UL — HIGH (ref 3.8–10.5)
WBC # FLD AUTO: 12.31 K/UL — HIGH (ref 3.8–10.5)

## 2021-11-19 PROCEDURE — 71045 X-RAY EXAM CHEST 1 VIEW: CPT | Mod: 26

## 2021-11-19 PROCEDURE — 93010 ELECTROCARDIOGRAM REPORT: CPT

## 2021-11-19 RX ORDER — AMIODARONE HYDROCHLORIDE 400 MG/1
400 TABLET ORAL EVERY 8 HOURS
Refills: 0 | Status: DISCONTINUED | OUTPATIENT
Start: 2021-11-19 | End: 2021-11-20

## 2021-11-19 RX ORDER — AMIODARONE HYDROCHLORIDE 400 MG/1
200 TABLET ORAL DAILY
Refills: 0 | Status: DISCONTINUED | OUTPATIENT
Start: 2021-11-21 | End: 2021-11-20

## 2021-11-19 RX ORDER — CEFAZOLIN SODIUM 1 G
VIAL (EA) INJECTION
Refills: 0 | Status: COMPLETED | OUTPATIENT
Start: 2021-11-19 | End: 2021-11-20

## 2021-11-19 RX ORDER — CEFAZOLIN SODIUM 1 G
1000 VIAL (EA) INJECTION ONCE
Refills: 0 | Status: COMPLETED | OUTPATIENT
Start: 2021-11-19 | End: 2021-11-19

## 2021-11-19 RX ORDER — METOPROLOL TARTRATE 50 MG
100 TABLET ORAL DAILY
Refills: 0 | Status: DISCONTINUED | OUTPATIENT
Start: 2021-11-20 | End: 2021-11-20

## 2021-11-19 RX ORDER — CEFAZOLIN SODIUM 1 G
1000 VIAL (EA) INJECTION EVERY 8 HOURS
Refills: 0 | Status: COMPLETED | OUTPATIENT
Start: 2021-11-19 | End: 2021-11-20

## 2021-11-19 RX ADMIN — POLYETHYLENE GLYCOL 3350 17 GRAM(S): 17 POWDER, FOR SOLUTION ORAL at 13:35

## 2021-11-19 RX ADMIN — SPIRONOLACTONE 25 MILLIGRAM(S): 25 TABLET, FILM COATED ORAL at 05:35

## 2021-11-19 RX ADMIN — Medication 100 MILLIGRAM(S): at 21:54

## 2021-11-19 RX ADMIN — PANTOPRAZOLE SODIUM 40 MILLIGRAM(S): 20 TABLET, DELAYED RELEASE ORAL at 05:35

## 2021-11-19 RX ADMIN — AMIODARONE HYDROCHLORIDE 400 MILLIGRAM(S): 400 TABLET ORAL at 05:35

## 2021-11-19 RX ADMIN — SENNA PLUS 2 TABLET(S): 8.6 TABLET ORAL at 21:54

## 2021-11-19 RX ADMIN — APIXABAN 2.5 MILLIGRAM(S): 2.5 TABLET, FILM COATED ORAL at 05:35

## 2021-11-19 RX ADMIN — Medication 100 MILLIGRAM(S): at 05:35

## 2021-11-19 RX ADMIN — Medication 100 MILLIGRAM(S): at 13:35

## 2021-11-19 RX ADMIN — Medication 81 MILLIGRAM(S): at 13:35

## 2021-11-19 RX ADMIN — APIXABAN 2.5 MILLIGRAM(S): 2.5 TABLET, FILM COATED ORAL at 18:34

## 2021-11-19 RX ADMIN — Medication 40 MILLIGRAM(S): at 05:36

## 2021-11-19 RX ADMIN — CHLORHEXIDINE GLUCONATE 1 APPLICATION(S): 213 SOLUTION TOPICAL at 09:58

## 2021-11-19 RX ADMIN — AMIODARONE HYDROCHLORIDE 400 MILLIGRAM(S): 400 TABLET ORAL at 21:54

## 2021-11-19 RX ADMIN — MIRTAZAPINE 45 MILLIGRAM(S): 45 TABLET, ORALLY DISINTEGRATING ORAL at 21:54

## 2021-11-19 NOTE — PROGRESS NOTE ADULT - SUBJECTIVE AND OBJECTIVE BOX
C A R D I O L O G Y  **********************************     DATE OF SERVICE: 11-19-21    Events noted - s/p PPM. Denies chest pain, palpitations, or SOB. Review of systems otherwise (-)      MEDICATIONS  (STANDING):  aMIOdarone    Tablet 400 milliGRAM(s) Oral every 8 hours  apixaban 2.5 milliGRAM(s) Oral two times a day  aspirin enteric coated 81 milliGRAM(s) Oral daily  bisacodyl Suppository 10 milliGRAM(s) Rectal once  ceFAZolin   IVPB      ceFAZolin   IVPB 1000 milliGRAM(s) IV Intermittent every 8 hours  chlorhexidine 2% Cloths 1 Application(s) Topical daily  furosemide    Tablet 40 milliGRAM(s) Oral daily  mirtazapine 45 milliGRAM(s) Oral at bedtime  pantoprazole    Tablet 40 milliGRAM(s) Oral before breakfast  polyethylene glycol 3350 17 Gram(s) Oral daily  senna 2 Tablet(s) Oral at bedtime  spironolactone 25 milliGRAM(s) Oral daily    MEDICATIONS  (PRN):  acetaminophen     Tablet .. 650 milliGRAM(s) Oral every 6 hours PRN Mild Pain (1 - 3)      LABS:                          11.9   12.31 )-----------( 217      ( 19 Nov 2021 07:51 )             37.1     Hemoglobin: 11.9 g/dL (11-19 @ 07:51)  Hemoglobin: 11.9 g/dL (11-18 @ 06:00)  Hemoglobin: 12.6 g/dL (11-17 @ 05:08)  Hemoglobin: 13.0 g/dL (11-16 @ 06:27)  Hemoglobin: 13.0 g/dL (11-15 @ 09:25)    11-19    137  |  99  |  28<H>  ----------------------------<  94  4.3   |  27  |  1.12    Ca    9.1      19 Nov 2021 07:51  Phos  3.1     11-18  Mg     2.3     11-18    TPro  6.5  /  Alb  4.2  /  TBili  0.6  /  DBili  x   /  AST  14  /  ALT  20  /  AlkPhos  112  11-19    Creatinine Trend: 1.12<--, 1.16<--, 1.01<--, 1.04<--, 1.18<--, 1.02<--             11-18-21 @ 07:01  -  11-19-21 @ 07:00  --------------------------------------------------------  IN: 920 mL / OUT: 1320 mL / NET: -400 mL        PHYSICAL EXAM  Vital Signs Last 24 Hrs  T(C): 36.3 (19 Nov 2021 12:40), Max: 36.9 (19 Nov 2021 06:44)  T(F): 97.4 (19 Nov 2021 12:40), Max: 98.4 (19 Nov 2021 06:44)  HR: 59 (19 Nov 2021 13:10) (42 - 128)  BP: 104/53 (19 Nov 2021 13:10) (99/62 - 133/54)  BP(mean): 74 (18 Nov 2021 17:44) (74 - 74)  RR: 18 (19 Nov 2021 13:10) (18 - 20)  SpO2: 99% (19 Nov 2021 13:10) (96% - 99%)        HEENT:  (-)icterus (-)pallor  CV: N S1 S2 1/6 ZITA (+)2 Pulses B/l  Resp:  Clear to auscultation B/L, normal effort  GI: (+) BS Soft, NT, ND  Lymph:  (-)Edema, (-)obvious lymphadenopathy  Skin: Warm to touch, Normal turgor  Psych: Appropriate mood and affect      TELEMETRY: AP 59    ASSESSMENT/PLAN: 74 y/o Female with pmh of HTN, HLD,  who presented to the ED with complains of SOB found with pulmonary edema and new afib DOUG revealed severe MR now s/p tissue MVR, LISBET ligation and MAZE.    - Events noted - long conversion pause now s/p dual chamber PPM  - Continue AC with Eliquis for AF/Stroke prevention if okay with EP  - Keep net negative with lasix/aldactone  - Continue ASA/Statin  - EP f/u noted - amio + metoprolol  - CTS follow up noted  - Patient to f/u with her cardiologist Dr. Solo Ocampo after d/c    CHRISTA CardenasC  Pager: [phone]

## 2021-11-19 NOTE — PROGRESS NOTE ADULT - PROBLEM SELECTOR PLAN 1
11/5 DOUG- Sev MR   Plan for MVR with Dr. Arango on Monday   Preop in progress  Continue with Lasix 20IV daily  Repeat echo today  check p2y12  and TFTs  RF ablation added to OR schedule  ua/t+S/MRSA/
Chest PT,  Incentive spirometry, wound care and assessment.  Ambulate   Maintain off av jessenia blockers  Eliquis 2.5 bid on hold for possible pigtail placement
Chest PT,  Incentive spirometry, wound care and assessment.  Ambulate   Maintain off av jessenia blockers  Eliquis 2.5 bid
Chest PT,  Incentive spirometry, wound care and assessment.  Ambulate   Maintain off av jessenia blockers  Eliquis 2.5 bid on hold for possible pigtail placement
Chest PT,  Incentive spirometry, wound care and assessment.  Ambulate   Maintain off av jessenia blockers  Eliquis 2.5 bid on hold for possible pigtail placement
11/5 DOUG- Sev MR   Plan for MVR with Dr. Arango on Monday   Preop in progress  Continue with Lasix 20IV daily  Repeat echo today  check p2y12  and TFTs  RF ablation added to OR schedule  ua/t+S/MRSA/

## 2021-11-19 NOTE — CHART NOTE - NSCHARTNOTEFT_GEN_A_CORE
EP Brief Operative Note    Diagnosis: Sick Sinus Syndrome, symptomatic bradycardia, asystole  Procedure: Dual chamber pacemaker insertion  Surgeon: Perez Bolton M.D.  1st Assistant: Endy Dudley M.D.  Findings: Successful dual chamber pacemaker ([employer]).  RA - 3830 in low interatrial septum  RV- 5076 in RV mid septum  TYRX pouch  EBL: minimal  Specimens: none  Post-op Diagnosis: same    A/P)   Ms Cedeño is a 79yo woman post Maze, MVR, LA appendage clip.  Normal LV EF.  Post op paroxysmal AFib requiring beta blockers and amiodarone.  Having long symptomatic conversion pauses.  A dual chamber pacemaker was implanted for sick sinus syndrome, and programmed to AAIR-DDDR.    3 doses of Ancef, ordered.  EKG and CXR ASAP, ordered.  Device check tomorrow, and OK to discharge.  Will set up 2 week followup w/ Dr Walker for device check.    Perez Bolton M.D.  Cardiac Electrophysiology    office [phone]  pager [phone]
patient cleared for discharge - discharge instructions explained to son.  pt waiting for transport when son said "My mom is light-headed and sweating a lot and shaking."  sbp 220 with rapid heart fate glucose 141- monitor placed back on patient.  rapid afib 140's sbp down 1p 126.  echo done last evening reveals no effusion.  call to EP Dr. Bolton - plan: continue low dose bb - & give amio 400 bid x 5 days.   IV BB 5mg x 3 for rate control.  will continue to monitor in step down

## 2021-11-19 NOTE — PROGRESS NOTE ADULT - SUBJECTIVE AND OBJECTIVE BOX
VITAL SIGNS    Telemetry:      Vital Signs Last 24 Hrs  T(C): 36.9 (11-19-21 @ 06:44), Max: 36.9 (11-18-21 @ 11:59)  T(F): 98.4 (11-19-21 @ 06:44), Max: 98.5 (11-18-21 @ 11:59)  HR: 42 (11-19-21 @ 06:44) (42 - 128)  BP: 103/57 (11-19-21 @ 06:44) (99/62 - 127/61)  RR: 20 (11-19-21 @ 06:44) (18 - 20)  SpO2: 96% (11-19-21 @ 06:44) (95% - 96%)                   11-18 @ 07:01  -  11-19 @ 07:00  --------------------------------------------------------  IN: 920 mL / OUT: 1320 mL / NET: -400 mL          Daily     Daily             CAPILLARY BLOOD GLUCOSE      POCT Blood Glucose.: 118 mg/dL (18 Nov 2021 21:31)  POCT Blood Glucose.: 139 mg/dL (18 Nov 2021 16:51)            Drains:     MS         [  ] Drainage:                 L Pleural  [  ]  Drainage:                R Pleural  [  ]  Drainage:    Pacing Wires        [  ]   Settings:                                  Isolated  [  ]    Coumadin    [ ] YES          [  ]      NO                                   PHYSICAL EXAM        Neurology: alert and oriented x 3, nonfocal, no gross deficits  CV : s1 s2 RRR  Sternal Wound :  CDI , Stable  Lungs: cta  Abdomen: soft, nontender, nondistended, positive bowel sounds, last bowel movement                       chest tubes  :    voiding / pineda - sbd         Extremities:      edema   /  -   calve tenderness ,    L leg  /  R leg  incisions cdi          acetaminophen     Tablet .. 650 milliGRAM(s) Oral every 6 hours PRN  apixaban 2.5 milliGRAM(s) Oral two times a day  aspirin enteric coated 81 milliGRAM(s) Oral daily  bisacodyl Suppository 10 milliGRAM(s) Rectal once  chlorhexidine 2% Cloths 1 Application(s) Topical daily  furosemide    Tablet 40 milliGRAM(s) Oral daily  mirtazapine 45 milliGRAM(s) Oral at bedtime  pantoprazole    Tablet 40 milliGRAM(s) Oral before breakfast  polyethylene glycol 3350 17 Gram(s) Oral daily  senna 2 Tablet(s) Oral at bedtime  spironolactone 25 milliGRAM(s) Oral daily                    Physical Therapy Rec:   Home  [  ]   Home w/ PT  [  ]  Rehab  [  ]  Discussed with Cardiothoracic Team at AM rounds.     VITAL SIGNS    Telemetry:      Vital Signs Last 24 Hrs  T(C): 36.9 (11-19-21 @ 06:44), Max: 36.9 (11-18-21 @ 11:59)  T(F): 98.4 (11-19-21 @ 06:44), Max: 98.5 (11-18-21 @ 11:59)  HR: 42 (11-19-21 @ 06:44) (42 - 128)  BP: 103/57 (11-19-21 @ 06:44) (99/62 - 127/61)  RR: 20 (11-19-21 @ 06:44) (18 - 20)  SpO2: 96% (11-19-21 @ 06:44) (95% - 96%)                   11-18 @ 07:01  -  11-19 @ 07:00  --------------------------------------------------------  IN: 920 mL / OUT: 1320 mL / NET: -400 mL          Daily     Daily             CAPILLARY BLOOD GLUCOSE      POCT Blood Glucose.: 118 mg/dL (18 Nov 2021 21:31)  POCT Blood Glucose.: 139 mg/dL (18 Nov 2021 16:51)            Drains:     MS         [  ] Drainage:                 L Pleural  [  ]  Drainage:                R Pleural  [  ]  Drainage:    Pacing Wires        [  ]   Settings:                                  Isolated  [  ]    Coumadin    [ ] YES          [  ]      NO                                   PHYSICAL EXAM        Neurology: alert and oriented x 3, nonfocal, no gross deficits  CV : s1 s2 RRR  Sternal Wound :  CDI , Stable  Lungs: cta  Abdomen: soft, nontender, nondistended, positive bowel sounds                       :    voiding        Extremities:    -  edema   /  -   calve tenderness ,               acetaminophen     Tablet .. 650 milliGRAM(s) Oral every 6 hours PRN  apixaban 2.5 milliGRAM(s) Oral two times a day  aspirin enteric coated 81 milliGRAM(s) Oral daily  bisacodyl Suppository 10 milliGRAM(s) Rectal once  chlorhexidine 2% Cloths 1 Application(s) Topical daily  furosemide    Tablet 40 milliGRAM(s) Oral daily  mirtazapine 45 milliGRAM(s) Oral at bedtime  pantoprazole    Tablet 40 milliGRAM(s) Oral before breakfast  polyethylene glycol 3350 17 Gram(s) Oral daily  senna 2 Tablet(s) Oral at bedtime  spironolactone 25 milliGRAM(s) Oral daily                    Physical Therapy Rec:   Home  [  ]   Home w/ PT  [  ]  Rehab  [  ]  Discussed with Cardiothoracic Team at AM rounds.

## 2021-11-19 NOTE — PROGRESS NOTE ADULT - PROBLEM SELECTOR PROBLEM 1
MR (mitral regurgitation)
S/P MVR (mitral valve replacement)
MR (mitral regurgitation)
S/P MVR (mitral valve replacement)

## 2021-11-19 NOTE — PROGRESS NOTE ADULT - PROBLEM SELECTOR PROBLEM 2
Afib
Junctional bradycardia
Afib
Junctional bradycardia

## 2021-11-19 NOTE — PROGRESS NOTE ADULT - ASSESSMENT
75 y/o F with pmhx of HTN, HLD, varicose veins, and vertigo who presented to the ED with complains of worsening SOB for the past 3 weeks, worse with exertion and laying flat and associated with intermittent 3/10 left sided chest pain and palpitations. Labs showed elevated pro-BNP and UA was positive for a possible infection. CTA of chest was negative for PE but showed pleural effusions. Patient was admitted for CHF exacerbation and new onset A-fib. Patient was put on metoprolol for her Afib and Lasix for CHF and was also put on telemetry and FD lovenox. An echocardiogram was performed on her which showed moderate M.    Rapid response was called for dizziness and pre-syncope  and nausea while in the bathroom, Cardiac monitor showing rapid afib with fluctuating HR, 500ml bolus given for low BP, repeat measurement was normal, EKG was obtained, showed A -fib. Her morning medications were given including cardizem drip, lasix and metoprolol  Patient states she is feeling better after interventions.    Cardiology  reviewed her Echo cardiogram which showed:  Moderate to severe MR, Moderately increased RV pressure, diastolic function assessment was inconclusive, EF 55%     11/18/21 Mitral valve replacement (t) with Magnaease 29  Moser-Maze IV Cryo-RF  LAAL with AtriClip 35mm    Inotropes/ pressors  New onset Afib requiring Amio -EPS following-->  Oral Amiodarone, recommend 5 gram load (IV infusion, + 400mg x 12 doses), then 200mg daily.  With her degree of heart failure, there is a high priority to maintain sinus rhythm.Junctional rhythm 40 's  paced at 80, av.Insulin infusion for  hyperglycemia- weaned  started Transfered to [facility] follow up,  pacer av interval adjusted to  allow a pacing, 60 .Underlying junctional 50  11/11 maintaining SR no evidence of junctional rhythm on tele. Amiodarone held yesterday. EP to follow. Consider ziopath placement upon discharge later this week if PPM not indicated. Amiodarone 400 bid reinstated as per Dr Arango.  11/12 Pacing ovenight , amio d/c. NSR 70's this am amio resumed at 200 qd.  EPS following, no pacer at this time.  d/c planning with ziopatch   11/13 VSS - will u/s r & l pleural space to assess for pigtail placement. eliquis is on hold for such d/c plan home w/ mcot ? monday 11/14 VSS - 95% off O2 - WBC 16 incision cdi afebrile IV site no reddness.  will ck u/a - no overt signs of infection will monitor cbc daily - will transfer to floor & d/c home tomorrow-   11/16   Pt was for d/c developed diaphoresis, palpitations--> sbp 220 with rapid heart fate glucose 141- monitor placed back on patient.  rapid afib 140's sbp down 1p 126.  echo done last evening reveals no effusion.  call to EP Dr. Bolton - plan: continue low dose bb - & give amio 400 bid x 5 days.   IV BB 5mg x 3 for rate control.  will continue to monitor in step down.  11/17 remains rapid afib, lopressor inc 25 q6, amio 400tid  11/18 afib w/ better rate control  tr. to fl d/c home ?friday 11/19  Pt with a 7 sec pause follwed  by 3 sec       75 y/o F with pmhx of HTN, HLD, varicose veins, and vertigo who presented to the ED with complains of worsening SOB for the past 3 weeks, worse with exertion and laying flat and associated with intermittent 3/10 left sided chest pain and palpitations. Labs showed elevated pro-BNP and UA was positive for a possible infection. CTA of chest was negative for PE but showed pleural effusions. Patient was admitted for CHF exacerbation and new onset A-fib. Patient was put on metoprolol for her Afib and Lasix for CHF and was also put on telemetry and FD lovenox. An echocardiogram was performed on her which showed moderate M.    Rapid response was called for dizziness and pre-syncope  and nausea while in the bathroom, Cardiac monitor showing rapid afib with fluctuating HR, 500ml bolus given for low BP, repeat measurement was normal, EKG was obtained, showed A -fib. Her morning medications were given including cardizem drip, lasix and metoprolol  Patient states she is feeling better after interventions.    Cardiology  reviewed her Echo cardiogram which showed:  Moderate to severe MR, Moderately increased RV pressure, diastolic function assessment was inconclusive, EF 55%     11/18/21 Mitral valve replacement (t) with Magnaease 29  Moser-Maze IV Cryo-RF  LAAL with AtriClip 35mm    Inotropes/ pressors  New onset Afib requiring Amio -EPS following-->  Oral Amiodarone, recommend 5 gram load (IV infusion, + 400mg x 12 doses), then 200mg daily.  With her degree of heart failure, there is a high priority to maintain sinus rhythm.Junctional rhythm 40 's  paced at 80, av.Insulin infusion for  hyperglycemia- weaned  started Transfered to [facility] follow up,  pacer av interval adjusted to  allow a pacing, 60 .Underlying junctional 50  11/11 maintaining SR no evidence of junctional rhythm on tele. Amiodarone held yesterday. EP to follow. Consider ziopath placement upon discharge later this week if PPM not indicated. Amiodarone 400 bid reinstated as per Dr Arango.  11/12 Pacing ovenight , amio d/c. NSR 70's this am amio resumed at 200 qd.  EPS following, no pacer at this time.  d/c planning with ziopatch   11/13 VSS - will u/s r & l pleural space to assess for pigtail placement. eliquis is on hold for such d/c plan home w/ mcot ? monday 11/14 VSS - 95% off O2 - WBC 16 incision cdi afebrile IV site no reddness.  will ck u/a - no overt signs of infection will monitor cbc daily - will transfer to floor & d/c home tomorrow-   11/16   Pt was for d/c developed diaphoresis, palpitations--> sbp 220 with rapid heart fate glucose 141- monitor placed back on patient.  rapid afib 140's sbp down 1p 126.  echo done last evening reveals no effusion.  call to EP Dr. Bolton - plan: continue low dose bb - & give amio 400 bid x 5 days.   IV BB 5mg x 3 for rate control.  will continue to monitor in step down.  11/17 remains rapid afib, lopressor inc 25 q6, amio 400tid  11/18 afib w/ better rate control  tr. to fl d/c home ?friday 11/19  Pt with a 7 sec pause follwed  by 3 sec pause, transferredto sdu, pacing pads in place TVP sety up at bedside.  Pt remain s junctional 40's, stable BP.  D/w Dr Bolton for PPM today.  Pt currently brought down to eps monitored.

## 2021-11-19 NOTE — PROGRESS NOTE ADULT - SUBJECTIVE AND OBJECTIVE BOX
EP ATTENDING    tele: rapid AF broke to sinus federico 40s, with a 7 sec conversion pause and 3 sec secondary pause.  associated symptoms of nausea, fatigue, lightheadedness.  feels tired during sinus federico.  DATE OF SERVICE - 11-19-21     Review of Systems:   Constitutional: [ ] fevers, [ ] chills.   Skin: [ ] dry skin. [ ] rashes.  Psychiatric: [ ] depression, [ ] anxiety.   Gastrointestinal: [ ] BRBPR, [ ] melena.   Neurological: [ ] confusion. [ ] seizures. [ ] shuffling gait.   Ears,Nose,Mouth and Throat: [ ] ear pain [ ] sore throat.   Eyes: [ ] diplopia.   Respiratory: [ ] hemoptysis. [ ] shortness of breath  Cardiovascular: See HPI above  Hematologic/Lymphatic: [ ] anemia. [ ] painful nodes. [ ] prolonged bleeding.   Genitourinary: [ ] hematuria. [ ] flank pain.   Endocrine: [ ] significant change in weight. [ ] intolerance to heat and cold.     Review of systems [ x] otherwise negative, [ ] otherwise unable to obtain    FH: no family history of sudden cardiac death in first degree relatives    SH: [ ] tobacco, [ ] alcohol, [ ] drugs    acetaminophen     Tablet .. 650 milliGRAM(s) Oral every 6 hours PRN  apixaban 2.5 milliGRAM(s) Oral two times a day  aspirin enteric coated 81 milliGRAM(s) Oral daily  bisacodyl Suppository 10 milliGRAM(s) Rectal once  chlorhexidine 2% Cloths 1 Application(s) Topical daily  furosemide    Tablet 40 milliGRAM(s) Oral daily  mirtazapine 45 milliGRAM(s) Oral at bedtime  pantoprazole    Tablet 40 milliGRAM(s) Oral before breakfast  polyethylene glycol 3350 17 Gram(s) Oral daily  senna 2 Tablet(s) Oral at bedtime  spironolactone 25 milliGRAM(s) Oral daily                            11.9   12.31 )-----------( 217      ( 19 Nov 2021 07:51 )             37.1       11-19    137  |  99  |  28<H>  ----------------------------<  94  4.3   |  27  |  1.12    Ca    9.1      19 Nov 2021 07:51  Phos  3.1     11-18  Mg     2.3     11-18    TPro  6.5  /  Alb  4.2  /  TBili  0.6  /  DBili  x   /  AST  14  /  ALT  20  /  AlkPhos  112  11-19    T(C): 36.9 (11-19-21 @ 06:44), Max: 36.9 (11-18-21 @ 11:59)  HR: 42 (11-19-21 @ 06:44) (42 - 128)  BP: 103/57 (11-19-21 @ 06:44) (99/62 - 127/61)  RR: 20 (11-19-21 @ 06:44) (18 - 20)  SpO2: 96% (11-19-21 @ 06:44) (95% - 96%)  Wt(kg): --    I&O's Summary    18 Nov 2021 07:01  -  19 Nov 2021 07:00  --------------------------------------------------------  IN: 920 mL / OUT: 1320 mL / NET: -400 mL    General: Well nourished in no acute distress. Alert and Oriented * 3.   Head: Normocephalic and atraumatic.   Neck: No JVD. No bruits. Supple. Does not appear to be enlarged.   Cardiovascular: + S1,S2 federico, Soft systolic murmur at the left lower sternal border. No rubs noted.    Lungs: CTA b/l. No rhonchi, rales or wheezes.   Abdomen: + BS, soft. Non tender. Non distended. No rebound. No guarding.   Extremities: No clubbing/cyanosis/edema.   Neurologic: Moves all four extremities. Full range of motion.   Skin: Warm and moist. The patient's skin has normal elasticity and good skin turgor.   Psychiatric: Appropriate mood and affect.  Musculoskeletal: Normal range of motion, normal strength    LVEF normal    A/P) 74 y/o female PMH hypertension, hyperlipidemia a/w symptomatic severe MR and new onset AF. She is now s/p bio-MVR, MAZE and LISBET ligation on 11/8. She was doing well in NSR until yesterday when she went back into rapid atypical AFL.    -Sick sinus syndrome demonstrated on telemetry this morning, with long symptomatic conversion pause.  -Her AF is now paroxysmal and she would benefit from long-term amiodarone after her Maze procedure.  -Agreeable to dual chamber pacemaker insertion today.  -Tonight, can resume anticoagulation AND amiodarone, as she will be protected from bradycardia by her pacemaker.  -2 week followup w/ Dr Walker to be scheduled for pacemaker wound check.  -f/u with her cardiologist Dr Solo Ocampo after discharge    Perez Boltno M.D.  Cardiac Electrophysiology  [phone]

## 2021-11-20 VITALS
HEART RATE: 60 BPM | OXYGEN SATURATION: 95 % | RESPIRATION RATE: 18 BRPM | SYSTOLIC BLOOD PRESSURE: 115 MMHG | DIASTOLIC BLOOD PRESSURE: 56 MMHG

## 2021-11-20 DIAGNOSIS — Z95.0 PRESENCE OF CARDIAC PACEMAKER: ICD-10-CM

## 2021-11-20 LAB
ALBUMIN SERPL ELPH-MCNC: 3.8 G/DL — SIGNIFICANT CHANGE UP (ref 3.3–5)
ALP SERPL-CCNC: 101 U/L — SIGNIFICANT CHANGE UP (ref 40–120)
ALT FLD-CCNC: 13 U/L — SIGNIFICANT CHANGE UP (ref 10–45)
ANION GAP SERPL CALC-SCNC: 10 MMOL/L — SIGNIFICANT CHANGE UP (ref 5–17)
AST SERPL-CCNC: 13 U/L — SIGNIFICANT CHANGE UP (ref 10–40)
BASOPHILS # BLD AUTO: 0.1 K/UL — SIGNIFICANT CHANGE UP (ref 0–0.2)
BASOPHILS NFR BLD AUTO: 1 % — SIGNIFICANT CHANGE UP (ref 0–2)
BILIRUB SERPL-MCNC: 0.4 MG/DL — SIGNIFICANT CHANGE UP (ref 0.2–1.2)
BUN SERPL-MCNC: 31 MG/DL — HIGH (ref 7–23)
CALCIUM SERPL-MCNC: 8.9 MG/DL — SIGNIFICANT CHANGE UP (ref 8.4–10.5)
CHLORIDE SERPL-SCNC: 100 MMOL/L — SIGNIFICANT CHANGE UP (ref 96–108)
CO2 SERPL-SCNC: 24 MMOL/L — SIGNIFICANT CHANGE UP (ref 22–31)
CREAT SERPL-MCNC: 1.14 MG/DL — SIGNIFICANT CHANGE UP (ref 0.5–1.3)
EOSINOPHIL # BLD AUTO: 0.14 K/UL — SIGNIFICANT CHANGE UP (ref 0–0.5)
EOSINOPHIL NFR BLD AUTO: 1.3 % — SIGNIFICANT CHANGE UP (ref 0–6)
GLUCOSE SERPL-MCNC: 99 MG/DL — SIGNIFICANT CHANGE UP (ref 70–99)
HCT VFR BLD CALC: 33.5 % — LOW (ref 34.5–45)
HGB BLD-MCNC: 10.6 G/DL — LOW (ref 11.5–15.5)
IMM GRANULOCYTES NFR BLD AUTO: 0.8 % — SIGNIFICANT CHANGE UP (ref 0–1.5)
LYMPHOCYTES # BLD AUTO: 3.59 K/UL — HIGH (ref 1–3.3)
LYMPHOCYTES # BLD AUTO: 34.3 % — SIGNIFICANT CHANGE UP (ref 13–44)
MCHC RBC-ENTMCNC: 29.9 PG — SIGNIFICANT CHANGE UP (ref 27–34)
MCHC RBC-ENTMCNC: 31.6 GM/DL — LOW (ref 32–36)
MCV RBC AUTO: 94.6 FL — SIGNIFICANT CHANGE UP (ref 80–100)
MONOCYTES # BLD AUTO: 0.6 K/UL — SIGNIFICANT CHANGE UP (ref 0–0.9)
MONOCYTES NFR BLD AUTO: 5.7 % — SIGNIFICANT CHANGE UP (ref 2–14)
NEUTROPHILS # BLD AUTO: 5.97 K/UL — SIGNIFICANT CHANGE UP (ref 1.8–7.4)
NEUTROPHILS NFR BLD AUTO: 56.9 % — SIGNIFICANT CHANGE UP (ref 43–77)
NRBC # BLD: 0 /100 WBCS — SIGNIFICANT CHANGE UP (ref 0–0)
PLATELET # BLD AUTO: 186 K/UL — SIGNIFICANT CHANGE UP (ref 150–400)
POTASSIUM SERPL-MCNC: 4.6 MMOL/L — SIGNIFICANT CHANGE UP (ref 3.5–5.3)
POTASSIUM SERPL-SCNC: 4.6 MMOL/L — SIGNIFICANT CHANGE UP (ref 3.5–5.3)
PROT SERPL-MCNC: 6.1 G/DL — SIGNIFICANT CHANGE UP (ref 6–8.3)
RBC # BLD: 3.54 M/UL — LOW (ref 3.8–5.2)
RBC # FLD: 14.2 % — SIGNIFICANT CHANGE UP (ref 10.3–14.5)
SODIUM SERPL-SCNC: 134 MMOL/L — LOW (ref 135–145)
WBC # BLD: 10.48 K/UL — SIGNIFICANT CHANGE UP (ref 3.8–10.5)
WBC # FLD AUTO: 10.48 K/UL — SIGNIFICANT CHANGE UP (ref 3.8–10.5)

## 2021-11-20 PROCEDURE — 86901 BLOOD TYPING SEROLOGIC RH(D): CPT

## 2021-11-20 PROCEDURE — 36415 COLL VENOUS BLD VENIPUNCTURE: CPT

## 2021-11-20 PROCEDURE — C1785: CPT

## 2021-11-20 PROCEDURE — 93321 DOPPLER ECHO F-UP/LMTD STD: CPT

## 2021-11-20 PROCEDURE — U0005: CPT

## 2021-11-20 PROCEDURE — 85384 FIBRINOGEN ACTIVITY: CPT

## 2021-11-20 PROCEDURE — 86850 RBC ANTIBODY SCREEN: CPT

## 2021-11-20 PROCEDURE — 82803 BLOOD GASES ANY COMBINATION: CPT

## 2021-11-20 PROCEDURE — 82550 ASSAY OF CK (CPK): CPT

## 2021-11-20 PROCEDURE — 82962 GLUCOSE BLOOD TEST: CPT

## 2021-11-20 PROCEDURE — 86923 COMPATIBILITY TEST ELECTRIC: CPT

## 2021-11-20 PROCEDURE — 85396 CLOTTING ASSAY WHOLE BLOOD: CPT

## 2021-11-20 PROCEDURE — 83605 ASSAY OF LACTIC ACID: CPT

## 2021-11-20 PROCEDURE — 86891 AUTOLOGOUS BLOOD OP SALVAGE: CPT

## 2021-11-20 PROCEDURE — 93306 TTE W/DOPPLER COMPLETE: CPT

## 2021-11-20 PROCEDURE — 80053 COMPREHEN METABOLIC PANEL: CPT

## 2021-11-20 PROCEDURE — C1751: CPT

## 2021-11-20 PROCEDURE — 83735 ASSAY OF MAGNESIUM: CPT

## 2021-11-20 PROCEDURE — P9047: CPT

## 2021-11-20 PROCEDURE — 84436 ASSAY OF TOTAL THYROXINE: CPT

## 2021-11-20 PROCEDURE — 93005 ELECTROCARDIOGRAM TRACING: CPT

## 2021-11-20 PROCEDURE — 84484 ASSAY OF TROPONIN QUANT: CPT

## 2021-11-20 PROCEDURE — 87640 STAPH A DNA AMP PROBE: CPT

## 2021-11-20 PROCEDURE — 97110 THERAPEUTIC EXERCISES: CPT

## 2021-11-20 PROCEDURE — 97530 THERAPEUTIC ACTIVITIES: CPT

## 2021-11-20 PROCEDURE — 86022 PLATELET ANTIBODIES: CPT

## 2021-11-20 PROCEDURE — 84134 ASSAY OF PREALBUMIN: CPT

## 2021-11-20 PROCEDURE — P9045: CPT

## 2021-11-20 PROCEDURE — 84480 ASSAY TRIIODOTHYRONINE (T3): CPT

## 2021-11-20 PROCEDURE — 86900 BLOOD TYPING SEROLOGIC ABO: CPT

## 2021-11-20 PROCEDURE — 93880 EXTRACRANIAL BILAT STUDY: CPT

## 2021-11-20 PROCEDURE — 82435 ASSAY OF BLOOD CHLORIDE: CPT

## 2021-11-20 PROCEDURE — C1889: CPT

## 2021-11-20 PROCEDURE — 82947 ASSAY GLUCOSE BLOOD QUANT: CPT

## 2021-11-20 PROCEDURE — 85014 HEMATOCRIT: CPT

## 2021-11-20 PROCEDURE — 94010 BREATHING CAPACITY TEST: CPT

## 2021-11-20 PROCEDURE — 80048 BASIC METABOLIC PNL TOTAL CA: CPT

## 2021-11-20 PROCEDURE — 82565 ASSAY OF CREATININE: CPT

## 2021-11-20 PROCEDURE — 84443 ASSAY THYROID STIM HORMONE: CPT

## 2021-11-20 PROCEDURE — 83880 ASSAY OF NATRIURETIC PEPTIDE: CPT

## 2021-11-20 PROCEDURE — 87641 MR-STAPH DNA AMP PROBE: CPT

## 2021-11-20 PROCEDURE — 85018 HEMOGLOBIN: CPT

## 2021-11-20 PROCEDURE — 93308 TTE F-UP OR LMTD: CPT

## 2021-11-20 PROCEDURE — 88305 TISSUE EXAM BY PATHOLOGIST: CPT

## 2021-11-20 PROCEDURE — 85730 THROMBOPLASTIN TIME PARTIAL: CPT

## 2021-11-20 PROCEDURE — 97116 GAIT TRAINING THERAPY: CPT

## 2021-11-20 PROCEDURE — C1886: CPT

## 2021-11-20 PROCEDURE — U0003: CPT

## 2021-11-20 PROCEDURE — 86769 SARS-COV-2 COVID-19 ANTIBODY: CPT

## 2021-11-20 PROCEDURE — 94002 VENT MGMT INPAT INIT DAY: CPT

## 2021-11-20 PROCEDURE — 85610 PROTHROMBIN TIME: CPT

## 2021-11-20 PROCEDURE — 85025 COMPLETE CBC W/AUTO DIFF WBC: CPT

## 2021-11-20 PROCEDURE — 33208 INSRT HEART PM ATRIAL & VENT: CPT | Mod: KX

## 2021-11-20 PROCEDURE — 81001 URINALYSIS AUTO W/SCOPE: CPT

## 2021-11-20 PROCEDURE — C2618: CPT

## 2021-11-20 PROCEDURE — C1892: CPT

## 2021-11-20 PROCEDURE — 84295 ASSAY OF SERUM SODIUM: CPT

## 2021-11-20 PROCEDURE — 82553 CREATINE MB FRACTION: CPT

## 2021-11-20 PROCEDURE — 71045 X-RAY EXAM CHEST 1 VIEW: CPT

## 2021-11-20 PROCEDURE — 97162 PT EVAL MOD COMPLEX 30 MIN: CPT

## 2021-11-20 PROCEDURE — 85027 COMPLETE CBC AUTOMATED: CPT

## 2021-11-20 PROCEDURE — 82330 ASSAY OF CALCIUM: CPT

## 2021-11-20 PROCEDURE — 84132 ASSAY OF SERUM POTASSIUM: CPT

## 2021-11-20 PROCEDURE — 84100 ASSAY OF PHOSPHORUS: CPT

## 2021-11-20 PROCEDURE — C1769: CPT

## 2021-11-20 PROCEDURE — 85576 BLOOD PLATELET AGGREGATION: CPT

## 2021-11-20 PROCEDURE — C1898: CPT

## 2021-11-20 RX ORDER — FUROSEMIDE 40 MG
20 TABLET ORAL DAILY
Refills: 0 | Status: DISCONTINUED | OUTPATIENT
Start: 2021-11-21 | End: 2021-11-20

## 2021-11-20 RX ORDER — GABAPENTIN 400 MG/1
1 CAPSULE ORAL
Qty: 0 | Refills: 0 | DISCHARGE

## 2021-11-20 RX ORDER — METOPROLOL SUCCINATE 100 MG/1
100 TABLET, EXTENDED RELEASE ORAL
Qty: 60 | Refills: 2 | Status: ACTIVE | COMMUNITY

## 2021-11-20 RX ORDER — APIXABAN 2.5 MG/1
2.5 TABLET, FILM COATED ORAL
Qty: 60 | Refills: 0 | Status: ACTIVE | COMMUNITY
Start: 2021-11-20

## 2021-11-20 RX ORDER — MIRTAZAPINE 45 MG/1
1 TABLET, ORALLY DISINTEGRATING ORAL
Qty: 0 | Refills: 0 | DISCHARGE
Start: 2021-11-20

## 2021-11-20 RX ORDER — FOLIC ACID 0.8 MG
1 TABLET ORAL
Qty: 0 | Refills: 0 | DISCHARGE

## 2021-11-20 RX ORDER — APIXABAN 2.5 MG/1
1 TABLET, FILM COATED ORAL
Qty: 60 | Refills: 0
Start: 2021-11-20 | End: 2021-12-19

## 2021-11-20 RX ORDER — GABAPENTIN 400 MG/1
1 CAPSULE ORAL
Qty: 60 | Refills: 0
Start: 2021-11-20 | End: 2021-12-19

## 2021-11-20 RX ORDER — GABAPENTIN 300 MG/1
300 CAPSULE ORAL
Refills: 0 | Status: ACTIVE | COMMUNITY
Start: 2021-11-20

## 2021-11-20 RX ORDER — ASPIRIN ENTERIC COATED TABLETS 81 MG 81 MG/1
81 TABLET, DELAYED RELEASE ORAL
Refills: 0 | Status: ACTIVE | COMMUNITY
Start: 2021-11-20

## 2021-11-20 RX ADMIN — PANTOPRAZOLE SODIUM 40 MILLIGRAM(S): 20 TABLET, DELAYED RELEASE ORAL at 05:45

## 2021-11-20 RX ADMIN — Medication 40 MILLIGRAM(S): at 05:09

## 2021-11-20 RX ADMIN — APIXABAN 2.5 MILLIGRAM(S): 2.5 TABLET, FILM COATED ORAL at 05:08

## 2021-11-20 RX ADMIN — CHLORHEXIDINE GLUCONATE 1 APPLICATION(S): 213 SOLUTION TOPICAL at 11:18

## 2021-11-20 RX ADMIN — AMIODARONE HYDROCHLORIDE 400 MILLIGRAM(S): 400 TABLET ORAL at 05:09

## 2021-11-20 RX ADMIN — Medication 81 MILLIGRAM(S): at 11:57

## 2021-11-20 RX ADMIN — Medication 100 MILLIGRAM(S): at 11:57

## 2021-11-20 RX ADMIN — SPIRONOLACTONE 25 MILLIGRAM(S): 25 TABLET, FILM COATED ORAL at 05:08

## 2021-11-20 RX ADMIN — Medication 100 MILLIGRAM(S): at 05:09

## 2021-11-20 NOTE — PROGRESS NOTE ADULT - SUBJECTIVE AND OBJECTIVE BOX
EP     pt seen and examined, no complaints, ROS - .   no distress overnight  PPM site c/d/i   Tele : stable SB, Paced     DATE OF SERVICE - 11-20-21     Review of Systems:   Constitutional: [ ] fevers, [ ] chills.   Skin: [ ] dry skin. [ ] rashes.  Psychiatric: [ ] depression, [ ] anxiety.   Gastrointestinal: [ ] BRBPR, [ ] melena.   Neurological: [ ] confusion. [ ] seizures. [ ] shuffling gait.   Ears,Nose,Mouth and Throat: [ ] ear pain [ ] sore throat.   Eyes: [ ] diplopia.   Respiratory: [ ] hemoptysis. [ ] shortness of breath  Cardiovascular: See HPI above  Hematologic/Lymphatic: [ ] anemia. [ ] painful nodes. [ ] prolonged bleeding.   Genitourinary: [ ] hematuria. [ ] flank pain.   Endocrine: [ ] significant change in weight. [ ] intolerance to heat and cold.     Review of systems [ x] otherwise negative, [ ] otherwise unable to obtain    FH: no family history of sudden cardiac death in first degree relatives    SH: [ ] tobacco, [ ] alcohol, [ ] drugs          acetaminophen     Tablet .. 650 milliGRAM(s) Oral every 6 hours PRN  aMIOdarone    Tablet 400 milliGRAM(s) Oral every 8 hours  apixaban 2.5 milliGRAM(s) Oral two times a day  aspirin enteric coated 81 milliGRAM(s) Oral daily  bisacodyl Suppository 10 milliGRAM(s) Rectal once  chlorhexidine 2% Cloths 1 Application(s) Topical daily  furosemide    Tablet 40 milliGRAM(s) Oral daily  metoprolol succinate  milliGRAM(s) Oral daily  mirtazapine 45 milliGRAM(s) Oral at bedtime  pantoprazole    Tablet 40 milliGRAM(s) Oral before breakfast  polyethylene glycol 3350 17 Gram(s) Oral daily  senna 2 Tablet(s) Oral at bedtime  spironolactone 25 milliGRAM(s) Oral daily                            10.6   10.48 )-----------( 186      ( 20 Nov 2021 05:44 )             33.5       Hemoglobin: 10.6 g/dL (11-20 @ 05:44)  Hemoglobin: 11.9 g/dL (11-19 @ 07:51)  Hemoglobin: 11.9 g/dL (11-18 @ 06:00)  Hemoglobin: 12.6 g/dL (11-17 @ 05:08)  Hemoglobin: 13.0 g/dL (11-16 @ 06:27)      11-20    134<L>  |  100  |  31<H>  ----------------------------<  99  4.6   |  24  |  1.14    Ca    8.9      20 Nov 2021 05:44    TPro  6.1  /  Alb  3.8  /  TBili  0.4  /  DBili  x   /  AST  13  /  ALT  13  /  AlkPhos  101  11-20    Creatinine Trend: 1.14<--, 1.12<--, 1.16<--, 1.01<--, 1.04<--, 1.18<--    COAGS:           T(C): 37.1 (11-20-21 @ 02:57), Max: 37.1 (11-20-21 @ 02:57)  HR: 60 (11-20-21 @ 05:06) (59 - 69)  BP: 100/55 (11-20-21 @ 05:06) (95/54 - 133/54)  RR: 18 (11-20-21 @ 02:57) (18 - 18)  SpO2: 94% (11-20-21 @ 02:57) (94% - 99%)  Wt(kg): --    I&O's Summary    19 Nov 2021 07:01  -  20 Nov 2021 07:00  --------------------------------------------------------  IN: 240 mL / OUT: 700 mL / NET: -460 mL        General: Well nourished in no acute distress. Alert and Oriented * 3.   Head: Normocephalic and atraumatic.   Neck: No JVD. No bruits. Supple. Does not appear to be enlarged.   Cardiovascular: + S1,S2 federico, Soft systolic murmur at the left lower sternal border. No rubs noted.    Lungs: CTA b/l. No rhonchi, rales or wheezes.   Abdomen: + BS, soft. Non tender. Non distended. No rebound. No guarding.   Extremities: No clubbing/cyanosis/edema.   Neurologic: Moves all four extremities. Full range of motion.   Skin: Warm and moist. The patient's skin has normal elasticity and good skin turgor.   Psychiatric: Appropriate mood and affect.  Musculoskeletal: Normal range of motion, normal strength    LVEF normal    A/P) 76 y/o female PMH hypertension, hyperlipidemia a/w symptomatic severe MR and new onset AF. She is now s/p bio-MVR, MAZE and LISBET ligation on 11/8. She was doing well in NSR until yesterday when she went back into rapid atypical AFL.  s/p PPM     - Tele stable ,   - ppm site c/d/i   - tolerating A/C , amio . BB   -2 week followup w/ Dr Walker to be scheduled for pacemaker wound check.  -f/u with her cardiologist Dr Solo Ocampo after discharge

## 2021-11-20 NOTE — PROGRESS NOTE ADULT - SUBJECTIVE AND OBJECTIVE BOX
C A R D I O L O G Y  **********************************     DATE OF SERVICE: 11-20-21    Patient denies chest pain or shortness of breath.   Review of symptoms otherwise negative.    acetaminophen     Tablet .. 650 milliGRAM(s) Oral every 6 hours PRN  apixaban 2.5 milliGRAM(s) Oral two times a day  aspirin enteric coated 81 milliGRAM(s) Oral daily  bisacodyl Suppository 10 milliGRAM(s) Rectal once  chlorhexidine 2% Cloths 1 Application(s) Topical daily  metoprolol succinate  milliGRAM(s) Oral daily  mirtazapine 45 milliGRAM(s) Oral at bedtime  pantoprazole    Tablet 40 milliGRAM(s) Oral before breakfast  polyethylene glycol 3350 17 Gram(s) Oral daily  senna 2 Tablet(s) Oral at bedtime  spironolactone 25 milliGRAM(s) Oral daily                            10.6   10.48 )-----------( 186      ( 20 Nov 2021 05:44 )             33.5       Hemoglobin: 10.6 g/dL (11-20 @ 05:44)  Hemoglobin: 11.9 g/dL (11-19 @ 07:51)  Hemoglobin: 11.9 g/dL (11-18 @ 06:00)  Hemoglobin: 12.6 g/dL (11-17 @ 05:08)  Hemoglobin: 13.0 g/dL (11-16 @ 06:27)      11-20    134<L>  |  100  |  31<H>  ----------------------------<  99  4.6   |  24  |  1.14    Ca    8.9      20 Nov 2021 05:44    TPro  6.1  /  Alb  3.8  /  TBili  0.4  /  DBili  x   /  AST  13  /  ALT  13  /  AlkPhos  101  11-20    Creatinine Trend: 1.14<--, 1.12<--, 1.16<--, 1.01<--, 1.04<--, 1.18<--    COAGS:           T(C): 36.7 (11-20-21 @ 07:12), Max: 37.1 (11-20-21 @ 02:57)  HR: 60 (11-20-21 @ 11:53) (59 - 63)  BP: 115/56 (11-20-21 @ 11:53) (95/54 - 120/65)  RR: 18 (11-20-21 @ 11:53) (18 - 18)  SpO2: 95% (11-20-21 @ 11:53) (94% - 98%)  Wt(kg): --    I&O's Summary    19 Nov 2021 07:01  -  20 Nov 2021 07:00  --------------------------------------------------------  IN: 240 mL / OUT: 700 mL / NET: -460 mL    20 Nov 2021 07:01  -  20 Nov 2021 17:42  --------------------------------------------------------  IN: 240 mL / OUT: 300 mL / NET: -60 mL            HEENT:  (-)icterus (-)pallor  CV: N S1 S2 1/6 ZITA (+)2 Pulses B/l  Resp:  Clear to auscultation B/L, normal effort  GI: (+) BS Soft, NT, ND  Lymph:  (-)Edema, (-)obvious lymphadenopathy  Skin: Warm to touch, Normal turgor  Psych: Appropriate mood and affect      TELEMETRY: AP 59    ASSESSMENT/PLAN: 76 y/o Female with pmh of HTN, HLD,  who presented to the ED with complains of SOB found with pulmonary edema and new afib DOUG revealed severe MR now s/p tissue MVR, LISBET ligation and MAZE.    - Events noted - long conversion pause now s/p dual chamber PPM  - Continue AC with Eliquis for AF/Stroke prevention   - Continue ASA  - EP f/u appreciated   - D/C planning per CTS  - Patient to f/u with her cardiologist Dr. Solo Ocampo after d/c    Jair Mullins MD, [facility]  BEEPER [phone]

## 2021-11-20 NOTE — PROGRESS NOTE ADULT - PROVIDER SPECIALTY LIST ADULT
Cardiology
Critical Care
Electrophysiology
Cardiology
Critical Care
Electrophysiology
CT Surgery
Cardiology
Electrophysiology
Electrophysiology
CT Surgery

## 2021-11-21 ENCOUNTER — TRANSCRIPTION ENCOUNTER (OUTPATIENT)
Age: 76
End: 2021-11-21

## 2021-11-21 RX ORDER — FOLIC ACID 1 MG/1
1 TABLET ORAL
Qty: 30 | Refills: 3 | Status: ACTIVE | COMMUNITY

## 2021-11-21 RX ORDER — PANTOPRAZOLE 40 MG/1
40 TABLET, DELAYED RELEASE ORAL DAILY
Qty: 30 | Refills: 0 | Status: ACTIVE | COMMUNITY

## 2021-11-21 RX ORDER — METOPROLOL TARTRATE 50 MG
1 TABLET ORAL
Qty: 30 | Refills: 0
Start: 2021-11-21 | End: 2021-12-20

## 2021-11-21 RX ORDER — AMIODARONE HYDROCHLORIDE 400 MG/1
1 TABLET ORAL
Qty: 30 | Refills: 0
Start: 2021-11-21 | End: 2021-12-20

## 2021-11-21 RX ORDER — FUROSEMIDE 40 MG
1 TABLET ORAL
Qty: 10 | Refills: 0
Start: 2021-11-21 | End: 2021-11-30

## 2021-11-21 RX ORDER — MIRTAZAPINE 45 MG/1
45 TABLET, FILM COATED ORAL
Refills: 0 | Status: ACTIVE | COMMUNITY

## 2021-11-21 RX ORDER — SPIRONOLACTONE 25 MG/1
1 TABLET, FILM COATED ORAL
Qty: 10 | Refills: 0
Start: 2021-11-21 | End: 2021-11-30

## 2021-11-22 ENCOUNTER — NON-APPOINTMENT (OUTPATIENT)
Age: 76
End: 2021-11-22

## 2021-11-22 ENCOUNTER — APPOINTMENT (OUTPATIENT)
Dept: CARDIOTHORACIC SURGERY | Facility: CLINIC | Age: 76
End: 2021-11-22
Payer: MEDICARE

## 2021-11-22 ENCOUNTER — TRANSCRIPTION ENCOUNTER (OUTPATIENT)
Age: 76
End: 2021-11-22

## 2021-11-23 ENCOUNTER — TRANSCRIPTION ENCOUNTER (OUTPATIENT)
Age: 76
End: 2021-11-23

## 2021-11-23 ENCOUNTER — APPOINTMENT (OUTPATIENT)
Dept: CARE COORDINATION | Facility: HOME HEALTH | Age: 76
End: 2021-11-23
Payer: MEDICARE

## 2021-11-23 VITALS
OXYGEN SATURATION: 98 % | DIASTOLIC BLOOD PRESSURE: 72 MMHG | SYSTOLIC BLOOD PRESSURE: 130 MMHG | HEART RATE: 62 BPM | RESPIRATION RATE: 18 BRPM

## 2021-11-23 DIAGNOSIS — I34.0 NONRHEUMATIC MITRAL (VALVE) INSUFFICIENCY: ICD-10-CM

## 2021-11-23 DIAGNOSIS — Z86.79 PERSONAL HISTORY OF OTHER DISEASES OF THE CIRCULATORY SYSTEM: ICD-10-CM

## 2021-11-23 DIAGNOSIS — Z86.39 PERSONAL HISTORY OF OTHER ENDOCRINE, NUTRITIONAL AND METABOLIC DISEASE: ICD-10-CM

## 2021-11-23 PROCEDURE — 99024 POSTOP FOLLOW-UP VISIT: CPT

## 2021-11-23 NOTE — REVIEW OF SYSTEMS
[Lower Ext Edema] : lower extremity edema [Heart Rate Is Slow] : the heart rate was not slow [Heart Rate Is Fast] : the heart rate was not fast [Chest Pain] : no chest pain [Palpitations] : no palpitations [Shortness Of Breath] : no shortness of breath [Cough] : no cough [SOB on Exertion] : no shortness of breath during exertion [Abdominal Pain] : no abdominal pain [Constipation] : no constipation [Negative] : Heme/Lymph

## 2021-11-23 NOTE — ASSESSMENT
[FreeTextEntry1] : 77 y/o F with pmhx of HTN, HLD, varicose veins, and vertigo\par 11/18/21 Mitral valve replacement (t) with Magnaease 29 Moser-Maze IV Cryo-RF LAAL with AtriClip 35mm with Dr Arango\par New onset Afib requiring Amio, JUnctional rhythm\par s/p PPM on 11/19/21\par \par EF 55%

## 2021-11-23 NOTE — HISTORY OF PRESENT ILLNESS
[FreeTextEntry1] : FOLLOW YOUR HEART HOME VISIT-[employer]\par Home Visit made Jaqueline ESPINOZA ] . A  patient of Dr Dawn Arango  S/P  MVR/atriclip/Moser maze on 11/18. Discharged from [facility]\par \par \par Visiting patient for post discharge transitional care management and post surgical follow up. \par Arrived to  home, she is accompanied by her son \par She appears well, able to get up and ambulate well\par \par \par \par \par \par \par \par \par

## 2021-11-23 NOTE — PHYSICAL EXAM
[Sclera] : the sclera and conjunctiva were normal [Neck Appearance] : the appearance of the neck was normal [] : no respiratory distress [Respiration, Rhythm And Depth] : normal respiratory rhythm and effort [Exaggerated Use Of Accessory Muscles For Inspiration] : no accessory muscle use [Auscultation Breath Sounds / Voice Sounds] : lungs were clear to auscultation bilaterally [Heart Sounds] : normal S1 and S2 [Murmurs] : no murmurs [1+] : left 1+ [Bowel Sounds] : normal bowel sounds [Abdomen Soft] : soft [Abdomen Tenderness] : non-tender [FreeTextEntry1] : see cardiac assessment [No Focal Deficits] : no focal deficits [Oriented To Time, Place, And Person] : oriented to person, place, and time

## 2021-11-29 ENCOUNTER — TRANSCRIPTION ENCOUNTER (OUTPATIENT)
Age: 76
End: 2021-11-29

## 2021-11-29 PROBLEM — Z98.890 STATUS POST MAZE OPERATION FOR ATRIAL FIBRILLATION: Status: ACTIVE | Noted: 2021-11-20

## 2021-11-30 ENCOUNTER — APPOINTMENT (OUTPATIENT)
Dept: CARDIOTHORACIC SURGERY | Facility: CLINIC | Age: 76
End: 2021-11-30
Payer: MEDICARE

## 2021-11-30 VITALS
RESPIRATION RATE: 13 BRPM | HEART RATE: 90 BPM | DIASTOLIC BLOOD PRESSURE: 64 MMHG | TEMPERATURE: 98 F | OXYGEN SATURATION: 99 % | SYSTOLIC BLOOD PRESSURE: 110 MMHG

## 2021-11-30 VITALS — WEIGHT: 171 LBS | HEIGHT: 67 IN | BODY MASS INDEX: 26.84 KG/M2

## 2021-11-30 DIAGNOSIS — Z86.79 OTHER SPECIFIED POSTPROCEDURAL STATES: ICD-10-CM

## 2021-11-30 DIAGNOSIS — Z98.890 OTHER SPECIFIED POSTPROCEDURAL STATES: ICD-10-CM

## 2021-11-30 PROCEDURE — 99024 POSTOP FOLLOW-UP VISIT: CPT

## 2021-11-30 RX ORDER — SPIRONOLACTONE 25 MG/1
25 TABLET ORAL DAILY
Qty: 60 | Refills: 0 | Status: ACTIVE | COMMUNITY
Start: 1900-01-01 | End: 1900-01-01

## 2021-11-30 RX ORDER — AMIODARONE HYDROCHLORIDE 200 MG/1
200 TABLET ORAL DAILY
Qty: 14 | Refills: 0 | Status: COMPLETED | COMMUNITY
Start: 2021-11-20 | End: 2021-11-30

## 2021-11-30 RX ORDER — FUROSEMIDE 20 MG/1
20 TABLET ORAL
Qty: 60 | Refills: 0 | Status: ACTIVE | COMMUNITY
Start: 1900-01-01 | End: 1900-01-01

## 2021-12-06 ENCOUNTER — TRANSCRIPTION ENCOUNTER (OUTPATIENT)
Age: 76
End: 2021-12-06

## 2021-12-20 ENCOUNTER — TRANSCRIPTION ENCOUNTER (OUTPATIENT)
Age: 76
End: 2021-12-20

## 2022-09-15 NOTE — DISCHARGE NOTE ADULT - THE PATIENT HAS
no difficulties

## 2022-10-26 NOTE — PROGRESS NOTE ADULT - PROBLEM SELECTOR PLAN 2
..        Urology ERAS End of Shift Note    1 Day Post-Op    Daley: Yes    Bowel Movement: No    Bowel Sounds: normal    Flatus: No    [unfilled]    Tolerating Diet?: Yes    Ensure Surgery Immunonutrion Shakes - 2 per day (POD 1-5) ? Yes    Ambulated 1 times. Up to chair for 0 meals.     Lidocaine: Yes    PRN Pain Medications Used?: No    IS Used: Yes    Ideal body weight: 73 kg (160 lb 15 oz)     Signed By: Siva Randhawa RN     October 7, 2022
Pacer at a v paced , av interval set longer than pts.  EPS f/u
d/cd Cardizem gtt Continue oral cardizem 30 q 6  continue with hep gtt for anticoagulation
Pacer at a v paced , av interval set longer than pts.  EPS f/u
d/cd Cardizem gtt Continue oral cardizem 30 q 6  continue with hep gtt for anticoagulation
[place]

## 2023-01-23 NOTE — PATIENT PROFILE ADULT. - AS SC BRADEN NUTRITION
Per Leila- referral specialist - Patient apparently has [facility] PPO plan as primary but [employer] secondary so with [employer] cannot have Cologuard, would need FIT Test instead.  Not sure how it would work if it would only go thru [facility] maybe it would be covered.      msg was send to Dr. Mathews to review and advise further.     Per Dr. Mathews- Let her know that her insurance would not cover the Cologuard test.  We could do the fit test as an alternative.    Left msg on v/m for patient to call back.   
Pt called and stated that she no longer has [facility] PPO she only has [employer].       
Spoke to patient today and she is aware cologuard not covered. This test was cancelled today. iFOBT has been ordered and mail out today. She also mentions that she will be going to [facility] for both Mamm and Dexa. Leila referral specialist was made aware of this. No further action needed.   
(3) adequate

## 2023-08-08 NOTE — TRANSFER ACCEPTANCE NOTE - RESPIRATORY
Detail Level: Detailed
Additional Notes: Documentation for MIPS  purposes only. Full Patient visit note from paper chart is available for review and also scanned in EMA chart.
detailed exam

## 2024-01-01 NOTE — DISCHARGE NOTE NURSING/CASE MANAGEMENT/SOCIAL WORK - WILL THE PATIENT ACCEPT THE PFIZER COVID-19 VACCINE IF ELIGIBLE AND IT IS AVAILABLE?
White lesions on tongue c/w thrush; attempted to remove lesions w/ gauze but unable to  Rx for Nystatin solution sent to pharmacy; instructed to apply 1mL to the inside of each cheek 4x a day until lesions clear, the continue tx for 2-3d after resolution to ensure full tx  Explained lesions can take about 2wks  to fully resolve  Instructed to sanitize bottle nipples/pacifiers in boiling water x5min   Instructed to message the clinic if no improvement in lesions after 2-3wks   Not applicable

## 2024-10-16 NOTE — ED PROVIDER NOTE - PATIENT/CAREGIVER ACCEPTED INTERPRETER SERVICES
Pulmonary consult changed to Dr Snowden per Dr Bingham' request.  Consult sent via perfect serve to Dr Snowden    Renal consult placed via perfect serve to Dr Franklin   yes

## 2024-10-22 ENCOUNTER — APPOINTMENT (OUTPATIENT)
Age: 79
End: 2024-10-22
Payer: MEDICARE

## 2024-10-22 VITALS — HEART RATE: 100 BPM

## 2024-10-22 PROCEDURE — 93000 ELECTROCARDIOGRAM COMPLETE: CPT

## 2024-10-22 PROCEDURE — 99204 OFFICE O/P NEW MOD 45 MIN: CPT

## 2024-11-26 ENCOUNTER — APPOINTMENT (OUTPATIENT)
Dept: OPHTHALMOLOGY | Facility: CLINIC | Age: 79
End: 2024-11-26

## 2024-12-04 ENCOUNTER — INPATIENT (INPATIENT)
Facility: HOSPITAL | Age: 79
LOS: 4 days | Discharge: HOME HEALTH | End: 2024-12-09
Attending: STUDENT IN AN ORGANIZED HEALTH CARE EDUCATION/TRAINING PROGRAM | Admitting: INTERNAL MEDICINE
Payer: MEDICARE

## 2024-12-04 VITALS — WEIGHT: 171.08 LBS | HEIGHT: 66.93 IN

## 2024-12-04 DIAGNOSIS — Z98.890 OTHER SPECIFIED POSTPROCEDURAL STATES: Chronic | ICD-10-CM

## 2024-12-04 DIAGNOSIS — Z95.0 PRESENCE OF CARDIAC PACEMAKER: Chronic | ICD-10-CM

## 2024-12-04 LAB
ALBUMIN SERPL ELPH-MCNC: 3.9 G/DL — SIGNIFICANT CHANGE UP (ref 3.3–5)
ALBUMIN SERPL ELPH-MCNC: 4.5 G/DL — SIGNIFICANT CHANGE UP (ref 3.3–5)
ALP SERPL-CCNC: 47 U/L — SIGNIFICANT CHANGE UP (ref 40–120)
ALP SERPL-CCNC: 59 U/L — SIGNIFICANT CHANGE UP (ref 40–120)
ALT FLD-CCNC: 12 U/L — SIGNIFICANT CHANGE UP (ref 10–45)
ALT FLD-CCNC: 15 U/L — SIGNIFICANT CHANGE UP (ref 10–45)
ANION GAP SERPL CALC-SCNC: 11 MMOL/L — SIGNIFICANT CHANGE UP (ref 5–17)
ANION GAP SERPL CALC-SCNC: 13 MMOL/L — SIGNIFICANT CHANGE UP (ref 5–17)
APTT BLD: 30 SEC — SIGNIFICANT CHANGE UP (ref 24.5–35.6)
AST SERPL-CCNC: 22 U/L — SIGNIFICANT CHANGE UP (ref 10–40)
AST SERPL-CCNC: 23 U/L — SIGNIFICANT CHANGE UP (ref 10–40)
BILIRUB SERPL-MCNC: 0.4 MG/DL — SIGNIFICANT CHANGE UP (ref 0.2–1.2)
BILIRUB SERPL-MCNC: 0.6 MG/DL — SIGNIFICANT CHANGE UP (ref 0.2–1.2)
BLD GP AB SCN SERPL QL: NEGATIVE — SIGNIFICANT CHANGE UP
BUN SERPL-MCNC: 14 MG/DL — SIGNIFICANT CHANGE UP (ref 7–23)
BUN SERPL-MCNC: 17 MG/DL — SIGNIFICANT CHANGE UP (ref 7–23)
CALCIUM SERPL-MCNC: 8.1 MG/DL — LOW (ref 8.4–10.5)
CALCIUM SERPL-MCNC: 9.3 MG/DL — SIGNIFICANT CHANGE UP (ref 8.4–10.5)
CHLORIDE SERPL-SCNC: 103 MMOL/L — SIGNIFICANT CHANGE UP (ref 96–108)
CHLORIDE SERPL-SCNC: 106 MMOL/L — SIGNIFICANT CHANGE UP (ref 96–108)
CO2 SERPL-SCNC: 24 MMOL/L — SIGNIFICANT CHANGE UP (ref 22–31)
CO2 SERPL-SCNC: 25 MMOL/L — SIGNIFICANT CHANGE UP (ref 22–31)
CREAT SERPL-MCNC: 0.78 MG/DL — SIGNIFICANT CHANGE UP (ref 0.5–1.3)
CREAT SERPL-MCNC: 0.84 MG/DL — SIGNIFICANT CHANGE UP (ref 0.5–1.3)
EGFR: 71 ML/MIN/1.73M2 — SIGNIFICANT CHANGE UP
EGFR: 77 ML/MIN/1.73M2 — SIGNIFICANT CHANGE UP
GLUCOSE SERPL-MCNC: 106 MG/DL — HIGH (ref 70–99)
GLUCOSE SERPL-MCNC: 88 MG/DL — SIGNIFICANT CHANGE UP (ref 70–99)
HAPTOGLOB SERPL-MCNC: 118 MG/DL — SIGNIFICANT CHANGE UP (ref 34–200)
HAPTOGLOB SERPL-MCNC: 90 MG/DL — SIGNIFICANT CHANGE UP (ref 34–200)
HCT VFR BLD CALC: 36.2 % — SIGNIFICANT CHANGE UP (ref 34.5–45)
HCT VFR BLD CALC: 39.7 % — SIGNIFICANT CHANGE UP (ref 34.5–45)
HGB BLD-MCNC: 11.6 G/DL — SIGNIFICANT CHANGE UP (ref 11.5–15.5)
HGB BLD-MCNC: 13 G/DL — SIGNIFICANT CHANGE UP (ref 11.5–15.5)
INR BLD: 1.17 — HIGH (ref 0.85–1.16)
LDH SERPL L TO P-CCNC: 235 U/L — SIGNIFICANT CHANGE UP (ref 50–242)
LDH SERPL L TO P-CCNC: 286 U/L — HIGH (ref 50–242)
MCHC RBC-ENTMCNC: 30.1 PG — SIGNIFICANT CHANGE UP (ref 27–34)
MCHC RBC-ENTMCNC: 30.8 PG — SIGNIFICANT CHANGE UP (ref 27–34)
MCHC RBC-ENTMCNC: 32 G/DL — SIGNIFICANT CHANGE UP (ref 32–36)
MCHC RBC-ENTMCNC: 32.7 G/DL — SIGNIFICANT CHANGE UP (ref 32–36)
MCV RBC AUTO: 93.8 FL — SIGNIFICANT CHANGE UP (ref 80–100)
MCV RBC AUTO: 94.1 FL — SIGNIFICANT CHANGE UP (ref 80–100)
NRBC # BLD: 0 /100 WBCS — SIGNIFICANT CHANGE UP (ref 0–0)
NRBC # BLD: 0 /100 WBCS — SIGNIFICANT CHANGE UP (ref 0–0)
PLATELET # BLD AUTO: 102 K/UL — LOW (ref 150–400)
PLATELET # BLD AUTO: 125 K/UL — LOW (ref 150–400)
POTASSIUM SERPL-MCNC: 3.7 MMOL/L — SIGNIFICANT CHANGE UP (ref 3.5–5.3)
POTASSIUM SERPL-MCNC: 3.9 MMOL/L — SIGNIFICANT CHANGE UP (ref 3.5–5.3)
POTASSIUM SERPL-SCNC: 3.7 MMOL/L — SIGNIFICANT CHANGE UP (ref 3.5–5.3)
POTASSIUM SERPL-SCNC: 3.9 MMOL/L — SIGNIFICANT CHANGE UP (ref 3.5–5.3)
PROT SERPL-MCNC: 5.7 G/DL — LOW (ref 6–8.3)
PROT SERPL-MCNC: 7.2 G/DL — SIGNIFICANT CHANGE UP (ref 6–8.3)
PROTHROM AB SERPL-ACNC: 13.7 SEC — HIGH (ref 9.9–13.4)
RBC # BLD: 3.86 M/UL — SIGNIFICANT CHANGE UP (ref 3.8–5.2)
RBC # BLD: 4.22 M/UL — SIGNIFICANT CHANGE UP (ref 3.8–5.2)
RBC # FLD: 13.1 % — SIGNIFICANT CHANGE UP (ref 10.3–14.5)
RBC # FLD: 13.2 % — SIGNIFICANT CHANGE UP (ref 10.3–14.5)
RH IG SCN BLD-IMP: POSITIVE — SIGNIFICANT CHANGE UP
SODIUM SERPL-SCNC: 139 MMOL/L — SIGNIFICANT CHANGE UP (ref 135–145)
SODIUM SERPL-SCNC: 143 MMOL/L — SIGNIFICANT CHANGE UP (ref 135–145)
WBC # BLD: 12.05 K/UL — HIGH (ref 3.8–10.5)
WBC # BLD: 6.47 K/UL — SIGNIFICANT CHANGE UP (ref 3.8–10.5)
WBC # FLD AUTO: 12.05 K/UL — HIGH (ref 3.8–10.5)
WBC # FLD AUTO: 6.47 K/UL — SIGNIFICANT CHANGE UP (ref 3.8–10.5)

## 2024-12-04 PROCEDURE — 93462 L HRT CATH TRNSPTL PUNCTURE: CPT

## 2024-12-04 PROCEDURE — 93655 ICAR CATH ABLTJ DSCRT ARRHYT: CPT

## 2024-12-04 PROCEDURE — 93653 COMPRE EP EVAL TX SVT: CPT

## 2024-12-04 PROCEDURE — 93662 INTRACARDIAC ECG (ICE): CPT | Mod: 26

## 2024-12-04 RX ORDER — METOPROLOL TARTRATE 100 MG/1
100 TABLET, FILM COATED ORAL DAILY
Refills: 0 | Status: DISCONTINUED | OUTPATIENT
Start: 2024-12-04 | End: 2024-12-05

## 2024-12-04 RX ORDER — PANTOPRAZOLE SODIUM 40 MG/1
40 TABLET, DELAYED RELEASE ORAL
Refills: 0 | Status: DISCONTINUED | OUTPATIENT
Start: 2024-12-04 | End: 2024-12-09

## 2024-12-04 RX ORDER — ROSUVASTATIN CALCIUM 5 MG/1
20 TABLET, FILM COATED ORAL AT BEDTIME
Refills: 0 | Status: DISCONTINUED | OUTPATIENT
Start: 2024-12-04 | End: 2024-12-05

## 2024-12-04 RX ORDER — APIXABAN 2.5 MG/1
5 TABLET, FILM COATED ORAL ONCE
Refills: 0 | Status: COMPLETED | OUTPATIENT
Start: 2024-12-04 | End: 2024-12-04

## 2024-12-04 RX ORDER — APIXABAN 2.5 MG/1
5 TABLET, FILM COATED ORAL
Refills: 0 | Status: DISCONTINUED | OUTPATIENT
Start: 2024-12-04 | End: 2024-12-09

## 2024-12-04 RX ORDER — FENOFIBRATE,MICRONIZED 134 MG
1 CAPSULE ORAL
Refills: 0 | DISCHARGE

## 2024-12-04 RX ORDER — LISINOPRIL 20 MG/1
10 TABLET ORAL DAILY
Refills: 0 | Status: DISCONTINUED | OUTPATIENT
Start: 2024-12-04 | End: 2024-12-05

## 2024-12-04 RX ORDER — GLUCOSAMINE SULFATE DIPOT CHLR 500 MG
1 CAPSULE ORAL DAILY
Refills: 0 | Status: DISCONTINUED | OUTPATIENT
Start: 2024-12-04 | End: 2024-12-09

## 2024-12-04 RX ORDER — ADENOSINE 3 MG/ML
6 INJECTION INTRAVENOUS ONCE
Refills: 0 | Status: COMPLETED | OUTPATIENT
Start: 2024-12-04 | End: 2024-12-04

## 2024-12-04 RX ORDER — MIRTAZAPINE 15 MG/1
45 TABLET, FILM COATED ORAL AT BEDTIME
Refills: 0 | Status: DISCONTINUED | OUTPATIENT
Start: 2024-12-04 | End: 2024-12-05

## 2024-12-04 RX ADMIN — MIRTAZAPINE 45 MILLIGRAM(S): 15 TABLET, FILM COATED ORAL at 23:51

## 2024-12-04 RX ADMIN — APIXABAN 5 MILLIGRAM(S): 2.5 TABLET, FILM COATED ORAL at 15:29

## 2024-12-04 RX ADMIN — ADENOSINE 6 MILLIGRAM(S): 3 INJECTION INTRAVENOUS at 22:35

## 2024-12-04 RX ADMIN — APIXABAN 5 MILLIGRAM(S): 2.5 TABLET, FILM COATED ORAL at 23:51

## 2024-12-04 RX ADMIN — ROSUVASTATIN CALCIUM 20 MILLIGRAM(S): 5 TABLET, FILM COATED ORAL at 23:51

## 2024-12-04 NOTE — H&P ADULT - HISTORY OF PRESENT ILLNESS
78 yo F with history of HTN, HLD, MVR s/p bioprosthetic MV and maze 2021, postop Afib and now atrial flutter since Jan 2024 seen on medtronic dual chamber PPM (atrial 3830 and RV lead, implanted by Dr. Art Walker/Che at [facility] post MV surgery for tachy-federico ?). Patient presents for scheduled ablation.   ??  She has never noted any symptoms of palpitation, worsening SOB, fatigue or chest discomfort during this time while in atrial flutter. She has started Eliquis and when seen by Dr. Simon previously her metoprolol dose was increased to 200mg for rate control. Her Device was interrogated today and showed 100% AFL since Jan 2024 with V rate around 100 bpm.  Patient  states that  last time she took  her medications 12/3/24 in am including Eliquis   ?  TTE 12/13/2023: EF 60%, mildly dilated RA and LA

## 2024-12-04 NOTE — H&P ADULT - NSICDXPASTSURGICALHX_GEN_ALL_CORE_FT
PAST SURGICAL HISTORY:  S/P Maze operation for atrial fibrillation     S/P MVR (mitral valve replacement)     S/P placement of cardiac pacemaker

## 2024-12-04 NOTE — H&P ADULT - ASSESSMENT
80 yo F with history of HTN, HLD, MVR s/p bioprosthetic MV and maze 2021, postop Afib and now atrial flutter since Jan 2024 seen on medtronic dual chamber PPM (atrial 3830 and RV lead, implanted by Dr. Art Walker/Che at [facility] post MV surgery for tachy-federico ?). Patient presents for scheduled ablation.

## 2024-12-04 NOTE — CHART NOTE - NSCHARTNOTEFT_GEN_A_CORE
Patient is status post successful ablation for atrial flutter, atypical.   There were no complications. No effusion noted at the completion of the case.   Access via right (and left) femoral vein.  Hemostasis obtained via vascade device x 3.   Bedrest x 4 hours.   Continue home medications including uninterrupted anticoagulation, increase to apixaban 5mg bid.   Device check in am.   Admit for montioring.

## 2024-12-05 ENCOUNTER — RESULT REVIEW (OUTPATIENT)
Age: 79
End: 2024-12-05

## 2024-12-05 LAB
ALBUMIN SERPL ELPH-MCNC: 4.1 G/DL — SIGNIFICANT CHANGE UP (ref 3.3–5)
ALP SERPL-CCNC: 52 U/L — SIGNIFICANT CHANGE UP (ref 40–120)
ALT FLD-CCNC: 13 U/L — SIGNIFICANT CHANGE UP (ref 10–45)
ANION GAP SERPL CALC-SCNC: 11 MMOL/L — SIGNIFICANT CHANGE UP (ref 5–17)
APTT BLD: 49.6 SEC — HIGH (ref 24.5–35.6)
AST SERPL-CCNC: 38 U/L — SIGNIFICANT CHANGE UP (ref 10–40)
BASOPHILS # BLD AUTO: 0.03 K/UL — SIGNIFICANT CHANGE UP (ref 0–0.2)
BASOPHILS NFR BLD AUTO: 0.4 % — SIGNIFICANT CHANGE UP (ref 0–2)
BILIRUB SERPL-MCNC: 0.7 MG/DL — SIGNIFICANT CHANGE UP (ref 0.2–1.2)
BUN SERPL-MCNC: 14 MG/DL — SIGNIFICANT CHANGE UP (ref 7–23)
CALCIUM SERPL-MCNC: 8.5 MG/DL — SIGNIFICANT CHANGE UP (ref 8.4–10.5)
CHLORIDE SERPL-SCNC: 108 MMOL/L — SIGNIFICANT CHANGE UP (ref 96–108)
CO2 SERPL-SCNC: 24 MMOL/L — SIGNIFICANT CHANGE UP (ref 22–31)
CREAT SERPL-MCNC: 0.91 MG/DL — SIGNIFICANT CHANGE UP (ref 0.5–1.3)
EGFR: 64 ML/MIN/1.73M2 — SIGNIFICANT CHANGE UP
EOSINOPHIL # BLD AUTO: 0.01 K/UL — SIGNIFICANT CHANGE UP (ref 0–0.5)
EOSINOPHIL NFR BLD AUTO: 0.1 % — SIGNIFICANT CHANGE UP (ref 0–6)
GLUCOSE SERPL-MCNC: 148 MG/DL — HIGH (ref 70–99)
HCT VFR BLD CALC: 37.2 % — SIGNIFICANT CHANGE UP (ref 34.5–45)
HGB BLD-MCNC: 12.3 G/DL — SIGNIFICANT CHANGE UP (ref 11.5–15.5)
IMM GRANULOCYTES NFR BLD AUTO: 0.3 % — SIGNIFICANT CHANGE UP (ref 0–0.9)
INR BLD: 1.48 — HIGH (ref 0.85–1.16)
LACTATE SERPL-SCNC: 1.5 MMOL/L — SIGNIFICANT CHANGE UP (ref 0.5–2)
LYMPHOCYTES # BLD AUTO: 1.89 K/UL — SIGNIFICANT CHANGE UP (ref 1–3.3)
LYMPHOCYTES # BLD AUTO: 26.8 % — SIGNIFICANT CHANGE UP (ref 13–44)
MAGNESIUM SERPL-MCNC: 1.7 MG/DL — SIGNIFICANT CHANGE UP (ref 1.6–2.6)
MCHC RBC-ENTMCNC: 31.1 PG — SIGNIFICANT CHANGE UP (ref 27–34)
MCHC RBC-ENTMCNC: 33.1 G/DL — SIGNIFICANT CHANGE UP (ref 32–36)
MCV RBC AUTO: 94.2 FL — SIGNIFICANT CHANGE UP (ref 80–100)
MONOCYTES # BLD AUTO: 0.48 K/UL — SIGNIFICANT CHANGE UP (ref 0–0.9)
MONOCYTES NFR BLD AUTO: 6.8 % — SIGNIFICANT CHANGE UP (ref 2–14)
NEUTROPHILS # BLD AUTO: 4.61 K/UL — SIGNIFICANT CHANGE UP (ref 1.8–7.4)
NEUTROPHILS NFR BLD AUTO: 65.6 % — SIGNIFICANT CHANGE UP (ref 43–77)
NRBC # BLD: 0 /100 WBCS — SIGNIFICANT CHANGE UP (ref 0–0)
PHOSPHATE SERPL-MCNC: 3.8 MG/DL — SIGNIFICANT CHANGE UP (ref 2.5–4.5)
PLATELET # BLD AUTO: 84 K/UL — LOW (ref 150–400)
POTASSIUM SERPL-MCNC: 4 MMOL/L — SIGNIFICANT CHANGE UP (ref 3.5–5.3)
POTASSIUM SERPL-SCNC: 4 MMOL/L — SIGNIFICANT CHANGE UP (ref 3.5–5.3)
PROT SERPL-MCNC: 6.2 G/DL — SIGNIFICANT CHANGE UP (ref 6–8.3)
PROTHROM AB SERPL-ACNC: 16.9 SEC — HIGH (ref 9.9–13.4)
RBC # BLD: 3.95 M/UL — SIGNIFICANT CHANGE UP (ref 3.8–5.2)
RBC # FLD: 13.3 % — SIGNIFICANT CHANGE UP (ref 10.3–14.5)
SODIUM SERPL-SCNC: 143 MMOL/L — SIGNIFICANT CHANGE UP (ref 135–145)
WBC # BLD: 7.04 K/UL — SIGNIFICANT CHANGE UP (ref 3.8–10.5)
WBC # FLD AUTO: 7.04 K/UL — SIGNIFICANT CHANGE UP (ref 3.8–10.5)

## 2024-12-05 PROCEDURE — 74018 RADEX ABDOMEN 1 VIEW: CPT | Mod: 26

## 2024-12-05 PROCEDURE — 71045 X-RAY EXAM CHEST 1 VIEW: CPT | Mod: 26

## 2024-12-05 PROCEDURE — 99291 CRITICAL CARE FIRST HOUR: CPT

## 2024-12-05 PROCEDURE — 99232 SBSQ HOSP IP/OBS MODERATE 35: CPT

## 2024-12-05 PROCEDURE — 93306 TTE W/DOPPLER COMPLETE: CPT | Mod: 26

## 2024-12-05 RX ORDER — FENOFIBRATE,MICRONIZED 134 MG
54 CAPSULE ORAL EVERY 24 HOURS
Refills: 0 | Status: DISCONTINUED | OUTPATIENT
Start: 2024-12-05 | End: 2024-12-05

## 2024-12-05 RX ORDER — DILTIAZEM HYDROCHLORIDE 240 MG/1
5 CAPSULE, COATED, EXTENDED RELEASE ORAL
Qty: 125 | Refills: 0 | Status: DISCONTINUED | OUTPATIENT
Start: 2024-12-05 | End: 2024-12-05

## 2024-12-05 RX ORDER — MEMANTINE HYDROCHLORIDE 14 MG/1
5 CAPSULE, EXTENDED RELEASE ORAL EVERY 24 HOURS
Refills: 0 | Status: DISCONTINUED | OUTPATIENT
Start: 2024-12-05 | End: 2024-12-09

## 2024-12-05 RX ORDER — LISINOPRIL 20 MG/1
10 TABLET ORAL EVERY 24 HOURS
Refills: 0 | Status: DISCONTINUED | OUTPATIENT
Start: 2024-12-05 | End: 2024-12-05

## 2024-12-05 RX ORDER — HYDROCORTISONE BUTYRATE 0.1 %
1 CREAM (GRAM) TOPICAL
Refills: 0 | Status: DISCONTINUED | OUTPATIENT
Start: 2024-12-05 | End: 2024-12-09

## 2024-12-05 RX ORDER — DILTIAZEM HYDROCHLORIDE 240 MG/1
15 CAPSULE, COATED, EXTENDED RELEASE ORAL ONCE
Refills: 0 | Status: DISCONTINUED | OUTPATIENT
Start: 2024-12-05 | End: 2024-12-06

## 2024-12-05 RX ORDER — DONEPEZIL HYDROCHLORIDE 5 MG/1
5 TABLET, FILM COATED ORAL AT BEDTIME
Refills: 0 | Status: DISCONTINUED | OUTPATIENT
Start: 2024-12-05 | End: 2024-12-09

## 2024-12-05 RX ORDER — TRAZODONE HYDROCHLORIDE 150 MG/1
1 TABLET ORAL
Refills: 0 | DISCHARGE

## 2024-12-05 RX ORDER — INFLUENZA VIRUS VACCINE 15; 15; 15; 15 UG/.5ML; UG/.5ML; UG/.5ML; UG/.5ML
0.5 SUSPENSION INTRAMUSCULAR ONCE
Refills: 0 | Status: DISCONTINUED | OUTPATIENT
Start: 2024-12-05 | End: 2024-12-09

## 2024-12-05 RX ORDER — DONEPEZIL HYDROCHLORIDE 5 MG/1
1 TABLET, FILM COATED ORAL
Refills: 0 | DISCHARGE

## 2024-12-05 RX ORDER — PRAVASTATIN SODIUM 20 MG/1
1 TABLET ORAL
Refills: 0 | DISCHARGE

## 2024-12-05 RX ORDER — MEMANTINE HYDROCHLORIDE 14 MG/1
1 CAPSULE, EXTENDED RELEASE ORAL
Refills: 0 | DISCHARGE

## 2024-12-05 RX ORDER — METOPROLOL TARTRATE 100 MG/1
200 TABLET, FILM COATED ORAL DAILY
Refills: 0 | Status: DISCONTINUED | OUTPATIENT
Start: 2024-12-05 | End: 2024-12-09

## 2024-12-05 RX ORDER — AMIODARONE HYDROCHLORIDE 200 MG/1
0.5 TABLET ORAL
Qty: 450 | Refills: 0 | Status: DISCONTINUED | OUTPATIENT
Start: 2024-12-05 | End: 2024-12-06

## 2024-12-05 RX ORDER — SENNOSIDES 8.6 MG
2 TABLET ORAL AT BEDTIME
Refills: 0 | Status: DISCONTINUED | OUTPATIENT
Start: 2024-12-05 | End: 2024-12-09

## 2024-12-05 RX ORDER — ADENOSINE 3 MG/ML
6 INJECTION INTRAVENOUS ONCE
Refills: 0 | Status: DISCONTINUED | OUTPATIENT
Start: 2024-12-05 | End: 2024-12-06

## 2024-12-05 RX ORDER — AMIODARONE HYDROCHLORIDE 200 MG/1
150 TABLET ORAL ONCE
Refills: 0 | Status: DISCONTINUED | OUTPATIENT
Start: 2024-12-05 | End: 2024-12-06

## 2024-12-05 RX ORDER — ROSUVASTATIN CALCIUM 5 MG/1
1 TABLET, FILM COATED ORAL
Refills: 0 | DISCHARGE

## 2024-12-05 RX ORDER — DILTIAZEM HYDROCHLORIDE 240 MG/1
15 CAPSULE, COATED, EXTENDED RELEASE ORAL ONCE
Refills: 0 | Status: COMPLETED | OUTPATIENT
Start: 2024-12-05 | End: 2024-12-05

## 2024-12-05 RX ORDER — METOPROLOL TARTRATE 100 MG/1
1 TABLET, FILM COATED ORAL
Refills: 0 | DISCHARGE

## 2024-12-05 RX ORDER — ADENOSINE 3 MG/ML
6 INJECTION INTRAVENOUS ONCE
Refills: 0 | Status: COMPLETED | OUTPATIENT
Start: 2024-12-05 | End: 2024-12-05

## 2024-12-05 RX ORDER — DICLOFENAC SODIUM 20 MG/G
2.25 SOLUTION TOPICAL
Refills: 0 | DISCHARGE

## 2024-12-05 RX ORDER — POLYETHYLENE GLYCOL 3350 17 G/17G
17 POWDER, FOR SOLUTION ORAL EVERY 24 HOURS
Refills: 0 | Status: DISCONTINUED | OUTPATIENT
Start: 2024-12-05 | End: 2024-12-09

## 2024-12-05 RX ORDER — METOPROLOL TARTRATE 100 MG/1
5 TABLET, FILM COATED ORAL ONCE
Refills: 0 | Status: COMPLETED | OUTPATIENT
Start: 2024-12-05 | End: 2024-12-05

## 2024-12-05 RX ORDER — AMIODARONE HYDROCHLORIDE 200 MG/1
1 TABLET ORAL
Qty: 450 | Refills: 0 | Status: DISCONTINUED | OUTPATIENT
Start: 2024-12-05 | End: 2024-12-05

## 2024-12-05 RX ORDER — DILTIAZEM HYDROCHLORIDE 240 MG/1
30 CAPSULE, COATED, EXTENDED RELEASE ORAL ONCE
Refills: 0 | Status: COMPLETED | OUTPATIENT
Start: 2024-12-05 | End: 2024-12-05

## 2024-12-05 RX ADMIN — DILTIAZEM HYDROCHLORIDE 15 MILLIGRAM(S): 240 CAPSULE, COATED, EXTENDED RELEASE ORAL at 04:16

## 2024-12-05 RX ADMIN — POLYETHYLENE GLYCOL 3350 17 GRAM(S): 17 POWDER, FOR SOLUTION ORAL at 14:49

## 2024-12-05 RX ADMIN — Medication 10 MILLIGRAM(S): at 21:08

## 2024-12-05 RX ADMIN — DONEPEZIL HYDROCHLORIDE 5 MILLIGRAM(S): 5 TABLET, FILM COATED ORAL at 21:08

## 2024-12-05 RX ADMIN — APIXABAN 5 MILLIGRAM(S): 2.5 TABLET, FILM COATED ORAL at 06:25

## 2024-12-05 RX ADMIN — Medication 1 MILLIGRAM(S): at 11:37

## 2024-12-05 RX ADMIN — ADENOSINE 6 MILLIGRAM(S): 3 INJECTION INTRAVENOUS at 01:46

## 2024-12-05 RX ADMIN — PANTOPRAZOLE SODIUM 40 MILLIGRAM(S): 40 TABLET, DELAYED RELEASE ORAL at 06:25

## 2024-12-05 RX ADMIN — METOPROLOL TARTRATE 5 MILLIGRAM(S): 100 TABLET, FILM COATED ORAL at 01:45

## 2024-12-05 RX ADMIN — AMIODARONE HYDROCHLORIDE 16.7 MG/MIN: 200 TABLET ORAL at 21:03

## 2024-12-05 RX ADMIN — Medication 25 GRAM(S): at 05:06

## 2024-12-05 RX ADMIN — Medication 2 TABLET(S): at 21:08

## 2024-12-05 RX ADMIN — METOPROLOL TARTRATE 200 MILLIGRAM(S): 100 TABLET, FILM COATED ORAL at 13:39

## 2024-12-05 RX ADMIN — MEMANTINE HYDROCHLORIDE 5 MILLIGRAM(S): 14 CAPSULE, EXTENDED RELEASE ORAL at 14:49

## 2024-12-05 RX ADMIN — Medication 1 APPLICATION(S): at 17:35

## 2024-12-05 RX ADMIN — APIXABAN 5 MILLIGRAM(S): 2.5 TABLET, FILM COATED ORAL at 17:35

## 2024-12-05 RX ADMIN — AMIODARONE HYDROCHLORIDE 33.3 MG/MIN: 200 TABLET ORAL at 03:20

## 2024-12-05 RX ADMIN — DILTIAZEM HYDROCHLORIDE 30 MILLIGRAM(S): 240 CAPSULE, COATED, EXTENDED RELEASE ORAL at 05:43

## 2024-12-05 RX ADMIN — DILTIAZEM HYDROCHLORIDE 5 MG/HR: 240 CAPSULE, COATED, EXTENDED RELEASE ORAL at 04:55

## 2024-12-05 NOTE — PROGRESS NOTE ADULT - ASSESSMENT
Patient is 79 year female with history of HTN, HLD, MVR s/p bioprosthetic MV and maze 2021, postop Afib and now atrial flutter since Jan 2024 seen on Medtronic dual chamber PPM who presented to [facility] for a scheduled ablation. Developed refractory SVT post ablation, stepped up to CCU for amio and dilt drips.     NEURO  Home Med: mirtazapine 45mg at bedtime  - c/w home med    CV  #Refractory SVT  s/p ablation 12/4/24  s/p adenosine 6mg [id number], diltiazem 15mg IVP x1, lopressor 5mg IVP x1  - c/w amiodarone gtt, wean as tolerated  - c/w diltiazem gtt, wean as tolerated  - f/u TTE  - f/u EP recs    #Postop Afib  #Aflutter  Seen on dual chamber PPM  Home Med: Eliquis 2.5mg BID(?)  - c/w Eliquis 5mg BID  - complete official med rec in AM    #HTN  Home Med: Lisinopril 10mg qd, metoprolol succinate 100mg qd  - hold metoprolol succinate 100mg given pt on diltiazem gtt  - hold lisinoprol 10mg for now    #HLD  Home Med: fenofibrate 54mg, rosuvastatin 20mg qd  - c/w rosuvastatin 20mg qd    PULM  KISHORE    RENAL  KISHORE    GI  - Diet: DASH/TLC  - Bowel Regimen: none  - GI PPx: pantoprazole 40mg qd      #Urinary Retention  Pt retaining,   - Reid in place    ENDO  KISHORE    ID  KISHORE    HEME/ONC  Home Med: folic acid 1mg daily  - c/w home med     MSK  No longer requiring bedrest    PREVENTIVE   F: none  E: replete K>4 and Mg>2  N: NPO for procedure  GI: pantoprazole 40mg qd  DVT: Eliquis  DISPO: CCU       Patient is 79 year female with history of HTN, HLD, MVR s/p bioprosthetic MV and maze 2021, postop Afib and now atrial flutter since Jan 2024 seen on Medtronic dual chamber PPM who presented to [facility] for a scheduled ablation. Developed refractory SVT post ablation, stepped up to CCU for amio and dilt drips.     NEURO  Home Med: mirtazapine 45mg at bedtime  - c/w home med    CV  #Refractory SVT  s/p ablation 12/4/24  s/p adenosine 6mg [id number], diltiazem 15mg IVP x1, lopressor 5mg IVP x1  - c/w amiodarone gtt, wean as tolerated  - c/w diltiazem gtt, wean as tolerated  - f/u TTE  - f/u EP recs    #Postop Afib  #Aflutter  Seen on dual chamber PPM  Home Med: Eliquis 5mg BID  - c/w Eliquis 5mg BID    #HTN  Home Med: Lisinopril 10mg qd, metoprolol succinate 100mg qd  - hold metoprolol succinate 100mg given pt on diltiazem gtt  - hold lisinoprol 10mg for now    #HLD  Home Med: fenofibrate 54mg, rosuvastatin 20mg qd  - c/w rosuvastatin 20mg qd    PULM  KISHORE    RENAL  KISHORE    GI  - Diet: DASH/TLC  - Bowel Regimen: none  - GI PPx: pantoprazole 40mg qd      #Urinary Retention  Pt retaining,   - Reid in place    ENDO  KISHORE    ID  KISHORE    HEME/ONC  Home Med: folic acid 1mg daily  - c/w home med     MSK  No longer requiring bedrest    PREVENTIVE   F: none  E: replete K>4 and Mg>2  N: NPO for procedure  GI: pantoprazole 40mg qd  DVT: Eliquis  DISPO: CCU       Patient is 79 year female with history of HTN, HLD, MVR s/p bioprosthetic MV and maze 2021, postop Afib and now atrial flutter since Jan 2024 seen on Medtronic dual chamber PPM who presented to [facility] for a scheduled ablation. Developed refractory SVT post ablation, stepped up to CCU for amio and dilt drips.     NEURO  #Dementia  Home Meds:   - c/w home meds    CV  #Refractory SVT  s/p ablation 12/4/24  s/p adenosine 6mg [id number], diltiazem 15mg IVP x1, lopressor 5mg IVP x1  - c/w amiodarone gtt, wean as tolerated  - c/w diltiazem gtt, wean as tolerated  - f/u TTE  - f/u EP recs    #Postop Afib  #Aflutter  Seen on dual chamber PPM  Home Med: Eliquis 5mg BID  - c/w Eliquis 5mg BID    #HTN  Home Med: Lisinopril 10mg qd, metoprolol succinate 100mg qd  - hold metoprolol succinate 100mg given pt on diltiazem gtt  - hold lisinoprol 10mg for now    #HLD  Home Med: fenofibrate 54mg, rosuvastatin 20mg qd  - c/w rosuvastatin 20mg qd    PULM  KISHORE    RENAL  KISHORE    GI  - Diet: DASH/TLC  - Bowel Regimen: none  - GI PPx: pantoprazole 40mg qd      #Urinary Retention  Pt retaining,   - Reid in place    ENDO  KISHORE    ID  KISHORE    HEME/ONC  Home Med: folic acid 1mg daily  - c/w home med     MSK  No longer requiring bedrest    PREVENTIVE   F: none  E: replete K>4 and Mg>2  N: NPO for procedure  GI: pantoprazole 40mg qd  DVT: Eliquis  DISPO: CCU       Patient is 79 year female with history of HTN, HLD, MVR s/p bioprosthetic MV and maze 2021, postop Afib and now atrial flutter since Jan 2024 s/p dual chamber PPM who presented to [facility] for a scheduled ablation with ccb refractory SVT post ablation, stepped up to CCU for amio and dilt drips.     NEURO  #Dementia  Home Meds: memantine 5 qd, donepozil 5 qd  - c/w home meds    CV  #Refractory SVT  s/p ablation 12/4/24  s/p adenosine 6mg [id number], diltiazem 15mg IVP x1, lopressor 5mg IVP x1  - c/w amiodarone gtt  - c/w diltiazem gtt, wean as tolerated  - f/u EP recs, possible ablation 12/6    #Postop Afib  #Aflutter  Seen on dual chamber PPM  Home Med: Eliquis 5mg BID  - c/w Eliquis 5mg BID    #HTN  Home Med: Lisinopril 10mg qd, metoprolol succinate 200mg qd  - c/w home meds with hold parameters    #HLD  Home Med: fenofibrate 54mg, pravastatin 40  - c/w home meds    PULM  KISHORE    RENAL  KISHORE    GI  - Diet: DASH/TLC  - Bowel Regimen: miralax and senna  - GI PPx: pantoprazole 40mg qd      #Urinary Retention  Pt retaining,   - Reid in place    ENDO  KISHORE    ID  KISHORE    HEME/ONC  Home Med: folic acid 1mg daily  - c/w home med     MSK/DERM  #Rash  Skin rash at site of pad placement, likely irritation 2/2 adhesives  - hydrocortisone cream     PREVENTIVE   F: none  E: replete K>4 and Mg>2  N: DASH  GI: pantoprazole 40mg qd  DVT: Eliquis  DISPO: CCU       Patient is 79 year female with history of HTN, HLD, MVR s/p bioprosthetic MV and maze 2021, postop Afib and now atrial flutter since Jan 2024 s/p dual chamber PPM who presented to [facility] for a scheduled ablation with ccb refractory SVT post ablation, stepped up to CCU for amio and dilt drips.     NEURO  #Dementia  Home Meds: memantine 5 qd, donepozil 5 qd  - c/w home meds    CV  #Refractory SVT  s/p ablation 12/4/24  s/p adenosine 6mg [id number], diltiazem 15mg IVP x1, lopressor 5mg IVP x1  - c/w amiodarone gtt  - c/w diltiazem gtt, wean as tolerated  - f/u EP recs, ablation today    #Postop Afib  #Aflutter  Seen on dual chamber PPM  Home Med: Eliquis 5mg BID  - c/w Eliquis 5mg BID    #HTN  Home Med: Lisinopril 10mg qd, metoprolol succinate 200mg qd  - c/w home meds with hold parameters    #HLD  Home Med: fenofibrate 54mg, pravastatin 40  - c/w home meds    PULM  KISHORE    RENAL  KISHORE    GI  - Diet: DASH/TLC, NPO for procedure  - Bowel Regimen: miralax and senna  - GI PPx: pantoprazole 40mg qd      #Urinary Retention  Pt retaining,   - Reid in place    ENDO  KISHORE    ID  KISHORE    HEME/ONC  Home Med: folic acid 1mg daily  - c/w home med     MSK/DERM  #Rash  Skin rash at site of pad placement, likely irritation 2/2 adhesives  - hydrocortisone cream     PREVENTIVE   F: none  E: replete K>4 and Mg>2  N: DASH, NPO for procedure  GI: pantoprazole 40mg qd  DVT: Eliquis  DISPO: CCU       Patient is 79 year female with history of HTN, HLD, MVR s/p bioprosthetic MV and maze 2021, postop Afib and now atrial flutter since Jan 2024 s/p dual chamber PPM who presented to [facility] for a scheduled ablation with ccb refractory SVT post ablation, stepped up to CCU for amio and dilt drips.     NEURO  #Dementia  Home Meds: memantine 5 qd, donepozil 5 qd  - c/w home meds    CV  #Refractory SVT  s/p ablation 12/4/24  s/p adenosine 6mg [id number], diltiazem 15mg IVP x1, lopressor 5mg IVP x1  - c/w amiodarone gtt  - c/w diltiazem gtt, wean as tolerated  - f/u EP recs, possible ablation 12/6    #Postop Afib  #Aflutter  Seen on dual chamber PPM  Home Med: Eliquis 5mg BID  - c/w Eliquis 5mg BID    #HTN  Home Med: Lisinopril 10mg qd, metoprolol succinate 200mg qd  - c/w home meds with hold parameters    #HLD  Home Med: fenofibrate 54mg, pravastatin 40  - c/w home meds    PULM  KISHORE    RENAL  KISHORE    GI  - Diet: DASH/TLC  - Bowel Regimen: miralax and senna  - GI PPx: pantoprazole 40mg qd      #Urinary Retention  Pt retaining,   - Reid in place    ENDO  KISHORE    ID  KISHORE    HEME/ONC  Home Med: folic acid 1mg daily  - c/w home med     MSK/DERM  #Rash  Skin rash at site of pad placement, likely irritation 2/2 adhesives  - hydrocortisone cream     PREVENTIVE   F: none  E: replete K>4 and Mg>2  N: DASH  GI: pantoprazole 40mg qd  DVT: Eliquis  DISPO: CCU

## 2024-12-05 NOTE — PROGRESS NOTE ADULT - SUBJECTIVE AND OBJECTIVE BOX
************************TRANSFER FROM CARDIAC TELE TO CCU************************    HOSPITAL COURSE: 79 year old female with history of HTN, HLD, MVR s/p bioprosthetic MV and maze 2021, postop Afib and now atrial flutter since Jan 2024 seen on medtronic dual chamber PPM who presented to [facility] for a scheduled ablation. Admitted to cardiac telemetry post ablation. Around 10:30pm, pt found to be in SVT with HR 180s and associated symptoms of chest palpitations and dizziness. Pt given adenosine 6mg x1 with conversion to sinus rhythm and resolution of symptoms. Around 12:30 am, pt developed chest palpitations with HR in the 180s again. Another push of adenosine 6mg x1 given with transient resolution of symptoms and achieving sinus rhythm with . Given lopressor 5mg IVP for rate control. Pt went back into SVT for the third time and despite the adenosine, did not adequately convert back to sinus rhythm. Given diltiazem 15mg IVP and PO tablet 30mg. Started on amiodarone gtt with return of sinus rhythm, now being transferred to CCU for closer monitoring on the drip.    INTERVAL/OVERNIGHT EVENTS: None    SUBJECTIVE:  Patient seen and examined at bedside,     Vital Signs Last 12 Hrs  T(F): 98.1 (12-05-24 @ 00:36), Max: 98.1 (12-05-24 @ 00:36)  HR: 85 (12-05-24 @ 00:36) (77 - 85)  BP: 103/60 (12-05-24 @ 00:36) (103/60 - 126/53)  BP(mean): 77 (12-05-24 @ 00:36) (77 - 77)  RR: 18 (12-05-24 @ 00:36) (18 - 18)  SpO2: 94% (12-05-24 @ 00:36) (94% - 95%)  I&O's Summary      PHYSICAL EXAM:  General: NAD  HEENT: PERRL, EOM intact, sclera anicteric, MMM  Cardiovascular: RRR; no MRG; no JVD  Respiratory: CTAB; no WRR  GI/: soft; NTND; BS x4  Extremities: WWP; 2+ peripheral pulses bilaterally; no LE edema  Skin: normal color & turgor; no rash  Neurologic: AOx3; no focal deficits    LABS:                        11.6   12.05 )-----------( 125      ( 04 Dec 2024 17:49 )             36.2     12-04    143  |  106  |  14  ----------------------------<  106[H]  3.9   |  24  |  0.78    Ca    8.1[L]      04 Dec 2024 17:49    TPro  5.7[L]  /  Alb  3.9  /  TBili  0.6  /  DBili  x   /  AST  23  /  ALT  12  /  AlkPhos  47  12-04    PT/INR - ( 04 Dec 2024 14:24 )   PT: 13.7 sec;   INR: 1.17          PTT - ( 04 Dec 2024 14:24 )  PTT:30.0 sec  Urinalysis Basic - ( 04 Dec 2024 17:49 )    Color: x / Appearance: x / SG: x / pH: x  Gluc: 106 mg/dL / Ketone: x  / Bili: x / Urobili: x   Blood: x / Protein: x / Nitrite: x   Leuk Esterase: x / RBC: x / WBC x   Sq Epi: x / Non Sq Epi: x / Bacteria: x          RADIOLOGY & ADDITIONAL TESTS:    MEDICATIONS  (STANDING):  aDENosine Injectable (ADENOCARD) 6 milliGRAM(s) IV Push once  aMIOdarone Infusion 1 mG/Min (33.3 mL/Hr) IV Continuous <Continuous>  aMIOdarone IVPB 150 milliGRAM(s) IV Intermittent once  apixaban 5 milliGRAM(s) Oral two times a day  diltiazem    Tablet 30 milliGRAM(s) Oral once  diltiazem Injectable 15 milliGRAM(s) IV Push once  folic acid 1 milliGRAM(s) Oral daily  metoprolol succinate  milliGRAM(s) Oral daily  mirtazapine 45 milliGRAM(s) Oral at bedtime  pantoprazole    Tablet 40 milliGRAM(s) Oral before breakfast  rosuvastatin 20 milliGRAM(s) Oral at bedtime    MEDICATIONS  (PRN):   ************************TRANSFER FROM CARDIAC TELE TO CCU************************    HOSPITAL COURSE: 79 year old female with history of HTN, HLD, MVR s/p bioprosthetic MV and maze 2021, postop Afib and now atrial flutter since Jan 2024 seen on medtronic dual chamber PPM who presented to [facility] for a scheduled ablation. Admitted to cardiac telemetry post ablation. Around 10:30pm, pt found to be in SVT with HR 180s and associated symptoms of chest palpitations and dizziness. Pt given adenosine 6mg x1 with conversion to sinus rhythm and resolution of symptoms. Around 12:30 am, pt developed chest palpitations with HR in the 180s again. Another push of adenosine 6mg x1 given with transient resolution of symptoms and achieving sinus rhythm with . Given lopressor 5mg IVP for rate control. Pt went back into SVT for the third time and despite the adenosine, did not adequately convert back to sinus rhythm. Given diltiazem 15mg IVP and started on amiodarone gtt with return of sinus rhythm, now being transferred to CCU for closer monitoring on the drip.    INTERVAL/OVERNIGHT EVENTS: None    SUBJECTIVE:  Patient seen and examined at bedside,     Vital Signs Last 12 Hrs  T(F): 98.1 (12-05-24 @ 00:36), Max: 98.1 (12-05-24 @ 00:36)  HR: 85 (12-05-24 @ 00:36) (77 - 85)  BP: 103/60 (12-05-24 @ 00:36) (103/60 - 126/53)  BP(mean): 77 (12-05-24 @ 00:36) (77 - 77)  RR: 18 (12-05-24 @ 00:36) (18 - 18)  SpO2: 94% (12-05-24 @ 00:36) (94% - 95%)  I&O's Summary      PHYSICAL EXAM:  General: NAD  HEENT: PERRL, EOM intact, sclera anicteric, MMM  Cardiovascular: RRR; no MRG; no JVD  Respiratory: CTAB; no WRR  GI/: soft; NTND; BS x4  Extremities: WWP; 2+ peripheral pulses bilaterally; no LE edema  Skin: normal color & turgor; no rash  Neurologic: AOx3; no focal deficits    LABS:                        11.6   12.05 )-----------( 125      ( 04 Dec 2024 17:49 )             36.2     12-04    143  |  106  |  14  ----------------------------<  106[H]  3.9   |  24  |  0.78    Ca    8.1[L]      04 Dec 2024 17:49    TPro  5.7[L]  /  Alb  3.9  /  TBili  0.6  /  DBili  x   /  AST  23  /  ALT  12  /  AlkPhos  47  12-04    PT/INR - ( 04 Dec 2024 14:24 )   PT: 13.7 sec;   INR: 1.17          PTT - ( 04 Dec 2024 14:24 )  PTT:30.0 sec  Urinalysis Basic - ( 04 Dec 2024 17:49 )    Color: x / Appearance: x / SG: x / pH: x  Gluc: 106 mg/dL / Ketone: x  / Bili: x / Urobili: x   Blood: x / Protein: x / Nitrite: x   Leuk Esterase: x / RBC: x / WBC x   Sq Epi: x / Non Sq Epi: x / Bacteria: x          RADIOLOGY & ADDITIONAL TESTS:    MEDICATIONS  (STANDING):  aDENosine Injectable (ADENOCARD) 6 milliGRAM(s) IV Push once  aMIOdarone Infusion 1 mG/Min (33.3 mL/Hr) IV Continuous <Continuous>  aMIOdarone IVPB 150 milliGRAM(s) IV Intermittent once  apixaban 5 milliGRAM(s) Oral two times a day  diltiazem    Tablet 30 milliGRAM(s) Oral once  diltiazem Injectable 15 milliGRAM(s) IV Push once  folic acid 1 milliGRAM(s) Oral daily  metoprolol succinate  milliGRAM(s) Oral daily  mirtazapine 45 milliGRAM(s) Oral at bedtime  pantoprazole    Tablet 40 milliGRAM(s) Oral before breakfast  rosuvastatin 20 milliGRAM(s) Oral at bedtime    MEDICATIONS  (PRN):   ************************TRANSFER FROM CARDIAC TELE TO CCU************************    HOSPITAL COURSE: 79 year old female with history of HTN, HLD, MVR s/p bioprosthetic MV and maze 2021, postop Afib and now atrial flutter since Jan 2024 seen on Medtronic dual chamber PPM who presented to [facility] for a scheduled ablation. Admitted to cardiac telemetry post ablation. Around 10:30pm, pt found to be in SVT with HR 180s and associated symptoms of chest palpitations and dizziness. Pt given adenosine 6mg x1 with conversion to sinus rhythm and resolution of symptoms. Around 12:30 am, pt developed chest palpitations with HR in the 180s again. Another push of adenosine 6mg x1 given with transient resolution of symptoms and achieving sinus rhythm with . Given lopressor 5mg IVP for rate control. Pt went back into SVT for the third time and despite the adenosine, did not adequately convert back to sinus rhythm. Given diltiazem 15mg IVP and started on amiodarone gtt with return of sinus rhythm, now being transferred to CCU for closer monitoring on the drip.    INTERVAL/OVERNIGHT EVENTS: None    SUBJECTIVE:  Patient seen and examined at bedside, endorsing that she hasn't urinated in a long time. She otherwise denies any chest pain, dyspnea, abdominal pain, or dizziness at this time.    Vital Signs Last 12 Hrs  T(F): 98.1 (12-05-24 @ 00:36), Max: 98.1 (12-05-24 @ 00:36)  HR: 85 (12-05-24 @ 00:36) (77 - 85)  BP: 103/60 (12-05-24 @ 00:36) (103/60 - 126/53)  BP(mean): 77 (12-05-24 @ 00:36) (77 - 77)  RR: 18 (12-05-24 @ 00:36) (18 - 18)  SpO2: 94% (12-05-24 @ 00:36) (94% - 95%)  I&O's Summary    PHYSICAL EXAM:  General: NAD, lying comfortably in bed  HEENT: PERRL, EOM intact  Cardiovascular: RRR; no MRG  Respiratory: CTAB; no WRR  GI/: soft; NTND; endorsing suprapubic discomfort on deep palpation 2/2 retention  Extremities: WWP; 2+ peripheral pulses bilaterally; no LE edema  Skin: normal color & turgor; no rash noted  Neurologic: AOx3; no focal deficits; able to follow commands    LABS:                        11.6   12.05 )-----------( 125      ( 04 Dec 2024 17:49 )             36.2     12-04    143  |  106  |  14  ----------------------------<  106[H]  3.9   |  24  |  0.78    Ca    8.1[L]      04 Dec 2024 17:49    TPro  5.7[L]  /  Alb  3.9  /  TBili  0.6  /  DBili  x   /  AST  23  /  ALT  12  /  AlkPhos  47  12-04    PT/INR - ( 04 Dec 2024 14:24 )   PT: 13.7 sec;   INR: 1.17          PTT - ( 04 Dec 2024 14:24 )  PTT:30.0 sec  Urinalysis Basic - ( 04 Dec 2024 17:49 )    Color: x / Appearance: x / SG: x / pH: x  Gluc: 106 mg/dL / Ketone: x  / Bili: x / Urobili: x   Blood: x / Protein: x / Nitrite: x   Leuk Esterase: x / RBC: x / WBC x   Sq Epi: x / Non Sq Epi: x / Bacteria: x          RADIOLOGY & ADDITIONAL TESTS:    MEDICATIONS  (STANDING):  aDENosine Injectable (ADENOCARD) 6 milliGRAM(s) IV Push once  aMIOdarone Infusion 1 mG/Min (33.3 mL/Hr) IV Continuous <Continuous>  aMIOdarone IVPB 150 milliGRAM(s) IV Intermittent once  apixaban 5 milliGRAM(s) Oral two times a day  diltiazem    Tablet 30 milliGRAM(s) Oral once  diltiazem Injectable 15 milliGRAM(s) IV Push once  folic acid 1 milliGRAM(s) Oral daily  metoprolol succinate  milliGRAM(s) Oral daily  mirtazapine 45 milliGRAM(s) Oral at bedtime  pantoprazole    Tablet 40 milliGRAM(s) Oral before breakfast  rosuvastatin 20 milliGRAM(s) Oral at bedtime    MEDICATIONS  (PRN):   ************************TRANSFER FROM CARDIAC TELE TO CCU************************    HOSPITAL COURSE: 79 year old female with history of HTN, HLD, MVR s/p bioprosthetic MV and maze 2021, postop Afib and now atrial flutter since Jan 2024 seen on Medtronic dual chamber PPM who presented to [facility] for a scheduled ablation. Admitted to cardiac telemetry post ablation. Around 10:30pm, pt found to be in SVT with HR 180s and associated symptoms of chest palpitations and dizziness. Pt given adenosine 6mg x1 with conversion to sinus rhythm and resolution of symptoms. Around 12:30 am, pt developed chest palpitations with HR in the 180s again. Another push of adenosine 6mg x1 given with transient resolution of symptoms and achieving sinus rhythm with . Given lopressor 5mg IVP for rate control. Pt went back into SVT for the third time and despite the adenosine, did not adequately convert back to sinus rhythm. Unclear whether fourth dose of adenosine and PO diltiazem ever given. Given diltiazem 15mg IVP and started on amiodarone gtt with return of sinus rhythm, now being transferred to CCU for closer monitoring on the drip.    INTERVAL/OVERNIGHT EVENTS: None    SUBJECTIVE:  Patient seen and examined at bedside, endorsing that she hasn't urinated in a long time. She otherwise denies any chest pain, dyspnea, abdominal pain, or dizziness at this time.    Vital Signs Last 12 Hrs  T(F): 98.1 (12-05-24 @ 00:36), Max: 98.1 (12-05-24 @ 00:36)  HR: 85 (12-05-24 @ 00:36) (77 - 85)  BP: 103/60 (12-05-24 @ 00:36) (103/60 - 126/53)  BP(mean): 77 (12-05-24 @ 00:36) (77 - 77)  RR: 18 (12-05-24 @ 00:36) (18 - 18)  SpO2: 94% (12-05-24 @ 00:36) (94% - 95%)  I&O's Summary    PHYSICAL EXAM:  General: NAD, lying comfortably in bed  HEENT: PERRL, EOM intact  Cardiovascular: RRR; no MRG  Respiratory: CTAB; no WRR  GI/: soft; NTND; endorsing suprapubic discomfort on deep palpation 2/2 retention  Extremities: WWP; 2+ peripheral pulses bilaterally; no LE edema  Skin: normal color & turgor; no rash noted  Neurologic: AOx3; no focal deficits; able to follow commands    LABS:                        11.6   12.05 )-----------( 125      ( 04 Dec 2024 17:49 )             36.2     12-04    143  |  106  |  14  ----------------------------<  106[H]  3.9   |  24  |  0.78    Ca    8.1[L]      04 Dec 2024 17:49    TPro  5.7[L]  /  Alb  3.9  /  TBili  0.6  /  DBili  x   /  AST  23  /  ALT  12  /  AlkPhos  47  12-04    PT/INR - ( 04 Dec 2024 14:24 )   PT: 13.7 sec;   INR: 1.17          PTT - ( 04 Dec 2024 14:24 )  PTT:30.0 sec  Urinalysis Basic - ( 04 Dec 2024 17:49 )    Color: x / Appearance: x / SG: x / pH: x  Gluc: 106 mg/dL / Ketone: x  / Bili: x / Urobili: x   Blood: x / Protein: x / Nitrite: x   Leuk Esterase: x / RBC: x / WBC x   Sq Epi: x / Non Sq Epi: x / Bacteria: x          RADIOLOGY & ADDITIONAL TESTS:    MEDICATIONS  (STANDING):  aDENosine Injectable (ADENOCARD) 6 milliGRAM(s) IV Push once  aMIOdarone Infusion 1 mG/Min (33.3 mL/Hr) IV Continuous <Continuous>  aMIOdarone IVPB 150 milliGRAM(s) IV Intermittent once  apixaban 5 milliGRAM(s) Oral two times a day  diltiazem    Tablet 30 milliGRAM(s) Oral once  diltiazem Injectable 15 milliGRAM(s) IV Push once  folic acid 1 milliGRAM(s) Oral daily  metoprolol succinate  milliGRAM(s) Oral daily  mirtazapine 45 milliGRAM(s) Oral at bedtime  pantoprazole    Tablet 40 milliGRAM(s) Oral before breakfast  rosuvastatin 20 milliGRAM(s) Oral at bedtime    MEDICATIONS  (PRN):

## 2024-12-05 NOTE — PROGRESS NOTE ADULT - SUBJECTIVE AND OBJECTIVE BOX
EPS Progress Note    S: in bed, NAD, no c/o palpitations or chest pain     MEDICATIONS  (STANDING):  aDENosine Injectable (ADENOCARD) 6 milliGRAM(s) IV Push once  aMIOdarone Infusion 1 mG/Min (33.3 mL/Hr) IV Continuous <Continuous>  aMIOdarone IVPB 150 milliGRAM(s) IV Intermittent once  apixaban 5 milliGRAM(s) Oral two times a day  atorvastatin 10 milliGRAM(s) Oral at bedtime  diltiazem Infusion 5 mG/Hr (5 mL/Hr) IV Continuous <Continuous>  diltiazem Injectable 15 milliGRAM(s) IV Push once  donepezil 5 milliGRAM(s) Oral at bedtime  folic acid 1 milliGRAM(s) Oral daily  hydrocortisone 1% Cream 1 Application(s) Topical two times a day  influenza  Vaccine (HIGH DOSE) 0.5 milliLiter(s) IntraMuscular once  memantine 5 milliGRAM(s) Oral every 24 hours  metoprolol succinate  milliGRAM(s) Oral daily  pantoprazole    Tablet 40 milliGRAM(s) Oral before breakfast  polyethylene glycol 3350 17 Gram(s) Oral every 24 hours  senna 2 Tablet(s) Oral at bedtime      Telemetry: atrial paced with pAT episodes           General:  NAD        HEENT:   PERRL, EOMI	  Neck: Supple, - JVD  Cardiovascular: S1 S2, No JVD  Respiratory: CTA B/L    	  Gastrointestinal:  Soft, Non-tender, + BS	  Skin: No rashes, No ecchymoses, No cyanosis  Extremities: No edema  Psychiatry: A & O x 3	         Labs:                                                               12.3   7.04  )-----------( 84       ( 05 Dec 2024 04:22 )             37.2     12-05    143  |  108  |  14  ----------------------------<  148[H]  4.0   |  24  |  0.91    Ca    8.5      05 Dec 2024 04:22  Phos  3.8     12-05  Mg     1.7     12-05    TPro  6.2  /  Alb  4.1  /  TBili  0.7  /  DBili  x   /  AST  38  /  ALT  13  /  AlkPhos  52  12-05    PT/INR - ( 05 Dec 2024 04:22 )   PT: 16.9 sec;   INR: 1.48          PTT - ( 05 Dec 2024 04:22 )  PTT:49.6 sec    Assessment/Plan:  80 yo F with history of HTN, HLD, MVR s/p bioprosthetic MV and maze 2021, postop Afib and now atrial flutter since Jan 2024 seen on medtronic dual chamber PPM (atrial 3830 and RV lead, implanted by Dr. Art Walker/Che at [facility] post MV surgery for tachy-federico ?). Patient had ablation of atypical atrial flutter 12/4/24, last night she was noted to be in SVT with HR up to 180 bpm requiring adenosine iv . She was transferred to ccu for closer monitoring  started on diltiazem and amiodarone gtt. She still had brief episodes of SVT (likely AT ) but self converters to sinus rhythm.   Continue a/c, amiodarone /diltiazem gtt, keep NPO after MN for EPS/ablation on 12/6/24.

## 2024-12-05 NOTE — PROGRESS NOTE ADULT - NS ATTEND AMEND GEN_ALL_CORE FT
78 yo F with history of HTN, HLD, MVR s/p bioprosthetic MV and maze 2021, postop Afib and now atrial flutter since Jan 2024 seen on medtronic dual chamber PPM (atrial 3830 and RV lead, implanted by Dr. Art Walker/Che at [facility] post MV surgery for SSS- underlying junctional 40s). Patient had ablation of atypical atrial flutter 12/4/24. Had paroxysmal AT with v rate up to 160s overnight during admission requiring diltiazem and amiodarone gtt. Has been in sinus/A paced rhythm for past 24 hours. Plan for oral transition of amiodarone load 200mg bid for 10days then 200mg qd. Slight rise in LFT today, repeat LFT next week with results faxed to Dr. Blake Simon. Continue Eliquis 5mg bid. Follow up scheduled for 1/14/25

## 2024-12-05 NOTE — CHART NOTE - NSCHARTNOTEFT_GEN_A_CORE
12/4 10:30 pm--   PA called upstairs to 11 UR as pt in symptomatic rapid SVT 180s post afib ablation. Immediately evaluated patient who was endorsing palpitations and dizziness. /70s, -180s, physical exam overall comfortable with mild anxious. Zoll pads placed.  EKG STAT confirmed rapid SVT. Given adenosine 6 mg x 1 w/ conversion to SR. Pt now asx. Repeat VS stable, /70s, HR 70-80s. Access site stable. D/w EP attending Dr. Moffett and CCU fellow Meaghan.    12/5 12:30 am--  PA got called by nurse that pt had HR 180s with /53, again symptomatic endorsing palpitations but appeared in no acute distress on physical exam. EKG revealed  bpm, appears to be rapid AFL. Given adenosine 6 mg x 1 with resolution of symptoms and HR control. VS with HR now in 70s and /60. Repeat EKG reveals  bpm, nsst abnormality. D/w Dr. Moffett. Plan to give IV lopressor 5 mg for now. Continue close monitoring with cardiac tele for any recurrence. Plan also discussed with CCU fellow. 12/4 10:30 pm--   PA called upstairs to 11 UR as pt in symptomatic rapid SVT 180s post afl ablation. Immediately evaluated patient who was endorsing palpitations and dizziness. /70s, -180s, physical exam overall comfortable with mild anxious. Zoll pads placed.  EKG STAT confirmed rapid SVT. Given adenosine 6 mg x 1 w/ conversion to SR. Pt now asx. Repeat VS stable, /70s, HR 70-80s. Access site stable. D/w EP attending Dr. Moffett and CCU fellow Meaghan.    12/5 12:30 am--  PA got called by nurse that pt had HR 180s with /53, again symptomatic endorsing palpitations but appeared in no acute distress on physical exam. EKG revealed  bpm, appears to be rapid AFL. Given adenosine 6 mg x 1 with resolution of symptoms and HR control. VS with HR now in 70s and /60. Repeat EKG reveals  bpm, nsst abnormality. D/w Dr. Moffett. Plan to give IV lopressor 5 mg for now. Continue close monitoring with cardiac tele for any recurrence. Plan also discussed with CCU fellow. 12/4 10:30 pm--   PA called upstairs to 11 UR as pt in symptomatic rapid SVT 180s post afl ablation. Immediately evaluated patient who was endorsing palpitations and dizziness. /70s, -180s, physical exam overall comfortable with mild anxious. Zoll pads placed.  EKG STAT confirmed rapid SVT. Given adenosine 6 mg x 1 w/ conversion to SR. Pt now asx. Repeat VS stable, /70s, HR 70-80s. Access site stable. D/w EP attending Dr. Moffett and CCU fellow Meaghan.    12/5 12:30 am--  PA got called by nurse that pt had HR 180s with /53, again symptomatic endorsing palpitations but appeared in no acute distress on physical exam. EKG revealed  bpm, appears to be rapid SVT. Given adenosine 6 mg x 1 with resolution of symptoms and HR control. VS with HR now in 70s and /60. Repeat EKG post adenosine reveals  bpm, nsst abnormality. D/w Dr. Moffett. Plan to give IV lopressor 5 mg for now. Continue close monitoring with cardiac tele for any recurrence. Plan also discussed with CCU fellow.

## 2024-12-05 NOTE — CHART NOTE - NSCHARTNOTEFT_GEN_A_CORE
12/5 2:30 am  Patient went into rapid SVT a third episode with HR 180s was given adenosine 6 IV x 1, diltiazem 15 mg IV and dilt 30 mg PO tablet. Patient converted to sinus briefly < 2 mins then had another episode of sustained SVT 180s w/ symptomatic chest pain, palpitations and borderline hypotension. Pt was given adenosine 6 mg IVP, initiated on amiodarone 150 mg bolus and amiodarone gtt given borderline low BP for acute HR control. Given resistant SVT refractory to medical therapy, plan for close monitoring in the CCU. Plan discussed in length with Dr. Moffett.

## 2024-12-05 NOTE — PATIENT PROFILE ADULT - FALL HARM RISK - HARM RISK INTERVENTIONS

## 2024-12-05 NOTE — PATIENT PROFILE ADULT - NSPRONUTRITIONRISK_GEN_A_NUR
No indicators present Purse String (Intermediate) Text: Given the location of the defect and the characteristics of the surrounding skin a purse string intermediate closure was deemed most appropriate.  Undermining was performed circumfirentially around the surgical defect.  A purse string suture was then placed and tightened.

## 2024-12-05 NOTE — PATIENT PROFILE ADULT - FALL HARM RISK - TYPE OF ASSESSMENT
AMBULATED WITH PT AND WALKER, TOLERATED WELL, VOIDED IN BATHROOM, PIV DC'D, PATIENT STATES SHE IS READY FOR DISCHARGE AT THIS TIME, AWARE NEXT PAIN MEDICATION NOT DUE BEFORE 5:30 PM Admission

## 2024-12-05 NOTE — PROGRESS NOTE ADULT - ATTENDING COMMENTS
Pt is a 79/o woman c HTN, HLP, MVR s/p bioprosthetic MV, post op fib, flutter, dual chamber PPM who underwent flutter ablation now with SVT to 180s.    Pt given adenosine and while it broke, it resumed.    Pt transferred to CICU for refractory SVT  Tele: NCT; @ 160s    During flutter ablation, pt might also have focus of a-tach    afeb, 59 -163, 96/55, 95 RA    Hgb 12.3  Plt 84    Na 143  Cr 0.91  Glc 148    PTT 41.6  INR 1.48    When dilt lowered, pt with tachycardia    - pt with refractory SVT perhaps a-tach  - cont amio gtt and dilt gtt to control HR  - if HR not controlled, consider ablation  - will try to give PO and control rate  - cont statin and other CAD meds  - restart dementia meds

## 2024-12-05 NOTE — PROGRESS NOTE ADULT - ASSESSMENT
Patient is 79yFemale with history of HTN, HLD, MVR s/p bioprosthetic MV and maze 2021, postop Afib and now atrial flutter since Jan 2024 seen on medtronic dual chamber PPM who presented to [facility] for a scheduled ablation. Developed refractory SVT post ablation, being stepped up to CCU for further management.    NEURO  KISHORE  AOx3    CV  #Refractory SVT  - s/p adenosine 6mg [id number], diltiazem 15mg IVP x1, diltiazem 30mg PO x1, lopressor 5mg IVP x1  - c/w amiodarone gtt, wean as tolerated  - if pt converts back to SVT,     #Afib  Home Med: Eliquis 2.5mg BID  - c/w Eliquis 5mg BID    #HTN  Home Med: Lisinopril 10mg qd, metoprolol succinate 100mg qd    #HLD  Home Med: fenofibrate 54mg    PULM  KISHORE    RENAL  KISHORE    GI  - Diet:  - Bowel Regimen:  - GI PPx:    ENDO  KISHORE    ID  KISHORE    HEME/ONC  KISHORE    MSK  - Activity:    PREVENTIVE   F: none  E: replete K>4 and Mg>2  N:   GI:  DVT: Eliquis  DISPO: CCU       Patient is 79yFemale with history of HTN, HLD, MVR s/p bioprosthetic MV and maze 2021, postop Afib and now atrial flutter since Jan 2024 seen on medtronic dual chamber PPM who presented to [facility] for a scheduled ablation. Developed refractory SVT post ablation, being stepped up to CCU for further management.    NEURO  #Insomnia  Home Med: mirtazapine 45mg at bedtime  - c/w home med    CV  #Refractory SVT  - s/p adenosine 6mg [id number], diltiazem 15mg IVP x1, diltiazem 30mg PO x1, lopressor 5mg IVP x1  - c/w amiodarone gtt, wean as tolerated  - f/u complete Echo    #Afib  Home Med: Eliquis 2.5mg BID  - c/w Eliquis 5mg BID    #HTN  Home Med: Lisinopril 10mg qd, metoprolol succinate 100mg qd  - c/w metoprolol succinate 100mg qd    #HLD  Home Med: fenofibrate 54mg, rosuvastatin 20mg qd  - c/w rosuvastatin 20mg qd    PULM  KISHORE    RENAL  KISHORE    GI  - Diet: DASH/TLC  - Bowel Regimen: none  - GI PPx: pantoprazole 40mg qd    ENDO  KISHORE    ID  KISHORE    HEME/ONC  Home Med: folic acid 1mg daily  - c/w home med     MSK  - Activity: Bedrest x4h post procedure    PREVENTIVE   F: none  E: replete K>4 and Mg>2  N: DASH/TLC  GI: pantoprazole 40mg qd  DVT: Eliquis 5mg BID  DISPO: CCU       Patient is 79yFemale with history of HTN, HLD, MVR s/p bioprosthetic MV and maze 2021, postop Afib and now atrial flutter since Jan 2024 seen on medtronic dual chamber PPM who presented to [facility] for a scheduled ablation. Developed refractory SVT post ablation, being stepped up to CCU for further management.    NEURO  #Insomnia  Home Med: mirtazapine 45mg at bedtime  - c/w home med    CV  #Refractory SVT  - s/p adenosine 6mg [id number], diltiazem 15mg IVP x1, lopressor 5mg IVP x1  - c/w amiodarone gtt, wean as tolerated  - start diltiazem gtt, wean as tolerated  - f/u complete Echo    #Afib  Home Med: Eliquis 2.5mg BID  - c/w Eliquis 5mg BID    #HTN  Home Med: Lisinopril 10mg qd, metoprolol succinate 100mg qd  - c/w metoprolol succinate 100mg qd    #HLD  Home Med: fenofibrate 54mg, rosuvastatin 20mg qd  - c/w rosuvastatin 20mg qd    PULM  KISHORE    RENAL  KISHORE    GI  - Diet: DASH/TLC  - Bowel Regimen: none  - GI PPx: pantoprazole 40mg qd      - Pt retaining,   - Reid placement    ENDO  KISHORE    ID  KISHORE    HEME/ONC  Home Med: folic acid 1mg daily  - c/w home med     MSK  - Activity: Bedrest x4h post procedure    PREVENTIVE   F: none  E: replete K>4 and Mg>2  N: DASH/TLC  GI: pantoprazole 40mg qd  DVT: Eliquis 5mg BID  DISPO: CCU       Patient is 79 year female with history of HTN, HLD, MVR s/p bioprosthetic MV and maze 2021, postop Afib and now atrial flutter since Jan 2024 seen on Medtronic dual chamber PPM who presented to [facility] for a scheduled ablation. Developed refractory SVT post ablation, being stepped up to CCU for further management.    NEURO  Home Med: mirtazapine 45mg at bedtime  - c/w home med    CV  #Refractory SVT  s/p ablation 12/4/24  - s/p adenosine 6mg [id number], diltiazem 15mg IVP x1, lopressor 5mg IVP x1  - c/w amiodarone gtt, wean as tolerated  - start diltiazem gtt, wean as tolerated  - lactate 1.5   - f/u complete Echo    #Postop Afib  #Aflutter  Seen on dual chamber PPM  Home Med: Eliquis 2.5mg BID  - c/w Eliquis 5mg BID    #HTN  Home Med: Lisinopril 10mg qd, metoprolol succinate 100mg qd  - hold metoprolol succinate 100mg given pt on diltiazem gtt  - hold lisinoprol 10mg for now    #HLD  Home Med: fenofibrate 54mg, rosuvastatin 20mg qd  - c/w rosuvastatin 20mg qd    PULM  KISHORE    RENAL  KISHORE    GI  - Diet: DASH/TLC  - Bowel Regimen: none  - GI PPx: pantoprazole 40mg qd      #Urinary Retention  - Pt retaining,   - Reid placement    ENDO  KISHORE    ID  KISHORE    HEME/ONC  Home Med: folic acid 1mg daily  - c/w home med     MSK  - Activity: Bedrest x4h post procedure    PREVENTIVE   F: none  E: replete K>4 and Mg>2  N: DASH/TLC  GI: pantoprazole 40mg qd  DVT: Eliquis 5mg BID  DISPO: CCU       Patient is 79 year female with history of HTN, HLD, MVR s/p bioprosthetic MV and maze 2021, postop Afib and now atrial flutter since Jan 2024 seen on Medtronic dual chamber PPM who presented to [facility] for a scheduled ablation. Developed refractory SVT post ablation, being stepped up to CCU for further management.    NEURO  Home Med: mirtazapine 45mg at bedtime  - c/w home med    CV  #Refractory SVT  s/p ablation 12/4/24  - s/p adenosine 6mg [id number], diltiazem 15mg IVP x1, lopressor 5mg IVP x1  - c/w amiodarone gtt, wean as tolerated  - start diltiazem gtt, wean as tolerated  - lactate 1.5   - f/u complete Echo    #Postop Afib  #Aflutter  Seen on dual chamber PPM  Home Med: Eliquis 2.5mg BID(?)  - c/w Eliquis 5mg BID  - complete official med rec in AM    #HTN  Home Med: Lisinopril 10mg qd, metoprolol succinate 100mg qd  - hold metoprolol succinate 100mg given pt on diltiazem gtt  - hold lisinoprol 10mg for now    #HLD  Home Med: fenofibrate 54mg, rosuvastatin 20mg qd  - c/w rosuvastatin 20mg qd    PULM  KISHORE    RENAL  KISHORE    GI  - Diet: DASH/TLC  - Bowel Regimen: none  - GI PPx: pantoprazole 40mg qd      #Urinary Retention  - Pt retaining,   - Reid placement    ENDO  KISHORE    ID  KISHORE    HEME/ONC  Home Med: folic acid 1mg daily  - c/w home med     MSK  - Activity: Bedrest x4h post procedure    PREVENTIVE   F: none  E: replete K>4 and Mg>2  N: DASH/TLC  GI: pantoprazole 40mg qd  DVT: Eliquis 5mg BID  DISPO: CCU       Patient is 79 year female with history of HTN, HLD, MVR s/p bioprosthetic MV and maze 2021, postop Afib and now atrial flutter since Jan 2024 seen on Medtronic dual chamber PPM who presented to [facility] for a scheduled ablation. Developed refractory SVT post ablation, being stepped up to CCU for further management.    NEURO  Home Med: mirtazapine 45mg at bedtime  - c/w home med    CV  #Refractory SVT  s/p ablation 12/4/24  - s/p adenosine 6mg [id number], diltiazem 15mg IVP x1, lopressor 5mg IVP x1  - c/w amiodarone gtt, wean as tolerated  - start diltiazem gtt, wean as tolerated  - lactate 1.5   - f/u complete Echo  - f/u EP recs    #Postop Afib  #Aflutter  Seen on dual chamber PPM  Home Med: Eliquis 2.5mg BID(?)  - c/w Eliquis 5mg BID  - complete official med rec in AM    #HTN  Home Med: Lisinopril 10mg qd, metoprolol succinate 100mg qd  - hold metoprolol succinate 100mg given pt on diltiazem gtt  - hold lisinoprol 10mg for now    #HLD  Home Med: fenofibrate 54mg, rosuvastatin 20mg qd  - c/w rosuvastatin 20mg qd    PULM  KISHORE    RENAL  KISHORE    GI  - Diet: DASH/TLC  - Bowel Regimen: none  - GI PPx: pantoprazole 40mg qd      #Urinary Retention  - Pt retaining,   - Reid placement    ENDO  KISHORE    ID  KISHORE    HEME/ONC  Home Med: folic acid 1mg daily  - c/w home med     MSK  - Activity: Bedrest x4h post procedure    PREVENTIVE   F: none  E: replete K>4 and Mg>2  N: DASH/TLC  GI: pantoprazole 40mg qd  DVT: Eliquis 5mg BID  DISPO: CCU

## 2024-12-05 NOTE — PROGRESS NOTE ADULT - SUBJECTIVE AND OBJECTIVE BOX
HOSPITAL COURSE: 79 year old female with history of HTN, HLD, MVR s/p bioprosthetic MV and maze 2021, postop Afib and now atrial flutter since Jan 2024 seen on Medtronic dual chamber PPM who presented to [facility] for a scheduled ablation. Admitted to cardiac telemetry post ablation. Around 10:30pm, pt found to be in SVT with HR 180s and associated symptoms of chest palpitations and dizziness. Pt given adenosine 6mg x1 with conversion to sinus rhythm and resolution of symptoms. Around 12:30 am, pt developed chest palpitations with HR in the 180s again. Another push of adenosine 6mg x1 given with transient resolution of symptoms and achieving sinus rhythm with . Given lopressor 5mg IVP for rate control. Pt went back into SVT for the third time and despite the adenosine, did not adequately convert back to sinus rhythm. Unclear whether fourth dose of adenosine and PO diltiazem ever given. Given diltiazem 15mg IVP and started on amiodarone gtt with return of sinus rhythm, now being transferred to CCU for closer monitoring on the drip.    INTERVAL/OVERNIGHT EVENTS: Reid placed for urinary retention    SUBJECTIVE:  Patient seen and examined at bedside,     Vital Signs Last 12 Hrs  T(F): 98.1 (12-05-24 @ 00:36), Max: 98.1 (12-05-24 @ 00:36)  HR: 85 (12-05-24 @ 00:36) (77 - 85)  BP: 103/60 (12-05-24 @ 00:36) (103/60 - 126/53)  BP(mean): 77 (12-05-24 @ 00:36) (77 - 77)  RR: 18 (12-05-24 @ 00:36) (18 - 18)  SpO2: 94% (12-05-24 @ 00:36) (94% - 95%)  I&O's Summary    PHYSICAL EXAM:  General: NAD, lying comfortably in bed  HEENT: PERRL, EOM intact  Cardiovascular: RRR; no MRG  Respiratory: CTAB; no WRR  GI/: soft; NTND; endorsing suprapubic discomfort on deep palpation 2/2 retention  Extremities: WWP; 2+ peripheral pulses bilaterally; no LE edema  Skin: normal color & turgor; no rash noted  Neurologic: AOx3; no focal deficits; able to follow commands    LABS:                        11.6   12.05 )-----------( 125      ( 04 Dec 2024 17:49 )             36.2     12-04    143  |  106  |  14  ----------------------------<  106[H]  3.9   |  24  |  0.78    Ca    8.1[L]      04 Dec 2024 17:49    TPro  5.7[L]  /  Alb  3.9  /  TBili  0.6  /  DBili  x   /  AST  23  /  ALT  12  /  AlkPhos  47  12-04    PT/INR - ( 04 Dec 2024 14:24 )   PT: 13.7 sec;   INR: 1.17          PTT - ( 04 Dec 2024 14:24 )  PTT:30.0 sec  Urinalysis Basic - ( 04 Dec 2024 17:49 )    Color: x / Appearance: x / SG: x / pH: x  Gluc: 106 mg/dL / Ketone: x  / Bili: x / Urobili: x   Blood: x / Protein: x / Nitrite: x   Leuk Esterase: x / RBC: x / WBC x   Sq Epi: x / Non Sq Epi: x / Bacteria: x          RADIOLOGY & ADDITIONAL TESTS:    MEDICATIONS  (STANDING):  aDENosine Injectable (ADENOCARD) 6 milliGRAM(s) IV Push once  aMIOdarone Infusion 1 mG/Min (33.3 mL/Hr) IV Continuous <Continuous>  aMIOdarone IVPB 150 milliGRAM(s) IV Intermittent once  apixaban 5 milliGRAM(s) Oral two times a day  diltiazem    Tablet 30 milliGRAM(s) Oral once  diltiazem Injectable 15 milliGRAM(s) IV Push once  folic acid 1 milliGRAM(s) Oral daily  metoprolol succinate  milliGRAM(s) Oral daily  mirtazapine 45 milliGRAM(s) Oral at bedtime  pantoprazole    Tablet 40 milliGRAM(s) Oral before breakfast  rosuvastatin 20 milliGRAM(s) Oral at bedtime    MEDICATIONS  (PRN):   HOSPITAL COURSE: 79 year old female with history of HTN, HLD, MVR s/p bioprosthetic MV and maze 2021, postop Afib and now atrial flutter since Jan 2024 seen on Medtronic dual chamber PPM who presented to [facility] for a scheduled ablation. Admitted to cardiac telemetry post ablation. Around 10:30pm, pt found to be in SVT with HR 180s and associated symptoms of chest palpitations and dizziness. Pt given adenosine 6mg x1 with conversion to sinus rhythm and resolution of symptoms. Around 12:30 am, pt developed chest palpitations with HR in the 180s again. Another push of adenosine 6mg x1 given with transient resolution of symptoms and achieving sinus rhythm with . Given lopressor 5mg IVP for rate control. Pt went back into SVT for the third time and despite the adenosine, did not adequately convert back to sinus rhythm. Unclear whether fourth dose of adenosine and PO diltiazem ever given. Given diltiazem 15mg IVP and started on amiodarone gtt with return of sinus rhythm, now being transferred to CCU for closer monitoring on the drip.    INTERVAL/OVERNIGHT EVENTS: Reid placed for urinary retention    SUBJECTIVE:  Patient seen and examined at bedside, feeling well, denies acute complaints. Denies chest pain, palpitations, lightheadedness/dizziness, SOB, abdominal pain. Patient does not know her home medications or eliquis dosing.     Vital Signs Last 12 Hrs  T(F): 98.1 (12-05-24 @ 00:36), Max: 98.1 (12-05-24 @ 00:36)  HR: 85 (12-05-24 @ 00:36) (77 - 85)  BP: 103/60 (12-05-24 @ 00:36) (103/60 - 126/53)  BP(mean): 77 (12-05-24 @ 00:36) (77 - 77)  RR: 18 (12-05-24 @ 00:36) (18 - 18)  SpO2: 94% (12-05-24 @ 00:36) (94% - 95%)  I&O's Summary    PHYSICAL EXAM:  General: NAD, lying comfortably in bed  HEENT: PERRL, EOM intact, MMM  Cardiovascular: RRR; no MRG  Respiratory: CTAB; no WRR  GI/: soft; NTND  Extremities: WWP; 2+ peripheral pulses bilaterally; no LE edema  Skin: normal color & turgor; no rash noted  Neurologic: AOx3; no focal deficits; able to follow commands    LABS:                        11.6   12.05 )-----------( 125      ( 04 Dec 2024 17:49 )             36.2     12-04    143  |  106  |  14  ----------------------------<  106[H]  3.9   |  24  |  0.78    Ca    8.1[L]      04 Dec 2024 17:49    TPro  5.7[L]  /  Alb  3.9  /  TBili  0.6  /  DBili  x   /  AST  23  /  ALT  12  /  AlkPhos  47  12-04    PT/INR - ( 04 Dec 2024 14:24 )   PT: 13.7 sec;   INR: 1.17          PTT - ( 04 Dec 2024 14:24 )  PTT:30.0 sec  Urinalysis Basic - ( 04 Dec 2024 17:49 )    Color: x / Appearance: x / SG: x / pH: x  Gluc: 106 mg/dL / Ketone: x  / Bili: x / Urobili: x   Blood: x / Protein: x / Nitrite: x   Leuk Esterase: x / RBC: x / WBC x   Sq Epi: x / Non Sq Epi: x / Bacteria: x          RADIOLOGY & ADDITIONAL TESTS:    MEDICATIONS  (STANDING):  aDENosine Injectable (ADENOCARD) 6 milliGRAM(s) IV Push once  aMIOdarone Infusion 1 mG/Min (33.3 mL/Hr) IV Continuous <Continuous>  aMIOdarone IVPB 150 milliGRAM(s) IV Intermittent once  apixaban 5 milliGRAM(s) Oral two times a day  diltiazem    Tablet 30 milliGRAM(s) Oral once  diltiazem Injectable 15 milliGRAM(s) IV Push once  folic acid 1 milliGRAM(s) Oral daily  metoprolol succinate  milliGRAM(s) Oral daily  mirtazapine 45 milliGRAM(s) Oral at bedtime  pantoprazole    Tablet 40 milliGRAM(s) Oral before breakfast  rosuvastatin 20 milliGRAM(s) Oral at bedtime    MEDICATIONS  (PRN):   HOSPITAL COURSE: 79 year old female with history of HTN, HLD, MVR s/p bioprosthetic MV and maze 2021, postop Afib and now atrial flutter since Jan 2024 seen on Medtronic dual chamber PPM who presented to [facility] for a scheduled ablation. Admitted to cardiac telemetry post ablation. Around 10:30pm, pt found to be in SVT with HR 180s and associated symptoms of chest palpitations and dizziness. Pt given adenosine 6mg x1 with conversion to sinus rhythm and resolution of symptoms. Around 12:30 am, pt developed chest palpitations with HR in the 180s again. Another push of adenosine 6mg x1 given with transient resolution of symptoms and achieving sinus rhythm with . Given lopressor 5mg IVP for rate control. Pt went back into SVT for the third time and despite the adenosine, did not adequately convert back to sinus rhythm. Unclear whether fourth dose of adenosine and PO diltiazem ever given. Given diltiazem 15mg IVP and started on amiodarone gtt with return of sinus rhythm, now being transferred to CCU for closer monitoring on the drip.    INTERVAL/OVERNIGHT EVENTS:  at 10 PM, 416 at 4 AM, pineda placed.    SUBJECTIVE:  Patient seen and examined at bedside, feeling well, denies acute complaints. Denies chest pain, palpitations, lightheadedness/dizziness, SOB, abdominal pain.     Vital Signs Last 12 Hrs  T(F): 98.1 (12-05-24 @ 00:36), Max: 98.1 (12-05-24 @ 00:36)  HR: 85 (12-05-24 @ 00:36) (77 - 85)  BP: 103/60 (12-05-24 @ 00:36) (103/60 - 126/53)  BP(mean): 77 (12-05-24 @ 00:36) (77 - 77)  RR: 18 (12-05-24 @ 00:36) (18 - 18)  SpO2: 94% (12-05-24 @ 00:36) (94% - 95%)  I&O's Summary    PHYSICAL EXAM:  General: NAD, lying comfortably in bed  HEENT: PERRL, EOM intact, MMM  Cardiovascular: RRR; no MRG  Respiratory: CTAB; no WRR  GI/: soft; NTND  Extremities: WWP; 2+ peripheral pulses bilaterally; no LE edema  Skin: normal color & turgor; no rash noted  Neurologic: AOx3; no focal deficits; able to follow commands    LABS:                        11.6   12.05 )-----------( 125      ( 04 Dec 2024 17:49 )             36.2     12-04    143  |  106  |  14  ----------------------------<  106[H]  3.9   |  24  |  0.78    Ca    8.1[L]      04 Dec 2024 17:49    TPro  5.7[L]  /  Alb  3.9  /  TBili  0.6  /  DBili  x   /  AST  23  /  ALT  12  /  AlkPhos  47  12-04    PT/INR - ( 04 Dec 2024 14:24 )   PT: 13.7 sec;   INR: 1.17          PTT - ( 04 Dec 2024 14:24 )  PTT:30.0 sec  Urinalysis Basic - ( 04 Dec 2024 17:49 )    Color: x / Appearance: x / SG: x / pH: x  Gluc: 106 mg/dL / Ketone: x  / Bili: x / Urobili: x   Blood: x / Protein: x / Nitrite: x   Leuk Esterase: x / RBC: x / WBC x   Sq Epi: x / Non Sq Epi: x / Bacteria: x          RADIOLOGY & ADDITIONAL TESTS:    MEDICATIONS  (STANDING):  aDENosine Injectable (ADENOCARD) 6 milliGRAM(s) IV Push once  aMIOdarone Infusion 1 mG/Min (33.3 mL/Hr) IV Continuous <Continuous>  aMIOdarone IVPB 150 milliGRAM(s) IV Intermittent once  apixaban 5 milliGRAM(s) Oral two times a day  diltiazem    Tablet 30 milliGRAM(s) Oral once  diltiazem Injectable 15 milliGRAM(s) IV Push once  folic acid 1 milliGRAM(s) Oral daily  metoprolol succinate  milliGRAM(s) Oral daily  mirtazapine 45 milliGRAM(s) Oral at bedtime  pantoprazole    Tablet 40 milliGRAM(s) Oral before breakfast  rosuvastatin 20 milliGRAM(s) Oral at bedtime    MEDICATIONS  (PRN):   HOSPITAL COURSE: 79 year old female with history of HTN, HLD, MVR s/p bioprosthetic MV and maze 2021, postop Afib and now atrial flutter since Jan 2024 seen on Medtronic dual chamber PPM who presented to [facility] for a scheduled ablation. Admitted to cardiac telemetry post ablation. Around 10:30pm, pt found to be in SVT with HR 180s and associated symptoms of chest palpitations and dizziness. Pt given adenosine 6mg x1 with conversion to sinus rhythm and resolution of symptoms. Around 12:30 am, pt developed chest palpitations with HR in the 180s again. Another push of adenosine 6mg x1 given with transient resolution of symptoms and achieving sinus rhythm with . Given lopressor 5mg IVP for rate control. Pt went back into SVT for the third time and despite the adenosine, did not adequately convert back to sinus rhythm. Unclear whether fourth dose of adenosine and PO diltiazem ever given. Given diltiazem 15mg IVP and started on amiodarone gtt with return of sinus rhythm, now being transferred to CCU for closer monitoring on the drip.    INTERVAL/OVERNIGHT EVENTS: Reid placed for urinary retention.    SUBJECTIVE:  Patient seen and examined at bedside, feeling well, denies acute complaints. Denies chest pain, palpitations, lightheadedness/dizziness, SOB, abdominal pain. Pt does not remember eliquis dosing or meds.    Vital Signs Last 12 Hrs  T(F): 98.1 (12-05-24 @ 00:36), Max: 98.1 (12-05-24 @ 00:36)  HR: 85 (12-05-24 @ 00:36) (77 - 85)  BP: 103/60 (12-05-24 @ 00:36) (103/60 - 126/53)  BP(mean): 77 (12-05-24 @ 00:36) (77 - 77)  RR: 18 (12-05-24 @ 00:36) (18 - 18)  SpO2: 94% (12-05-24 @ 00:36) (94% - 95%)  I&O's Summary    PHYSICAL EXAM:  General: NAD, lying comfortably in bed  HEENT: PERRL, EOM intact, MMM  Cardiovascular: RRR; no MRG  Respiratory: CTAB; no WRR  GI/: soft; NTND  Extremities: WWP; 2+ peripheral pulses bilaterally; no LE edema  Skin: normal color & turgor; no rash noted  Neurologic: AOx3; no focal deficits; able to follow commands    LABS:                        11.6   12.05 )-----------( 125      ( 04 Dec 2024 17:49 )             36.2     12-04    143  |  106  |  14  ----------------------------<  106[H]  3.9   |  24  |  0.78    Ca    8.1[L]      04 Dec 2024 17:49    TPro  5.7[L]  /  Alb  3.9  /  TBili  0.6  /  DBili  x   /  AST  23  /  ALT  12  /  AlkPhos  47  12-04    PT/INR - ( 04 Dec 2024 14:24 )   PT: 13.7 sec;   INR: 1.17          PTT - ( 04 Dec 2024 14:24 )  PTT:30.0 sec  Urinalysis Basic - ( 04 Dec 2024 17:49 )    Color: x / Appearance: x / SG: x / pH: x  Gluc: 106 mg/dL / Ketone: x  / Bili: x / Urobili: x   Blood: x / Protein: x / Nitrite: x   Leuk Esterase: x / RBC: x / WBC x   Sq Epi: x / Non Sq Epi: x / Bacteria: x          RADIOLOGY & ADDITIONAL TESTS:    MEDICATIONS  (STANDING):  aDENosine Injectable (ADENOCARD) 6 milliGRAM(s) IV Push once  aMIOdarone Infusion 1 mG/Min (33.3 mL/Hr) IV Continuous <Continuous>  aMIOdarone IVPB 150 milliGRAM(s) IV Intermittent once  apixaban 5 milliGRAM(s) Oral two times a day  diltiazem    Tablet 30 milliGRAM(s) Oral once  diltiazem Injectable 15 milliGRAM(s) IV Push once  folic acid 1 milliGRAM(s) Oral daily  metoprolol succinate  milliGRAM(s) Oral daily  mirtazapine 45 milliGRAM(s) Oral at bedtime  pantoprazole    Tablet 40 milliGRAM(s) Oral before breakfast  rosuvastatin 20 milliGRAM(s) Oral at bedtime    MEDICATIONS  (PRN):

## 2024-12-06 ENCOUNTER — TRANSCRIPTION ENCOUNTER (OUTPATIENT)
Age: 79
End: 2024-12-06

## 2024-12-06 DIAGNOSIS — I10 ESSENTIAL (PRIMARY) HYPERTENSION: ICD-10-CM

## 2024-12-06 DIAGNOSIS — E78.5 HYPERLIPIDEMIA, UNSPECIFIED: ICD-10-CM

## 2024-12-06 DIAGNOSIS — R33.9 RETENTION OF URINE, UNSPECIFIED: ICD-10-CM

## 2024-12-06 DIAGNOSIS — I47.10 SUPRAVENTRICULAR TACHYCARDIA, UNSPECIFIED: ICD-10-CM

## 2024-12-06 DIAGNOSIS — F03.90 UNSPECIFIED DEMENTIA, UNSPECIFIED SEVERITY, WITHOUT BEHAVIORAL DISTURBANCE, PSYCHOTIC DISTURBANCE, MOOD DISTURBANCE, AND ANXIETY: ICD-10-CM

## 2024-12-06 DIAGNOSIS — R74.01 ELEVATION OF LEVELS OF LIVER TRANSAMINASE LEVELS: ICD-10-CM

## 2024-12-06 DIAGNOSIS — I48.92 UNSPECIFIED ATRIAL FLUTTER: ICD-10-CM

## 2024-12-06 LAB
ADD ON TEST-SPECIMEN IN LAB: SIGNIFICANT CHANGE UP
ALBUMIN SERPL ELPH-MCNC: 3.7 G/DL — SIGNIFICANT CHANGE UP (ref 3.3–5)
ALP SERPL-CCNC: 66 U/L — SIGNIFICANT CHANGE UP (ref 40–120)
ALT FLD-CCNC: 57 U/L — HIGH (ref 10–45)
ANION GAP SERPL CALC-SCNC: 10 MMOL/L — SIGNIFICANT CHANGE UP (ref 5–17)
APTT BLD: 32.3 SEC — SIGNIFICANT CHANGE UP (ref 24.5–35.6)
AST SERPL-CCNC: 99 U/L — HIGH (ref 10–40)
BASOPHILS # BLD AUTO: 0.03 K/UL — SIGNIFICANT CHANGE UP (ref 0–0.2)
BASOPHILS NFR BLD AUTO: 0.4 % — SIGNIFICANT CHANGE UP (ref 0–2)
BILIRUB SERPL-MCNC: 0.4 MG/DL — SIGNIFICANT CHANGE UP (ref 0.2–1.2)
BLD GP AB SCN SERPL QL: NEGATIVE — SIGNIFICANT CHANGE UP
BUN SERPL-MCNC: 19 MG/DL — SIGNIFICANT CHANGE UP (ref 7–23)
CALCIUM SERPL-MCNC: 8.4 MG/DL — SIGNIFICANT CHANGE UP (ref 8.4–10.5)
CHLORIDE SERPL-SCNC: 105 MMOL/L — SIGNIFICANT CHANGE UP (ref 96–108)
CO2 SERPL-SCNC: 23 MMOL/L — SIGNIFICANT CHANGE UP (ref 22–31)
CREAT SERPL-MCNC: 0.94 MG/DL — SIGNIFICANT CHANGE UP (ref 0.5–1.3)
EGFR: 62 ML/MIN/1.73M2 — SIGNIFICANT CHANGE UP
EOSINOPHIL # BLD AUTO: 0.12 K/UL — SIGNIFICANT CHANGE UP (ref 0–0.5)
EOSINOPHIL NFR BLD AUTO: 1.6 % — SIGNIFICANT CHANGE UP (ref 0–6)
GLUCOSE SERPL-MCNC: 109 MG/DL — HIGH (ref 70–99)
HCT VFR BLD CALC: 37.9 % — SIGNIFICANT CHANGE UP (ref 34.5–45)
HGB BLD-MCNC: 12.3 G/DL — SIGNIFICANT CHANGE UP (ref 11.5–15.5)
IMM GRANULOCYTES NFR BLD AUTO: 0.4 % — SIGNIFICANT CHANGE UP (ref 0–0.9)
INR BLD: 1.52 — HIGH (ref 0.85–1.16)
LYMPHOCYTES # BLD AUTO: 1.83 K/UL — SIGNIFICANT CHANGE UP (ref 1–3.3)
LYMPHOCYTES # BLD AUTO: 25.1 % — SIGNIFICANT CHANGE UP (ref 13–44)
MAGNESIUM SERPL-MCNC: 2 MG/DL — SIGNIFICANT CHANGE UP (ref 1.6–2.6)
MCHC RBC-ENTMCNC: 31 PG — SIGNIFICANT CHANGE UP (ref 27–34)
MCHC RBC-ENTMCNC: 32.5 G/DL — SIGNIFICANT CHANGE UP (ref 32–36)
MCV RBC AUTO: 95.5 FL — SIGNIFICANT CHANGE UP (ref 80–100)
MONOCYTES # BLD AUTO: 0.62 K/UL — SIGNIFICANT CHANGE UP (ref 0–0.9)
MONOCYTES NFR BLD AUTO: 8.5 % — SIGNIFICANT CHANGE UP (ref 2–14)
NEUTROPHILS # BLD AUTO: 4.65 K/UL — SIGNIFICANT CHANGE UP (ref 1.8–7.4)
NEUTROPHILS NFR BLD AUTO: 64 % — SIGNIFICANT CHANGE UP (ref 43–77)
NRBC # BLD: 0 /100 WBCS — SIGNIFICANT CHANGE UP (ref 0–0)
PHOSPHATE SERPL-MCNC: 2.5 MG/DL — SIGNIFICANT CHANGE UP (ref 2.5–4.5)
PLATELET # BLD AUTO: 86 K/UL — LOW (ref 150–400)
POTASSIUM SERPL-MCNC: 4.2 MMOL/L — SIGNIFICANT CHANGE UP (ref 3.5–5.3)
POTASSIUM SERPL-SCNC: 4.2 MMOL/L — SIGNIFICANT CHANGE UP (ref 3.5–5.3)
PROT SERPL-MCNC: 6.1 G/DL — SIGNIFICANT CHANGE UP (ref 6–8.3)
PROTHROM AB SERPL-ACNC: 17.7 SEC — HIGH (ref 9.9–13.4)
RAPID RVP RESULT: SIGNIFICANT CHANGE UP
RBC # BLD: 3.97 M/UL — SIGNIFICANT CHANGE UP (ref 3.8–5.2)
RBC # FLD: 13.6 % — SIGNIFICANT CHANGE UP (ref 10.3–14.5)
RH IG SCN BLD-IMP: POSITIVE — SIGNIFICANT CHANGE UP
S PNEUM AG UR QL: NEGATIVE — SIGNIFICANT CHANGE UP
SARS-COV-2 RNA SPEC QL NAA+PROBE: SIGNIFICANT CHANGE UP
SODIUM SERPL-SCNC: 138 MMOL/L — SIGNIFICANT CHANGE UP (ref 135–145)
WBC # BLD: 7.28 K/UL — SIGNIFICANT CHANGE UP (ref 3.8–10.5)
WBC # FLD AUTO: 7.28 K/UL — SIGNIFICANT CHANGE UP (ref 3.8–10.5)

## 2024-12-06 PROCEDURE — 99231 SBSQ HOSP IP/OBS SF/LOW 25: CPT

## 2024-12-06 PROCEDURE — 93010 ELECTROCARDIOGRAM REPORT: CPT

## 2024-12-06 PROCEDURE — 99239 HOSP IP/OBS DSCHRG MGMT >30: CPT

## 2024-12-06 PROCEDURE — 71045 X-RAY EXAM CHEST 1 VIEW: CPT | Mod: 26

## 2024-12-06 RX ORDER — AMIODARONE HYDROCHLORIDE 200 MG/1
200 TABLET ORAL EVERY 12 HOURS
Refills: 0 | Status: DISCONTINUED | OUTPATIENT
Start: 2024-12-07 | End: 2024-12-09

## 2024-12-06 RX ORDER — AMIODARONE HYDROCHLORIDE 200 MG/1
1 TABLET ORAL
Qty: 60 | Refills: 0
Start: 2024-12-06 | End: 2025-01-04

## 2024-12-06 RX ORDER — APIXABAN 2.5 MG/1
1 TABLET, FILM COATED ORAL
Qty: 60 | Refills: 0
Start: 2024-12-06 | End: 2025-01-04

## 2024-12-06 RX ADMIN — POLYETHYLENE GLYCOL 3350 17 GRAM(S): 17 POWDER, FOR SOLUTION ORAL at 16:16

## 2024-12-06 RX ADMIN — METOPROLOL TARTRATE 200 MILLIGRAM(S): 100 TABLET, FILM COATED ORAL at 06:04

## 2024-12-06 RX ADMIN — Medication 2 TABLET(S): at 22:13

## 2024-12-06 RX ADMIN — Medication 10 MILLIGRAM(S): at 22:12

## 2024-12-06 RX ADMIN — Medication 1 MILLIGRAM(S): at 11:37

## 2024-12-06 RX ADMIN — APIXABAN 5 MILLIGRAM(S): 2.5 TABLET, FILM COATED ORAL at 06:04

## 2024-12-06 RX ADMIN — PANTOPRAZOLE SODIUM 40 MILLIGRAM(S): 40 TABLET, DELAYED RELEASE ORAL at 06:04

## 2024-12-06 RX ADMIN — Medication 1 APPLICATION(S): at 18:40

## 2024-12-06 RX ADMIN — Medication 1 APPLICATION(S): at 06:05

## 2024-12-06 RX ADMIN — APIXABAN 5 MILLIGRAM(S): 2.5 TABLET, FILM COATED ORAL at 18:40

## 2024-12-06 RX ADMIN — MEMANTINE HYDROCHLORIDE 5 MILLIGRAM(S): 14 CAPSULE, EXTENDED RELEASE ORAL at 15:14

## 2024-12-06 RX ADMIN — DONEPEZIL HYDROCHLORIDE 5 MILLIGRAM(S): 5 TABLET, FILM COATED ORAL at 22:12

## 2024-12-06 NOTE — DISCHARGE NOTE PROVIDER - NSDCFUADDINST_GEN_ALL_CORE_FT
SEEK IMMEDIATE MEDICAL CARE IF YOU HAVE ANY OF THE FOLLOWING SYMPTOMS: worsening chest pain, racing heart beat, unexplained jaw/neck/back pain, severe abdominal pain, shortness of breath, dizziness or lightheadedness, fainting, sweaty or clammy skin, vomiting, or coughing up blood. These symptoms may represent a serious problem that is an emergency. Do not wait to see if the symptoms will go away. Get medical help right away. Call 911 and do not drive yourself to the hospital.

## 2024-12-06 NOTE — DISCHARGE NOTE PROVIDER - NSDCCPCAREPLAN_GEN_ALL_CORE_FT
PRINCIPAL DISCHARGE DIAGNOSIS  Diagnosis: Sustained SVT  Assessment and Plan of Treatment: You came in to the hospital for an elective ablation procedure for your atrial flutter. After this procedure was performed, your heart went into a different abnormal rhythm called Supraventricular Tachycardia. You were started on IV medications, diltiazem and amiodarone, to control your heart rate and rhythm. You were able to be weaned off of these medications and your heart rate remained stable. You will be discharged on amiodarone to continue to control your heart rate. Please take amiodarone 200 mg twice daily for 10 days, then 200 mg once daily. Amiodarone may affect your liver function, so please get your liver enzymes checked with a blood test at your local lab on Monday 12/9. Please continue to take your Eliquis 5mg twice daily to prevent blood clots and strokes from your abnormal heart rhythm. Please follow up with Dr. Becerra in the [facility] office for further treatment of this condition.     PRINCIPAL DISCHARGE DIAGNOSIS  Diagnosis: Sustained SVT  Assessment and Plan of Treatment: You came in to the hospital for an elective ablation procedure for your atrial flutter. After this procedure was performed, your heart went into a different abnormal rhythm called Supraventricular Tachycardia. You were started on IV medications, diltiazem and amiodarone, to control your heart rate and rhythm. You were able to be weaned off of these medications and your heart rate remained stable. You will be discharged on amiodarone to continue to control your heart rate. Please take amiodarone 200 mg twice daily for 10 days, then 200 mg once daily. Amiodarone may affect your liver function, so please get your liver enzymes checked with a blood test at your local lab on Monday 12/9. Please continue to take your Eliquis 5mg twice daily to prevent blood clots and strokes from your abnormal heart rhythm. Please also continue to take your metoprolol 200mg daily and lisinopril 10mg daily. Please follow up with Dr. Becerra in the [facility] office for further treatment of this condition.     PRINCIPAL DISCHARGE DIAGNOSIS  Diagnosis: Sustained SVT  Assessment and Plan of Treatment: You came in to the hospital for an elective ablation procedure for your atrial flutter. After this procedure was performed, your heart went into a different abnormal rhythm called Supraventricular Tachycardia. You were started on IV medications, diltiazem and amiodarone, to control your heart rate and rhythm. You were able to be weaned off of these medications and your heart rate remained stable. You will be discharged on amiodarone to continue to control your heart rate. Please take amiodarone 200 mg twice daily until 12/16/2024 and  then 200 mg once daily after that.  Please continue to take your Eliquis 5mg twice daily to prevent blood clots and strokes from your abnormal heart rhythm. Please also continue to take your metoprolol 200mg daily and Losartan 25mg daily. Please follow up with Dr. Becerra in the [facility] office for further treatment of this condition.      SECONDARY DISCHARGE DIAGNOSES  Diagnosis: HTN (hypertension)  Assessment and Plan of Treatment: You were previously taking Lisinopril 10mg once daily. While you were in the hospital we switched your medication to Losartan 25mg once daily. Please continue this medication as listed to keep your blood pressure controlled. For blood pressure that is too high or too low please see your doctor or go to the emergency room as necessary.      Diagnosis: HLD (hyperlipidemia)  Assessment and Plan of Treatment: Please continue Pravastatin 40mg QHS at bedtime to keep your cholesterol low. High cholesterol contributes to heart disease.      Diagnosis: Transaminitis  Assessment and Plan of Treatment: While you were in the hospital you were started on Amiodorone. This medication can sometimes cause your liver enzymes to elevate. While you were in the hospital your liver enzymes were temporarily elevated. On 12/9/2024 your liver enzyme came back down to normal. Please follow up with Dr. Simon within one week for continued monitoring and lab work while on Amiodorone.     PRINCIPAL DISCHARGE DIAGNOSIS  Diagnosis: Sustained SVT  Assessment and Plan of Treatment: You came in to the hospital for an elective ablation procedure for your atrial flutter. After this procedure was performed, your heart went into a different abnormal rhythm called Supraventricular Tachycardia. You were started on IV medications, diltiazem and amiodarone, to control your heart rate and rhythm. You were able to be weaned off of these medications and your heart rate remained stable. You will be discharged on amiodarone to continue to control your heart rate. Please take amiodarone 200 mg twice daily until 12/16/2024 and  then 200 mg once daily after that.  Please continue to take your Eliquis 5mg twice daily to prevent blood clots and strokes from your abnormal heart rhythm. Please also continue to take your metoprolol 200mg daily and Losartan 25mg daily. Please follow up with Dr. Becerra in the [facility] office for further treatment of this condition.      SECONDARY DISCHARGE DIAGNOSES  Diagnosis: HTN (hypertension)  Assessment and Plan of Treatment: You were previously taking Lisinopril 10mg once daily. While you were in the hospital we switched your medication to Losartan 25mg once daily. Please continue this medication as listed to keep your blood pressure controlled. For blood pressure that is too high or too low please see your doctor or go to the emergency room as necessary.      Diagnosis: HLD (hyperlipidemia)  Assessment and Plan of Treatment: Please continue Pravastatin 40mg QHS at bedtime to keep your cholesterol low. High cholesterol contributes to heart disease.      Diagnosis: Transaminitis  Assessment and Plan of Treatment: While you were in the hospital you were started on Amiodorone. This medication can sometimes cause your liver enzymes to elevate. While you were in the hospital your liver enzymes were temporarily elevated. On 12/9/2024 your liver enzyme came back down to normal. Please follow up with Dr. Simon within one week for continued monitoring and lab work while on Amiodorone.    Diagnosis: (HFpEF) heart failure with preserved ejection fraction  Assessment and Plan of Treatment: You have a weak heart, also known as Congestive Heart Failure (CHF). Heart failure is a condition in which the heart does not pump or fill with blood well. As a result, the heart lags behind in its job of moving blood throughout the body. This can lead to symptoms such as swelling, trouble breathing, and feeling tired. Your Ejection Fraction (EF) is 50-55%a normal EF is 55-60%.  -Please begin taking Lasix 40mg once daily to prevent fluid build up in the body.  -Please begin Farxiga 10mg once daily, losartan 25mg once daily and metoprolol 200mg once daily to help strenghten your heart muscle  -Avoid drinking more than 1.5L of fluid daily and maintain a low salt diet (max 2grams daily).  -Please weigh yourself daily, for any significant increases in daily weight of 2lbs/day or 5lbs/week with associated swelling in the legs or abdomen and/or shortness of breath, please call your doctor or go to the emergency room.  -Follow up with Dr. Simon in 1 week.       PRINCIPAL DISCHARGE DIAGNOSIS  Diagnosis: Sustained SVT  Assessment and Plan of Treatment: You came in to the hospital for an elective ablation procedure for your atrial flutter. After this procedure was performed, your heart went into a different abnormal rhythm called Supraventricular Tachycardia. You were started on IV medications, diltiazem and amiodarone, to control your heart rate and rhythm. You were able to be weaned off of these medications and your heart rate remained stable. You will be discharged on amiodarone to continue to control your heart rate. Please take amiodarone 200 mg twice daily until 12/16/2024 and  then 200 mg once daily after that.  Please continue to take your Eliquis 5mg twice daily to prevent blood clots and strokes from your abnormal heart rhythm. Please also continue to take your metoprolol 200mg daily. Please follow up with Dr. Becerra in the [facility] office for further treatment of this condition on 1/14/2024      SECONDARY DISCHARGE DIAGNOSES  Diagnosis: HTN (hypertension)  Assessment and Plan of Treatment: You were previously taking Lisinopril 10mg once daily. While you were in the hospital we switched your medication to Losartan 25mg once daily. Please continue this medication as listed to keep your blood pressure controlled. For blood pressure that is too high or too low please see your doctor or go to the emergency room as necessary.      Diagnosis: HLD (hyperlipidemia)  Assessment and Plan of Treatment: Please continue Pravastatin 40mg QHS at bedtime to keep your cholesterol low. High cholesterol contributes to heart disease.      Diagnosis: Transaminitis  Assessment and Plan of Treatment: While you were in the hospital you were started on Amiodorone. This medication can sometimes cause your liver enzymes to elevate. While you were in the hospital your liver enzymes were temporarily elevated. On 12/9/2024 your liver enzyme came back down to normal. Please follow up with Dr. Simon within one week for continued monitoring and lab work while on Amiodorone.    Diagnosis: (HFpEF) heart failure with preserved ejection fraction  Assessment and Plan of Treatment: You have a weak heart, also known as Congestive Heart Failure (CHF). Heart failure is a condition in which the heart does not pump or fill with blood well. As a result, the heart lags behind in its job of moving blood throughout the body. This can lead to symptoms such as swelling, trouble breathing, and feeling tired. Your Ejection Fraction (EF) is 50-55%a normal EF is 55-60%.  -Please begin taking Lasix 40mg once daily to prevent fluid build up in the body.  -Please begin Farxiga 10mg once daily, losartan 25mg once daily and metoprolol 200mg once daily to help strenghten your heart muscle  -Avoid drinking more than 1.5L of fluid daily and maintain a low salt diet (max 2grams daily).  -Please weigh yourself daily, for any significant increases in daily weight of 2lbs/day or 5lbs/week with associated swelling in the legs or abdomen and/or shortness of breath, please call your doctor or go to the emergency room.  -Follow up with Dr. Simon on 12/18/2024 at 1PM

## 2024-12-06 NOTE — DISCHARGE NOTE PROVIDER - PROVIDER TOKENS
PROVIDER:[TOKEN:[[id number]:[id number]],SCHEDULEDAPPT:[12/26/2024],SCHEDULEDAPPTTIME:[12:00 PM],ESTABLISHEDPATIENT:[T]],FREE:[LAST:[marin],FIRST:[Blake],PHONE:[phone]],FAX:[(   )    -],ADDRESS:[[address] [place] [address] [address] [address]],FOLLOWUP:[1 week]],FREE:[LAST:[Mariam],FIRST:[Danica],PHONE:[phone]],FAX:[(   )    -],ADDRESS:[[address] [place] [address]],SCHEDULEDAPPT:[12/26/2024],SCHEDULEDAPPTTIME:[12:00 PM],ESTABLISHEDPATIENT:[T]] FREE:[LAST:[marin],FIRST:[Blake],PHONE:[phone]],FAX:[(   )    -],ADDRESS:[[address] [place] [address] [address] [address]],FOLLOWUP:[1 week]],FREE:[LAST:[Mariam],FIRST:[Danica],PHONE:[phone]],FAX:[(   )    -],ADDRESS:[[address] [place] [address]],SCHEDULEDAPPT:[01/14/2025]] FREE:[LAST:[marin],FIRST:[Blake],PHONE:[phone]],FAX:[(   )    -],ADDRESS:[[address] [place] [address] [address] [address]],SCHEDULEDAPPT:[12/18/2024],SCHEDULEDAPPTTIME:[01:00 PM]],FREE:[LAST:[Mariam],FIRST:[Danica],PHONE:[phone]],FAX:[(   )    -],ADDRESS:[[address] [place] [address]],SCHEDULEDAPPT:[01/14/2025]]

## 2024-12-06 NOTE — DISCHARGE NOTE PROVIDER - ATTENDING DISCHARGE PHYSICAL EXAMINATION:
Ms. Cedeño is a 79F with history of HTN, MVR with bioprosthetic MVR and Maze, Hx of AFib/flutter with PPM presented for ablation, noted to go into SVT requiring adenosine, multiple times, admitted for optimization and requiring O2 and decompensation.     ExaM:  JVP normal  Lungs CTA B/l  S1, S2 RRR  WWP, No edema    SVT- Pending outpatient ablation.   Decompensated HF- HFpEF- s/p IV diuretics, weaned off O2. Switched to PO. Add SGLT2. ARB+ BB  AFib/flutter- Eliquis, Amiodarone, BB  HTN- ARb and BB.   HLD-pravastatin

## 2024-12-06 NOTE — DISCHARGE NOTE PROVIDER - NSDCFUADDAPPT_GEN_ALL_CORE_FT
Please follow up with Dr. Becerra in the [facility]. The office will call you to schedule an appointment.

## 2024-12-06 NOTE — PROGRESS NOTE ADULT - SUBJECTIVE AND OBJECTIVE BOX
HOSPITAL COURSE: 79 year old female with history of HTN, HLD, MVR s/p bioprosthetic MV and maze 2021, postop Afib and now atrial flutter since Jan 2024 seen on Medtronic dual chamber PPM who presented to [facility] for a scheduled ablation. Admitted to cardiac telemetry post ablation. Around 10:30pm, pt found to be in SVT with HR 180s and associated symptoms of chest palpitations and dizziness. Pt given adenosine 6mg x1 with conversion to sinus rhythm and resolution of symptoms. Around 12:30 am, pt developed chest palpitations with HR in the 180s again. Another push of adenosine 6mg x1 given with transient resolution of symptoms and achieving sinus rhythm with . Given lopressor 5mg IVP for rate control. Pt went back into SVT for the third time and despite the adenosine, did not adequately convert back to sinus rhythm. Unclear whether fourth dose of adenosine and PO diltiazem ever given. Given diltiazem 15mg IVP and started on amiodarone gtt with return of sinus rhythm, now being transferred to CCU for closer monitoring on the drip pending ablation.    INTERVAL/OVERNIGHT EVENTS: BS at 10 , 416 at 4 AM, pineda placed.    SUBJECTIVE:  Patient seen and examined at bedside, feeling well, denies acute complaints. Denies chest pain, palpitations, lightheadedness/dizziness, SOB, abdominal pain.    Vital Signs Last 12 Hrs  T(F): 98.1 (12-05-24 @ 00:36), Max: 98.1 (12-05-24 @ 00:36)  HR: 85 (12-05-24 @ 00:36) (77 - 85)  BP: 103/60 (12-05-24 @ 00:36) (103/60 - 126/53)  BP(mean): 77 (12-05-24 @ 00:36) (77 - 77)  RR: 18 (12-05-24 @ 00:36) (18 - 18)  SpO2: 94% (12-05-24 @ 00:36) (94% - 95%)  I&O's Summary    PHYSICAL EXAM:  General: NAD, lying comfortably in bed  HEENT: PERRL, EOM intact, MMM  Cardiovascular: RRR; no MRG  Respiratory: CTAB; no WRR  GI/: soft; NTND  Extremities: WWP; 2+ peripheral pulses bilaterally; no LE edema  Skin: normal color & turgor; no rash noted  Neurologic: AOx3; no focal deficits; able to follow commands    LABS:                        11.6   12.05 )-----------( 125      ( 04 Dec 2024 17:49 )             36.2     12-04    143  |  106  |  14  ----------------------------<  106[H]  3.9   |  24  |  0.78    Ca    8.1[L]      04 Dec 2024 17:49    TPro  5.7[L]  /  Alb  3.9  /  TBili  0.6  /  DBili  x   /  AST  23  /  ALT  12  /  AlkPhos  47  12-04    PT/INR - ( 04 Dec 2024 14:24 )   PT: 13.7 sec;   INR: 1.17          PTT - ( 04 Dec 2024 14:24 )  PTT:30.0 sec  Urinalysis Basic - ( 04 Dec 2024 17:49 )    Color: x / Appearance: x / SG: x / pH: x  Gluc: 106 mg/dL / Ketone: x  / Bili: x / Urobili: x   Blood: x / Protein: x / Nitrite: x   Leuk Esterase: x / RBC: x / WBC x   Sq Epi: x / Non Sq Epi: x / Bacteria: x          RADIOLOGY & ADDITIONAL TESTS:    MEDICATIONS  (STANDING):  aDENosine Injectable (ADENOCARD) 6 milliGRAM(s) IV Push once  aMIOdarone Infusion 1 mG/Min (33.3 mL/Hr) IV Continuous <Continuous>  aMIOdarone IVPB 150 milliGRAM(s) IV Intermittent once  apixaban 5 milliGRAM(s) Oral two times a day  diltiazem    Tablet 30 milliGRAM(s) Oral once  diltiazem Injectable 15 milliGRAM(s) IV Push once  folic acid 1 milliGRAM(s) Oral daily  metoprolol succinate  milliGRAM(s) Oral daily  mirtazapine 45 milliGRAM(s) Oral at bedtime  pantoprazole    Tablet 40 milliGRAM(s) Oral before breakfast  rosuvastatin 20 milliGRAM(s) Oral at bedtime    MEDICATIONS  (PRN):   HOSPITAL COURSE: 79 year old female with history of HTN, HLD, MVR s/p bioprosthetic MV and maze 2021, postop Afib and now atrial flutter since Jan 2024 seen on Medtronic dual chamber PPM who presented to [facility] for a scheduled ablation. Admitted to cardiac telemetry post ablation. Around 10:30pm, pt found to be in SVT with HR 180s and associated symptoms of chest palpitations and dizziness. Pt given adenosine 6mg x1 with conversion to sinus rhythm and resolution of symptoms. Around 12:30 am, pt developed chest palpitations with HR in the 180s again. Another push of adenosine 6mg x1 given with transient resolution of symptoms and achieving sinus rhythm with . Given lopressor 5mg IVP for rate control. Pt went back into SVT for the third time and despite the adenosine, did not adequately convert back to sinus rhythm. Unclear whether fourth dose of adenosine and PO diltiazem ever given. Given diltiazem 15mg IVP and started on amiodarone gtt with return of sinus rhythm, now being transferred to CCU for closer monitoring on the drip pending ablation.    INTERVAL/OVERNIGHT EVENTS: BS at 10 , 416 at 4 AM, pineda placed.    SUBJECTIVE:  Patient seen and examined at bedside, feeling well, denies acute complaints. Denies chest pain, palpitations, lightheadedness/dizziness, SOB, abdominal pain. Reports no episodes of chest pain or palpitations overnight. Her last BM was Wednesday but she denies feeling constipated or bloated.    Vital Signs Last 12 Hrs  T(F): 98.1 (12-05-24 @ 00:36), Max: 98.1 (12-05-24 @ 00:36)  HR: 85 (12-05-24 @ 00:36) (77 - 85)  BP: 103/60 (12-05-24 @ 00:36) (103/60 - 126/53)  BP(mean): 77 (12-05-24 @ 00:36) (77 - 77)  RR: 18 (12-05-24 @ 00:36) (18 - 18)  SpO2: 94% (12-05-24 @ 00:36) (94% - 95%)  I&O's Summary    PHYSICAL EXAM:  General: NAD, lying comfortably in bed  HEENT: PERRL, EOM intact, MMM  Cardiovascular: RRR; no MRG  Respiratory: CTAB; no WRR  GI/: soft; NTND  Extremities: WWP; 2+ peripheral pulses bilaterally; no LE edema  Skin: normal color & turgor; no rash noted  Neurologic: AOx3; no focal deficits; able to follow commands    LABS:                        11.6   12.05 )-----------( 125      ( 04 Dec 2024 17:49 )             36.2     12-04    143  |  106  |  14  ----------------------------<  106[H]  3.9   |  24  |  0.78    Ca    8.1[L]      04 Dec 2024 17:49    TPro  5.7[L]  /  Alb  3.9  /  TBili  0.6  /  DBili  x   /  AST  23  /  ALT  12  /  AlkPhos  47  12-04    PT/INR - ( 04 Dec 2024 14:24 )   PT: 13.7 sec;   INR: 1.17          PTT - ( 04 Dec 2024 14:24 )  PTT:30.0 sec  Urinalysis Basic - ( 04 Dec 2024 17:49 )    Color: x / Appearance: x / SG: x / pH: x  Gluc: 106 mg/dL / Ketone: x  / Bili: x / Urobili: x   Blood: x / Protein: x / Nitrite: x   Leuk Esterase: x / RBC: x / WBC x   Sq Epi: x / Non Sq Epi: x / Bacteria: x          RADIOLOGY & ADDITIONAL TESTS:    MEDICATIONS  (STANDING):  aDENosine Injectable (ADENOCARD) 6 milliGRAM(s) IV Push once  aMIOdarone Infusion 1 mG/Min (33.3 mL/Hr) IV Continuous <Continuous>  aMIOdarone IVPB 150 milliGRAM(s) IV Intermittent once  apixaban 5 milliGRAM(s) Oral two times a day  diltiazem    Tablet 30 milliGRAM(s) Oral once  diltiazem Injectable 15 milliGRAM(s) IV Push once  folic acid 1 milliGRAM(s) Oral daily  metoprolol succinate  milliGRAM(s) Oral daily  mirtazapine 45 milliGRAM(s) Oral at bedtime  pantoprazole    Tablet 40 milliGRAM(s) Oral before breakfast  rosuvastatin 20 milliGRAM(s) Oral at bedtime    MEDICATIONS  (PRN):   HOSPITAL COURSE: 79 year old female with history of HTN, HLD, MVR s/p bioprosthetic MV and maze 2021, postop Afib and now atrial flutter since Jan 2024 seen on Medtronic dual chamber PPM who presented to [facility] for a scheduled ablation. Admitted to cardiac telemetry post ablation. Around 10:30pm, pt found to be in SVT with HR 180s and associated symptoms of chest palpitations and dizziness. Pt given adenosine 6mg x1 with conversion to sinus rhythm and resolution of symptoms. Around 12:30 am, pt developed chest palpitations with HR in the 180s again. Another push of adenosine 6mg x1 given with transient resolution of symptoms and achieving sinus rhythm with . Given lopressor 5mg IVP for rate control. Pt went back into SVT for the third time and despite the adenosine, did not adequately convert back to sinus rhythm. Unclear whether fourth dose of adenosine and PO diltiazem ever given. Given diltiazem 15mg IVP and started on amiodarone gtt with return of sinus rhythm, transferred to CCU for closer monitoring on the drip. Able to be weaned off drips with plan to start amio PO and discharge home.    INTERVAL/OVERNIGHT EVENTS: BS at 10 , 416 at 4 AM, pineda placed.    SUBJECTIVE:  Patient seen and examined at bedside, feeling well, denies acute complaints. Denies chest pain, palpitations, lightheadedness/dizziness, SOB, abdominal pain. Reports no episodes of chest pain or palpitations overnight. Her last BM was Wednesday but she denies feeling constipated or bloated.    Vital Signs Last 12 Hrs  T(F): 98.1 (12-05-24 @ 00:36), Max: 98.1 (12-05-24 @ 00:36)  HR: 85 (12-05-24 @ 00:36) (77 - 85)  BP: 103/60 (12-05-24 @ 00:36) (103/60 - 126/53)  BP(mean): 77 (12-05-24 @ 00:36) (77 - 77)  RR: 18 (12-05-24 @ 00:36) (18 - 18)  SpO2: 94% (12-05-24 @ 00:36) (94% - 95%)  I&O's Summary    PHYSICAL EXAM:  General: NAD, lying comfortably in bed  HEENT: PERRL, EOM intact, MMM  Cardiovascular: RRR; no MRG  Respiratory: CTAB; no WRR  GI/: soft; NTND  Extremities: WWP; 2+ peripheral pulses bilaterally; no LE edema  Skin: normal color & turgor; no rash noted  Neurologic: AOx3; no focal deficits; able to follow commands    LABS:                        11.6   12.05 )-----------( 125      ( 04 Dec 2024 17:49 )             36.2     12-04    143  |  106  |  14  ----------------------------<  106[H]  3.9   |  24  |  0.78    Ca    8.1[L]      04 Dec 2024 17:49    TPro  5.7[L]  /  Alb  3.9  /  TBili  0.6  /  DBili  x   /  AST  23  /  ALT  12  /  AlkPhos  47  12-04    PT/INR - ( 04 Dec 2024 14:24 )   PT: 13.7 sec;   INR: 1.17          PTT - ( 04 Dec 2024 14:24 )  PTT:30.0 sec  Urinalysis Basic - ( 04 Dec 2024 17:49 )    Color: x / Appearance: x / SG: x / pH: x  Gluc: 106 mg/dL / Ketone: x  / Bili: x / Urobili: x   Blood: x / Protein: x / Nitrite: x   Leuk Esterase: x / RBC: x / WBC x   Sq Epi: x / Non Sq Epi: x / Bacteria: x          RADIOLOGY & ADDITIONAL TESTS:    MEDICATIONS  (STANDING):  aDENosine Injectable (ADENOCARD) 6 milliGRAM(s) IV Push once  aMIOdarone Infusion 1 mG/Min (33.3 mL/Hr) IV Continuous <Continuous>  aMIOdarone IVPB 150 milliGRAM(s) IV Intermittent once  apixaban 5 milliGRAM(s) Oral two times a day  diltiazem    Tablet 30 milliGRAM(s) Oral once  diltiazem Injectable 15 milliGRAM(s) IV Push once  folic acid 1 milliGRAM(s) Oral daily  metoprolol succinate  milliGRAM(s) Oral daily  mirtazapine 45 milliGRAM(s) Oral at bedtime  pantoprazole    Tablet 40 milliGRAM(s) Oral before breakfast  rosuvastatin 20 milliGRAM(s) Oral at bedtime    MEDICATIONS  (PRN):   -----------------------------------TRANSFER CCU TO ---------------------------------------    HOSPITAL COURSE: 79 year old female with history of HTN, HLD, MVR s/p bioprosthetic MV and maze 2021, postop Afib and now atrial flutter since Jan 2024 seen on Medtronic dual chamber PPM who presented to [facility] for a scheduled ablation. Admitted to cardiac telemetry post ablation. Around 10:30pm, pt found to be in SVT with HR 180s and associated symptoms of chest palpitations and dizziness. Pt given adenosine 6mg x1 with conversion to sinus rhythm and resolution of symptoms. Around 12:30 am, pt developed chest palpitations with HR in the 180s again. Another push of adenosine 6mg x1 given with transient resolution of symptoms and achieving sinus rhythm with . Given lopressor 5mg IVP for rate control. Pt went back into SVT for the third time and despite the adenosine, did not adequately convert back to sinus rhythm. Unclear whether fourth dose of adenosine and PO diltiazem ever given. Given diltiazem 15mg IVP and started on amiodarone gtt with return of sinus rhythm, transferred to CCU for closer monitoring on the drip. Able to be weaned off drips with plan to start amio PO.    INTERVAL/OVERNIGHT EVENTS: BS at 10 , 416 at 4 AM, pineda placed.    SUBJECTIVE:  Patient seen and examined at bedside, feeling well, denies acute complaints. Denies chest pain, palpitations, lightheadedness/dizziness, SOB, abdominal pain. Reports no episodes of chest pain or palpitations overnight. Her last BM was Wednesday but she denies feeling constipated or bloated.    Vital Signs Last 12 Hrs  T(F): 98.1 (12-05-24 @ 00:36), Max: 98.1 (12-05-24 @ 00:36)  HR: 85 (12-05-24 @ 00:36) (77 - 85)  BP: 103/60 (12-05-24 @ 00:36) (103/60 - 126/53)  BP(mean): 77 (12-05-24 @ 00:36) (77 - 77)  RR: 18 (12-05-24 @ 00:36) (18 - 18)  SpO2: 94% (12-05-24 @ 00:36) (94% - 95%)  I&O's Summary    PHYSICAL EXAM:  General: NAD, lying comfortably in bed  HEENT: PERRL, EOM intact, MMM  Cardiovascular: RRR; no MRG  Respiratory: CTAB; no WRR  GI/: soft; NTND  Extremities: WWP; 2+ peripheral pulses bilaterally; no LE edema  Skin: normal color & turgor; no rash noted  Neurologic: AOx3; no focal deficits; able to follow commands    LABS:                        11.6   12.05 )-----------( 125      ( 04 Dec 2024 17:49 )             36.2     12-04    143  |  106  |  14  ----------------------------<  106[H]  3.9   |  24  |  0.78    Ca    8.1[L]      04 Dec 2024 17:49    TPro  5.7[L]  /  Alb  3.9  /  TBili  0.6  /  DBili  x   /  AST  23  /  ALT  12  /  AlkPhos  47  12-04    PT/INR - ( 04 Dec 2024 14:24 )   PT: 13.7 sec;   INR: 1.17          PTT - ( 04 Dec 2024 14:24 )  PTT:30.0 sec  Urinalysis Basic - ( 04 Dec 2024 17:49 )    Color: x / Appearance: x / SG: x / pH: x  Gluc: 106 mg/dL / Ketone: x  / Bili: x / Urobili: x   Blood: x / Protein: x / Nitrite: x   Leuk Esterase: x / RBC: x / WBC x   Sq Epi: x / Non Sq Epi: x / Bacteria: x          RADIOLOGY & ADDITIONAL TESTS:    MEDICATIONS  (STANDING):  aDENosine Injectable (ADENOCARD) 6 milliGRAM(s) IV Push once  aMIOdarone Infusion 1 mG/Min (33.3 mL/Hr) IV Continuous <Continuous>  aMIOdarone IVPB 150 milliGRAM(s) IV Intermittent once  apixaban 5 milliGRAM(s) Oral two times a day  diltiazem    Tablet 30 milliGRAM(s) Oral once  diltiazem Injectable 15 milliGRAM(s) IV Push once  folic acid 1 milliGRAM(s) Oral daily  metoprolol succinate  milliGRAM(s) Oral daily  mirtazapine 45 milliGRAM(s) Oral at bedtime  pantoprazole    Tablet 40 milliGRAM(s) Oral before breakfast  rosuvastatin 20 milliGRAM(s) Oral at bedtime    MEDICATIONS  (PRN):

## 2024-12-06 NOTE — PROGRESS NOTE ADULT - ASSESSMENT
Patient is 79 year female with history of HTN, HLD, MVR s/p bioprosthetic MV and maze 2021, postop Afib and now atrial flutter since Jan 2024 s/p dual chamber PPM who presented to [facility] for a scheduled ablation with ccb refractory SVT post ablation, stepped up to CCU for amio and dilt drips pending ablation.    NEURO  #Dementia  Home Meds: memantine 5 qd, donepozil 5 qd  - c/w home meds    CV  #Refractory SVT  s/p ablation 12/4/24  s/p adenosine 6mg [id number], diltiazem 15mg IVP x1, lopressor 5mg IVP x1  - c/w amiodarone gtt  - c/w diltiazem gtt, wean as tolerated  - f/u EP recs, ablation today    #Postop Afib  #Aflutter  Seen on dual chamber PPM  Home Med: Eliquis 5mg BID  - c/w Eliquis 5mg BID    #HTN  Home Med: Lisinopril 10mg qd, metoprolol succinate 200mg qd  - c/w home meds with hold parameters    #HLD  Home Med: fenofibrate 54mg, pravastatin 40  - c/w home meds    PULM  KISHORE    RENAL  KISHORE    GI  - Diet: DASH/TLC, NPO for procedure  - Bowel Regimen: miralax and senna  - GI PPx: pantoprazole 40mg qd      #Urinary Retention  Pt retaining,   - Reid in place    ENDO  KISHORE    ID  KISHORE    HEME/ONC  Home Med: folic acid 1mg daily  - c/w home med     MSK/DERM  #Rash  Skin rash at site of pad placement, likely irritation 2/2 adhesives  - hydrocortisone cream     PREVENTIVE   F: none  E: replete K>4 and Mg>2  N: DASH, NPO for procedure  GI: pantoprazole 40mg qd  DVT: Eliquis  DISPO: CCU       Patient is 79 year female with history of HTN, HLD, MVR s/p bioprosthetic MV and maze 2021, postop Afib and now atrial flutter since Jan 2024 s/p dual chamber PPM who presented to [facility] for a scheduled ablation with ccb refractory SVT post ablation, stepped up to CCU for amio and dilt drips pending ablation.    NEURO  #Dementia  Home Meds: memantine 5 qd, donepozil 5 qd  - c/w home meds    CV  #Refractory SVT  s/p ablation 12/4/24  s/p adenosine 6mg [id number], diltiazem 15mg IVP x1, lopressor 5mg IVP x1  - c/w amiodarone gtt  - c/w diltiazem gtt, wean as tolerated  - f/u EP recs, ablation today    #Postop Afib  #Aflutter  Seen on dual chamber PPM  Home Med: Eliquis 5mg BID  - c/w Eliquis 5mg BID    #HTN  Home Med: Lisinopril 10mg qd, metoprolol succinate 200mg qd  - c/w home med metoprolol with hold parameters  - holding lisinopril for now    #HLD  Home Med: fenofibrate 54mg, pravastatin 40  - c/w home meds    PULM  KISHORE    RENAL  KISHORE    GI  - Diet: DASH/TLC, NPO for procedure  - Bowel Regimen: miralax and senna  - GI PPx: pantoprazole 40mg qd      #Urinary Retention  Pt retaining,   - Reid in place    ENDO  KISHORE    ID  KISHORE    HEME/ONC  Home Med: folic acid 1mg daily  - c/w home med     MSK/DERM  #Rash  Skin rash at site of pad placement, likely irritation 2/2 adhesives  - hydrocortisone cream     PREVENTIVE   F: none  E: replete K>4 and Mg>2  N: DASH, NPO for procedure  GI: pantoprazole 40mg qd  DVT: Eliquis  DISPO: CCU       Patient is 79 year female with history of HTN, HLD, MVR s/p bioprosthetic MV and maze 2021, postop Afib and now atrial flutter since Jan 2024 s/p dual chamber PPM who presented to [facility] for a scheduled ablation with ccb refractory SVT post ablation, stepped up to CCU for amio and dilt drips, now off drips with plan for amio outpatient.    NEURO  #Dementia  Home Meds: memantine 5 qd, donepozil 5 qd  - c/w home meds    CV  #Refractory SVT  s/p ablation 12/4/24  s/p adenosine 6mg [id number], diltiazem 15mg IVP x1, lopressor 5mg IVP x1  - Off amio and dilt drips  - EP unable to fit in patient for ablation, recommend discharge with amio PO outpatient  - Sent scripts for amiodarone 200mg BID x10 days --> amio 100mg daily   - Repeat LFTs on Monday for monitoring on amio  - Will follow up with Dr. Becerra in [place] outpatient    #Postop Afib  #Aflutter  Seen on dual chamber PPM  Home Med: Eliquis 5mg BID  - c/w Eliquis 5mg BID, sent script to [facility]    #HTN  Home Med: Lisinopril 10mg qd, metoprolol succinate 200mg qd  - c/w home med metoprolol with hold parameters  - restarted lisinopril    #HLD  Home Med: fenofibrate 54mg, pravastatin 40  - c/w home statin  - fenofibrate non-formulary    PULM  KISHORE    RENAL  KISHORE    GI  - Diet: DASH/TLC, NPO for procedure  - Bowel Regimen: miralax and senna  - GI PPx: pantoprazole 40mg qd      #Urinary Retention  Pt retaining,   - TOV today    ENDO  KISHORE    ID  KISHORE    HEME/ONC  Home Med: folic acid 1mg daily  - c/w home med     MSK/DERM  #Rash  Skin rash at site of pad placement, likely irritation 2/2 adhesives  - hydrocortisone cream     PREVENTIVE   F: none  E: replete K>4 and Mg>2  N: DASH, NPO for procedure  GI: pantoprazole 40mg qd  DVT: Eliquis  DISPO: discharge home       Patient is 79 year female with history of HTN, HLD, MVR s/p bioprosthetic MV and maze 2021, postop Afib and now atrial flutter since Jan 2024 s/p dual chamber PPM who presented to [facility] for a scheduled ablation with ccb refractory SVT post ablation, stepped up to CCU for amio and dilt drips, now off drips with plan for amio PO.    NEURO  #Dementia  Home Meds: memantine 5 qd, donepozil 5 qd  - c/w home meds    CV  #Refractory SVT  s/p ablation 12/4/24  s/p adenosine 6mg [id number], diltiazem 15mg IVP x1, lopressor 5mg IVP x1  - Off amio and dilt drips  - EP unable to fit in patient for ablation, recommend discharge with amio PO outpatient  - Sent scripts for amiodarone 200mg BID x10 days --> amio 100mg daily   - Repeat LFTs on Monday for monitoring on amio  - Will follow up with Dr. Becerra in [place] outpatient    #Postop Afib  #Aflutter  Seen on dual chamber PPM  Home Med: Eliquis 5mg BID  - c/w Eliquis 5mg BID, sent script to [facility]    #HTN  Home Med: Lisinopril 10mg qd, metoprolol succinate 200mg qd  - c/w home med metoprolol with hold parameters  - restarted lisinopril    #HLD  Home Med: fenofibrate 54mg, pravastatin 40  - c/w home statin  - fenofibrate non-formulary    PULM  KISHORE    RENAL  KISHORE    GI  - Diet: DASH/TLC, NPO for procedure  - Bowel Regimen: miralax and senna  - GI PPx: pantoprazole 40mg qd      #Urinary Retention  Pt retaining,   - TOV today    ENDO  KISHORE    ID  KISHORE    HEME/ONC  Home Med: folic acid 1mg daily  - c/w home med     MSK/DERM  #Rash  Skin rash at site of pad placement, likely irritation 2/2 adhesives  - hydrocortisone cream     PREVENTIVE   F: none  E: replete K>4 and Mg>2  N: DASH, NPO for procedure  GI: pantoprazole 40mg qd  DVT: Eliquis  DISPO: discharge home

## 2024-12-06 NOTE — DISCHARGE NOTE PROVIDER - CARE PROVIDER_API CALL
Danica Becerra   Cardiovascular Disease  [address], [address] [place] [address]  Phone: [phone]  Fax: [phone]  Established Patient  Scheduled Appointment: 12/26/2024 12:00 PM    Blake marin  [address] [place] [address] [address] [place] [address]  Phone: [phone]  Fax: (   )    -  Follow Up Time: 1 week    Danica Becerra  [address] [place] [address]  Phone: [phone]  Fax: (   )    -  Established Patient  Scheduled Appointment: 12/26/2024 12:00 PM   Blake marin  [address] [place] [address] [address], [place] [address]  Phone: [phone]  Fax: (   )    -  Follow Up Time: 1 week    Danica Becerra  [address] [place] [address]  Phone: [phone]  Fax: (   )    -  Scheduled Appointment: 01/14/2025   Blake marin  [address] [place] [address] [address] [address]  Phone: [phone]  Fax: (   )    -  Scheduled Appointment: 12/18/2024 01:00 PM    Danica Becerra  [address] [place] [address]  Phone: [phone]  Fax: (   )    -  Scheduled Appointment: 01/14/2025

## 2024-12-06 NOTE — PROGRESS NOTE ADULT - ASSESSMENT
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 Art Walker/Che at [facility] post MV surgery for ?tachy-federico) who initially presented to [facility] for an ambulatory afib/aflutter ablation on 12/4 with post-procedure c/b symptomatic refractory SVT without improvement from IVP Adenosine/Dilt/lopressor requiring CCU upgrade for amio and dilt gtt. Now maintained in NSR, weaned off drips and transitioned to PO amio, planned for outpt EPS/ablation. Course c/b transaminitis, urinary retention s/p Reid, and fever/hypoxia (on 2L NC) on 12/5. Stepped down to cardiac tele on 12/6 for further management and dc planning.

## 2024-12-06 NOTE — PROGRESS NOTE ADULT - SUBJECTIVE AND OBJECTIVE BOX
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 Art Walker/Che at [facility] post MV surgery for ?tachy-federico) who initially presented to [facility] for an ambulatory afib/aflutter ablation. Pt is now s/p ablation on 12/4 with post-procedure c/b symptomatic refractory SVT without improvement from Adenosine, Diltiazem, and IV lopressor thus was upgraded to CCU for further managemnet of refractory SVT. Pt was started on amio gtt & dilt gtt with subsequent controlled rates, now weaned off and transitioned to PO amio and resumed home Toprol 200mg QD. Per EP, the initial SVT was likely AT (mapped to that area during EP study). Now w/ transaminitis this AM after initiating amio; per EP ok to continue amio until ablation with repeat labs Monday (fax results to Dr. Blake Simon). Hospital course futher c/b hypoxia (89-91% on RA, now on 2L NC) and fever (Tmax 100.5) on 12/5; infectious workup sent, currently monitoring off abx as no infectious sx. Also s/p Reid placement 12/5 2/2 urinary retention. Pt has been in sinus/A paced rhythm x24 hrs. Stable for step down to cardiac tele for further management and dc planning.     SUBJECTIVE ASSESSMENT:  	  MEDICATIONS:  aDENosine Injectable (ADENOCARD) 6 milliGRAM(s) IV Push once  metoprolol succinate  milliGRAM(s) Oral daily  donepezil 5 milliGRAM(s) Oral at bedtime  memantine 5 milliGRAM(s) Oral every 24 hours  pantoprazole    Tablet 40 milliGRAM(s) Oral before breakfast  polyethylene glycol 3350 17 Gram(s) Oral every 24 hours  senna 2 Tablet(s) Oral at bedtime  atorvastatin 10 milliGRAM(s) Oral at bedtime  apixaban 5 milliGRAM(s) Oral two times a day  folic acid 1 milliGRAM(s) Oral daily  hydrocortisone 1% Cream 1 Application(s) Topical two times a day  influenza  Vaccine (HIGH DOSE) 0.5 milliLiter(s) IntraMuscular once    	  VITAL SIGNS:  T(C): 37.3 (12-06-24 @ 13:30), Max: 38.1 (12-05-24 @ 17:00)  HR: 63 (12-06-24 @ 16:00) (59 - 63)  BP: 145/66 (12-06-24 @ 16:00) (108/52 - 156/69)  RR: 32 (12-06-24 @ 16:00) (12 - 32)  SpO2: 92% (12-06-24 @ 16:00) (89% - 100%)  Wt(kg): --    I&O's Summary    05 Dec 2024 07:01  -  06 Dec 2024 07:00  --------------------------------------------------------  IN: 891.8 mL / OUT: 960 mL / NET: -68.2 mL    06 Dec 2024 07:01  -  06 Dec 2024 16:14  --------------------------------------------------------  IN: 50.1 mL / OUT: 480 mL / NET: -429.9 mL                                       PHYSICAL EXAM:  Appearance: Normal	  HEENT: Normal oral mucosa, PERRL, EOMI	  Neck: Supple, + JVD/ - JVD; ___ Carotid Bruit   Cardiovascular: Normal S1 S2, No murmurs  Respiratory: Lungs clear to auscultation/Decreased Breath Sounds/No Rales, Rhonchi, Wheezing	  Gastrointestinal:  Soft, Non-tender, + BS	  Skin: No rashes, No ecchymoses, No cyanosis  Extremities: Normal range of motion, No clubbing, cyanosis or edema  Vascular: Peripheral pulses palpable 2+ bilaterally  Neurologic: Non-focal  Psychiatry: A & O x 3, Mood & affect appropriate    LABS:	 	                          12.3   7.28  )-----------( 86       ( 06 Dec 2024 05:59 )             37.9     12-06    138  |  105  |  19  ----------------------------<  109[H]  4.2   |  23  |  0.94    Ca    8.4      06 Dec 2024 05:59  Phos  2.5     12-06  Mg     2.0     12-06    TPro  6.1  /  Alb  3.7  /  TBili  0.4  /  DBili  x   /  AST  99[H]  /  ALT  57[H]  /  AlkPhos  66  12-06  PT/INR - ( 06 Dec 2024 05:59 )   PT: 17.7 sec;   INR: 1.52     PTT - ( 06 Dec 2024 05:59 )  PTT:32.3 se ---------------- CCU STEPDOWN TO 5URIS ACCEPTANCE NOTE --------------    HOSPITAL COURSE: 78 yo Polish speaking F with PMHx of HTN, HLD, MVR s/p bioprosthetic MV and Maze + LISBET closure c/b postop A.fib and now a.flutter seen on Medtronic DC-PPM since Jan 2024 (implanted by Dr. Art Walker/Che at [facility] post MV surgery for ?tachy-federico) who initially presented to [facility] for an ambulatory afib/aflutter ablation. Pt is now s/p ablation on 12/4 with post-procedure c/b symptomatic refractory SVT without improvement from Adenosine, Diltiazem, and IV lopressor thus was upgraded to CCU for further managemnet of refractory SVT. Pt was started on amio gtt & dilt gtt with subsequent controlled rates, now weaned off and transitioned to PO amio and resumed home Toprol 200mg QD. Per EP, the initial SVT was likely AT (mapped to that area during EP study). Now w/ transaminitis this AM after initiating amio; per EP ok to continue amio until ablation with repeat labs Monday (fax results to Dr. Blake Simon). Hospital course futher c/b hypoxia (89-91% on RA, now on 2L NC) and fever (Tmax 100.5) on 12/5; infectious workup sent, currently monitoring off abx as no infectious sx. Also s/p Reid placement 12/5 2/2 urinary retention. Pt has been in sinus/A paced rhythm x24 hrs. Stable for step down to cardiac tele for further management and dc planning.     SUBJECTIVE ASSESSMENT:  Patient seen and examined with Language Line ID Polish #[id number]. Denies CP, SOB, palpitations, lightheadedness, dizziness, abd pain, N/V, bleeding, LE edema, fever, or cough.   Endorses not having BM x2 days and urinary retention.   	  MEDICATIONS:  aDENosine Injectable (ADENOCARD) 6 milliGRAM(s) IV Push once  metoprolol succinate  milliGRAM(s) Oral daily  donepezil 5 milliGRAM(s) Oral at bedtime  memantine 5 milliGRAM(s) Oral every 24 hours  pantoprazole    Tablet 40 milliGRAM(s) Oral before breakfast  polyethylene glycol 3350 17 Gram(s) Oral every 24 hours  senna 2 Tablet(s) Oral at bedtime  atorvastatin 10 milliGRAM(s) Oral at bedtime  apixaban 5 milliGRAM(s) Oral two times a day  folic acid 1 milliGRAM(s) Oral daily  hydrocortisone 1% Cream 1 Application(s) Topical two times a day  influenza  Vaccine (HIGH DOSE) 0.5 milliLiter(s) IntraMuscular once    	  VITAL SIGNS:  T(C): 37.3 (12-06-24 @ 13:30), Max: 38.1 (12-05-24 @ 17:00)  HR: 63 (12-06-24 @ 16:00) (59 - 63)  BP: 145/66 (12-06-24 @ 16:00) (108/52 - 156/69)  RR: 32 (12-06-24 @ 16:00) (12 - 32)  SpO2: 92% (12-06-24 @ 16:00) (89% - 100%)  Wt(kg): --    I&O's Summary    05 Dec 2024 07:01  -  06 Dec 2024 07:00  --------------------------------------------------------  IN: 891.8 mL / OUT: 960 mL / NET: -68.2 mL    06 Dec 2024 07:01  -  06 Dec 2024 16:14  --------------------------------------------------------  IN: 50.1 mL / OUT: 480 mL / NET: -429.9 mL                                       PHYSICAL EXAM:  Appearance: Normal	  HEENT: Normal oral mucosa, PERRL, EOMI	  Neck: Supple,  Cardiovascular: Normal S1 S2, No murmurs  Respiratory: Diminished BS in B/L lower lobes   Gastrointestinal:  Soft, Non-tender, + BS	  Skin: No rashes, No ecchymoses, No cyanosis  Extremities: Normal range of motion, No clubbing, cyanosis or edema  Vascular: DP 2+, B/L 1+ pitting edema   B/L groin access site stable, skin soft, no hematoma or bleeding   Neurologic: Non-focal  Psychiatry: A & O x 3, Mood & affect appropriate    LABS:	 	                          12.3   7.28  )-----------( 86       ( 06 Dec 2024 05:59 )             37.9     12-06    138  |  105  |  19  ----------------------------<  109[H]  4.2   |  23  |  0.94    Ca    8.4      06 Dec 2024 05:59  Phos  2.5     12-06  Mg     2.0     12-06    TPro  6.1  /  Alb  3.7  /  TBili  0.4  /  DBili  x   /  AST  99[H]  /  ALT  57[H]  /  AlkPhos  66  12-06  PT/INR - ( 06 Dec 2024 05:59 )   PT: 17.7 sec;   INR: 1.52     PTT - ( 06 Dec 2024 05:59 )  PTT:32.3 se

## 2024-12-06 NOTE — DISCHARGE NOTE PROVIDER - HOSPITAL COURSE
HOSPITAL COURSE: 79 year old female with history of HTN, HLD, MVR s/p bioprosthetic MV and maze 2021, postop Afib and now atrial flutter since Jan 2024 seen on Medtronic dual chamber PPM who presented to [facility] for a scheduled ablation. Admitted to cardiac telemetry post ablation. Around 10:30pm, pt found to be in SVT with HR 180s and associated symptoms of chest palpitations and dizziness. Pt given adenosine 6mg x1 with conversion to sinus rhythm and resolution of symptoms. Around 12:30 am, pt developed chest palpitations with HR in the 180s again. Another push of adenosine 6mg x1 given with transient resolution of symptoms and achieving sinus rhythm with . Given lopressor 5mg IVP for rate control. Pt went back into SVT for the third time and despite the adenosine, did not adequately convert back to sinus rhythm. Unclear whether fourth dose of adenosine and PO diltiazem ever given. Given diltiazem 15mg IVP and started on amiodarone gtt with return of sinus rhythm, transferred to CCU for closer monitoring on the drip. Able to be weaned off drips with plan to start amio PO and discharge home.    Problem List/Main Diagnoses:   #Dementia  Home Meds: memantine 5 qd, donepozil 5 qd  - c/w home meds    #Refractory SVT  s/p ablation 12/4/24  s/p adenosine 6mg [id number], diltiazem 15mg IVP x1, lopressor 5mg IVP x1  - Off amio and dilt drips  - EP unable to fit in patient for ablation, recommend discharge with amio PO outpatient  - Sent scripts for amiodarone 200mg BID x10 days --> amio 100mg daily   - Repeat LFTs on Monday for monitoring on amio  - Will follow up with Dr. Becerra in [place] outpatient    #Postop Afib  #Aflutter  Seen on dual chamber PPM  Home Med: Eliquis 5mg BID  - c/w Eliquis 5mg BID, [facility] script to [facility]    #HTN  Home Med: Lisinopril 10mg qd, metoprolol succinate 200mg qd  - c/w home meds on discharge    #HLD  Home Med: fenofibrate 54mg, pravastatin 40  - c/w home meds on discharge    New medications/therapies: Amiodarone 200mg BID x10 days then 200mg qd  Labs to be followed outpatient: LFTs on 12/9 --> fax to Dr. Blake Simon [phone]  Exam to be followed outpatient: F/u with EP (Dr. Becerra in [place] office)    Discharge plan: discharge to home       79 year old female with history of HTN, HLD, MVR s/p bioprosthetic MV and maze 2021, postop Afib and now atrial flutter since Jan 2024 seen on Medtronic dual chamber PPM who presented to [facility] for a scheduled ablation. Admitted to cardiac telemetry post ablation. Around 10:30pm, pt found to be in SVT with HR 180s and associated symptoms of chest palpitations and dizziness. Pt given adenosine 6mg x1 with conversion to sinus rhythm and resolution of symptoms. Around 12:30 am, pt developed chest palpitations with HR in the 180s again. Another push of adenosine 6mg x1 given with transient resolution of symptoms and achieving sinus rhythm with . Given lopressor 5mg IVP for rate control. Pt went back into SVT for the third time and despite the adenosine, did not adequately convert back to sinus rhythm. Unclear whether fourth dose of adenosine and PO diltiazem ever given. Given diltiazem 15mg IVP and started on amiodarone gtt with return of sinus rhythm, transferred to CCU for closer monitoring on the drip. On 12/6 pt was able to be weaned off drips and was transferred back to cardiac tele on PO amio 200mg BID with plan for outpatient EPS/ablation. Her course was further c/b Transaminitis ( 12/6/24 LFT's 99/57 after amio), urinary retention s/p pineda and low grade fever/ hypoxia requiring 2L 02 NC. LFT's have since self resolved ( 12/9 LFT's 22/27)    CXR on 12/6/2024 revealed slight exacerbation in pulmonary venous congestion. bibasilar pleural effusions left greater than right.    CXR on 12/7/2024 revealed no changes from the above. Patient was given a one time dose of 20mg IV lasix and CXR was repeated.     CXR on 12/8/2024 revealed Left effusion with congestion and/or infiltrates. Pt is now satting 94% on RA and euvolemic. She was transitioned to 40mg PO lasix QD and started on Farxiga 10mg QD and Losartan 25mg QD.     Discharge Medications:   -          79 year old female with history of HTN, HLD, MVR s/p bioprosthetic MV and maze 2021, postop Afib and now atrial flutter since Jan 2024 seen on Medtronic dual chamber PPM who presented to [facility] for a scheduled ablation. Admitted to cardiac telemetry post ablation. Around 10:30pm, pt found to be in SVT with HR 180s and associated symptoms of chest palpitations and dizziness. Pt given adenosine 6mg x1 with conversion to sinus rhythm and resolution of symptoms. Around 12:30 am, pt developed chest palpitations with HR in the 180s again. Another push of adenosine 6mg x1 given with transient resolution of symptoms and achieving sinus rhythm with . Given lopressor 5mg IVP for rate control. Pt went back into SVT for the third time and despite the adenosine, did not adequately convert back to sinus rhythm. Unclear whether fourth dose of adenosine and PO diltiazem ever given. Given diltiazem 15mg IVP and started on amiodarone gtt with return of sinus rhythm, transferred to CCU for closer monitoring on the drip. On 12/6 pt was able to be weaned off drips and was transferred back to cardiac tele on PO amio 200mg BID with plan for outpatient EPS/ablation. Her course was further c/b Transaminitis ( 12/6/24 LFT's 99/57 after amio), urinary retention s/p pineda and low grade fever/ hypoxia requiring 2L 02 NC. LFT's have since self resolved ( 12/9 LFT's 22/27)    CXR on 12/6/2024 revealed slight exacerbation in pulmonary venous congestion. bibasilar pleural effusions left greater than right.    CXR on 12/7/2024 revealed no changes from the above. Patient was given a one time dose of 20mg IV lasix and CXR was repeated.     CXR on 12/8/2024 revealed Left effusion with congestion and/or infiltrates. Pt is now satting 94% on RA and euvolemic. She was transitioned to 40mg PO lasix QD and started on Farxiga 10mg QD and Losartan 25mg QD.     Discharge Medications:   - Amiodarone 200 mg PO BID until 12/16 and then QD   - apixaban 5 mg PO BID   - diclofenac 1% topical gel: Apply topically to affected area 4 times a day as needed for  mild pain  - donepezil 5 mg PO QD  - Farxiga 10 mg PO QD  - fenofibrate 54 mg PO QD  - folic acid 1 mg PO QD  - Losartan 25mg PO QD  - memantine 5 mg PO QD  - metoprolol succinate 200 mg PO QD  - pravastatin 40 mg PO QD   - traZODone 50 mg PO QHS    On the day of discharge, the patient was seen and examined. Symptoms improved. Vital signs are stable. Labs and imaging reviewed. Patient is medically optimized and hemodynamically stable. Return precautions discussed, medication teach back done, and importance of physician followup emphasized. The patient verbalized understanding. pt has been cleared for discharge as per Dr. Hsu. She was told to follow up with Electrophysiologist, Dr. Becerra on _____ and Dr. Simon in one week.          79 year old female with history of HTN, HLD, MVR s/p bioprosthetic MV and maze 2021, postop Afib and now atrial flutter since Jan 2024 seen on Medtronic dual chamber PPM who presented to [facility] for a scheduled ablation. Admitted to cardiac telemetry post ablation. Around 10:30pm, pt found to be in SVT with HR 180s and associated symptoms of chest palpitations and dizziness. Pt given adenosine 6mg x1 with conversion to sinus rhythm and resolution of symptoms. Around 12:30 am, pt developed chest palpitations with HR in the 180s again. Another push of adenosine 6mg x1 given with transient resolution of symptoms and achieving sinus rhythm with . Given lopressor 5mg IVP for rate control. Pt went back into SVT for the third time and despite the adenosine, did not adequately convert back to sinus rhythm. Unclear whether fourth dose of adenosine and PO diltiazem ever given. Given diltiazem 15mg IVP and started on amiodarone gtt with return of sinus rhythm, transferred to CCU for closer monitoring on the drip. On 12/6 pt was able to be weaned off drips and was transferred back to cardiac tele on PO amio 200mg BID with plan for outpatient EPS/ablation. Her course was further c/b Transaminitis ( 12/6/24 LFT's 99/57 after amio), urinary retention s/p pineda and low grade fever/ hypoxia requiring 2L 02 NC. LFT's have since self resolved ( 12/9 LFT's 22/27)    CXR on 12/6/2024 revealed slight exacerbation in pulmonary venous congestion. bibasilar pleural effusions left greater than right.    CXR on 12/7/2024 revealed no changes from the above. Patient was given a one time dose of 20mg IV lasix and CXR was repeated.     CXR on 12/8/2024 revealed Left effusion with congestion and/or infiltrates. Pt is now satting 94% on RA and euvolemic. She was transitioned to 40mg PO lasix QD and started on Farxiga 10mg QD and Losartan 25mg QD.     Discharge Medications:   - Amiodarone 200 mg PO BID until 12/16 and then QD   - Eliquis 5 mg PO BID   - diclofenac 1% topical gel: Apply topically to affected area 4 times a day as needed for  mild pain  - donepezil 5 mg PO QD  - Farxiga 10 mg PO QD  - fenofibrate 54 mg PO QD  - folic acid 1 mg PO QD  - Losartan 25mg PO QD  - memantine 5 mg PO QD  - metoprolol succinate 200 mg PO QD  - pravastatin 40 mg PO QD   - traZODone 50 mg PO QHS    On the day of discharge, the patient was seen and examined. Symptoms improved. Vital signs are stable. Labs and imaging reviewed. Patient is medically optimized and hemodynamically stable. Return precautions discussed, medication teach back done, and importance of physician followup emphasized. The patient verbalized understanding. pt has been cleared for discharge as per Dr. Hsu. She was told to follow up with Electrophysiologist, Dr. Becerra on _____ and Dr. Simon in one week.          79 year old female with history of HTN, HLD, MVR s/p bioprosthetic MV and maze 2021, postop Afib and now atrial flutter since Jan 2024 seen on Medtronic dual chamber PPM who presented to [facility] for a scheduled ablation. Admitted to cardiac telemetry post ablation. Around 10:30pm, pt found to be in SVT with HR 180s and associated symptoms of chest palpitations and dizziness. Pt given adenosine 6mg x1 with conversion to sinus rhythm and resolution of symptoms. Around 12:30 am, pt developed chest palpitations with HR in the 180s again. Another push of adenosine 6mg x1 given with transient resolution of symptoms and achieving sinus rhythm with . Given lopressor 5mg IVP for rate control. Pt went back into SVT for the third time and despite the adenosine, did not adequately convert back to sinus rhythm. Unclear whether fourth dose of adenosine and PO diltiazem ever given. Given diltiazem 15mg IVP and started on amiodarone gtt with return of sinus rhythm, transferred to CCU for closer monitoring on the drip. On 12/6 pt was able to be weaned off drips and was transferred back to cardiac tele on PO amio 200mg BID with plan for outpatient EPS/ablation. Her course was further c/b Transaminitis ( 12/6/24 LFT's 99/57 after amio), urinary retention s/p pineda and low grade fever/ hypoxia requiring 2L 02 NC. Transaminitis has since self normalized( 12/9 LFT's 22/27)    CXR on 12/6/2024 revealed slight exacerbation in pulmonary venous congestion. bibasilar pleural effusions left greater than right.    CXR on 12/7/2024 revealed no changes from the above. Patient was given a one time dose of 20mg IV lasix and CXR was repeated.     CXR on 12/8/2024 revealed Left effusion with congestion and/or infiltrates. Pt is now satting 94% on RA and euvolemic. She was transitioned to 40mg PO lasix QD and started on Farxiga 10mg QD and Losartan 25mg QD.     Discharge Medications:   - Amiodarone 200 mg PO BID until 12/16 and then QD   - Eliquis 5 mg PO BID   - diclofenac 1% topical gel: Apply topically to affected area 4 times a day as needed for  mild pain  - donepezil 5 mg PO QD  - Farxiga 10 mg PO QD  - fenofibrate 54 mg PO QD  - folic acid 1 mg PO QD  - Losartan 25mg PO QD  - memantine 5 mg PO QD  - metoprolol succinate 200 mg PO QD  - pravastatin 40 mg PO QD   - traZODone 50 mg PO QHS    On the day of discharge, the patient was seen and examined. Symptoms improved. Vital signs are stable. Labs and imaging reviewed. Patient is medically optimized and hemodynamically stable. Return precautions discussed, medication teach back done, and importance of physician followup emphasized. The patient verbalized understanding. pt has been cleared for discharge as per Dr. Hsu. She was told to follow up with Electrophysiologist, Dr. Becerra on 1/14/2024 and Dr. Simon on 12/18/2024 at 1:00PM         79 year old female with history of HTN, HLD, MVR s/p bioprosthetic MV and maze 2021, postop Afib and now atrial flutter since Jan 2024 seen on Medtronic dual chamber PPM who presented to [facility] for a scheduled ablation. Admitted to cardiac telemetry post ablation. Around 10:30pm, pt found to be in SVT with HR 180s and associated symptoms of chest palpitations and dizziness. Pt given adenosine 6mg x1 with conversion to sinus rhythm and resolution of symptoms. Around 12:30 am, pt developed chest palpitations with HR in the 180s again. Another push of adenosine 6mg x1 given with transient resolution of symptoms and achieving sinus rhythm with . Given lopressor 5mg IVP for rate control. Pt went back into SVT for the third time and despite the adenosine, did not adequately convert back to sinus rhythm. Unclear whether fourth dose of adenosine and PO diltiazem ever given. Given diltiazem 15mg IVP and started on amiodarone gtt with return of sinus rhythm, transferred to CCU for closer monitoring on the drip. On 12/6 pt was able to be weaned off drips and was transferred back to cardiac tele on PO amio 200mg BID with plan for outpatient EPS/ablation. Her course was further c/b Transaminitis ( 12/6/24 LFT's 99/57 after amio), urinary retention s/p pineda and low grade fever/ hypoxia requiring 2L 02 NC. Transaminitis has since self normalized( 12/9 LFT's 22/27)    TTE completed on 12/5/2024 revealed LVEF of 50-55%, Apical lateral segment and apex are abnormal. LV size is normal. Mildly reduced RVSF, Mildly dilated LA, Aortic sclerosis without significant stenosis. Trace AR, Pulmonary artery systolic pressure is 45 mmHg.    CXR on 12/6/2024 revealed slight exacerbation in pulmonary venous congestion. bibasilar pleural effusions left greater than right.    CXR on 12/7/2024 revealed no changes from the above. Patient was given a one time dose of 20mg IV lasix and CXR was repeated.     CXR on 12/8/2024 revealed Left effusion with congestion and/or infiltrates. Pt is now satting 94% on RA and euvolemic. She was transitioned to 40mg PO lasix QD and started on Farxiga 10mg QD and Losartan 25mg QD.     Discharge Medications:   - Amiodarone 200 mg PO BID until 12/16 and then QD   - Eliquis 5 mg PO BID   - diclofenac 1% topical gel: Apply topically to affected area 4 times a day as needed for  mild pain  - donepezil 5 mg PO QD  - Farxiga 10 mg PO QD  - fenofibrate 54 mg PO QD  - folic acid 1 mg PO QD  - Losartan 25mg PO QD  - memantine 5 mg PO QD  - metoprolol succinate 200 mg PO QD  - pravastatin 40 mg PO QD   - traZODone 50 mg PO QHS    On the day of discharge, the patient was seen and examined. Symptoms improved. Vital signs are stable. Labs and imaging reviewed. Patient is medically optimized and hemodynamically stable. Return precautions discussed, medication teach back done, and importance of physician followup emphasized. The patient verbalized understanding. pt has been cleared for discharge as per Dr. Hsu. She was told to follow up with Electrophysiologist, Dr. Becerra on 1/14/2024 and Dr. Simon on 12/18/2024 at 1:00PM

## 2024-12-06 NOTE — DISCHARGE NOTE PROVIDER - NSDCMRMEDTOKEN_GEN_ALL_CORE_FT
apixaban 5 mg oral tablet: 1 tab(s) orally 2 times a day  diclofenac 1% topical gel: Apply topically to affected area 4 times a day as needed for  mild pain  donepezil 5 mg oral tablet: 1 tab(s) orally once a day  fenofibrate 54 mg oral tablet: 1 tab(s) orally once a day  folic acid 1 mg oral tablet: 1 tab(s) orally once a day  lisinopril 10 mg oral tablet: 1 tab(s) orally once a day  memantine 5 mg oral tablet: 1 tab(s) orally once a day  metoprolol succinate 200 mg oral capsule, extended release: 1 cap(s) orally once a day  pravastatin 40 mg oral tablet: 1 tab(s) orally once a day  traZODone 50 mg oral tablet: 1 tab(s) orally once a day (at bedtime)   amiodarone 200 mg oral tablet: 1 tab(s) orally 2 times a day Take 1 tab twice daily for 10 days, then 1 tab once daily  apixaban 5 mg oral tablet: 1 tab(s) orally 2 times a day  diclofenac 1% topical gel: Apply topically to affected area 4 times a day as needed for  mild pain  donepezil 5 mg oral tablet: 1 tab(s) orally once a day  Eliquis 5 mg oral tablet: 1 tab(s) orally 2 times a day  fenofibrate 54 mg oral tablet: 1 tab(s) orally once a day  folic acid 1 mg oral tablet: 1 tab(s) orally once a day  lisinopril 10 mg oral tablet: 1 tab(s) orally once a day  memantine 5 mg oral tablet: 1 tab(s) orally once a day  metoprolol succinate 200 mg oral capsule, extended release: 1 cap(s) orally once a day  pravastatin 40 mg oral tablet: 1 tab(s) orally once a day  traZODone 50 mg oral tablet: 1 tab(s) orally once a day (at bedtime)   amiodarone 200 mg oral tablet: 1 tab(s) orally 2 times a day Take 1 tab twice daily for 10 days, then 1 tab once daily  apixaban 5 mg oral tablet: 1 tab(s) orally 2 times a day  diclofenac 1% topical gel: Apply topically to affected area 4 times a day as needed for  mild pain  donepezil 5 mg oral tablet: 1 tab(s) orally once a day  Eliquis 5 mg oral tablet: 1 tab(s) orally 2 times a day  Farxiga 10 mg oral tablet: 1 tab(s) orally once a day  fenofibrate 54 mg oral tablet: 1 tab(s) orally once a day  folic acid 1 mg oral tablet: 1 tab(s) orally once a day  lisinopril 10 mg oral tablet: 1 tab(s) orally once a day  memantine 5 mg oral tablet: 1 tab(s) orally once a day  metoprolol succinate 200 mg oral capsule, extended release: 1 cap(s) orally once a day  pravastatin 40 mg oral tablet: 1 tab(s) orally once a day  traZODone 50 mg oral tablet: 1 tab(s) orally once a day (at bedtime)   amiodarone 200 mg oral tablet: 1 tab(s) orally 2 times a day Take 1 tab twice daily for 10 days, then 1 tab once daily  diclofenac 1% topical gel: Apply topically to affected area 4 times a day as needed for  mild pain  donepezil 5 mg oral tablet: 1 tab(s) orally once a day  Eliquis 5 mg oral tablet: 1 tab(s) orally 2 times a day  Farxiga 10 mg oral tablet: 1 tab(s) orally once a day  fenofibrate 54 mg oral tablet: 1 tab(s) orally once a day  folic acid 1 mg oral tablet: 1 tab(s) orally once a day  furosemide 40 mg oral tablet: 1 tab(s) orally once a day  losartan 25 mg oral tablet: 1 tab(s) orally once a day  memantine 5 mg oral tablet: 1 tab(s) orally once a day  metoprolol succinate 200 mg oral capsule, extended release: 1 cap(s) orally once a day  pravastatin 40 mg oral tablet: 1 tab(s) orally once a day  traZODone 50 mg oral tablet: 1 tab(s) orally once a day (at bedtime)   amiodarone 200 mg oral tablet: 1 tab(s) orally 2 times a day Take 1 tab twice daily for 10 days, then 1 tab once daily  diclofenac 1% topical gel: Apply topically to affected area 4 times a day as needed for  mild pain  donepezil 5 mg oral tablet: 1 tab(s) orally once a day  Eliquis 5 mg oral tablet: 1 tab(s) orally 2 times a day  Eliquis 5 mg oral tablet: 1 tab(s) orally 2 times a day  Farxiga 10 mg oral tablet: 1 tab(s) orally once a day  Farxiga 10 mg oral tablet: 1 tab(s) orally once a day  fenofibrate 54 mg oral tablet: 1 tab(s) orally once a day  folic acid 1 mg oral tablet: 1 tab(s) orally once a day  furosemide 40 mg oral tablet: 1 tab(s) orally once a day  Lasix 40 mg oral tablet: 1 tab(s) orally once a day  losartan 25 mg oral tablet: 1 tab(s) orally once a day  losartan 25 mg oral tablet: 1 tab(s) orally once a day  memantine 5 mg oral tablet: 1 tab(s) orally once a day  metoprolol succinate 200 mg oral capsule, extended release: 1 cap(s) orally once a day  pravastatin 40 mg oral tablet: 1 tab(s) orally once a day  traZODone 50 mg oral tablet: 1 tab(s) orally once a day (at bedtime)

## 2024-12-06 NOTE — DISCHARGE NOTE PROVIDER - NSDCFUSCHEDAPPT_GEN_ALL_CORE_FT
[facility]  [facility] [id number]  Scheduled Appointment: 12/26/2024

## 2024-12-07 DIAGNOSIS — J90 PLEURAL EFFUSION, NOT ELSEWHERE CLASSIFIED: ICD-10-CM

## 2024-12-07 LAB
ADD ON TEST-SPECIMEN IN LAB: SIGNIFICANT CHANGE UP
ALBUMIN SERPL ELPH-MCNC: 4 G/DL — SIGNIFICANT CHANGE UP (ref 3.3–5)
ALP SERPL-CCNC: 72 U/L — SIGNIFICANT CHANGE UP (ref 40–120)
ALT FLD-CCNC: 45 U/L — SIGNIFICANT CHANGE UP (ref 10–45)
ANION GAP SERPL CALC-SCNC: 9 MMOL/L — SIGNIFICANT CHANGE UP (ref 5–17)
AST SERPL-CCNC: 49 U/L — HIGH (ref 10–40)
BILIRUB SERPL-MCNC: 0.6 MG/DL — SIGNIFICANT CHANGE UP (ref 0.2–1.2)
BUN SERPL-MCNC: 18 MG/DL — SIGNIFICANT CHANGE UP (ref 7–23)
CALCIUM SERPL-MCNC: 8.8 MG/DL — SIGNIFICANT CHANGE UP (ref 8.4–10.5)
CHLORIDE SERPL-SCNC: 103 MMOL/L — SIGNIFICANT CHANGE UP (ref 96–108)
CHOLEST SERPL-MCNC: 147 MG/DL — SIGNIFICANT CHANGE UP
CO2 SERPL-SCNC: 27 MMOL/L — SIGNIFICANT CHANGE UP (ref 22–31)
CREAT SERPL-MCNC: 0.97 MG/DL — SIGNIFICANT CHANGE UP (ref 0.5–1.3)
D DIMER BLD IA.RAPID-MCNC: 384 NG/ML DDU — HIGH
EGFR: 59 ML/MIN/1.73M2 — LOW
GLUCOSE SERPL-MCNC: 106 MG/DL — HIGH (ref 70–99)
HCT VFR BLD CALC: 38 % — SIGNIFICANT CHANGE UP (ref 34.5–45)
HDLC SERPL-MCNC: 62 MG/DL — SIGNIFICANT CHANGE UP
HGB BLD-MCNC: 11.8 G/DL — SIGNIFICANT CHANGE UP (ref 11.5–15.5)
LIPID PNL WITH DIRECT LDL SERPL: 63 MG/DL — SIGNIFICANT CHANGE UP
MAGNESIUM SERPL-MCNC: 1.8 MG/DL — SIGNIFICANT CHANGE UP (ref 1.6–2.6)
MCHC RBC-ENTMCNC: 29.9 PG — SIGNIFICANT CHANGE UP (ref 27–34)
MCHC RBC-ENTMCNC: 31.1 G/DL — LOW (ref 32–36)
MCV RBC AUTO: 96.2 FL — SIGNIFICANT CHANGE UP (ref 80–100)
NON HDL CHOLESTEROL: 85 MG/DL — SIGNIFICANT CHANGE UP
NRBC # BLD: 0 /100 WBCS — SIGNIFICANT CHANGE UP (ref 0–0)
PLATELET # BLD AUTO: 102 K/UL — LOW (ref 150–400)
POTASSIUM SERPL-MCNC: 4.3 MMOL/L — SIGNIFICANT CHANGE UP (ref 3.5–5.3)
POTASSIUM SERPL-SCNC: 4.3 MMOL/L — SIGNIFICANT CHANGE UP (ref 3.5–5.3)
PROCALCITONIN SERPL-MCNC: 0.09 NG/ML — SIGNIFICANT CHANGE UP (ref 0.02–0.1)
PROT SERPL-MCNC: 6.7 G/DL — SIGNIFICANT CHANGE UP (ref 6–8.3)
RBC # BLD: 3.95 M/UL — SIGNIFICANT CHANGE UP (ref 3.8–5.2)
RBC # FLD: 13.7 % — SIGNIFICANT CHANGE UP (ref 10.3–14.5)
SODIUM SERPL-SCNC: 139 MMOL/L — SIGNIFICANT CHANGE UP (ref 135–145)
TRIGL SERPL-MCNC: 124 MG/DL — SIGNIFICANT CHANGE UP
WBC # BLD: 10.04 K/UL — SIGNIFICANT CHANGE UP (ref 3.8–10.5)
WBC # FLD AUTO: 10.04 K/UL — SIGNIFICANT CHANGE UP (ref 3.8–10.5)

## 2024-12-07 PROCEDURE — 71045 X-RAY EXAM CHEST 1 VIEW: CPT | Mod: 26

## 2024-12-07 PROCEDURE — 99232 SBSQ HOSP IP/OBS MODERATE 35: CPT

## 2024-12-07 RX ORDER — FUROSEMIDE 40 MG/1
20 TABLET ORAL ONCE
Refills: 0 | Status: COMPLETED | OUTPATIENT
Start: 2024-12-07 | End: 2024-12-07

## 2024-12-07 RX ADMIN — DONEPEZIL HYDROCHLORIDE 5 MILLIGRAM(S): 5 TABLET, FILM COATED ORAL at 21:44

## 2024-12-07 RX ADMIN — Medication 2 TABLET(S): at 21:44

## 2024-12-07 RX ADMIN — METOPROLOL TARTRATE 200 MILLIGRAM(S): 100 TABLET, FILM COATED ORAL at 05:40

## 2024-12-07 RX ADMIN — POLYETHYLENE GLYCOL 3350 17 GRAM(S): 17 POWDER, FOR SOLUTION ORAL at 16:04

## 2024-12-07 RX ADMIN — Medication 10 MILLIGRAM(S): at 21:44

## 2024-12-07 RX ADMIN — Medication 800 MILLIGRAM(S): at 10:15

## 2024-12-07 RX ADMIN — APIXABAN 5 MILLIGRAM(S): 2.5 TABLET, FILM COATED ORAL at 05:39

## 2024-12-07 RX ADMIN — AMIODARONE HYDROCHLORIDE 200 MILLIGRAM(S): 200 TABLET ORAL at 18:41

## 2024-12-07 RX ADMIN — Medication 1 APPLICATION(S): at 18:40

## 2024-12-07 RX ADMIN — FUROSEMIDE 20 MILLIGRAM(S): 40 TABLET ORAL at 16:22

## 2024-12-07 RX ADMIN — Medication 1 MILLIGRAM(S): at 10:16

## 2024-12-07 RX ADMIN — AMIODARONE HYDROCHLORIDE 200 MILLIGRAM(S): 200 TABLET ORAL at 05:39

## 2024-12-07 RX ADMIN — Medication 1 APPLICATION(S): at 05:41

## 2024-12-07 RX ADMIN — PANTOPRAZOLE SODIUM 40 MILLIGRAM(S): 40 TABLET, DELAYED RELEASE ORAL at 10:16

## 2024-12-07 RX ADMIN — APIXABAN 5 MILLIGRAM(S): 2.5 TABLET, FILM COATED ORAL at 18:41

## 2024-12-07 RX ADMIN — MEMANTINE HYDROCHLORIDE 5 MILLIGRAM(S): 14 CAPSULE, EXTENDED RELEASE ORAL at 16:03

## 2024-12-07 NOTE — PROGRESS NOTE ADULT - ASSESSMENT
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 Art Walker/Che at [facility] post MV surgery for ?tachy-federico) who initially presented to [facility] for an ambulatory afib/aflutter ablation on 12/4 with post-procedure c/b symptomatic refractory SVT without improvement from IVP Adenosine/Dilt/lopressor requiring CCU upgrade for amio and dilt gtt. Now maintained in NSR, weaned off drips and transitioned to PO amio, planned for outpt EPS/ablation. Course c/b transaminitis, urinary retention s/p Reid (passed TOV), and low grade fever/hypoxia (on 2L NC) on 12/5. CXR concerning for b/l effusions, s/p lasix 20mg IV x1 today. Will follow up morning CXR to re-evaluate effusions. On an EP standpoint, patient is cleared for dc.

## 2024-12-07 NOTE — PROGRESS NOTE ADULT - SUBJECTIVE AND OBJECTIVE BOX
Patient seen and evaluated at bedside    80 yo female with Hx of HTN, HLD, MVR s/p bMVR and maze 2021 and DC-PPM (Medtronic), postop AF and now atrial flutter since Jan 2024 s/p atypical AFL ablation c/b AT now on amiodarone and metoprolol.    Interval Events:  -No acute events overnight  -States she has a productive cough of clear sputum and feels short of breath when walking    Telemetry Reviewed:  Sinus Rhythm with brief episodes of AT      FAMILY HISTORY:  FH: heart attack (Father, Mother)      Allergies:  No Known Allergies    Home Medications:  apixaban 5 mg oral tablet: 1 tab(s) orally 2 times a day (05 Dec 2024 09:20)  diclofenac 1% topical gel: Apply topically to affected area 4 times a day as needed for  mild pain (05 Dec 2024 09:26)  donepezil 5 mg oral tablet: 1 tab(s) orally once a day (05 Dec 2024 09:26)  fenofibrate 54 mg oral tablet: 1 tab(s) orally once a day (04 Dec 2024 15:19)  folic acid 1 mg oral tablet: 1 tab(s) orally once a day (04 Dec 2024 15:19)  lisinopril 10 mg oral tablet: 1 tab(s) orally once a day (04 Dec 2024 15:19)  memantine 5 mg oral tablet: 1 tab(s) orally once a day (05 Dec 2024 09:26)  metoprolol succinate 200 mg oral capsule, extended release: 1 cap(s) orally once a day (05 Dec 2024 09:23)  pravastatin 40 mg oral tablet: 1 tab(s) orally once a day (05 Dec 2024 09:21)  traZODone 50 mg oral tablet: 1 tab(s) orally once a day (at bedtime) (05 Dec 2024 09:26)    Current Medications:   aMIOdarone    Tablet 200 milliGRAM(s) Oral every 12 hours  apixaban 5 milliGRAM(s) Oral two times a day  atorvastatin 10 milliGRAM(s) Oral at bedtime  donepezil 5 milliGRAM(s) Oral at bedtime  folic acid 1 milliGRAM(s) Oral daily  hydrocortisone 1% Cream 1 Application(s) Topical two times a day  influenza  Vaccine (HIGH DOSE) 0.5 milliLiter(s) IntraMuscular once  memantine 5 milliGRAM(s) Oral every 24 hours  metoprolol succinate  milliGRAM(s) Oral daily  pantoprazole    Tablet 40 milliGRAM(s) Oral before breakfast  polyethylene glycol 3350 17 Gram(s) Oral every 24 hours  senna 2 Tablet(s) Oral at bedtime    Social History  Smoking History: denies  Alcohol Use: denies  Drug Use: denies    REVIEW OF SYSTEMS:  Constitutional:     [X] negative [ ] fevers [ ] chills [ ] weight loss [ ] weight gain  HEENT:                  [X] negative [ ] dry eyes [ ] eye irritation [ ] postnasal drip [ ] nasal congestion  CV:                         [X] negative  [ ] chest pain [ ] orthopnea [ ] palpitations [ ] murmur  Resp:                     [X] negative [ ] cough [ ] shortness of breath [ ] wheezing [ ] sputum [ ] hemoptysis  GI:                          [X] negative [ ] nausea [ ] vomiting [ ] diarrhea [ ] constipation [ ] abd pain [ ] dysphagia   :                        [X] negative [ ] dysuria [ ] nocturia [ ] hematuria [ ] increased urinary frequency  MSK:                      [X] negative [ ] back pain [ ] myalgias [ ] arthralgias [ ] fracture  Skin:                       [X] negative [ ] rash [ ] itch  Neuro:                   [X] negative [ ] headache [ ] dizziness [ ] syncope [ ] weakness [ ] numbness  Psych:                    [X] negative [ ] anxiety [ ] depression  Endo:                     [X] negative [ ] diabetes [ ] thyroid problem  Heme/Lymph:      [X] negative [ ] anemia [ ] bleeding problem  Allergic/Immune: [X] negative [ ] itchy eyes [ ] nasal discharge [ ] hives [ ] angioedema    [X] All other systems negative or otherwise described above.  [ ] Unable to assess ROS due to ________.    ICU Vital Signs Last 24 Hrs  T(C): 37.4 (07 Dec 2024 05:36), Max: 37.4 (07 Dec 2024 05:36)  T(F): 99.3 (07 Dec 2024 05:36), Max: 99.3 (07 Dec 2024 05:36)  HR: 76 (07 Dec 2024 05:36) (59 - 91)  BP: 129/80 (07 Dec 2024 05:36) (109/83 - 145/66)  BP(mean): 99 (07 Dec 2024 05:36) (84 - 99)  ABP: --  ABP(mean): --  RR: 18 (07 Dec 2024 05:36) (18 - 32)  SpO2: 92% (07 Dec 2024 05:36) (92% - 100%)    O2 Parameters below as of 07 Dec 2024 05:36  Patient On (Oxygen Delivery Method): nasal cannula  O2 Flow (L/min): 2        Orthostatic VS    Daily     Daily   I&O's Summary    06 Dec 2024 07:01  -  07 Dec 2024 07:00  --------------------------------------------------------  IN: 530.1 mL / OUT: 680 mL / NET: -149.9 mL        Physical Exam:  GENERAL: No acute distress, well-developed  ENT: EOMI, conjunctiva and sclera clear, Neck supple, No JVD, moist mucosa  CHEST/LUNG: crackles in bases  HEART: Regular rate and rhythm; No murmurs, rubs, or gallops, radial and DP 2+ b/l, euvolemic  ABDOMEN: Soft, Nontender, Nondistended  EXTREMITIES:  No clubbing, cyanosis, or edema  NEUROLOGY: AAOx3, non-focal  SKIN: Normal color, No rashes or lesions    LABS:                        11.8   10.04 )-----------( 102      ( 07 Dec 2024 08:42 )             38.0     PT/INR - ( 06 Dec 2024 05:59 )   PT: 17.7 sec;   INR: 1.52          PTT - ( 06 Dec 2024 05:59 )  PTT:32.3 sec  12-07    139  |  103  |  18  ----------------------------<  106[H]  4.3   |  27  |  0.97    Ca    8.8      07 Dec 2024 08:42  Phos  2.5     12-06  Mg     1.8     12-07    TPro  6.7  /  Alb  4.0  /  TBili  0.6  /  DBili  x   /  AST  49[H]  /  ALT  45  /  AlkPhos  72  12-07        proBNP:   Lipid Profile:   HgA1c:   TSH:   Urinalysis Basic - ( 07 Dec 2024 08:42 )    Color: x / Appearance: x / SG: x / pH: x  Gluc: 106 mg/dL / Ketone: x  / Bili: x / Urobili: x   Blood: x / Protein: x / Nitrite: x   Leuk Esterase: x / RBC: x / WBC x   Sq Epi: x / Non Sq Epi: x / Bacteria: x          RADIOLOGY & ADDITIONAL STUDIES:    ECG: Personally reviewed    Telemetry: reviewed    Echo: Personally reviewed    Stress Testing: none    Cath: none    CXR: Personally reviewed    Other cardiac imaging: none    Other misc imaging: none

## 2024-12-07 NOTE — PROGRESS NOTE ADULT - ASSESSMENT
80 yo female with Hx of HTN, HLD, MVR s/p bMVR and maze 2021 and DC-PPM (Medtronic), postop AF and now atrial flutter since Jan 2024 s/p atypical AFL ablation c/b AT now on amiodarone and metoprolol.    -Continue Amiodarone 200 mg BID   -Continue Metoprolol Succinate 200 mg Daily  -Continue Apixaban 5 mg BID  -Will plan for outpatient repeat ablation  -Infectious work up as per primary team 80 yo female with Hx of HTN, HLD, MVR s/p bMVR and maze 2021 and DC-PPM (Medtronic), postop AF and now atrial flutter since Jan 2024 s/p atypical AFL ablation c/b AT now on amiodarone and metoprolol. CXR with bilateral pleural effusions with no signs of consolidation. Currently undergoing infectious work up for productive cough and oxygen requirement. Noted to have crackles in bases, with CXR showing pleural effusions, possibly in the setting of hypervolemia.    -Continue Amiodarone 200 mg BID   -Continue Metoprolol Succinate 200 mg Daily  -Continue Apixaban 5 mg BID  -Consider trial of Furosemide 40 mg IV x 1  -Monitor I/O's  -Will plan for outpatient repeat ablation  -Infectious work up as per primary team

## 2024-12-07 NOTE — PROGRESS NOTE ADULT - SUBJECTIVE AND OBJECTIVE BOX
Interventional Cardiology PA Adult Progress Note    Subjective Assessment:    ROS negative except as noted above.  	  MEDICATIONS:  aMIOdarone    Tablet 200 milliGRAM(s) Oral every 12 hours  metoprolol succinate  milliGRAM(s) Oral daily        donepezil 5 milliGRAM(s) Oral at bedtime  memantine 5 milliGRAM(s) Oral every 24 hours    pantoprazole    Tablet 40 milliGRAM(s) Oral before breakfast  polyethylene glycol 3350 17 Gram(s) Oral every 24 hours  senna 2 Tablet(s) Oral at bedtime    atorvastatin 10 milliGRAM(s) Oral at bedtime    apixaban 5 milliGRAM(s) Oral two times a day  folic acid 1 milliGRAM(s) Oral daily  hydrocortisone 1% Cream 1 Application(s) Topical two times a day  influenza  Vaccine (HIGH DOSE) 0.5 milliLiter(s) IntraMuscular once      	    [PHYSICAL EXAM:  TELEMETRY:  T(C): 37.4 (12-07-24 @ 05:36), Max: 37.4 (12-07-24 @ 05:36)  HR: 76 (12-07-24 @ 05:36) (59 - 91)  BP: 129/80 (12-07-24 @ 05:36) (109/83 - 145/66)  RR: 18 (12-07-24 @ 05:36) (18 - 32)  SpO2: 92% (12-07-24 @ 05:36) (92% - 100%)  Wt(kg): --  I&O's Summary    06 Dec 2024 07:01  -  07 Dec 2024 07:00  --------------------------------------------------------  IN: 530.1 mL / OUT: 680 mL / NET: -149.9 mL                                              Appearance: Normal	  HEENT:   Normal oral mucosa, PERRLA, EOMI	  Neck: Supple, + JVD/ - JVD; Carotid Bruit   Cardiovascular: Normal S1 S2, No JVD, No murmurs,   Respiratory: Lungs clear to auscultation/Decreased Breath Sounds/No Rales, Rhonchi, Wheezing	  Gastrointestinal:  Soft, Non-tender, + BS	  Skin: No rashes, No ecchymoses, No cyanosis  Extremities: Normal range of motion, No clubbing, cyanosis or edema  Vascular: Peripheral pulses palpable 2+ bilaterally  Neurologic: Non-focal  Psychiatry: A & O x 3, Mood & affect appropriate      	    ECG:  	  RADIOLOGY:   DIAGNOSTIC TESTING:  [ ] Echocardiogram:   [ ]  Catheterization:  [ ] Stress Test:    [ ] DOUG  OTHER: 	    LABS:	 	  CARDIAC MARKERS:                                  12.3   7.28  )-----------( 86       ( 06 Dec 2024 05:59 )             37.9     12-06    138  |  105  |  19  ----------------------------<  109[H]  4.2   |  23  |  0.94    Ca    8.4      06 Dec 2024 05:59  Phos  2.5     12-06  Mg     2.0     12-06    TPro  6.1  /  Alb  3.7  /  TBili  0.4  /  DBili  x   /  AST  99[H]  /  ALT  57[H]  /  AlkPhos  66  12-06    proBNP:   Lipid Profile:   HgA1c:   TSH:   PT/INR - ( 06 Dec 2024 05:59 )   PT: 17.7 sec;   INR: 1.52          PTT - ( 06 Dec 2024 05:59 )  PTT:32.3 sec Cardiology PA Adult Progress Note    Subjective Assessment: Patient seen and examined at [facility].     ROS negative except as noted above.  	  MEDICATIONS:  aMIOdarone    Tablet 200 milliGRAM(s) Oral every 12 hours  metoprolol succinate  milliGRAM(s) Oral daily        donepezil 5 milliGRAM(s) Oral at bedtime  memantine 5 milliGRAM(s) Oral every 24 hours    pantoprazole    Tablet 40 milliGRAM(s) Oral before breakfast  polyethylene glycol 3350 17 Gram(s) Oral every 24 hours  senna 2 Tablet(s) Oral at bedtime    atorvastatin 10 milliGRAM(s) Oral at bedtime    apixaban 5 milliGRAM(s) Oral two times a day  folic acid 1 milliGRAM(s) Oral daily  hydrocortisone 1% Cream 1 Application(s) Topical two times a day  influenza  Vaccine (HIGH DOSE) 0.5 milliLiter(s) IntraMuscular once      	    [PHYSICAL EXAM:  TELEMETRY:  T(C): 37.4 (12-07-24 @ 05:36), Max: 37.4 (12-07-24 @ 05:36)  HR: 76 (12-07-24 @ 05:36) (59 - 91)  BP: 129/80 (12-07-24 @ 05:36) (109/83 - 145/66)  RR: 18 (12-07-24 @ 05:36) (18 - 32)  SpO2: 92% (12-07-24 @ 05:36) (92% - 100%)  Wt(kg): --  I&O's Summary    06 Dec 2024 07:01  -  07 Dec 2024 07:00  --------------------------------------------------------  IN: 530.1 mL / OUT: 680 mL / NET: -149.9 mL                                              Appearance: Normal	  HEENT:   Normal oral mucosa, PERRLA, EOMI	  Neck: Supple, - JVD; Carotid Bruit   Cardiovascular: Normal S1 S2, No JVD, No murmurs,   Respiratory: decreased breath sounds b/l   Gastrointestinal:  Soft, Non-tender, + BS	  Skin: No rashes, No ecchymoses, No cyanosis  Extremities: Normal range of motion, No clubbing, cyanosis or edema  Vascular: Peripheral pulses palpable 2+ bilaterally  Neurologic: Non-focal  Psychiatry: A & O x 3, Mood & affect appropriate      	    ECG:  	  RADIOLOGY:   DIAGNOSTIC TESTING:  [ ] Echocardiogram:   [ ]  Catheterization:  [ ] Stress Test:    [ ] DOUG  OTHER: 	    LABS:	 	  CARDIAC MARKERS:                            12.3   7.28  )-----------( 86       ( 06 Dec 2024 05:59 )             37.9     12-06    138  |  105  |  19  ----------------------------<  109[H]  4.2   |  23  |  0.94    Ca    8.4      06 Dec 2024 05:59  Phos  2.5     12-06  Mg     2.0     12-06    TPro  6.1  /  Alb  3.7  /  TBili  0.4  /  DBili  x   /  AST  99[H]  /  ALT  57[H]  /  AlkPhos  66  12-06    proBNP:   Lipid Profile:   HgA1c:   TSH:   PT/INR - ( 06 Dec 2024 05:59 )   PT: 17.7 sec;   INR: 1.52          PTT - ( 06 Dec 2024 05:59 )  PTT:32.3 sec

## 2024-12-08 LAB
ALBUMIN SERPL ELPH-MCNC: 3.6 G/DL — SIGNIFICANT CHANGE UP (ref 3.3–5)
ALP SERPL-CCNC: 69 U/L — SIGNIFICANT CHANGE UP (ref 40–120)
ALT FLD-CCNC: 32 U/L — SIGNIFICANT CHANGE UP (ref 10–45)
ANION GAP SERPL CALC-SCNC: 11 MMOL/L — SIGNIFICANT CHANGE UP (ref 5–17)
AST SERPL-CCNC: 28 U/L — SIGNIFICANT CHANGE UP (ref 10–40)
BILIRUB SERPL-MCNC: 0.7 MG/DL — SIGNIFICANT CHANGE UP (ref 0.2–1.2)
BUN SERPL-MCNC: 16 MG/DL — SIGNIFICANT CHANGE UP (ref 7–23)
CALCIUM SERPL-MCNC: 8.5 MG/DL — SIGNIFICANT CHANGE UP (ref 8.4–10.5)
CHLORIDE SERPL-SCNC: 103 MMOL/L — SIGNIFICANT CHANGE UP (ref 96–108)
CO2 SERPL-SCNC: 25 MMOL/L — SIGNIFICANT CHANGE UP (ref 22–31)
CREAT SERPL-MCNC: 0.88 MG/DL — SIGNIFICANT CHANGE UP (ref 0.5–1.3)
EGFR: 67 ML/MIN/1.73M2 — SIGNIFICANT CHANGE UP
GLUCOSE SERPL-MCNC: 101 MG/DL — HIGH (ref 70–99)
HCT VFR BLD CALC: 34.4 % — LOW (ref 34.5–45)
HGB BLD-MCNC: 11.1 G/DL — LOW (ref 11.5–15.5)
MAGNESIUM SERPL-MCNC: 1.9 MG/DL — SIGNIFICANT CHANGE UP (ref 1.6–2.6)
MCHC RBC-ENTMCNC: 30.2 PG — SIGNIFICANT CHANGE UP (ref 27–34)
MCHC RBC-ENTMCNC: 32.3 G/DL — SIGNIFICANT CHANGE UP (ref 32–36)
MCV RBC AUTO: 93.7 FL — SIGNIFICANT CHANGE UP (ref 80–100)
NRBC # BLD: 0 /100 WBCS — SIGNIFICANT CHANGE UP (ref 0–0)
PLATELET # BLD AUTO: 85 K/UL — LOW (ref 150–400)
POTASSIUM SERPL-MCNC: 3.8 MMOL/L — SIGNIFICANT CHANGE UP (ref 3.5–5.3)
POTASSIUM SERPL-SCNC: 3.8 MMOL/L — SIGNIFICANT CHANGE UP (ref 3.5–5.3)
PROT SERPL-MCNC: 6.2 G/DL — SIGNIFICANT CHANGE UP (ref 6–8.3)
RBC # BLD: 3.67 M/UL — LOW (ref 3.8–5.2)
RBC # FLD: 13.3 % — SIGNIFICANT CHANGE UP (ref 10.3–14.5)
SODIUM SERPL-SCNC: 139 MMOL/L — SIGNIFICANT CHANGE UP (ref 135–145)
WBC # BLD: 6.82 K/UL — SIGNIFICANT CHANGE UP (ref 3.8–10.5)
WBC # FLD AUTO: 6.82 K/UL — SIGNIFICANT CHANGE UP (ref 3.8–10.5)

## 2024-12-08 PROCEDURE — 71045 X-RAY EXAM CHEST 1 VIEW: CPT | Mod: 26

## 2024-12-08 PROCEDURE — 99232 SBSQ HOSP IP/OBS MODERATE 35: CPT

## 2024-12-08 RX ORDER — POTASSIUM CHLORIDE 600 MG/1
20 TABLET, EXTENDED RELEASE ORAL ONCE
Refills: 0 | Status: COMPLETED | OUTPATIENT
Start: 2024-12-08 | End: 2024-12-08

## 2024-12-08 RX ORDER — FUROSEMIDE 40 MG/1
40 TABLET ORAL DAILY
Refills: 0 | Status: DISCONTINUED | OUTPATIENT
Start: 2024-12-08 | End: 2024-12-09

## 2024-12-08 RX ADMIN — MEMANTINE HYDROCHLORIDE 5 MILLIGRAM(S): 14 CAPSULE, EXTENDED RELEASE ORAL at 13:12

## 2024-12-08 RX ADMIN — AMIODARONE HYDROCHLORIDE 200 MILLIGRAM(S): 200 TABLET ORAL at 05:29

## 2024-12-08 RX ADMIN — PANTOPRAZOLE SODIUM 40 MILLIGRAM(S): 40 TABLET, DELAYED RELEASE ORAL at 05:29

## 2024-12-08 RX ADMIN — METOPROLOL TARTRATE 200 MILLIGRAM(S): 100 TABLET, FILM COATED ORAL at 05:29

## 2024-12-08 RX ADMIN — AMIODARONE HYDROCHLORIDE 200 MILLIGRAM(S): 200 TABLET ORAL at 17:18

## 2024-12-08 RX ADMIN — POTASSIUM CHLORIDE 20 MILLIEQUIVALENT(S): 600 TABLET, EXTENDED RELEASE ORAL at 13:09

## 2024-12-08 RX ADMIN — APIXABAN 5 MILLIGRAM(S): 2.5 TABLET, FILM COATED ORAL at 17:18

## 2024-12-08 RX ADMIN — Medication 1 APPLICATION(S): at 17:18

## 2024-12-08 RX ADMIN — Medication 400 MILLIGRAM(S): at 13:09

## 2024-12-08 RX ADMIN — FUROSEMIDE 40 MILLIGRAM(S): 40 TABLET ORAL at 15:35

## 2024-12-08 RX ADMIN — Medication 10 MILLIGRAM(S): at 21:29

## 2024-12-08 RX ADMIN — DONEPEZIL HYDROCHLORIDE 5 MILLIGRAM(S): 5 TABLET, FILM COATED ORAL at 21:30

## 2024-12-08 RX ADMIN — Medication 1 MILLIGRAM(S): at 13:09

## 2024-12-08 RX ADMIN — Medication 2 TABLET(S): at 21:29

## 2024-12-08 RX ADMIN — APIXABAN 5 MILLIGRAM(S): 2.5 TABLET, FILM COATED ORAL at 05:29

## 2024-12-08 NOTE — PROGRESS NOTE ADULT - SUBJECTIVE AND OBJECTIVE BOX
Interventional Cardiology PA Adult Progress Note    Subjective Assessment: Patient seen and examined at bedside. Pt eager to go home soon. Pt denies CP, SOB, f/c, n/v/d.   	  MEDICATIONS:  aMIOdarone    Tablet 200 milliGRAM(s) Oral every 12 hours  metoprolol succinate  milliGRAM(s) Oral daily      donepezil 5 milliGRAM(s) Oral at bedtime  memantine 5 milliGRAM(s) Oral every 24 hours    pantoprazole    Tablet 40 milliGRAM(s) Oral before breakfast  polyethylene glycol 3350 17 Gram(s) Oral every 24 hours  senna 2 Tablet(s) Oral at bedtime    atorvastatin 10 milliGRAM(s) Oral at bedtime    apixaban 5 milliGRAM(s) Oral two times a day  folic acid 1 milliGRAM(s) Oral daily  hydrocortisone 1% Cream 1 Application(s) Topical two times a day  influenza  Vaccine (HIGH DOSE) 0.5 milliLiter(s) IntraMuscular once  magnesium oxide 400 milliGRAM(s) Oral once  potassium chloride   Powder 20 milliEquivalent(s) Oral once        [PHYSICAL EXAM:  TELEMETRY:  T(C): 36.6 (12-08-24 @ 08:54), Max: 37 (12-07-24 @ 20:53)  HR: 64 (12-08-24 @ 08:54) (60 - 69)  BP: 137/63 (12-08-24 @ 08:54) (127/58 - 153/68)  RR: 18 (12-08-24 @ 08:54) (18 - 18)  SpO2: 93% (12-08-24 @ 08:54) (93% - 98%)  Wt(kg): --  I&O's Summary    07 Dec 2024 07:01  -  08 Dec 2024 07:00  --------------------------------------------------------  IN: 720 mL / OUT: 0 mL / NET: 720 mL                                    Appearance: Pt seen in bed in NAD 	  HEENT:   Normal oral mucosa, PERRL, EOMI	  Neck: Supple  Cardiovascular: Normal S1 S2, No JVD, No murmurs.  +PPM L chest wall   Respiratory: Bibasilar rales noted. Satting 93 on room air. No Rhonchi, Wheezing	  Gastrointestinal:  Soft, Non-tender, + BS	  Skin: No rashes, No ecchymoses, No cyanosis  Extremities: Normal range of motion, No clubbing, cyanosis or edema  Vascular: Peripheral pulses palpable 2+ bilaterally  Neurologic: Non-focal  Psychiatry: A & O x 3, Mood & affect appropriate  	    LABS:	 	                            11.1   6.82  )-----------( 85       ( 08 Dec 2024 05:30 )             34.4     12-08    139  |  103  |  16  ----------------------------<  101[H]  3.8   |  25  |  0.88    Ca    8.5      08 Dec 2024 05:30  Mg     1.9     12-08    TPro  6.2  /  Alb  3.6  /  TBili  0.7  /  DBili  x   /  AST  28  /  ALT  32  /  AlkPhos  69  12-08    proBNP:   Lipid Profile:   HgA1c:   TSH:

## 2024-12-08 NOTE — PROGRESS NOTE ADULT - ASSESSMENT
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 Art Walker/Che at [facility] post MV surgery for ?tachy-federico) who initially presented to [facility] for an ambulatory afib/aflutter ablation on 12/4 with post-procedure c/b symptomatic refractory SVT without improvement from IVP Adenosine/Dilt/lopressor requiring CCU upgrade for amio and dilt gtt. Now maintained in NSR, weaned off drips and transitioned to PO amio, planned for outpt EPS/ablation. Course c/b transaminitis, urinary retention s/p Reid (passed TOV), and low grade fever/hypoxia (on 2L NC) on 12/5. CXR concerning for b/l effusions, s/p lasix 20mg IV x1 today. Will follow up morning CXR to re-evaluate effusions. On an EP standpoint, patient is cleared for dc.

## 2024-12-08 NOTE — PROGRESS NOTE ADULT - PROBLEM SELECTOR PLAN 7
- Continue Memantine 5mg QD and Donepezil 5mg QD      F: None  E: Replete if K<4 or Mag<2  N: DASH Diet  VTEppx: Eliquis  CODE: FULL  Dispo: Cardiac tele
AM lipid panel  - Continue Atorvastatin 10mg QD (home Pravastatin 40mg)
Ldl 63   - Continue Atorvastatin 10mg QD (home Pravastatin 40mg)

## 2024-12-08 NOTE — PROGRESS NOTE ADULT - PROBLEM SELECTOR PLAN 8
- Continue Memantine 5mg QD and Donepezil 5mg QD      F: None  E: Replete if K<4 or Mag<2  N: DASH Diet  VTEppx: Eliquis  CODE: FULL  Dispo: Cardiac tele
- Continue Memantine 5mg QD and Donepezil 5mg QD      F: None  E: Replete if K<4 or Mag<2  N: DASH Diet  VTEppx: Eliquis  CODE: FULL  Dispo: Cardiac tele

## 2024-12-08 NOTE — PROGRESS NOTE ADULT - PROBLEM SELECTOR PLAN 1
Post afib/flutter ablation c/b refractory SVT unresponsive to IVP dilt/lopressor/adenosine  - s/p dilt gtt and amio gtt in CCU, now in NSR/A.paced  - Continue Amio 200mg BIDx 10 days (12/7-12/16) followed by 200mg QD   - Continue Toprol 200mg QD

## 2024-12-08 NOTE — PROGRESS NOTE ADULT - PROBLEM SELECTOR PLAN 3
- S/p ambulatory afib/flutter ablation 12/4/24 c/b SVT as above  - AC: Eliquis 5mg BID  - Rate control: Toprol 200mg QD  - Rhythm control: Amio as above
- S/p ambulatory afib/flutter ablation 12/4/24 c/b SVT as above  - AC: Eliquis 5mg BID  - Rate control: Toprol 200mg QD  - Rhythm control: Amio as above
- LFTS 99/57, uptrending after initiating Amio  - Daily LFTs  - Per EP, continue amio with repeat outpt LFTs on Monday

## 2024-12-08 NOTE — PROGRESS NOTE ADULT - PROBLEM SELECTOR PLAN 4
- LFTS 99/57, uptrending after initiating Amio  - Daily LFTs  - Per EP, continue amio with repeat outpt LFTs on Monday
- Reid placed 12/5 AM 2/2 urinary retention  - F/u TOV in AM
- LFTS 99/57, uptrending after initiating Amio  - Daily LFTs  - Per EP, continue amio with repeat outpt LFTs on Monday

## 2024-12-08 NOTE — PROGRESS NOTE ADULT - PROBLEM SELECTOR PLAN 2
CXR 12/6: increase in pulm vascular congestion and bibasilar pleural effussions L>R   CXR 12/7: trace b/l pleural effusions, congestion/and/or infltrates silhouetting left hemidrapgrahm   -Room air will desat to 88%-- currently on 4L NC satting mid 90s   -in no distress   -Lasix 20mg IVP x1 and will f/u AM chest XR
- S/p ambulatory afib/flutter ablation 12/4/24 c/b SVT as above  - AC: Eliquis 5mg BID  - Rate control: Toprol 200mg QD  - Rhythm control: Amio as above
CXR 12/6: increase in pulm vascular congestion and bibasilar pleural effussions L>R   CXR 12/7: trace b/l pleural effusions, congestion/and/or infltrates silhouetting left hemidrapgrahm   -Room air will desat to 88%-- currently on 4L NC satting mid 90s   -in no distress   -s/p Lasix 20mg IVP x1 12/7,  AM chest XR Left effusion. Congestion and/or infiltrates.     o as d/w Dr Arnold start Lasix 40mg PO Qd, encourage incentive spirometer

## 2024-12-08 NOTE — PROGRESS NOTE ADULT - PROBLEM SELECTOR PLAN 5
SBPs stable  - Continue Lisinopril 10mg QD and Toprol 200mg QD
- Reid placed 12/5 AM 2/2 urinary retention  -s/p successful TOV
- Reid placed 12/5 AM 2/2 urinary retention  - F/u TOV in AM

## 2024-12-08 NOTE — PROGRESS NOTE ADULT - PROBLEM SELECTOR PLAN 6
SBPs stable  - Continue Lisinopril 10mg QD and Toprol 200mg QD
SBPs stable  - Continue Lisinopril 10mg QD and Toprol 200mg QD
AM lipid panel  - Continue Atorvastatin 10mg QD (home Pravastatin 40mg)

## 2024-12-09 ENCOUNTER — TRANSCRIPTION ENCOUNTER (OUTPATIENT)
Age: 79
End: 2024-12-09

## 2024-12-09 VITALS
HEART RATE: 67 BPM | OXYGEN SATURATION: 95 % | TEMPERATURE: 98 F | RESPIRATION RATE: 18 BRPM | DIASTOLIC BLOOD PRESSURE: 61 MMHG | SYSTOLIC BLOOD PRESSURE: 130 MMHG

## 2024-12-09 LAB
ALBUMIN SERPL ELPH-MCNC: 4.1 G/DL — SIGNIFICANT CHANGE UP (ref 3.3–5)
ALP SERPL-CCNC: 72 U/L — SIGNIFICANT CHANGE UP (ref 40–120)
ALT FLD-CCNC: 27 U/L — SIGNIFICANT CHANGE UP (ref 10–45)
ANION GAP SERPL CALC-SCNC: 13 MMOL/L — SIGNIFICANT CHANGE UP (ref 5–17)
AST SERPL-CCNC: 22 U/L — SIGNIFICANT CHANGE UP (ref 10–40)
BILIRUB SERPL-MCNC: 0.6 MG/DL — SIGNIFICANT CHANGE UP (ref 0.2–1.2)
BUN SERPL-MCNC: 20 MG/DL — SIGNIFICANT CHANGE UP (ref 7–23)
CALCIUM SERPL-MCNC: 9.2 MG/DL — SIGNIFICANT CHANGE UP (ref 8.4–10.5)
CHLORIDE SERPL-SCNC: 102 MMOL/L — SIGNIFICANT CHANGE UP (ref 96–108)
CO2 SERPL-SCNC: 29 MMOL/L — SIGNIFICANT CHANGE UP (ref 22–31)
CREAT SERPL-MCNC: 1.03 MG/DL — SIGNIFICANT CHANGE UP (ref 0.5–1.3)
EGFR: 55 ML/MIN/1.73M2 — LOW
GLUCOSE SERPL-MCNC: 86 MG/DL — SIGNIFICANT CHANGE UP (ref 70–99)
HCT VFR BLD CALC: 38.7 % — SIGNIFICANT CHANGE UP (ref 34.5–45)
HGB BLD-MCNC: 12.3 G/DL — SIGNIFICANT CHANGE UP (ref 11.5–15.5)
HGB FREE PLAS-MCNC: 11.5 MG/DL — HIGH (ref 0–4.9)
HGB FREE PLAS-MCNC: 20.5 MG/DL — HIGH (ref 0–4.9)
MAGNESIUM SERPL-MCNC: 2.1 MG/DL — SIGNIFICANT CHANGE UP (ref 1.6–2.6)
MCHC RBC-ENTMCNC: 30.6 PG — SIGNIFICANT CHANGE UP (ref 27–34)
MCHC RBC-ENTMCNC: 31.8 G/DL — LOW (ref 32–36)
MCV RBC AUTO: 96.3 FL — SIGNIFICANT CHANGE UP (ref 80–100)
NRBC # BLD: 0 /100 WBCS — SIGNIFICANT CHANGE UP (ref 0–0)
PLATELET # BLD AUTO: 104 K/UL — LOW (ref 150–400)
POTASSIUM SERPL-MCNC: 4.1 MMOL/L — SIGNIFICANT CHANGE UP (ref 3.5–5.3)
POTASSIUM SERPL-SCNC: 4.1 MMOL/L — SIGNIFICANT CHANGE UP (ref 3.5–5.3)
PROT SERPL-MCNC: 7.1 G/DL — SIGNIFICANT CHANGE UP (ref 6–8.3)
RBC # BLD: 4.02 M/UL — SIGNIFICANT CHANGE UP (ref 3.8–5.2)
RBC # FLD: 13.5 % — SIGNIFICANT CHANGE UP (ref 10.3–14.5)
SODIUM SERPL-SCNC: 144 MMOL/L — SIGNIFICANT CHANGE UP (ref 135–145)
WBC # BLD: 5.97 K/UL — SIGNIFICANT CHANGE UP (ref 3.8–10.5)
WBC # FLD AUTO: 5.97 K/UL — SIGNIFICANT CHANGE UP (ref 3.8–10.5)

## 2024-12-09 PROCEDURE — 97161 PT EVAL LOW COMPLEX 20 MIN: CPT

## 2024-12-09 PROCEDURE — 80053 COMPREHEN METABOLIC PANEL: CPT

## 2024-12-09 PROCEDURE — 83051 HEMOGLOBIN PLASMA: CPT

## 2024-12-09 PROCEDURE — C1730: CPT

## 2024-12-09 PROCEDURE — 84132 ASSAY OF SERUM POTASSIUM: CPT

## 2024-12-09 PROCEDURE — 36415 COLL VENOUS BLD VENIPUNCTURE: CPT

## 2024-12-09 PROCEDURE — 82330 ASSAY OF CALCIUM: CPT

## 2024-12-09 PROCEDURE — 83036 HEMOGLOBIN GLYCOSYLATED A1C: CPT

## 2024-12-09 PROCEDURE — 86901 BLOOD TYPING SEROLOGIC RH(D): CPT

## 2024-12-09 PROCEDURE — 93306 TTE W/DOPPLER COMPLETE: CPT

## 2024-12-09 PROCEDURE — 74018 RADEX ABDOMEN 1 VIEW: CPT

## 2024-12-09 PROCEDURE — 85027 COMPLETE CBC AUTOMATED: CPT

## 2024-12-09 PROCEDURE — C1894: CPT

## 2024-12-09 PROCEDURE — 85730 THROMBOPLASTIN TIME PARTIAL: CPT

## 2024-12-09 PROCEDURE — C1766: CPT

## 2024-12-09 PROCEDURE — 83605 ASSAY OF LACTIC ACID: CPT

## 2024-12-09 PROCEDURE — 83735 ASSAY OF MAGNESIUM: CPT

## 2024-12-09 PROCEDURE — 93005 ELECTROCARDIOGRAM TRACING: CPT

## 2024-12-09 PROCEDURE — 86900 BLOOD TYPING SEROLOGIC ABO: CPT

## 2024-12-09 PROCEDURE — 71045 X-RAY EXAM CHEST 1 VIEW: CPT

## 2024-12-09 PROCEDURE — 85014 HEMATOCRIT: CPT

## 2024-12-09 PROCEDURE — 82803 BLOOD GASES ANY COMBINATION: CPT

## 2024-12-09 PROCEDURE — 85025 COMPLETE CBC W/AUTO DIFF WBC: CPT

## 2024-12-09 PROCEDURE — 85347 COAGULATION TIME ACTIVATED: CPT

## 2024-12-09 PROCEDURE — 85379 FIBRIN DEGRADATION QUANT: CPT

## 2024-12-09 PROCEDURE — C1760: CPT

## 2024-12-09 PROCEDURE — 80061 LIPID PANEL: CPT

## 2024-12-09 PROCEDURE — C1733: CPT

## 2024-12-09 PROCEDURE — 86850 RBC ANTIBODY SCREEN: CPT

## 2024-12-09 PROCEDURE — 82565 ASSAY OF CREATININE: CPT

## 2024-12-09 PROCEDURE — 85610 PROTHROMBIN TIME: CPT

## 2024-12-09 PROCEDURE — 83010 ASSAY OF HAPTOGLOBIN QUANT: CPT

## 2024-12-09 PROCEDURE — 84443 ASSAY THYROID STIM HORMONE: CPT

## 2024-12-09 PROCEDURE — 83615 LACTATE (LD) (LDH) ENZYME: CPT

## 2024-12-09 PROCEDURE — C1759: CPT

## 2024-12-09 PROCEDURE — 87899 AGENT NOS ASSAY W/OPTIC: CPT

## 2024-12-09 PROCEDURE — 84100 ASSAY OF PHOSPHORUS: CPT

## 2024-12-09 PROCEDURE — 84145 PROCALCITONIN (PCT): CPT

## 2024-12-09 PROCEDURE — 0225U NFCT DS DNA&RNA 21 SARSCOV2: CPT

## 2024-12-09 PROCEDURE — 99239 HOSP IP/OBS DSCHRG MGMT >30: CPT

## 2024-12-09 PROCEDURE — 84295 ASSAY OF SERUM SODIUM: CPT

## 2024-12-09 PROCEDURE — 82947 ASSAY GLUCOSE BLOOD QUANT: CPT

## 2024-12-09 RX ORDER — APIXABAN 2.5 MG/1
1 TABLET, FILM COATED ORAL
Refills: 0 | DISCHARGE

## 2024-12-09 RX ORDER — LOSARTAN POTASSIUM 100 MG/1
1 TABLET, FILM COATED ORAL
Qty: 30 | Refills: 3
Start: 2024-12-09 | End: 2025-04-07

## 2024-12-09 RX ORDER — FUROSEMIDE 40 MG/1
1 TABLET ORAL
Qty: 30 | Refills: 3
Start: 2024-12-09 | End: 2025-04-07

## 2024-12-09 RX ORDER — DAPAGLIFLOZIN 10 MG/1
1 TABLET, FILM COATED ORAL
Qty: 30 | Refills: 3
Start: 2024-12-09 | End: 2025-04-07

## 2024-12-09 RX ORDER — FUROSEMIDE 40 MG/1
1 TABLET ORAL
Qty: 30 | Refills: 0
Start: 2024-12-09 | End: 2025-01-07

## 2024-12-09 RX ORDER — DAPAGLIFLOZIN 10 MG/1
1 TABLET, FILM COATED ORAL
Qty: 30 | Refills: 0
Start: 2024-12-09 | End: 2025-01-07

## 2024-12-09 RX ORDER — APIXABAN 2.5 MG/1
1 TABLET, FILM COATED ORAL
Qty: 60 | Refills: 2
Start: 2024-12-09 | End: 2025-03-08

## 2024-12-09 RX ORDER — LISINOPRIL 20 MG/1
1 TABLET ORAL
Refills: 0 | DISCHARGE

## 2024-12-09 RX ORDER — LOSARTAN POTASSIUM 100 MG/1
1 TABLET, FILM COATED ORAL
Qty: 30 | Refills: 0
Start: 2024-12-09 | End: 2025-01-07

## 2024-12-09 RX ADMIN — PANTOPRAZOLE SODIUM 40 MILLIGRAM(S): 40 TABLET, DELAYED RELEASE ORAL at 05:58

## 2024-12-09 RX ADMIN — MEMANTINE HYDROCHLORIDE 5 MILLIGRAM(S): 14 CAPSULE, EXTENDED RELEASE ORAL at 14:07

## 2024-12-09 RX ADMIN — FUROSEMIDE 40 MILLIGRAM(S): 40 TABLET ORAL at 05:57

## 2024-12-09 RX ADMIN — APIXABAN 5 MILLIGRAM(S): 2.5 TABLET, FILM COATED ORAL at 05:59

## 2024-12-09 RX ADMIN — AMIODARONE HYDROCHLORIDE 200 MILLIGRAM(S): 200 TABLET ORAL at 05:58

## 2024-12-09 RX ADMIN — Medication 1 MILLIGRAM(S): at 14:06

## 2024-12-09 RX ADMIN — Medication 1 APPLICATION(S): at 06:00

## 2024-12-09 RX ADMIN — METOPROLOL TARTRATE 200 MILLIGRAM(S): 100 TABLET, FILM COATED ORAL at 05:57

## 2024-12-09 NOTE — DISCHARGE NOTE NURSING/CASE MANAGEMENT/SOCIAL WORK - PATIENT PORTAL LINK FT
You can access the FollowMyHealth Patient Portal offered by [facility] by registering at the following website: [email or web address]. By joining [facility]’s FollowMyHealth portal, you will also be able to view your health information using other applications (apps) compatible with our system.

## 2024-12-09 NOTE — DISCHARGE NOTE NURSING/CASE MANAGEMENT/SOCIAL WORK - FINANCIAL ASSISTANCE
[facility] provides services at a reduced cost to those who are determined to be eligible through [facility]’s financial assistance program. Information regarding [facility]’s financial assistance program can be found by going to [email or web address] or by calling [phone].

## 2024-12-09 NOTE — PHYSICAL THERAPY INITIAL EVALUATION ADULT - HEALTH SCREEN CRITERIA
yes Libtayo Counseling- I discussed with the patient the risks of Libtayo including but not limited to nausea, vomiting, diarrhea, and bone or muscle pain.  The patient verbalized understanding of the proper use and possible adverse effects of Libtayo.  All of the patient's questions and concerns were addressed.

## 2024-12-09 NOTE — PHYSICAL THERAPY INITIAL EVALUATION ADULT - ADDITIONAL COMMENTS
Community ambulator who lives alone in private house, +6 MATHEW. Ambulates without AD, however, reports to owning SC for use, but rarely uses. no FOS inside. Patient owns shower chair. Has [facility] services 7 days/week for ~5-7 hours/day to assist as needed. Patients son stays with patient in the morning and helps prepare meals.

## 2024-12-09 NOTE — PHYSICAL THERAPY INITIAL EVALUATION ADULT - GENERAL OBSERVATIONS, REHAB EVAL
PT IE completed. Patient received semi supine in bed +tele, +RA, +heplock IV, NAD, willing to work with PT.

## 2024-12-09 NOTE — PHYSICAL THERAPY INITIAL EVALUATION ADULT - PERTINENT HX OF CURRENT PROBLEM, REHAB EVAL
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 Art Walker/Che at [facility] post MV surgery for ?tachy-federico) who initially presented to [facility] for an ambulatory afib/aflutter ablation on 12/4 with post-procedure c/b symptomatic refractory SVT without improvement from IVP Adenosine/Dilt/lopressor requiring CCU upgrade for amio and dilt gtt. Now maintained in NSR, weaned off drips and transitioned to PO amio, planned for outpt EPS/ablation. Course c/b transaminitis, urinary retention s/p Reid (passed TOV), and low grade fever/hypoxia (on 2L NC) on 12/5. CXR concerning for b/l effusions, s/p lasix 20mg IV x1 today. Will follow up morning CXR to re-evaluate effusions. On an EP standpoint, patient is cleared for dc.

## 2024-12-09 NOTE — PROGRESS NOTE ADULT - REASON FOR ADMISSION
ablation atrial fibrillation

## 2024-12-09 NOTE — DISCHARGE NOTE NURSING/CASE MANAGEMENT/SOCIAL WORK - NSDCFUADDAPPT_GEN_ALL_CORE_FT
Please follow up with Dr. Becerra in the [facility] office. The office will call you to schedule an appointment.

## 2024-12-22 DIAGNOSIS — I97.191 OTHER POSTPROCEDURAL CARDIAC FUNCTIONAL DISTURBANCES FOLLOWING OTHER SURGERY: ICD-10-CM

## 2024-12-22 DIAGNOSIS — I11.0 HYPERTENSIVE HEART DISEASE WITH HEART FAILURE: ICD-10-CM

## 2024-12-22 DIAGNOSIS — R74.01 ELEVATION OF LEVELS OF LIVER TRANSAMINASE LEVELS: ICD-10-CM

## 2024-12-22 DIAGNOSIS — I48.91 UNSPECIFIED ATRIAL FIBRILLATION: ICD-10-CM

## 2024-12-22 DIAGNOSIS — R21 RASH AND OTHER NONSPECIFIC SKIN ERUPTION: ICD-10-CM

## 2024-12-22 DIAGNOSIS — R33.9 RETENTION OF URINE, UNSPECIFIED: ICD-10-CM

## 2024-12-22 DIAGNOSIS — Z95.0 PRESENCE OF CARDIAC PACEMAKER: ICD-10-CM

## 2024-12-22 DIAGNOSIS — I50.33 ACUTE ON CHRONIC DIASTOLIC (CONGESTIVE) HEART FAILURE: ICD-10-CM

## 2024-12-22 DIAGNOSIS — I47.10 SUPRAVENTRICULAR TACHYCARDIA, UNSPECIFIED: ICD-10-CM

## 2024-12-22 DIAGNOSIS — Z95.3 PRESENCE OF XENOGENIC HEART VALVE: ICD-10-CM

## 2024-12-22 DIAGNOSIS — R00.0 TACHYCARDIA, UNSPECIFIED: ICD-10-CM

## 2024-12-22 DIAGNOSIS — I48.4 ATYPICAL ATRIAL FLUTTER: ICD-10-CM

## 2024-12-22 DIAGNOSIS — E78.5 HYPERLIPIDEMIA, UNSPECIFIED: ICD-10-CM

## 2024-12-26 ENCOUNTER — APPOINTMENT (OUTPATIENT)
Dept: HEART AND VASCULAR | Facility: CLINIC | Age: 79
End: 2024-12-26

## 2025-01-14 ENCOUNTER — APPOINTMENT (OUTPATIENT)
Age: 80
End: 2025-01-14
Payer: COMMERCIAL

## 2025-01-14 VITALS — HEART RATE: 78 BPM

## 2025-01-14 DIAGNOSIS — Z86.79 OTHER SPECIFIED POSTPROCEDURAL STATES: ICD-10-CM

## 2025-01-14 DIAGNOSIS — I48.4 ATYPICAL ATRIAL FLUTTER: ICD-10-CM

## 2025-01-14 DIAGNOSIS — Z98.890 OTHER SPECIFIED POSTPROCEDURAL STATES: ICD-10-CM

## 2025-01-14 DIAGNOSIS — I47.19 OTHER SUPRAVENTRICULAR TACHYCARDIA: ICD-10-CM

## 2025-01-14 PROCEDURE — 99214 OFFICE O/P EST MOD 30 MIN: CPT

## 2025-01-14 PROCEDURE — 99204 OFFICE O/P NEW MOD 45 MIN: CPT

## 2025-01-14 PROCEDURE — 93000 ELECTROCARDIOGRAM COMPLETE: CPT

## 2025-03-03 ENCOUNTER — APPOINTMENT (OUTPATIENT)
Facility: CLINIC | Age: 80
End: 2025-03-03
Payer: MEDICARE

## 2025-03-03 DIAGNOSIS — Z86.79 OTHER SPECIFIED POSTPROCEDURAL STATES: ICD-10-CM

## 2025-03-03 DIAGNOSIS — Z98.890 OTHER SPECIFIED POSTPROCEDURAL STATES: ICD-10-CM

## 2025-03-03 DIAGNOSIS — I47.19 OTHER SUPRAVENTRICULAR TACHYCARDIA: ICD-10-CM

## 2025-03-03 PROCEDURE — 99213 OFFICE O/P EST LOW 20 MIN: CPT

## 2025-03-03 PROCEDURE — 93000 ELECTROCARDIOGRAM COMPLETE: CPT

## 2025-03-04 VITALS
OXYGEN SATURATION: 97 % | DIASTOLIC BLOOD PRESSURE: 67 MMHG | WEIGHT: 163 LBS | SYSTOLIC BLOOD PRESSURE: 136 MMHG | BODY MASS INDEX: 25.53 KG/M2 | HEART RATE: 73 BPM

## 2025-03-11 RX ORDER — AMIODARONE HYDROCHLORIDE 200 MG/1
200 TABLET ORAL
Qty: 180 | Refills: 3 | Status: ACTIVE | COMMUNITY
Start: 2025-03-11 | End: 1900-01-01

## 2025-04-21 NOTE — PROCEDURE NOTE - NSFINDINGS_GEN_A_CORE
Subjective   Patient ID: Talya is a 76 year old female.    Talya Declined a chaperone.    Chief Complaint   Patient presents with   • Gyn Exam     Talya Trujillo is a 76 year old  who presents today for annual well woman exam.  She is a postmenopausal female.  She denies any postmenopausal bleeding or severe vasomotor symptoms.  She is Up to date for screening Pap smear.  She is Up to date for screening mammogram and colonoscopy.  She denies any bladder or bowel dysfunction.  She denies any breast or vaginal concerns.  She is up-to-date on her preventative health screenings and immunizations.          Patient's medications, allergies, past medical, surgical, social and family histories were reviewed and updated as appropriate.    Review of Systems   Constitutional:  Negative for chills, fatigue and fever.   Eyes:  Negative for pain and visual disturbance.   Respiratory:  Negative for chest tightness and shortness of breath.    Cardiovascular:  Negative for chest pain, palpitations and leg swelling.   Gastrointestinal:  Negative for abdominal pain, constipation, diarrhea, nausea and vomiting.   Endocrine: Negative for cold intolerance and heat intolerance.   Genitourinary:  Negative for dysuria, flank pain, frequency, hematuria, urgency, vaginal bleeding, vaginal discharge and vaginal pain.   Musculoskeletal:  Negative for back pain and joint swelling.   Neurological:  Negative for dizziness, weakness, light-headedness and headaches.   Hematological:  Does not bruise/bleed easily.   Psychiatric/Behavioral:  Negative for suicidal ideas. The patient is not nervous/anxious.        Objective   Physical Exam  Constitutional:       General: She is not in acute distress.     Appearance: Normal appearance. She is well-developed.   Genitourinary:      Vulva, bladder and rectum normal.      Right Labia: No rash, tenderness or lesions.     Left Labia: No tenderness, lesions or rash.     No vaginal discharge, erythema,  tenderness or bleeding.        Right Adnexa: not tender, not full and no mass present.     Left Adnexa: not tender, not full and no mass present.     No cervical motion tenderness or discharge.      Uterus is not enlarged or tender.      No uterine mass detected.     Bladder is not tender.       Pelvic exam was performed with patient in the lithotomy position.   Breasts:     Right: No inverted nipple, mass, nipple discharge, skin change or tenderness.      Left: No inverted nipple, mass, nipple discharge, skin change or tenderness.   HENT:      Head: Normocephalic and atraumatic.   Neck:      Trachea: Trachea normal. No tracheal deviation.   Cardiovascular:      Rate and Rhythm: Normal rate.      Pulses: Normal pulses.   Pulmonary:      Effort: Pulmonary effort is normal. No respiratory distress.   Abdominal:      General: There is no distension.      Palpations: Abdomen is soft. There is no mass.      Tenderness: There is no abdominal tenderness. There is no guarding or rebound.   Musculoskeletal:         General: Normal range of motion.      Right lower leg: No edema.      Left lower leg: No edema.   Lymphadenopathy:      Lower Body: No right inguinal adenopathy. No left inguinal adenopathy.   Neurological:      General: No focal deficit present.      Mental Status: She is alert.   Skin:     General: Skin is warm and dry.      Findings: No bruising or rash.   Psychiatric:         Mood and Affect: Mood normal.         Speech: Speech normal.         Behavior: Behavior normal. Behavior is cooperative.         Judgment: Judgment normal.   Vitals reviewed.     Visit Vitals  BP (!) 154/74 (BP Location: LUE - Left upper extremity, Patient Position: Sitting, Cuff Size: Regular)   Pulse (!) 58   Resp 16   Ht 5' 3\" (1.6 m)   Wt 68.9 kg (151 lb 12.6 oz)   SpO2 100%   BMI 26.89 kg/m²      Assessment   Problem List Items Addressed This Visit    None  Visit Diagnoses       Encounter for gynecological examination without  abnormal finding    -  Primary        Plan:  Complete physical exam including clinical breast exam, pelvic exam  performed today.  Cervical cancer screening no longer required for this over 65-year-old patient with no history of high-grade cervical dysplasia.  Recommend clinical breast exam and pelvic exam every other year.  Discussed post menopausal precautions for bleeding, vaginal atrophy, and vasomotor symptoms  Patient is up-to-date on mammogram.  Self breast exam/breast awareness discussed and taught today.    Recommend daily multivitamin with calcium and vitamin D.  Follow up annually for pelvic exam, or as needed for concerns.    Sabiha Lu MD    serosanguineous fluid

## 2025-04-22 ENCOUNTER — APPOINTMENT (OUTPATIENT)
Age: 80
End: 2025-04-22
Payer: MEDICAID

## 2025-04-22 VITALS — HEART RATE: 60 BPM

## 2025-04-22 DIAGNOSIS — I47.19 OTHER SUPRAVENTRICULAR TACHYCARDIA: ICD-10-CM

## 2025-04-22 DIAGNOSIS — Z98.890 OTHER SPECIFIED POSTPROCEDURAL STATES: ICD-10-CM

## 2025-04-22 DIAGNOSIS — I48.19 OTHER PERSISTENT ATRIAL FIBRILLATION: ICD-10-CM

## 2025-04-22 DIAGNOSIS — Z86.79 OTHER SPECIFIED POSTPROCEDURAL STATES: ICD-10-CM

## 2025-04-22 PROCEDURE — 93288 INTERROG EVL PM/LDLS PM IP: CPT

## 2025-04-22 PROCEDURE — 99214 OFFICE O/P EST MOD 30 MIN: CPT

## 2025-07-20 NOTE — PROGRESS NOTE ADULT - REASON FOR ADMISSION
Tianna VIVAS
Tianna VIVSA
Severe MR
Tianna VIVAS
Additional Safety/Bands: